# Patient Record
Sex: MALE | Race: WHITE | NOT HISPANIC OR LATINO | Employment: OTHER | URBAN - METROPOLITAN AREA
[De-identification: names, ages, dates, MRNs, and addresses within clinical notes are randomized per-mention and may not be internally consistent; named-entity substitution may affect disease eponyms.]

---

## 2018-03-04 NOTE — PROGRESS NOTES
"[facility] Urology Consult:  Consult from: Dr Lugo/LISA Deluna ([facility])  Consult for: BPH    Phoenix Fragoso is a 57 y.o. male who presents for evaluation of BPH/LUTS at the referral of Dr. Lugo and LISA Deluna, his primary care team    He most recently saw LISA Deluna on 1/15/18, and reported nocturia increasing to 5-6 times nightly.  He had been on Flomax but felt that become less effective.  Terazosin 5 mg was added and he was referred for further evaluation.  He was also noted at that visit to have some bilateral lower extremity edema for a few weeks.  He had been on a calcium channel blocker/ACE inhibitor combination drug (lotrel) for his hypertension.  This was discontinued and he was started on benazepril. Also noted to have hyperlipidemia, hypothyroidism, anxiety (alprazolam/celexa).  He was also given 5mg cialis.    He did see Dr Pitts in 6/2014 for ED after  from his wife of 33 years. He reported ability to have erections but was unable to ejaculate.  History of urological symptoms included decrease force of his urinary stream suggestive slightly more than a trickle not feeling empty postvoid, nocturia x2, daytime frequency every hour.  No hematuria burning on urination or urinary tract infection or stone disease. 30g benign JAY. Given flomax 0.4 daily and prn Cialis 20mg    He presents today reporting nocturia every 1.5 hours at night. 4-5x.  Also endorses significant hesitancy, with intermittency, + pv dribble.  + urgency with just less than half of voids, no UUI   Some mild weakening of stream - partner asserts she heard a good flow, but he notes he "cant pee over the side of the boat anymore"  Still on tamsulosin, not taking terazosin  Brother prostate cancer - diagnosed at age 54.  Thinks its been a year since his lat psa but always remembers them being normal  AUA SS: 27/4, meds help 3/10 - 4: Emptying, frequency, intermittency, urgency, straining, nocturia; 3: Weak stream  He drinks one diet Coke " every morning, no coffee, rare green tea.  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  With his prescription of Cialis 5 mg, he uses one half tablet, 2.5 mg, as when necessary on demand dosing which he does note helps his erections     Past Medical History:   Diagnosis Date    Hypertension        Past Surgical History:   Procedure Laterality Date    APPENDECTOMY      CHOLECYSTECTOMY      right knee arthroscopy         History reviewed. No pertinent family history.    Social History     Social History    Marital status:      Spouse name: N/A    Number of children: N/A    Years of education: N/A     Occupational History    Not on file.     Social History Main Topics    Smoking status: Never Smoker    Smokeless tobacco: Never Used    Alcohol use Yes    Drug use: No    Sexual activity: Yes     Partners: Female     Other Topics Concern    Not on file     Social History Narrative    No narrative on file       Review of patient's allergies indicates:  No Known Allergies    Medications Reviewed: see MAR    ROS:    Constitutional: denies fevers, chills, night sweats, fatigue, malaise  Respiratory: negative for cough, shortness of breath, wheezing, dyspnea.  Cardiovascular: + for high blood pressure, negative for chest pain, varicose veins, ankle swelling, palpitations, syncope.  GI: negative for abdominal pain, heartburn, indigestion, nausea, vomiting, constipation, diarrhea, blood in stool.   Urology: as noted above in HPI  Endocrinology: negative for cold intolerance, excessive thirst, not feeling tired/sluggish, no heat intolerance.   Hematology/Lymph: negative for easy bleeding, easy bruising, swollen glands.  Musculoskeletal: negative for back pain, joint pain, joint swelling, neck pain.  Allergy-Immunology: negative for seasonal allergies, negative for unusual infections.   Skin: negative for boils, breast lumps, hives, itching, rash.  "  Neurology: negative for, dizziness, headache, tingling/numbness, tremors.   Psych: satisfied with life; negative for, anxiety, depression, suicidal thoughts.     PHYSICAL EXAM:    Vitals:    03/05/18 1510   BP: (!) 169/106   Pulse: 68   Resp: 18     Body mass index is 23.75 kg/m². Weight: 81.6 kg (180 lb) Height: 6' 1" (185.4 cm)       General: Alert, cooperative, no distress, appears stated age  Head: Normocephalic, without obvious abnormality, atraumatic  Neck: no masses, no thyromegaly, no lymphadenopathy  Eyes: PERRL, conjunctiva/corneas clear  Lungs: Respirations unlabored, normal effort, no accessory muscle use  CV: Warm and well perfused extremities  Abdomen: Soft, non-tender, no CVA tenderness, no hepatosplenomegaly, no hernia  Penis: phallus normal, circumcised, well cared for, no plaques or lesions.   Scrotum: no cysts, no lesions, no rash, no hydrocele.   Epididymes: normal, nontender, symmetrical, no masses or cysts.   Testes: normal, both descended, no masses.   Urethra: palpably normal with orthotopic meatus of normal size    JAY: normal sphincter tone, no masses, no hemmorrhoids   PROSTATE: 40g, no nodules, smooth, non-tender, symmetrical.   Extremities: Extremities normal, atraumatic, no cyanosis or edema  Skin: Normal color, texture, and turgor, no rashes or lesions  Psych: Appropriate, well oriented, normal affect, normal mood  Neuro: Non-focal    Assessment/Diagnosis:    1. BPH with obstruction/lower urinary tract symptoms  POCT URINE DIPSTICK WITHOUT MICROSCOPE    POCT Bladder Scan    Case Request Operating Room: TRANSRECTAL ULTRASOUND GUIDED PROSTATE BIOPSY, CYSTOSCOPY    Place in Outpatient    Vital Signs     Activity as tolerated   2. Prostate cancer screening  PSA, Screening   3. Elevated PSA         Plans:    We did have a long discussion regarding his severe lower urinary tract symptoms in the setting of enlarged prostate.  He has failed primary therapy with alpha blockers.  We did " discuss that the addition of terazosin to tamsulosin should have no added benefit as they both have similar mechanisms of action.  We did discuss the combination medical therapy is often with 1 alpha-blocker and 15 alpha reductase inhibitor should've prostate be determined to be greater than 40 g with persistent lower urinary tract symptoms.  He would prefer not to be on any medications, though at this point would like the best symptomatic resolution.  We did discuss in the setting of the severity of his LUTS, with enlarged prostate for his age, especially in spite of alpha blockers, further evaluation of his lower urinary tract with cystourethroscopy to determine level of prostatic or lower urinary tract obstruction and evaluate bladder, with concurrent transrectal ultrasound for accurate prostatic volumetric measurement.  Both of these procedures were described in detail and all questions answered, and cystoscopy/truss planned for 3/13/18.  Prior to this, I did advise the patient that I will have his previous PSA history sent over from Dr. Lugo's office for review and as the patient believes it has been one year since his last PSA, I will have him get a PSA today and chart check the results on the way out.  We did have a brief discussion about PSA and prostate cancer diagnostics especially age adjusted elevations in PSA.  Given his family history of first-degree relative, brother, being diagnosed with prostate cancer in his early 50s, should he have any elevation of his PSA or concern on PSA trend upon review of previous results, could add on prostate biopsy to his TRUS/cystoscopy and we will chart check results and discussed with patient.    *Before closing this encounter, I was able to review his PSA of 4.9 from date of visit and did speak with the patient to change procedure to prostate biopsy and cystoscopy.  All risks and benefits of prostate biopsy were discussed with patient via the telephone as well as  preparatory instructions, which were then mailed to the patient as well. -->  Cystoscopy and prostate biopsy at [facility] 3/13/18.

## 2018-03-05 ENCOUNTER — OFFICE VISIT (OUTPATIENT)
Dept: UROLOGY | Facility: CLINIC | Age: 58
End: 2018-03-05
Payer: COMMERCIAL

## 2018-03-05 ENCOUNTER — LAB VISIT (OUTPATIENT)
Dept: LAB | Facility: HOSPITAL | Age: 58
End: 2018-03-05
Attending: UROLOGY
Payer: COMMERCIAL

## 2018-03-05 VITALS
HEIGHT: 73 IN | DIASTOLIC BLOOD PRESSURE: 106 MMHG | WEIGHT: 180 LBS | SYSTOLIC BLOOD PRESSURE: 169 MMHG | RESPIRATION RATE: 18 BRPM | HEART RATE: 68 BPM | BODY MASS INDEX: 23.86 KG/M2

## 2018-03-05 DIAGNOSIS — N13.8 BPH WITH OBSTRUCTION/LOWER URINARY TRACT SYMPTOMS: Primary | ICD-10-CM

## 2018-03-05 DIAGNOSIS — Z12.5 PROSTATE CANCER SCREENING: ICD-10-CM

## 2018-03-05 DIAGNOSIS — R97.20 ELEVATED PSA: ICD-10-CM

## 2018-03-05 DIAGNOSIS — N40.1 BPH WITH OBSTRUCTION/LOWER URINARY TRACT SYMPTOMS: Primary | ICD-10-CM

## 2018-03-05 LAB — COMPLEXED PSA SERPL-MCNC: 4.9 NG/ML

## 2018-03-05 PROCEDURE — 99244 OFF/OP CNSLTJ NEW/EST MOD 40: CPT | Mod: S$GLB,,, | Performed by: UROLOGY

## 2018-03-05 PROCEDURE — 99999 PR PBB SHADOW E&M-NEW PATIENT-LVL IV: CPT | Mod: PBBFAC,,, | Performed by: UROLOGY

## 2018-03-05 PROCEDURE — 84153 ASSAY OF PSA TOTAL: CPT

## 2018-03-05 PROCEDURE — 36415 COLL VENOUS BLD VENIPUNCTURE: CPT

## 2018-03-05 RX ORDER — GENTAMICIN SULFATE 40 MG/ML
80 INJECTION, SOLUTION INTRAMUSCULAR; INTRAVENOUS ONCE
Status: CANCELLED | OUTPATIENT
Start: 2018-03-13

## 2018-03-05 RX ORDER — TERAZOSIN 5 MG/1
5 CAPSULE ORAL DAILY
Refills: 2 | Status: ON HOLD | COMMUNITY
Start: 2018-01-16 | End: 2018-03-13 | Stop reason: HOSPADM

## 2018-03-05 RX ORDER — TADALAFIL 5 MG/1
TABLET, FILM COATED ORAL
Refills: 5 | COMMUNITY
Start: 2018-02-24 | End: 2020-02-03 | Stop reason: SDUPTHER

## 2018-03-05 RX ORDER — LORAZEPAM 2 MG/1
1 TABLET ORAL DAILY
Refills: 2 | COMMUNITY
Start: 2018-02-20 | End: 2020-03-12 | Stop reason: SDUPTHER

## 2018-03-05 RX ORDER — CITALOPRAM 10 MG/1
10 TABLET ORAL DAILY
Refills: 0 | COMMUNITY
Start: 2018-01-15 | End: 2019-04-01 | Stop reason: ALTCHOICE

## 2018-03-05 RX ORDER — GEMFIBROZIL 600 MG/1
300 TABLET, FILM COATED ORAL DAILY
Refills: 0 | COMMUNITY
Start: 2018-01-18 | End: 2020-07-07

## 2018-03-05 RX ORDER — LIDOCAINE HYDROCHLORIDE 10 MG/ML
10 INJECTION, SOLUTION EPIDURAL; INFILTRATION; INTRACAUDAL; PERINEURAL ONCE
Status: CANCELLED | OUTPATIENT
Start: 2018-03-05 | End: 2018-03-05

## 2018-03-05 RX ORDER — LEVOTHYROXINE SODIUM 175 UG/1
175 TABLET ORAL DAILY
Refills: 1 | COMMUNITY
Start: 2018-01-15 | End: 2018-09-24

## 2018-03-05 RX ORDER — BENAZEPRIL HYDROCHLORIDE 20 MG/1
20 TABLET ORAL DAILY
Refills: 2 | COMMUNITY
Start: 2018-02-12 | End: 2019-04-01 | Stop reason: ALTCHOICE

## 2018-03-05 NOTE — LETTER
March 11, 2018      Walker Lugo MD  [address]  [address]  [facility]  [place] [address]           [place] [facility]  [address] [place] [address] [address]  [place] [address]  Phone: [phone]  Fax: [phone]          Patient: Phoenix Fragoso   MR Number: [id number]   YOB: 1960   Date of Visit: 3/5/2018       Dear Dr. Walker Lugo:    Thank you for referring Phoenix Fragoso to me for evaluation. Attached you will find relevant portions of my assessment and plan of care.    If you have questions, please do not hesitate to call me. I look forward to following Phoenix Fragoso along with you.    Sincerely,    Walker Brooks MD    Enclosure  CC:  Christian Deluna PA-C    If you would like to receive this communication electronically, please contact [email or web address] or [phone] to request more information on [employer] Link access.    For providers and/or their staff who would like to refer a patient to [facility], please contact us through our one-stop-shop provider referral line, [facility], at [phone].    If you feel you have received this communication in error or would no longer like to receive these types of communications, please e-mail [email or web address]

## 2018-03-07 ENCOUNTER — TELEPHONE (OUTPATIENT)
Dept: UROLOGY | Facility: CLINIC | Age: 58
End: 2018-03-07

## 2018-03-07 NOTE — TELEPHONE ENCOUNTER
Spoke with Pt. Pt stated he called Dr Avery office to check to see if PSA labs were sent to Dr Brooks. Pt notified we received his 2 lab results. Pt stated Dr Brooks will do Prostate biopsy on 3/13/18. Pt scheduled for 2 wk f/u for results on 3/28/18. Instructed pt on biospy prep. Dr Brooks to send abx to pt's pharmacy for Monday. Pt has stopped blood thinners as ordered. Copy of instruction sheet was mailed to pt already. Pt verbalized understanding.

## 2018-03-11 ENCOUNTER — TELEPHONE (OUTPATIENT)
Dept: UROLOGY | Facility: CLINIC | Age: 58
End: 2018-03-11

## 2018-03-12 ENCOUNTER — TELEPHONE (OUTPATIENT)
Dept: UROLOGY | Facility: CLINIC | Age: 58
End: 2018-03-12

## 2018-03-12 NOTE — TELEPHONE ENCOUNTER
----- Message from Deanna Duron sent at 3/12/2018 10:38 AM CDT -----    Calling  To  Speak  To the  Nurse  About    The  Procedure  Tomorrow  And   Needs an  antibiotic  ,  Before   Procedure // please call   [phone]  [facility] [place], - [place] - [address]  [address] [place] [address]  Phone: [phone] Fax: [phone]

## 2018-03-12 NOTE — TELEPHONE ENCOUNTER
Spoke with pt. Pt notified that his Cipro abx will be called into his pharmacy. Pt wanted it to be called into [facility] pharmacy. Pt advised to start right away. Pt needs to take 2 doses today. Pt verbalized understanding.    Spoke with Lorena at [facility]'s pharmacy. Cipro 500mg po bid x 3 days/disp 6 tablets. Pt will be called as soon as his script is filled.

## 2018-03-13 ENCOUNTER — HOSPITAL ENCOUNTER (OUTPATIENT)
Facility: AMBULARY SURGERY CENTER | Age: 58
Discharge: HOME | End: 2018-03-13
Attending: UROLOGY | Admitting: UROLOGY
Payer: COMMERCIAL

## 2018-03-13 ENCOUNTER — SURGERY (OUTPATIENT)
Age: 58
End: 2018-03-13

## 2018-03-13 DIAGNOSIS — R97.20 ELEVATED PSA: ICD-10-CM

## 2018-03-13 DIAGNOSIS — N13.8 BPH WITH OBSTRUCTION/LOWER URINARY TRACT SYMPTOMS: ICD-10-CM

## 2018-03-13 DIAGNOSIS — N40.1 BPH WITH OBSTRUCTION/LOWER URINARY TRACT SYMPTOMS: ICD-10-CM

## 2018-03-13 PROCEDURE — 55700 PR BIOPSY OF PROSTATE,NEEDLE/PUNCH: CPT | Mod: ,,, | Performed by: UROLOGY

## 2018-03-13 PROCEDURE — 76872 US TRANSRECTAL: CPT | Performed by: UROLOGY

## 2018-03-13 PROCEDURE — 88305 TISSUE EXAM BY PATHOLOGIST: CPT | Performed by: PATHOLOGY

## 2018-03-13 PROCEDURE — 88342 IMHCHEM/IMCYTCHM 1ST ANTB: CPT | Mod: 26,,, | Performed by: PATHOLOGY

## 2018-03-13 PROCEDURE — 76942 ECHO GUIDE FOR BIOPSY: CPT | Mod: 26,59,, | Performed by: UROLOGY

## 2018-03-13 PROCEDURE — 88341 IMHCHEM/IMCYTCHM EA ADD ANTB: CPT | Mod: 26,,, | Performed by: PATHOLOGY

## 2018-03-13 PROCEDURE — 55700 HC PROSTATE NEEDLE BIOPSY: CPT | Performed by: UROLOGY

## 2018-03-13 PROCEDURE — 76872 US TRANSRECTAL: CPT | Mod: 26,,, | Performed by: UROLOGY

## 2018-03-13 PROCEDURE — 52000 CYSTOURETHROSCOPY: CPT | Mod: 59,,, | Performed by: UROLOGY

## 2018-03-13 PROCEDURE — 52000 CYSTOURETHROSCOPY: CPT | Performed by: UROLOGY

## 2018-03-13 RX ORDER — GENTAMICIN SULFATE 40 MG/ML
80 INJECTION, SOLUTION INTRAMUSCULAR; INTRAVENOUS ONCE
Status: COMPLETED | OUTPATIENT
Start: 2018-03-13 | End: 2018-03-13

## 2018-03-13 RX ORDER — WATER 1 ML/ML
IRRIGANT IRRIGATION
Status: DISCONTINUED | OUTPATIENT
Start: 2018-03-13 | End: 2018-03-13 | Stop reason: HOSPADM

## 2018-03-13 RX ORDER — LIDOCAINE HYDROCHLORIDE 10 MG/ML
10 INJECTION, SOLUTION EPIDURAL; INFILTRATION; INTRACAUDAL; PERINEURAL ONCE
Status: COMPLETED | OUTPATIENT
Start: 2018-03-13 | End: 2018-03-13

## 2018-03-13 RX ORDER — LIDOCAINE HYDROCHLORIDE 20 MG/ML
JELLY TOPICAL
Status: DISCONTINUED | OUTPATIENT
Start: 2018-03-13 | End: 2018-03-13 | Stop reason: HOSPADM

## 2018-03-13 RX ADMIN — LIDOCAINE HYDROCHLORIDE 140 MG: 10 INJECTION, SOLUTION EPIDURAL; INFILTRATION; INTRACAUDAL; PERINEURAL at 01:03

## 2018-03-13 RX ADMIN — LIDOCAINE HYDROCHLORIDE 5 ML: 20 JELLY TOPICAL at 01:03

## 2018-03-13 RX ADMIN — WATER 500 ML: 1 IRRIGANT IRRIGATION at 01:03

## 2018-03-13 RX ADMIN — GENTAMICIN SULFATE 80 MG: 40 INJECTION, SOLUTION INTRAMUSCULAR; INTRAVENOUS at 01:03

## 2018-03-13 NOTE — INTERVAL H&P NOTE
The patient has been examined and the H&P has been reviewed:    Proceed with cysto/trus biopsy as planned  Took abx, no bloodthinners, did enema  Pt is acceptable candidate for procedure at asc    Anesthesia/Surgery risks, benefits and alternative options discussed and understood by patient/family.          Active Hospital Problems    Diagnosis  POA    Elevated PSA [R97.20]  Yes      Resolved Hospital Problems    Diagnosis Date Resolved POA   No resolved problems to display.

## 2018-03-13 NOTE — H&P (VIEW-ONLY)
"[facility] Urology Consult:  Consult from: Dr Luog/LISA Deluna ([facility])  Consult for: BPH    Phoenix Fragoso is a 57 y.o. male who presents for evaluation of BPH/LUTS at the referral of Dr. Lugo and LISA Deluna, his primary care team    He most recently saw LISA Deluna on 1/15/18, and reported nocturia increasing to 5-6 times nightly.  He had been on Flomax but felt that become less effective.  Terazosin 5 mg was added and he was referred for further evaluation.  He was also noted at that visit to have some bilateral lower extremity edema for a few weeks.  He had been on a calcium channel blocker/ACE inhibitor combination drug (lotrel) for his hypertension.  This was discontinued and he was started on benazepril. Also noted to have hyperlipidemia, hypothyroidism, anxiety (alprazolam/celexa).  He was also given 5mg cialis.    He did see Dr Pitts in 6/2014 for ED after  from his wife of 33 years. He reported ability to have erections but was unable to ejaculate.  History of urological symptoms included decrease force of his urinary stream suggestive slightly more than a trickle not feeling empty postvoid, nocturia x2, daytime frequency every hour.  No hematuria burning on urination or urinary tract infection or stone disease. 30g benign JAY. Given flomax 0.4 daily and prn Cialis 20mg    He presents today reporting nocturia every 1.5 hours at night. 4-5x.  Also endorses significant hesitancy, with intermittency, + pv dribble.  + urgency with just less than half of voids, no UUI   Some mild weakening of stream - partner asserts she heard a good flow, but he notes he "cant pee over the side of the boat anymore"  Still on tamsulosin, not taking terazosin  Brother prostate cancer - diagnosed at age 54.  Thinks its been a year since his lat psa but always remembers them being normal  AUA SS: 27/4, meds help 3/10 - 4: Emptying, frequency, intermittency, urgency, straining, nocturia; 3: Weak stream  He drinks one diet Coke " every morning, no coffee, rare green tea.  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  With his prescription of Cialis 5 mg, he uses one half tablet, 2.5 mg, as when necessary on demand dosing which he does note helps his erections     Past Medical History:   Diagnosis Date    Hypertension        Past Surgical History:   Procedure Laterality Date    APPENDECTOMY      CHOLECYSTECTOMY      right knee arthroscopy         History reviewed. No pertinent family history.    Social History     Social History    Marital status:      Spouse name: N/A    Number of children: N/A    Years of education: N/A     Occupational History    Not on file.     Social History Main Topics    Smoking status: Never Smoker    Smokeless tobacco: Never Used    Alcohol use Yes    Drug use: No    Sexual activity: Yes     Partners: Female     Other Topics Concern    Not on file     Social History Narrative    No narrative on file       Review of patient's allergies indicates:  No Known Allergies    Medications Reviewed: see MAR    ROS:    Constitutional: denies fevers, chills, night sweats, fatigue, malaise  Respiratory: negative for cough, shortness of breath, wheezing, dyspnea.  Cardiovascular: + for high blood pressure, negative for chest pain, varicose veins, ankle swelling, palpitations, syncope.  GI: negative for abdominal pain, heartburn, indigestion, nausea, vomiting, constipation, diarrhea, blood in stool.   Urology: as noted above in HPI  Endocrinology: negative for cold intolerance, excessive thirst, not feeling tired/sluggish, no heat intolerance.   Hematology/Lymph: negative for easy bleeding, easy bruising, swollen glands.  Musculoskeletal: negative for back pain, joint pain, joint swelling, neck pain.  Allergy-Immunology: negative for seasonal allergies, negative for unusual infections.   Skin: negative for boils, breast lumps, hives, itching, rash.  "  Neurology: negative for, dizziness, headache, tingling/numbness, tremors.   Psych: satisfied with life; negative for, anxiety, depression, suicidal thoughts.     PHYSICAL EXAM:    Vitals:    03/05/18 1510   BP: (!) 169/106   Pulse: 68   Resp: 18     Body mass index is 23.75 kg/m². Weight: 81.6 kg (180 lb) Height: 6' 1" (185.4 cm)       General: Alert, cooperative, no distress, appears stated age  Head: Normocephalic, without obvious abnormality, atraumatic  Neck: no masses, no thyromegaly, no lymphadenopathy  Eyes: PERRL, conjunctiva/corneas clear  Lungs: Respirations unlabored, normal effort, no accessory muscle use  CV: Warm and well perfused extremities  Abdomen: Soft, non-tender, no CVA tenderness, no hepatosplenomegaly, no hernia  Penis: phallus normal, circumcised, well cared for, no plaques or lesions.   Scrotum: no cysts, no lesions, no rash, no hydrocele.   Epididymes: normal, nontender, symmetrical, no masses or cysts.   Testes: normal, both descended, no masses.   Urethra: palpably normal with orthotopic meatus of normal size    JAY: normal sphincter tone, no masses, no hemmorrhoids   PROSTATE: 40g, no nodules, smooth, non-tender, symmetrical.   Extremities: Extremities normal, atraumatic, no cyanosis or edema  Skin: Normal color, texture, and turgor, no rashes or lesions  Psych: Appropriate, well oriented, normal affect, normal mood  Neuro: Non-focal    Assessment/Diagnosis:    1. BPH with obstruction/lower urinary tract symptoms  POCT URINE DIPSTICK WITHOUT MICROSCOPE    POCT Bladder Scan    Case Request Operating Room: TRANSRECTAL ULTRASOUND GUIDED PROSTATE BIOPSY, CYSTOSCOPY    Place in Outpatient    Vital Signs     Activity as tolerated   2. Prostate cancer screening  PSA, Screening   3. Elevated PSA         Plans:    We did have a long discussion regarding his severe lower urinary tract symptoms in the setting of enlarged prostate.  He has failed primary therapy with alpha blockers.  We did " discuss that the addition of terazosin to tamsulosin should have no added benefit as they both have similar mechanisms of action.  We did discuss the combination medical therapy is often with 1 alpha-blocker and 15 alpha reductase inhibitor should've prostate be determined to be greater than 40 g with persistent lower urinary tract symptoms.  He would prefer not to be on any medications, though at this point would like the best symptomatic resolution.  We did discuss in the setting of the severity of his LUTS, with enlarged prostate for his age, especially in spite of alpha blockers, further evaluation of his lower urinary tract with cystourethroscopy to determine level of prostatic or lower urinary tract obstruction and evaluate bladder, with concurrent transrectal ultrasound for accurate prostatic volumetric measurement.  Both of these procedures were described in detail and all questions answered, and cystoscopy/truss planned for 3/13/18.  Prior to this, I did advise the patient that I will have his previous PSA history sent over from Dr. Lugo's office for review and as the patient believes it has been one year since his last PSA, I will have him get a PSA today and chart check the results on the way out.  We did have a brief discussion about PSA and prostate cancer diagnostics especially age adjusted elevations in PSA.  Given his family history of first-degree relative, brother, being diagnosed with prostate cancer in his early 50s, should he have any elevation of his PSA or concern on PSA trend upon review of previous results, could add on prostate biopsy to his TRUS/cystoscopy and we will chart check results and discussed with patient.    *Before closing this encounter, I was able to review his PSA of 4.9 from date of visit and did speak with the patient to change procedure to prostate biopsy and cystoscopy.  All risks and benefits of prostate biopsy were discussed with patient via the telephone as well as  preparatory instructions, which were then mailed to the patient as well. -->  Cystoscopy and prostate biopsy at [facility] 3/13/18.

## 2018-03-13 NOTE — DISCHARGE INSTRUCTIONS
After your prostate biopsy    Avoid sexual activity,lifting, strenuous physical activity or exertion for 3 days     No riding mowers, tractors, bicycles, motorcycles for 2-3 weeks    You may experience blood in your urine or stool for up to 2 weeks and in your semen for up to 6 weeks.  This is a normal side effect of the procedure and will resolve.    Drink plenty of water    Take antibiotics as prescribed    If you experience any of the following conditions, please return immediately to the clinic (during office hrs) or the Emergency Room if after hours:       Fever       Inabiltiy to urinate       Severe bleeding    You may resume aspirin, anti inflammatory and blood thinners in 3 days    Results take at minimum 10 business days.  A 2 week follow up will be scheduled to review pathology reports in the clinic    During office hours, please call  and ask to speak with the nurse if you have any questions.  If after hours, call the [facility] On [facility] # to be connectied t o the doctor on call             After the procedure    · Drink plenty of fluids.  · You may have burning or light bleeding when you urinate--this is normal.  · Medications may be prescribed to ease any discomfort or prevent infection. Take these as directed.  · Call your doctor if you have heavy bleeding or blood clots, burning that lasts more than a day, a fever over 100°F  (38° C), or trouble urinating.

## 2018-03-13 NOTE — PLAN OF CARE
Stable, states ready to go home, chris po fluids, pain tolerable, talked to Dr Brooks with wife present, ambuated to car with wife

## 2018-03-14 NOTE — OP NOTE
[facility] Urology Operative/Brief Discharge Note     Date: 3/13/18     Staff Surgeon: Walker Brooks MD     Pre-Op Diagnosis:   1. Elevated psa   2. BPH/LUTS refractory to medical management     Post-Op Diagnosis: same     Procedure(s) Performed:   1. Transrectal ultrasound guided prostate needle biopsy  2. Cystoscopy (flexible)     INDICATION FOR PROCEDURE:   56 yo M with severe LUTS despite daily flomax (AUA SS 27/4) as well as some urgency. Also noted to have screening psa 4.9 (with prior psas 3.5 in 2016 and 3.2 in 2015).    ANESTHESIA: Local periprostatic block; 10 cc 1% lidocaine, and urojet 2% xylocaine per urethra     PSA:  4.9     TRUS VOLUME: 46.7cc  (W 48.1, H 31.1, L 59.7)     EBL: Minimal     SPECIMEN:   14 Prostate Biopsy Cores: right and left base, apex, and mid - medial and lateral of each - as well as right and left transition zone.      ULTRASOUND FINDINGS: mild hypoechoic areas, normal SVs, + median lobe.     CYSTO FINDINGS: kissing lateral lobe obstruction with significant lateral lobe ingrowth and high grade prostatic urethral obstruction as well as moderate median lobe with anterior ballvalving projection     CONFIRMED PATIENT TOOK ANTIBIOTICS: Yes     CONFIRMED PATIENT NOT TAKING ASPIRIN OR ANTICOAGULANTS: Yes     CONFIRMED PATIENT USED ENEMA: Yes     PROCEDURE IN DETAIL:  After informed consent, the patient was prepped and drapped in standard  cystoscopic fashion and 2% xylocaine jelly was instilled into the urethra. 80mg gentamicin injected IM.     First, a flexible cystoscope was passed into the bladder via the urethra.   Anterior urethra appeared normal without any lesions or narrowings.   The prostatic urethra demonstrated findings as above.    The bladder was then systematically inspected. The ureteral orifices were in the orthotopic position on the trigone with clear efflux bilaterally, seen only on retroflexion, with median lobe projection extending towards trigone. The bladder mucosa,  including the lateral walls, posterior wall, and dome were normal in appearance and free of any lesions or tumors. Did have faint mild grade 1 trabeculations.  Retroflexion noted median lobe as above. Flexible cystoscope then removed.      Patient voided into urinal, and was then turned to the left lateral position and TRUS probe inserted into rectum. Ultrasound measurements taken as above and ultrasound of prostate performed with findings as above. Approximately 10cc of 1% lidocaine injected bilaterally in eder-prostatic block fashion, as well as at the apex.   14 core biopies taken in a sextant fashion, as described in specimens as above.   standard 12 core biopsy template, with the addition of  transition zones. Patient tolerated well without complication.     CONDITION: Stable     DISHARGE:  Status post uncomplicated outpatient procedure as above.   Disposition: Home.  He will follow up in 2 weeks for biopsy results.   Should his biopsy be benign, it would be reasonable to discuss management of his prostatic obstruction, such as TURP.  Resume regular diet  FU 2 weeks for biopsy results  Return to ER if temp >101, uncontrollable urethral or rectal bleeding, or inability to urinate/urinary retention  No sex/ejaculation x3 days, no riding mowers/tractors/bikes x2-4 weeks  Drink plenty of water may see blood in urine

## 2018-03-15 VITALS
OXYGEN SATURATION: 96 % | DIASTOLIC BLOOD PRESSURE: 100 MMHG | HEIGHT: 73 IN | HEART RATE: 72 BPM | RESPIRATION RATE: 20 BRPM | BODY MASS INDEX: 23.84 KG/M2 | TEMPERATURE: 99 F | WEIGHT: 179.88 LBS | SYSTOLIC BLOOD PRESSURE: 152 MMHG

## 2018-03-25 NOTE — PROGRESS NOTES
"[facility] Urology Progress Note    Phoenix Fragoso is a 57 y.o. male who presents for follow up of BPH and elevated PSA, s/p cysto and prostate biopsy on 3/13/18.    He had severe LUTS despite daily flomax (AUA SS 27/4) as well as some urgency. Also noted to have screening psa 4.9 (with prior psas 3.5 in 2016 and 3.2 in 2015).      TRUS VOLUME: 46.7cc  (W 48.1, H 31.1, L 59.7)   ULTRASOUND FINDINGS: mild hypoechoic areas, normal SVs, + median lobe.   CYSTO FINDINGS: kissing lateral lobe obstruction with significant lateral lobe ingrowth and high grade prostatic urethral obstruction as well as moderate median lobe with anterior ballvalving projection  PATHOLOGY:  1. Prostate, right base lateral, biopsy:  Benign prostatic tissue.  2. Prostate, right base medial, biopsy:  Adenocarcinoma, [place] grade 3+3 = 6, measuring 2.5 mm and occupying 25% of one of one core(s).  Negative for perineural invasion.  Negative for extraprostatic extension.  3-14. Prostate biopsies:  Benign prostatic tissue    He did well after the biopsy without fever or chills, no significant pain, and resolution of expected hematuria        ROS: A comprehensive 10 system review was performed and is negative except as noted above in HPI    PHYSICAL EXAM:    Vitals:    03/28/18 1355   BP: (!) 139/90   Pulse: 81   Resp: 18   Temp: 98 °F (36.7 °C)     Body mass index is 23.85 kg/m². Weight: 82 kg (180 lb 12.4 oz) Height: 6' 1" (185.4 cm)       General: Alert, cooperative, no distress, appears stated age   Head: Normocephalic, without obvious abnormality, atraumatic   Eyes: PERRL, conjunctiva/corneas clear   Lungs: Respirations unlabored   Heart: Warm and well perfused   Abdomen: soft NT ND   Extremities: Extremities normal, atraumatic, no cyanosis or edema   Skin: Skin color, texture, turgor normal, no rashes or lesions   Psych: Appropriate   Neurologic: Non-focal         ASSESSMENT   1. Malignant neoplasm of prostate  Prostate Specific Antigen, Diagnostic "   2. BPH with obstruction/lower urinary tract symptoms         Plan    I sat with the patient and explained in detail his diagnosis of prostate cancer, including explanation of dayana grading system, and went over all the treatment options for management of his prostate cancer. The treatment options include detailed discussions of surveillance, radiation treatment including external beam as well as brachytherapy, and surgical extirpative therapy namely robotic prostatectomy. Given his low volume of 1 core of dayana 6, we did discuss that with is very low risk prostate cancer he is a candidate for active surveillance. We also briefly discussed risks and benefits of treatment options. Risks of surgery were discussed including but not limited to up-front urinary incontinence and erectile dysfunction, versus side effects of radiation which are often minimal and irritative upfront with more troubling complications such as stricture and radiation cystitis occurring late.     We discussed radical prostatectomy. The procedure in general including hospital stay and postoperative process were discussed in detail. Risks of surgery were discussed including but not limited to up-front urinary incontinence and erectile dysfunction which we will work to overcome with kegel excercises and any number of ED therapies, versus side effects of radiation which are often minimal and irritative upfront with more troubling complications such as stricture and radiation cystitis occurring late. We also briefly discussed psa monitoring post-treatment and had brief discussion about psa recurrence, noting radiation could be used as salvage therapy after surgery but not often vice versa. We discussed concurrent pelvic lymphadenectomy with surgery, and that sometimes lymph nodes are included in radiation fields dependent on risk. Also discussed concurrent use of ADT with radiation and its side effects such as fatigue, hot flashes, ED.     In  discussion of active surveillance I explained that after Q6 month psa and rebiopsy at 1 year, we could use combination of MRI and biopsy, often alternating annually, to follow him, as well as monitoring psa, re-biopsying at minimum every 2 years or as indicated by psa or MRI lesion, in which case cognitive fusion would be used for his biopsy. If biopsy at 1 year consistent with current pathology, would use MRI the following year.   In this patient, in his 50s who also has bothersome lower urinary tract symptoms, we did also discuss that prostatectomy would be reasonable given the multiple years of active surveillance procedures should he not be found to have any upgrading, as well as obstructive LUTS due to trilobar enlarged prostate with median lobe - an obstruction which could be relieved with prostatectomy thus yielding resolution of his bothersome LUTS upon regaining continence.    He and his wife had several good questions which were all answered in detail. At this time, after extensive discussion, he has elected to proceed with active surveillance, and will RTC in 6 monthd. I also provided a copy of the [facility] patient guide to Localized Prostate Cancer and encouraged he to read through the materials provided and make notes with any questions. In the interim, he does still have persistent LUTS. We discussed adding finasteride vs doubling his flomax and the risks and benefits of both options, including the potential for finding higher grade prostate cancer with 5ari use though it is unclear if this is true upon already having diagnosis of low grade CaP. At this time he would like to try doubling flomax so renewed at 0.8mg daily. May consider finasteride in future, though again with large median lobe, discussed that gland shrinkage usually not achieved of this central obstructing component. We did briefly discuss the safety of TURP in the setting of followed known low grade prostate cancer,  as although prostatectomy would resolve both cancer and obstructive symptoms, oncologic control may not be compromised with bph interventions if closely monitored, nor would it preclude (just increase risk) of future CaP treatment.    RTC 6 mos with psa prior.

## 2018-03-28 ENCOUNTER — OFFICE VISIT (OUTPATIENT)
Dept: UROLOGY | Facility: CLINIC | Age: 58
End: 2018-03-28
Payer: COMMERCIAL

## 2018-03-28 VITALS
WEIGHT: 180.75 LBS | SYSTOLIC BLOOD PRESSURE: 139 MMHG | HEIGHT: 73 IN | RESPIRATION RATE: 18 BRPM | HEART RATE: 81 BPM | DIASTOLIC BLOOD PRESSURE: 90 MMHG | BODY MASS INDEX: 23.96 KG/M2 | TEMPERATURE: 98 F

## 2018-03-28 DIAGNOSIS — C61 MALIGNANT NEOPLASM OF PROSTATE: Primary | ICD-10-CM

## 2018-03-28 DIAGNOSIS — N40.1 BPH WITH OBSTRUCTION/LOWER URINARY TRACT SYMPTOMS: ICD-10-CM

## 2018-03-28 DIAGNOSIS — N13.8 BPH WITH OBSTRUCTION/LOWER URINARY TRACT SYMPTOMS: ICD-10-CM

## 2018-03-28 PROCEDURE — 99214 OFFICE O/P EST MOD 30 MIN: CPT | Mod: S$GLB,,, | Performed by: UROLOGY

## 2018-03-28 PROCEDURE — 99999 PR PBB SHADOW E&M-EST. PATIENT-LVL III: CPT | Mod: PBBFAC,,, | Performed by: UROLOGY

## 2018-03-28 RX ORDER — ALPRAZOLAM 1 MG/1
1 TABLET ORAL DAILY
Refills: 2 | COMMUNITY
Start: 2018-03-12 | End: 2020-07-07

## 2018-03-28 RX ORDER — TAMSULOSIN HYDROCHLORIDE 0.4 MG/1
0.8 CAPSULE ORAL
Qty: 60 CAPSULE | Refills: 11 | Status: SHIPPED | OUTPATIENT
Start: 2018-03-28 | End: 2018-09-24 | Stop reason: SDUPTHER

## 2018-03-28 NOTE — LETTER
April 1, 2018        Walker Lugo MD  [address]  [address]  [facility]  [place] [address]             [place] [facility]  [address] [place] [address] [address]  [place] [address]  Phone: [phone]  Fax: [phone]   Patient: Phoenix Fragoso   MR Number: [id number]   YOB: 1960   Date of Visit: 3/28/2018       Dear Dr. Lugo:    Thank you for referring Phoenix Fragoso to me for evaluation. Attached you will find relevant portions of my assessment and plan of care.    If you have questions, please do not hesitate to call me. I look forward to following Phoenix Fragoso along with you.    Sincerely,      Walker Brooks MD            CC  No Recipients    Enclosure

## 2018-06-07 ENCOUNTER — PATIENT MESSAGE (OUTPATIENT)
Dept: UROLOGY | Facility: CLINIC | Age: 58
End: 2018-06-07

## 2018-09-18 ENCOUNTER — TELEPHONE (OUTPATIENT)
Dept: UROLOGY | Facility: CLINIC | Age: 58
End: 2018-09-18

## 2018-09-18 NOTE — TELEPHONE ENCOUNTER
----- Message from Leona Simmons sent at 9/18/2018  2:20 PM CDT -----  Contact: Wife Sandie  Patient is scheduled to have labs tomorrow and thought his labs should be fasting but was told that it is non fasting.  Please call Sandie back at [phone].  Thank you!

## 2018-09-19 ENCOUNTER — LAB VISIT (OUTPATIENT)
Dept: LAB | Facility: HOSPITAL | Age: 58
End: 2018-09-19
Attending: UROLOGY
Payer: COMMERCIAL

## 2018-09-19 DIAGNOSIS — C61 MALIGNANT NEOPLASM OF PROSTATE: ICD-10-CM

## 2018-09-19 LAB — COMPLEXED PSA SERPL-MCNC: 4.4 NG/ML

## 2018-09-19 PROCEDURE — 84153 ASSAY OF PSA TOTAL: CPT

## 2018-09-19 PROCEDURE — 36415 COLL VENOUS BLD VENIPUNCTURE: CPT

## 2018-09-23 NOTE — PROGRESS NOTES
[facility] Urology Progress Note    Phoenix Fragoso is a 57 y.o. male who presents for follow up of prostate cancer, on active surveillance, as well as LUTS.    Phoenix Fragoso is a 57 y.o. male referred by LISA Deluna/Dr Lugo in March 2018 for eval of elevated psa to 4.9 (previously 3.5 in 2016 and 3.2 in 2015).  He also had severe LUTS despite daily flomax (AUA SS 27/4) as well as some urgency.   4: Emptying, frequency, intermittency, urgency, straining, nocturia; 3: Weak stream -- flomax helped 3/10. + PV dribble, drinking at night.  Family history of prostate cancer in brother, age 54.  Baseline ED since 2014, though satisfactory erections with on demand use of 2.5-5mg cialis    Cytso/Prostate biopsy 3/13/18:  JAY 40g benign  TRUS VOLUME: 46.7cc (W 48.1, H 31.1, L 59.7)  ULTRASOUND FINDINGS: mild hypoechoic areas, normal SVs, + median lobe.  CYSTO FINDINGS: kissing lateral lobe obstruction with significant lateral lobe ingrowth and high grade prostatic urethral obstruction as well as moderate median lobe with anterior ballvalving projection  PATHOLOGY: 1/14 cores dayana 6 CaP: 3+3=6 in 25% RB medial    I last saw him April 2018 to discus diagnosis and after extensive review of options, elected active surveillance and increased flomax to 0.8mg daily.  He returns today noting:  PSA 9/19/18 is 4.4  Udip negative  AUA SS: 32/4 (5: emptying, frequency, intermittency, nocturia; 4: urgency, weak stream, straining) - meds help 3/10  May have to return right after urinating.  Urgency not as bothersome  Nocturia hourly at bedtime  Constipated frequently - baseline once daily but has had periods of not  NO hematuria or dysuria  No new back or bone pain      ROS: A comprehensive 10 system review was performed and is negative except as noted above in HPI    PHYSICAL EXAM:    Vitals:    09/24/18 1207   BP: 138/87   Pulse: (!) 56   Resp: 18   Temp: 97.6 °F (36.4 °C)     Body mass index is 23.85 kg/m². Weight: 82 kg (180 lb 12.4 oz)  "Height: 6' 1" (185.4 cm)       General: Alert, cooperative, no distress, appears stated age   Head: Normocephalic, without obvious abnormality, atraumatic   Eyes: PERRL, conjunctiva/corneas clear   Lungs: Respirations unlabored   Heart: Warm and well perfused   Abdomen: soft NT ND  Extremities: Extremities normal, atraumatic, no cyanosis or edema   Skin: Skin color, texture, turgor normal, no rashes or lesions   Psych: Appropriate   Neurologic: Non-focal       Recent Results (from the past 336 hour(s))   Prostate Specific Antigen, Diagnostic    Collection Time: 09/19/18 12:33 PM   Result Value Ref Range    PSA DIAGNOSTIC 4.4 (H) 0.00 - 4.00 ng/mL   POCT URINE DIPSTICK WITHOUT MICROSCOPE    Collection Time: 09/24/18 12:12 PM   Result Value Ref Range    Color, UA yellow,clear     Spec Grav UA 1.020     pH, UA 5.0     WBC, UA neg     Nitrite, UA neg     Protein neg     Glucose, UA neg     Ketones, UA neg     Urobilinogen, UA neg     Bilirubin neg     Blood, UA neg        ASSESSMENT   1. Malignant neoplasm of prostate  POCT URINE DIPSTICK WITHOUT MICROSCOPE    Prostate Specific Antigen, Diagnostic       Plan    Again had extensive discussion with patient and wife about active surveillance protocol as well as treatment options. Given his severe LUTS would not recommend radiotherapy due to exacerbation, and did again review that prostatectomy would be quite reasonable despite low grade pathology given his age in his 50s and significant LUTS with severely obstructing prostate.   Further detailed discussion about robotic prostatectomy, risks benefits, healing process etc. As well as detailed discussion of continued active surveillance including oncologic implications and implications on lower tract symptoms, including discussion of bladder decompensation from longstanding obstruction worsening urinary symptoms over time and should future prostatectomy be performed may compromise return of continence with more chronic " bladder change. Though prostatectomy has defined side effects/morbidities, would serve to manage both his prostate cancer as well as his lower tract obstruction. Discussed usual return of continence as well as recovery of impotence and details about nerve sparing procedures which are reasonable in low grade disease.    At this time he feels he can live with his urinary symptoms and is not interested in proceeding with any local therapy this time. Extensive discussion and many questions answered. Would like to plan to proceed with AS at this time and return in 6 mos with psa prior. We did discuss planning one year confirmatory biopsy at that time.  Discussed conservative measures to control urgency and frequency including avoiding bladder irritants, timed voiding, not postponing voiding, and bowel regimen (as distended bowel has extrinsic compressive effect on bladder. Discussed bladder irritants include coffe (even decaf), tea, alcohol, soda, spicy foods, acidic juices (orange, tomato), vinegar, and artificial sweeteners.  Discussed OAB meds but would like to hold off at this time. Will continue flomax 0.8mg for now.    RTC 6 mos with psa prior. In interim will consider prostatectomy and will call back to schedule if so desired or with any further questions.

## 2018-09-24 ENCOUNTER — OFFICE VISIT (OUTPATIENT)
Dept: UROLOGY | Facility: CLINIC | Age: 58
End: 2018-09-24
Payer: COMMERCIAL

## 2018-09-24 VITALS
SYSTOLIC BLOOD PRESSURE: 138 MMHG | HEART RATE: 56 BPM | HEIGHT: 73 IN | DIASTOLIC BLOOD PRESSURE: 87 MMHG | BODY MASS INDEX: 23.96 KG/M2 | RESPIRATION RATE: 18 BRPM | WEIGHT: 180.75 LBS | TEMPERATURE: 98 F

## 2018-09-24 DIAGNOSIS — C61 MALIGNANT NEOPLASM OF PROSTATE: Primary | ICD-10-CM

## 2018-09-24 LAB
BILIRUB SERPL-MCNC: NORMAL MG/DL
BLOOD URINE, POC: NORMAL
COLOR, POC UA: NORMAL
GLUCOSE UR QL STRIP: NORMAL
KETONES UR QL STRIP: NORMAL
LEUKOCYTE ESTERASE URINE, POC: NORMAL
NITRITE, POC UA: NORMAL
PH, POC UA: 5
PROTEIN, POC: NORMAL
SPECIFIC GRAVITY, POC UA: 1.02
UROBILINOGEN, POC UA: NORMAL

## 2018-09-24 PROCEDURE — 81002 URINALYSIS NONAUTO W/O SCOPE: CPT | Mod: S$GLB,,, | Performed by: UROLOGY

## 2018-09-24 PROCEDURE — 99999 PR PBB SHADOW E&M-EST. PATIENT-LVL III: CPT | Mod: PBBFAC,,, | Performed by: UROLOGY

## 2018-09-24 PROCEDURE — 99214 OFFICE O/P EST MOD 30 MIN: CPT | Mod: 25,S$GLB,, | Performed by: UROLOGY

## 2018-09-24 PROCEDURE — 3008F BODY MASS INDEX DOCD: CPT | Mod: CPTII,S$GLB,, | Performed by: UROLOGY

## 2018-09-24 RX ORDER — TAMSULOSIN HYDROCHLORIDE 0.4 MG/1
0.8 CAPSULE ORAL
Qty: 60 CAPSULE | Refills: 11 | Status: SHIPPED | OUTPATIENT
Start: 2018-09-24 | End: 2019-10-03 | Stop reason: SDUPTHER

## 2018-09-24 RX ORDER — LEVOTHYROXINE SODIUM 150 UG/1
150 TABLET ORAL DAILY
Refills: 1 | COMMUNITY
Start: 2018-08-30 | End: 2019-12-18 | Stop reason: DRUGHIGH

## 2018-09-24 NOTE — LETTER
September 28, 2018        Walker Lugo MD  [address]  [address]  [facility]  [place] [address]             [place] [facility]  [address] [place] [address] [address]  [place] [address]  Phone: [phone]  Fax: [phone]   Patient: Phoenix Fragoso   MR Number: [id number]   YOB: 1960   Date of Visit: 9/24/2018       Dear Dr. Lugo:    Thank you for referring Phoenix Fragoso to me for evaluation. Attached you will find relevant portions of my assessment and plan of care.    If you have questions, please do not hesitate to call me. I look forward to following Phoenix Fragoso along with you.    Sincerely,      Walker Brooks MD            CC  No Recipients    Enclosure

## 2018-09-24 NOTE — PATIENT INSTRUCTIONS
Discussed conservative measures to control urgency and frequency including   1. avoiding bladder irritants (see below), and increase water intake     2. timed voiding - empty on a schedule (approx ~2-3 hours)    3. dont postponing voiding - dont hold it on purpose     4. bowel regimen as distended bowel has extrinsic compressive effect on bladder.   - any or all of the following in any combination, titrate to soft daily bowel movement without pushing or straining  - colace/stool softener capsule - once to twice daily  - miralax - 1 capful daily to start, can increase to 2x daily (or decrease to 1/2 cap daily)  - increase dietary fibery  - fiber supplements, such as metamucil    Discussed bladder irritants include coffe (even decaf), tea, alcohol, soda, spicy foods, acidic juices (orange, tomato), vinegar, and artificial sweeteners/sugary beverages.

## 2019-03-27 ENCOUNTER — TELEPHONE (OUTPATIENT)
Dept: UROLOGY | Facility: CLINIC | Age: 59
End: 2019-03-27

## 2019-03-27 NOTE — TELEPHONE ENCOUNTER
----- Message from Lara Barroso sent at 3/27/2019 11:28 AM CDT -----  Contact: Self  Pt is calling to speak with Staff regarding orders to have blood work done tomorrow.    He can be reached at [phone].    Thank you.

## 2019-03-28 ENCOUNTER — LAB VISIT (OUTPATIENT)
Dept: LAB | Facility: HOSPITAL | Age: 59
End: 2019-03-28
Attending: UROLOGY
Payer: COMMERCIAL

## 2019-03-28 DIAGNOSIS — C61 MALIGNANT NEOPLASM OF PROSTATE: ICD-10-CM

## 2019-03-28 LAB — COMPLEXED PSA SERPL-MCNC: 2.8 NG/ML (ref 0–4)

## 2019-03-28 PROCEDURE — 84153 ASSAY OF PSA TOTAL: CPT

## 2019-03-28 PROCEDURE — 36415 COLL VENOUS BLD VENIPUNCTURE: CPT

## 2019-04-01 ENCOUNTER — OFFICE VISIT (OUTPATIENT)
Dept: UROLOGY | Facility: CLINIC | Age: 59
End: 2019-04-01
Payer: COMMERCIAL

## 2019-04-01 VITALS
SYSTOLIC BLOOD PRESSURE: 129 MMHG | HEART RATE: 67 BPM | BODY MASS INDEX: 25.42 KG/M2 | HEIGHT: 73 IN | DIASTOLIC BLOOD PRESSURE: 86 MMHG | WEIGHT: 191.81 LBS | RESPIRATION RATE: 18 BRPM

## 2019-04-01 DIAGNOSIS — C61 MALIGNANT NEOPLASM OF PROSTATE: Primary | ICD-10-CM

## 2019-04-01 DIAGNOSIS — R39.14 BENIGN PROSTATIC HYPERPLASIA WITH INCOMPLETE BLADDER EMPTYING: ICD-10-CM

## 2019-04-01 DIAGNOSIS — N40.1 BENIGN PROSTATIC HYPERPLASIA WITH INCOMPLETE BLADDER EMPTYING: ICD-10-CM

## 2019-04-01 LAB
BILIRUB SERPL-MCNC: NORMAL MG/DL
BLOOD URINE, POC: NORMAL
COLOR, POC UA: YELLOW
GLUCOSE UR QL STRIP: NORMAL
KETONES UR QL STRIP: NORMAL
LEUKOCYTE ESTERASE URINE, POC: NORMAL
NITRITE, POC UA: NORMAL
PH, POC UA: 7
POC RESIDUAL URINE VOLUME: 157 ML (ref 0–100)
PROTEIN, POC: NORMAL
SPECIFIC GRAVITY, POC UA: 1.01
UROBILINOGEN, POC UA: 0.2

## 2019-04-01 PROCEDURE — 99215 PR OFFICE/OUTPT VISIT, EST, LEVL V, 40-54 MIN: ICD-10-PCS | Mod: 25,S$GLB,, | Performed by: UROLOGY

## 2019-04-01 PROCEDURE — 99215 OFFICE O/P EST HI 40 MIN: CPT | Mod: 25,S$GLB,, | Performed by: UROLOGY

## 2019-04-01 PROCEDURE — 99999 PR PBB SHADOW E&M-EST. PATIENT-LVL IV: CPT | Mod: PBBFAC,,, | Performed by: UROLOGY

## 2019-04-01 PROCEDURE — 51798 US URINE CAPACITY MEASURE: CPT | Mod: S$GLB,,, | Performed by: UROLOGY

## 2019-04-01 PROCEDURE — 51798 POCT BLADDER SCAN: ICD-10-PCS | Mod: S$GLB,,, | Performed by: UROLOGY

## 2019-04-01 PROCEDURE — 81002 POCT URINE DIPSTICK WITHOUT MICROSCOPE: ICD-10-PCS | Mod: S$GLB,,, | Performed by: UROLOGY

## 2019-04-01 PROCEDURE — 3008F PR BODY MASS INDEX (BMI) DOCUMENTED: ICD-10-PCS | Mod: CPTII,S$GLB,, | Performed by: UROLOGY

## 2019-04-01 PROCEDURE — 81002 URINALYSIS NONAUTO W/O SCOPE: CPT | Mod: S$GLB,,, | Performed by: UROLOGY

## 2019-04-01 PROCEDURE — 99999 PR PBB SHADOW E&M-EST. PATIENT-LVL IV: ICD-10-PCS | Mod: PBBFAC,,, | Performed by: UROLOGY

## 2019-04-01 PROCEDURE — 3008F BODY MASS INDEX DOCD: CPT | Mod: CPTII,S$GLB,, | Performed by: UROLOGY

## 2019-04-01 RX ORDER — HYDRALAZINE HYDROCHLORIDE 100 MG/1
150 TABLET, FILM COATED ORAL 2 TIMES DAILY
COMMUNITY
End: 2021-06-14

## 2019-04-01 RX ORDER — FINASTERIDE 5 MG/1
5 TABLET, FILM COATED ORAL DAILY
Qty: 30 TABLET | Refills: 11 | Status: SHIPPED | OUTPATIENT
Start: 2019-04-01 | End: 2019-08-09

## 2019-04-01 RX ORDER — NEBIVOLOL 20 MG/1
TABLET ORAL DAILY
COMMUNITY
End: 2019-10-23 | Stop reason: SDUPTHER

## 2019-04-01 RX ORDER — LOSARTAN POTASSIUM AND HYDROCHLOROTHIAZIDE 12.5; 1 MG/1; MG/1
1 TABLET ORAL DAILY
COMMUNITY
End: 2019-12-18 | Stop reason: SDUPTHER

## 2019-04-01 NOTE — PATIENT INSTRUCTIONS
Discussed conservative measures to control urgency and frequency including   1. avoiding bladder irritants (see below) and INCREASING water    Discussed bladder irritants include coffe (even decaf), tea, alcohol, soda, spicy foods, acidic juices (orange, tomato), vinegar, and artificial sweeteners/sugary beverages.    2. timed voiding - empty on a schedule (approx ~2-3 hours) in spite of need to urinate     3. dont postponing voiding - dont hold it on purpose     4. Double voiding - try to go again right after finishing.    - try the flomax in evening  - start finasteride    Nurse visit early June - come with full bladder for uroflow test and to check bladder emptying    -----  4. bowel regimen as distended bowel has extrinsic compressive effect on bladder.   - any or all of the following in any combination, titrate to soft daily bowel movement without pushing or straining  - colace/stool softener capsule - once to twice daily  - miralax - 1 capful daily to start, can increase to 2x daily (or decrease to 1/2 cap daily)  - increase dietary fibery  - fiber supplements, such as metamucil

## 2019-04-01 NOTE — PROGRESS NOTES
[facility] Urology Progress Note    Phoenix Fragoso is a 58 y.o. male who presents for follow up of prostate cancer (currently on AS) and severe LUTS    Initially referred by LISA Deluna/Dr Lugo in March 2018 for eval of elevated psa to 4.9 (previously 3.5 in 2016 and 3.2 in 2015).  He also had severe LUTS despite daily flomax (AUA SS 27/4) as well as some urgency. AUA SS: 27/4 (4: Emptying, frequency, intermittency, urgency, straining, nocturia; 3: Weak stream) + PV dribble  Family history of prostate cancer in brother, age 54.  Baseline ED since 2014, though satisfactory erections with on demand use of 2.5-5mg cialis     Cytso/Prostate biopsy 3/13/18:  JAY 40g benign  TRUS VOLUME: 46.7cc (W 48.1, H 31.1, L 59.7)  ULTRASOUND FINDINGS: mild hypoechoic areas, normal SVs, + median lobe.  CYSTO FINDINGS: kissing lateral lobe obstruction with significant lateral lobe ingrowth and high grade prostatic urethral obstruction as well as moderate median lobe with anterior ballvalving projection  PATHOLOGY: 1/14 cores dayana 6 CaP: 3+3=6 in 25% RB medial  After extensive review of options, elected active surveillance and increased flomax to 0.8mg daily.  PSA 9/19/18 is 4.4  Symptoms progressed on flomax, though urgency less. AUA SS: 32/4 (5: emptying, frequency, intermittency, nocturia; 4: urgency, weak stream, straining) - meds help 3/10  Nocturia hourly at bedtime. Constipated frequently - baseline once daily but has had periods of not  Elected to remain on flomax/AS    He returns today noting:  PSA 3/28/19 is 2.8  AUA SS: 33/4, mostly dissatisfied. (5:  Emptying, frequency, intermittency, urgency, straining; for:  Weak stream, sleeping)  Postvoid residual by bladder scan is 157 cc, and he does note that he urinated 2 times prior to his bladder scan since arriving  Urinalysis dipstick is negative.  Nocturia is every 1 and half to 2 hr.  He does not snore.  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  He thought he had an ear infection as he was having aching from the side of his face and then the neurologic changes can 3 weeks later.  It is improving but is still a problem.  Did have initial neurologic workup/scans, but has not followed up.  He reports that he recently had a renal ultrasound which was normal, this was done by LISA Wells to evaluate for renal artery stenosis in the workup of his hypertension.  Blood pressure is now well controlled and is 129/86 today  He did recently establish cardiology care with Dr Gunjan perea of his HTN - had stress test, etc, last seen in December 2018      ROS: A comprehensive 10 system review was performed and is negative except as noted above in HPI    PHYSICAL EXAM:    Vitals:    04/01/19 1146   BP: 129/86   Pulse: 67   Resp: 18     Body mass index is 25.3 kg/m². Weight: 87 kg (191 lb 12.8 oz) Height: 6' 1" (185.4 cm)       General: Alert, cooperative, no distress, appears stated age   Head: Normocephalic, without obvious abnormality, atraumatic   Eyes: PERRL, conjunctiva/corneas clear   Lungs: Respirations unlabored   Heart: Warm and well perfused   Abdomen:  Soft, nontender, nondistended  Extremities: Extremities normal, atraumatic, no cyanosis or edema   Skin: Skin color, texture, turgor normal, no rashes or lesions   Psych: Appropriate   Neurologic: Non-focal       Recent Results (from the past 336 hour(s))   Prostate Specific Antigen, Diagnostic    Collection Time: 03/28/19  8:46 AM   Result Value Ref Range    PSA DIAGNOSTIC 2.8 0.00 - 4.00 ng/mL   POCT URINE DIPSTICK WITHOUT MICROSCOPE    Collection Time: 04/01/19 11:49 AM   Result Value Ref Range    Color, UA yellow     Spec Grav UA 1.010     pH, UA " 7     WBC, UA neg     Nitrite, UA neg     Protein neg     Glucose, UA neg     Ketones, UA neg     Urobilinogen, UA 0.2     Bilirubin neg     Blood, UA neg    POCT Bladder Scan    Collection Time: 04/01/19  2:26 PM   Result Value Ref Range    POC Residual Urine Volume 157 (A) 0 - 100 mL       ASSESSMENT   1. Malignant neoplasm of prostate  POCT URINE DIPSTICK WITHOUT MICROSCOPE    POCT Bladder Scan    Case Request Operating Room: ROBOTIC PROSTATECTOMY   2. Benign prostatic hyperplasia with incomplete bladder emptying         Plan    He has been on active surveillance for his very low risk prostate cancer.  Despite his very low risk prostate cancer suitable for active surveillance he does have significant BPH component in addition to his prostate cancer with severe obstructive lower urinary tract symptoms and incomplete emptying.  Despite increasing dose of alpha blockers, his symptom score remains severe, he had severe visual obstruction, and he has continued demonstration of incomplete emptying despite multiple voids.    Again had extensive discussion with patient and wife about active surveillance protocol as well as treatment options. Given his severe LUTS would not recommend radiotherapy due to exacerbation, and did again review that prostatectomy would be reasonable intervention despite low grade pathology given his age in his 50s and significant LUTS with severely obstructing prostate.   Further detailed discussion about robotic prostatectomy, risks benefits, healing process etc. As well as detailed discussion of continued active surveillance including oncologic implications and implications on lower tract symptoms, including discussion of bladder decompensation from longstanding obstruction worsening urinary symptoms over time and should future prostatectomy be performed may compromise return of continence with more chronic bladder change. Though prostatectomy has defined side effects/morbidities, would serve  to manage both his prostate cancer as well as his lower tract obstruction. Discussed usual return of continence as well as recovery of impotence and details about nerve sparing procedures which are reasonable in low grade disease.    He he and his wife did recently attend seminar I gave about robotic Urology which helped answer many of their questions and they now understand the procedure a bit better, and we did discuss it in detail again today.  At last visit he felt that he could live with his urinary symptoms and was not interested in proceeding with any therapy, though today he does note severe bother and continued incomplete emptying.  We did have a risk benefit discussion about radical prostatectomy for both his prostate cancer and severely obstructing prostate, as well as continued active surveillance and BPH interventions to relieve prostatic obstruction in the interim (such as proceeding with transurethral resection of prostate now and continuing active surveillance for prostate cancer, as his median lobe obstruction is anterior and though Urolift has been indicated for treatment of middle lobe obstruction, not anterior gland middle lobes).     He is now 1 year from initial diagnosis with prostate biopsy and returns today to review PSA trans which fortunately are stable/lower.  We did discuss that this is not necessarily better but rather stable, and is encouraging that his PSA is not rising.  He is due for confirmatory 1 year biopsy, and I did suggest this if we worked to continue active surveillance or if he wanted to consider BPH interventions such as TURP in the interim to make sure there is no upgrading before proceeding with such procedure.  He did indicate at this time that he is ready to proceed with robotic radical prostatectomy for the dual indications above, though he has big projects at work that he would like to see through and proceed with robotic prostatectomy this summer.    Given initial  low volume, low-grade disease, it is reasonable to proceed with local treatment without repeat biopsy.  Could consider 3 T MRI prior to surgical intervention to evaluate for any index lesions or suspicious lesions close to the capsule, as otherwise a bilateral nerve-sparing procedure is quite reasonable in this young patient with low volume low-grade disease.  We did review risks and benefits of robotic prostatectomy and procedure in general as well as postoperative recovery and return to work, catheter management, etc.    Specific to him given an enlarged obstructing prostate from with severe symptoms despite Flomax, and that in procedures to relieve prostatic obstruction he may experience worsening urgency and frequency, and therefore with prostatectomy may be no different given the long-term bladder affects from obstruction, especially given his persistent incomplete emptying.  He does have some baseline urinary urgency though has not had any urge incontinent episodes,  though upon relief of prostatic obstruction, there can be a transient increase in urgency and frequency which may manifest as urge incontinence, and in the postoperative setting from radical prostatectomy this urge incontinence can confound the recovery of stress urinary incontinence.  This can be managed medically to allow him to focus on the recovery of his stress urinary incontinence from the surgery, and will likely be necessary.  We did also briefly discussed challenges of median lobe during prostatectomy and possible effects on bladder neck, including reconstruction, and potential effect on recovery of continence.  However, knowing up front the presence of his median lobe will allow better intraoperative planning and decision making.    At his request, robotic prostatectomy has been scheduled for 8/21/19.    I will have him return on 8/9/19 to clinic to review the procedure in detail, preop with me, signed consents, after which he can go to  pre-admit testing.  In the interim, will make inquiry of Dr. eastman, his cardiologist, for cardiac clearance for robotic prostatectomy.  Will be up to his discretion if it is necessary to see the patient back in followup were clear based on recent full cardiac workup as he did have stress test etc.    As well, this interim will allow him further recovery of his Bell's palsy, and advised that he make follow-up with his neurologist for further evaluation, as well as discussion of potential complications with intubation it anesthesia.  Will also cc Dr. Lugo for interim medical follow-up and medical clearance with documentation of stability of chronic medical problems and any preop optimization recommendations.    Given long interval between this decision making now and his robotic prostatectomy in August 2019, he still has an enlarged obstructing prostate with severe bothersome lower urinary tract symptoms despite increased dose of alpha blocker and persistent incomplete emptying.  We did discuss the dangers of this, the long-term bladder effects, the potential for urinary tract infection, etc.  As he is planning to undergo definitive therapy, I feel it would be safe to add 5 alpha reductase inhibitor, finasteride, at this time.  Potentially may have benefit from dual medical therapy on his symptoms.  We did discuss that in clinical trials in some patients finasteride was found to increase the presence of high-grade disease.  It is unclear in known diagnosed prostate cancer what effect this may have in the short term, but is most likely safe.  We did discuss that 5 alpha reductase inhibitors, and drinking the prostate gland, often do not affect the median lobe/transition zone, though he does have severe lateral lobe obstruction as well and the combination medical therapy in the interim may have some symptomatic benefit until definitive therapy is performed.  Finasteride 5 mg daily has been prescribed and sent to his  pharmacy.  Discussed conservative measures to control urgency and frequency including avoiding bladder irritants, timed voiding, not postponing voiding, and bowel regimen (as distended bowel has extrinsic compressive effect on bladder. Discussed bladder irritants include coffe (even decaf), tea, alcohol, soda, spicy foods, acidic juices (orange, tomato), vinegar, and artificial sweeteners.  Elements of an OTC bowel regimen were written out for the patient to facilitate soft daily bowel movement    To make sure his chronic incomplete emptying is not getting worse and that he is not qualified as chronic urinary retention with increasing postvoid residuals, I will have him return between now and surgery, early June, for a uroflow assessment as an office based assessment of obstruction, as well as a repeat postvoid residual.  He should have an AUA symptom score at that time.  If symptoms and incomplete emptying are progressive at that time, may consider moving up the timeline of his robotic prostatectomy.    45 min spent in encounter, over half in counseling, and all questions at the patient and his wife had were answered in detail.  They are agreeable to treatment plan at this time.  RTC 8/9/19 for preop for robotic prostatectomy 8/21/19, with interval nurse visit for uroflow/PVR/symptom score in June 2019

## 2019-07-17 ENCOUNTER — TELEPHONE (OUTPATIENT)
Dept: UROLOGY | Facility: CLINIC | Age: 59
End: 2019-07-17

## 2019-07-17 NOTE — TELEPHONE ENCOUNTER
----- Message from Daksha Velasquez sent at 7/17/2019 10:34 AM CDT -----  Contact: Patient  Type:  Patient Returning Call    Who Called:  Patient  Who Left Message for Patient:  Lisa  Does the patient know what this is regarding?:  edna  Best Call Back Number:  [phone] (home)    Additional Information:  edna

## 2019-07-17 NOTE — TELEPHONE ENCOUNTER
Spoke to patient  Discussed pham removal ~8-9 days postop  Answered questions  Discussed timing and recovery    Moving to 8/28 - OR was notified - please pursue clearances

## 2019-07-17 NOTE — TELEPHONE ENCOUNTER
Would like to cancel surgery and reschedule to later in the year or after the first.  He asked if Dr. Brooks would be doing surgery between Christmas and New Years.  Advised I would find out, but his surgery day for this type of surgery is on Wednesdays, and both of these holidays fall on a Wednesday.  He is concerned about having a catheter for about 2 weeks interfering with his work.    Please advise or call patient to discuss.   Clearances are pending still, so will need to know if need to martina these or wait.

## 2019-08-01 ENCOUNTER — TELEPHONE (OUTPATIENT)
Dept: UROLOGY | Facility: CLINIC | Age: 59
End: 2019-08-01

## 2019-08-01 NOTE — TELEPHONE ENCOUNTER
----- Message from Nataliia Jovel sent at 8/1/2019 11:40 AM CDT -----  Patient's wife dropped off clearance documents for Dr. Brooks. Put on Lisa's desk. Also asked if they could be contacted regarding if surgery is approved and with pricing info. Good phone #: [phone].

## 2019-08-05 NOTE — TELEPHONE ENCOUNTER
Consulted with Cecilia/Pre-service.  She says the procedure is approved.    Finance should be calling him this week.   Left message for patient.

## 2019-08-09 ENCOUNTER — HOSPITAL ENCOUNTER (OUTPATIENT)
Dept: PREADMISSION TESTING | Facility: HOSPITAL | Age: 59
Discharge: HOME | End: 2019-08-09
Attending: UROLOGY
Payer: COMMERCIAL

## 2019-08-09 ENCOUNTER — OFFICE VISIT (OUTPATIENT)
Dept: UROLOGY | Facility: CLINIC | Age: 59
End: 2019-08-09
Payer: COMMERCIAL

## 2019-08-09 ENCOUNTER — HOSPITAL ENCOUNTER (OUTPATIENT)
Dept: RADIOLOGY | Facility: HOSPITAL | Age: 59
Discharge: HOME | End: 2019-08-09
Attending: UROLOGY
Payer: COMMERCIAL

## 2019-08-09 VITALS
BODY MASS INDEX: 25.9 KG/M2 | SYSTOLIC BLOOD PRESSURE: 129 MMHG | WEIGHT: 195.44 LBS | RESPIRATION RATE: 18 BRPM | DIASTOLIC BLOOD PRESSURE: 83 MMHG | HEART RATE: 62 BPM | HEIGHT: 73 IN

## 2019-08-09 VITALS — BODY MASS INDEX: 25.84 KG/M2 | HEIGHT: 73 IN | WEIGHT: 195 LBS

## 2019-08-09 DIAGNOSIS — C61 MALIGNANT NEOPLASM OF PROSTATE: Primary | ICD-10-CM

## 2019-08-09 LAB
BILIRUB SERPL-MCNC: NORMAL MG/DL
BLOOD URINE, POC: NORMAL
COLOR, POC UA: YELLOW
GLUCOSE UR QL STRIP: NORMAL
KETONES UR QL STRIP: NORMAL
LEUKOCYTE ESTERASE URINE, POC: NORMAL
NITRITE, POC UA: NORMAL
PH, POC UA: 5
PROTEIN, POC: NORMAL
SPECIFIC GRAVITY, POC UA: 1.03
UROBILINOGEN, POC UA: 0.2

## 2019-08-09 PROCEDURE — 71046 X-RAY EXAM CHEST 2 VIEWS: CPT | Mod: TC,FY

## 2019-08-09 PROCEDURE — 99999 PR PBB SHADOW E&M-EST. PATIENT-LVL III: CPT | Mod: PBBFAC,,, | Performed by: UROLOGY

## 2019-08-09 PROCEDURE — 81002 URINALYSIS NONAUTO W/O SCOPE: CPT | Mod: S$GLB,,, | Performed by: UROLOGY

## 2019-08-09 PROCEDURE — 3008F PR BODY MASS INDEX (BMI) DOCUMENTED: ICD-10-PCS | Mod: CPTII,S$GLB,, | Performed by: UROLOGY

## 2019-08-09 PROCEDURE — 99215 PR OFFICE/OUTPT VISIT, EST, LEVL V, 40-54 MIN: ICD-10-PCS | Mod: 25,S$GLB,, | Performed by: UROLOGY

## 2019-08-09 PROCEDURE — 71046 X-RAY EXAM CHEST 2 VIEWS: CPT | Mod: 26,,, | Performed by: RADIOLOGY

## 2019-08-09 PROCEDURE — 99215 OFFICE O/P EST HI 40 MIN: CPT | Mod: 25,S$GLB,, | Performed by: UROLOGY

## 2019-08-09 PROCEDURE — 99900104 DSU ONLY-NO CHARGE-EA ADD'L HR (STAT)

## 2019-08-09 PROCEDURE — 3008F BODY MASS INDEX DOCD: CPT | Mod: CPTII,S$GLB,, | Performed by: UROLOGY

## 2019-08-09 PROCEDURE — 99999 PR PBB SHADOW E&M-EST. PATIENT-LVL III: ICD-10-PCS | Mod: PBBFAC,,, | Performed by: UROLOGY

## 2019-08-09 PROCEDURE — 71046 XR CHEST PA AND LATERAL: ICD-10-PCS | Mod: 26,,, | Performed by: RADIOLOGY

## 2019-08-09 PROCEDURE — 87086 URINE CULTURE/COLONY COUNT: CPT

## 2019-08-09 PROCEDURE — 99900103 DSU ONLY-NO CHARGE-INITIAL HR (STAT)

## 2019-08-09 PROCEDURE — 81002 POCT URINE DIPSTICK WITHOUT MICROSCOPE: ICD-10-PCS | Mod: S$GLB,,, | Performed by: UROLOGY

## 2019-08-09 RX ORDER — AMOXICILLIN 500 MG
2 CAPSULE ORAL DAILY
COMMUNITY

## 2019-08-09 NOTE — DISCHARGE INSTRUCTIONS

## 2019-08-10 LAB — BACTERIA UR CULT: NO GROWTH

## 2019-08-17 NOTE — PROGRESS NOTES
[facility] Urology Progress Note    Phoenix Fragoso is a 58 y.o. male who presents for fu of prostate cancer and severe LUTS    Initially referred by LISA Deluna/Dr Lugo in March 2018 for eval of elevated psa to 4.9 (previously 3.5 in 2016 and 3.2 in 2015).  He also had severe LUTS despite daily flomax (AUA SS 27/4) as well as some urgency. AUA SS: 27/4 (4: Emptying, frequency, intermittency, urgency, straining, nocturia; 3: Weak stream) + PV dribble  Family history of prostate cancer in brother, age 54. Baseline ED since 2014, though satisfactory erections with on demand use of 2.5-5mg cialis     Cytso/Prostate biopsy 3/13/18: JAY 40g benign. TRUS VOLUME: 46.7cc (W 48.1, H 31.1, L 59.7). ULTRASOUND FINDINGS: mild hypoechoic areas, normal SVs, + median lobe.  CYSTO FINDINGS: kissing lateral lobe obstruction with significant lateral lobe ingrowth and high grade prostatic urethral obstruction as well as moderate median lobe with anterior ballvalving projection  PATHOLOGY: 1/14 cores dayana 6 CaP: 3+3=6 in 25% RB medial  After extensive review of options, elected active surveillance and increased flomax to 0.8mg daily.    PSA 9/19/18 is 4.4 Symptoms progressed on flomax, though urgency less. AUA SS: 32/4 (5: emptying, frequency, intermittency, nocturia; 4: urgency, weak stream, straining) - meds help 3/10  Nocturia hourly at bedtime. Constipated frequently - baseline once daily but has had periods of not. Elected to remain on flomax/AS     PSA 3/28/19 is 2.8. AUA SS: 33/4, mostly dissatisfied. (5:  Emptying, frequency, intermittency, urgency, straining; for:  Weak stream, sleeping)  Postvoid residual by bladder scan is 157 cc, and he does note that he urinated 2 times prior to his bladder scan since arriving. Urinalysis dipstick is negative.  Nocturia is every 1 and half to 2 hr.  He does not snore. No incontinence though does have significant postvoid dribble  He has been on losartan/hydrochlorothiazide combination blood  "pressure pill since November or December, and in the morning after taking his medications he does urinate more  Diagnosed with Bell's palsy on 1/5/19.  Recently had a renal ultrasound which was normal, this was done by LISA Wells to evaluate for renal artery stenosis in the workup of his hypertension.  He did recently establish cardiology care with Dr Gunjan perea of his HTN - had stress test, etc, last seen in December 2018  In discussion management of his prostate cancer and his severe BPH with obstruction, elected to proceed with future robotic prostatectomy, though in the interim added finasteride 5 mg for dual medical therapy.    He returns today without any improvement of his obstructive lower urinary tract symptoms, noting it may actually be getting worse.  AUA symptom score 31/4.  Medications helped 3/10.  (5:  Emptying, frequency, intermittency; for:  Urgency, weak stream, straining, sleeping)  No constipation.  Diet improved.  Increase fiber.  Never heard stool.  Bothered by urgency frequency.  No new back pain or bone pain.  No hematuria or dysuria.  Bell's palsy largely resolved    ROS: A comprehensive 10 system review was performed and is negative except as noted above in HPI    PHYSICAL EXAM:    Vitals:    08/09/19 1023   BP: 129/83   Pulse: 62   Resp: 18     Body mass index is 25.78 kg/m². Weight: 88.6 kg (195 lb 7 oz) Height: 6' 1" (185.4 cm)       General: Alert, cooperative, no distress, appears stated age   Head: Normocephalic, without obvious abnormality, atraumatic   Eyes: PERRL, conjunctiva/corneas clear   Lungs: Respirations unlabored   Heart: Warm and well perfused   Abdomen:  Soft, nontender, nondistended  Extremities: Extremities normal, atraumatic, no cyanosis or edema   Skin: Skin color, texture, turgor normal, no rashes or lesions   Psych: Appropriate   Neurologic: Non-focal       Recent Results (from the past 336 hour(s))   POCT URINE DIPSTICK WITHOUT MICROSCOPE    Collection Time: " 08/09/19 10:37 AM   Result Value Ref Range    Color, UA yellow     Spec Grav UA 1.030     pH, UA 5     WBC, UA neg     Nitrite, UA neg     Protein neg     Glucose, UA neg     Ketones, UA neg     Urobilinogen, UA 0.2     Bilirubin neg     Blood, UA neg    Urine culture    Collection Time: 08/09/19 11:09 AM   Result Value Ref Range    Urine Culture, Routine No growth    Basic metabolic panel    Collection Time: 08/09/19 11:41 AM   Result Value Ref Range    Sodium 142 136 - 145 mmol/L    Potassium 3.6 3.5 - 5.1 mmol/L    Chloride 104 95 - 110 mmol/L    CO2 28 23 - 29 mmol/L    Glucose 93 70 - 110 mg/dL    BUN, Bld 23 (H) 6 - 20 mg/dL    Creatinine 1.0 0.5 - 1.4 mg/dL    Calcium 9.4 8.7 - 10.5 mg/dL    Anion Gap 10 8 - 16 mmol/L    eGFR if African American >60 >60 mL/min/1.73 m^2    eGFR if non African American >60 >60 mL/min/1.73 m^2   CBC auto differential    Collection Time: 08/09/19 11:42 AM   Result Value Ref Range    WBC 5.50 3.90 - 12.70 K/uL    RBC 5.01 4.60 - 6.20 M/uL    Hemoglobin 13.8 (L) 14.0 - 18.0 g/dL    Hematocrit 41.2 40.0 - 54.0 %    Mean Corpuscular Volume 82 82 - 98 fL    Mean Corpuscular Hemoglobin 27.5 27.0 - 31.0 pg    Mean Corpuscular Hemoglobin Conc 33.5 32.0 - 36.0 g/dL    RDW 12.4 11.5 - 14.5 %    Platelets 218 150 - 350 K/uL    MPV 8.7 (L) 9.2 - 12.9 fL    Gran # (ANC) 3.3 1.8 - 7.7 K/uL    Immature Grans (Abs) 0.04 0.00 - 0.04 K/uL    Lymph # 1.4 1.0 - 4.8 K/uL    Mono # 0.6 0.3 - 1.0 K/uL    Eos # 0.1 0.0 - 0.5 K/uL    Baso # 0.04 0.00 - 0.20 K/uL    nRBC 0 0 /100 WBC    Gran% 59.5 38.0 - 73.0 %    Lymph% 26.0 18.0 - 48.0 %    Mono% 11.6 4.0 - 15.0 %    Eosinophil% 1.5 0.0 - 8.0 %    Basophil% 0.7 0.0 - 1.9 %    Differential Method Automated    Protime-INR    Collection Time: 08/09/19 11:42 AM   Result Value Ref Range    Prothrombin Time 10.3 9.0 - 12.5 sec    INR 1.0 0.8 - 1.2       ASSESSMENT   1. Malignant neoplasm of prostate  POCT URINE DIPSTICK WITHOUT MICROSCOPE    Diet NPO    Admit  to Inpatient    Insert peripheral IV    Place MICKEY hose    Reason for No Pharmacological VTE Prophylaxis    Place sequential compression device    Basic metabolic panel    CBC auto differential    Protime-INR    Type And Screen Preop    Urine culture    CANCELED: EKG 12-lead    CANCELED: X-Ray Chest PA And Lateral       Plan    He has a significantly enlarged obstructing prostate with severe refractory lower urinary tract symptoms despite dual medical therapy, as well as small volume low-grade prostate cancer.    At this time he is ready to proceed with surgical extirpative therapy, and has elected to proceed with robot-assisted laparoscopic radical prostatectomy.  The procedure in general including hospital stay and postoperative recovery process were discussed in detail. Reviewed hospital stay, LORA drain and pham management, reminding him the pham will remain indwelling at minimum 7-10 days during which time he should refrain from driving. Risks of surgery were discussed including but not limited to up-front urinary incontinence and erectile dysfunction which we will work to overcome with kegel excercises and any number of ED therapies. We discussed possibility of pelvic lymphadenectomy, though not necessary in small volume low-grade disease. We did also discuss nerve sparing procedure.    Risks of surgery were discussed including but not limited to urinary incontinence, erectile dysfunction, injury to intraabdominal structures, urine leak, anastamotic leak, rectal injury, damage to ureters, hernia, postoperative ileus.    We did review that he does have some baseline urinary urgency though has not had any urge incontinent episodes, and upon relief of longstanding prostatic obstruction, there can be a transient increase in urgency and frequency which may manifest as urge incontinence, and in the postoperative setting from radical prostatectomy this urge incontinence can confound the recovery of stress urinary  incontinence.  This can be managed medically to allow him to focus on the recovery of his stress urinary incontinence from the surgery, and will likely be necessary.  We did also briefly discussed challenges of median lobe during prostatectomy and possible effects on bladder neck, including reconstruction, and potential effect on recovery of continence.  However, knowing up front the presence of his median lobe will allow better intraoperative planning and decision making.     The need for monitoring his PSA postoperatively was also discussed with the patient. Any adjuvant treatment, including hormonal and/or radiation treatment may be dependent on his final pathology report, including final dayana score, margin status, extracapsular invasion, or seminal vesicle invasion. Such adjuvant therapies may also be necessary in the case of postoperative psa rise. All questions were answered and appropriate informed consent was obtained.   Robotic prostatectomy planned for 8/28/19.       I did instruct the patient on kegel exercises today and advised to practice them preoperatively to strengthen his pelvic floor muscles prior to surgery to facilitate postoperative return of continence.  He will proceed with medical and cardiac clearance from Dr. Lugo and Dr. Hollins     40 mins spent in encounter, over half in counseling

## 2019-08-17 NOTE — H&P (VIEW-ONLY)
[facility] Urology Progress Note    Phoenix Fragoso is a 58 y.o. male who presents for fu of prostate cancer and severe LUTS    Initially referred by LISA Deluna/Dr Lugo in March 2018 for eval of elevated psa to 4.9 (previously 3.5 in 2016 and 3.2 in 2015).  He also had severe LUTS despite daily flomax (AUA SS 27/4) as well as some urgency. AUA SS: 27/4 (4: Emptying, frequency, intermittency, urgency, straining, nocturia; 3: Weak stream) + PV dribble  Family history of prostate cancer in brother, age 54. Baseline ED since 2014, though satisfactory erections with on demand use of 2.5-5mg cialis     Cytso/Prostate biopsy 3/13/18: JAY 40g benign. TRUS VOLUME: 46.7cc (W 48.1, H 31.1, L 59.7). ULTRASOUND FINDINGS: mild hypoechoic areas, normal SVs, + median lobe.  CYSTO FINDINGS: kissing lateral lobe obstruction with significant lateral lobe ingrowth and high grade prostatic urethral obstruction as well as moderate median lobe with anterior ballvalving projection  PATHOLOGY: 1/14 cores dayana 6 CaP: 3+3=6 in 25% RB medial  After extensive review of options, elected active surveillance and increased flomax to 0.8mg daily.    PSA 9/19/18 is 4.4 Symptoms progressed on flomax, though urgency less. AUA SS: 32/4 (5: emptying, frequency, intermittency, nocturia; 4: urgency, weak stream, straining) - meds help 3/10  Nocturia hourly at bedtime. Constipated frequently - baseline once daily but has had periods of not. Elected to remain on flomax/AS     PSA 3/28/19 is 2.8. AUA SS: 33/4, mostly dissatisfied. (5:  Emptying, frequency, intermittency, urgency, straining; for:  Weak stream, sleeping)  Postvoid residual by bladder scan is 157 cc, and he does note that he urinated 2 times prior to his bladder scan since arriving. Urinalysis dipstick is negative.  Nocturia is every 1 and half to 2 hr.  He does not snore. No incontinence though does have significant postvoid dribble  He has been on losartan/hydrochlorothiazide combination blood  "pressure pill since November or December, and in the morning after taking his medications he does urinate more  Diagnosed with Bell's palsy on 1/5/19.  Recently had a renal ultrasound which was normal, this was done by LISA Wells to evaluate for renal artery stenosis in the workup of his hypertension.  He did recently establish cardiology care with Dr Gunjan perea of his HTN - had stress test, etc, last seen in December 2018  In discussion management of his prostate cancer and his severe BPH with obstruction, elected to proceed with future robotic prostatectomy, though in the interim added finasteride 5 mg for dual medical therapy.    He returns today without any improvement of his obstructive lower urinary tract symptoms, noting it may actually be getting worse.  AUA symptom score 31/4.  Medications helped 3/10.  (5:  Emptying, frequency, intermittency; for:  Urgency, weak stream, straining, sleeping)  No constipation.  Diet improved.  Increase fiber.  Never heard stool.  Bothered by urgency frequency.  No new back pain or bone pain.  No hematuria or dysuria.  Bell's palsy largely resolved    ROS: A comprehensive 10 system review was performed and is negative except as noted above in HPI    PHYSICAL EXAM:    Vitals:    08/09/19 1023   BP: 129/83   Pulse: 62   Resp: 18     Body mass index is 25.78 kg/m². Weight: 88.6 kg (195 lb 7 oz) Height: 6' 1" (185.4 cm)       General: Alert, cooperative, no distress, appears stated age   Head: Normocephalic, without obvious abnormality, atraumatic   Eyes: PERRL, conjunctiva/corneas clear   Lungs: Respirations unlabored   Heart: Warm and well perfused   Abdomen:  Soft, nontender, nondistended  Extremities: Extremities normal, atraumatic, no cyanosis or edema   Skin: Skin color, texture, turgor normal, no rashes or lesions   Psych: Appropriate   Neurologic: Non-focal       Recent Results (from the past 336 hour(s))   POCT URINE DIPSTICK WITHOUT MICROSCOPE    Collection Time: " 08/09/19 10:37 AM   Result Value Ref Range    Color, UA yellow     Spec Grav UA 1.030     pH, UA 5     WBC, UA neg     Nitrite, UA neg     Protein neg     Glucose, UA neg     Ketones, UA neg     Urobilinogen, UA 0.2     Bilirubin neg     Blood, UA neg    Urine culture    Collection Time: 08/09/19 11:09 AM   Result Value Ref Range    Urine Culture, Routine No growth    Basic metabolic panel    Collection Time: 08/09/19 11:41 AM   Result Value Ref Range    Sodium 142 136 - 145 mmol/L    Potassium 3.6 3.5 - 5.1 mmol/L    Chloride 104 95 - 110 mmol/L    CO2 28 23 - 29 mmol/L    Glucose 93 70 - 110 mg/dL    BUN, Bld 23 (H) 6 - 20 mg/dL    Creatinine 1.0 0.5 - 1.4 mg/dL    Calcium 9.4 8.7 - 10.5 mg/dL    Anion Gap 10 8 - 16 mmol/L    eGFR if African American >60 >60 mL/min/1.73 m^2    eGFR if non African American >60 >60 mL/min/1.73 m^2   CBC auto differential    Collection Time: 08/09/19 11:42 AM   Result Value Ref Range    WBC 5.50 3.90 - 12.70 K/uL    RBC 5.01 4.60 - 6.20 M/uL    Hemoglobin 13.8 (L) 14.0 - 18.0 g/dL    Hematocrit 41.2 40.0 - 54.0 %    Mean Corpuscular Volume 82 82 - 98 fL    Mean Corpuscular Hemoglobin 27.5 27.0 - 31.0 pg    Mean Corpuscular Hemoglobin Conc 33.5 32.0 - 36.0 g/dL    RDW 12.4 11.5 - 14.5 %    Platelets 218 150 - 350 K/uL    MPV 8.7 (L) 9.2 - 12.9 fL    Gran # (ANC) 3.3 1.8 - 7.7 K/uL    Immature Grans (Abs) 0.04 0.00 - 0.04 K/uL    Lymph # 1.4 1.0 - 4.8 K/uL    Mono # 0.6 0.3 - 1.0 K/uL    Eos # 0.1 0.0 - 0.5 K/uL    Baso # 0.04 0.00 - 0.20 K/uL    nRBC 0 0 /100 WBC    Gran% 59.5 38.0 - 73.0 %    Lymph% 26.0 18.0 - 48.0 %    Mono% 11.6 4.0 - 15.0 %    Eosinophil% 1.5 0.0 - 8.0 %    Basophil% 0.7 0.0 - 1.9 %    Differential Method Automated    Protime-INR    Collection Time: 08/09/19 11:42 AM   Result Value Ref Range    Prothrombin Time 10.3 9.0 - 12.5 sec    INR 1.0 0.8 - 1.2       ASSESSMENT   1. Malignant neoplasm of prostate  POCT URINE DIPSTICK WITHOUT MICROSCOPE    Diet NPO    Admit  to Inpatient    Insert peripheral IV    Place MICKEY hose    Reason for No Pharmacological VTE Prophylaxis    Place sequential compression device    Basic metabolic panel    CBC auto differential    Protime-INR    Type And Screen Preop    Urine culture    CANCELED: EKG 12-lead    CANCELED: X-Ray Chest PA And Lateral       Plan    He has a significantly enlarged obstructing prostate with severe refractory lower urinary tract symptoms despite dual medical therapy, as well as small volume low-grade prostate cancer.    At this time he is ready to proceed with surgical extirpative therapy, and has elected to proceed with robot-assisted laparoscopic radical prostatectomy.  The procedure in general including hospital stay and postoperative recovery process were discussed in detail. Reviewed hospital stay, LORA drain and pham management, reminding him the pham will remain indwelling at minimum 7-10 days during which time he should refrain from driving. Risks of surgery were discussed including but not limited to up-front urinary incontinence and erectile dysfunction which we will work to overcome with kegel excercises and any number of ED therapies. We discussed possibility of pelvic lymphadenectomy, though not necessary in small volume low-grade disease. We did also discuss nerve sparing procedure.    Risks of surgery were discussed including but not limited to urinary incontinence, erectile dysfunction, injury to intraabdominal structures, urine leak, anastamotic leak, rectal injury, damage to ureters, hernia, postoperative ileus.    We did review that he does have some baseline urinary urgency though has not had any urge incontinent episodes, and upon relief of longstanding prostatic obstruction, there can be a transient increase in urgency and frequency which may manifest as urge incontinence, and in the postoperative setting from radical prostatectomy this urge incontinence can confound the recovery of stress urinary  incontinence.  This can be managed medically to allow him to focus on the recovery of his stress urinary incontinence from the surgery, and will likely be necessary.  We did also briefly discussed challenges of median lobe during prostatectomy and possible effects on bladder neck, including reconstruction, and potential effect on recovery of continence.  However, knowing up front the presence of his median lobe will allow better intraoperative planning and decision making.     The need for monitoring his PSA postoperatively was also discussed with the patient. Any adjuvant treatment, including hormonal and/or radiation treatment may be dependent on his final pathology report, including final dayana score, margin status, extracapsular invasion, or seminal vesicle invasion. Such adjuvant therapies may also be necessary in the case of postoperative psa rise. All questions were answered and appropriate informed consent was obtained.   Robotic prostatectomy planned for 8/28/19.       I did instruct the patient on kegel exercises today and advised to practice them preoperatively to strengthen his pelvic floor muscles prior to surgery to facilitate postoperative return of continence.  He will proceed with medical and cardiac clearance from Dr. Lugo and Dr. Hollins     40 mins spent in encounter, over half in counseling

## 2019-08-27 ENCOUNTER — ANESTHESIA EVENT (OUTPATIENT)
Dept: SURGERY | Facility: HOSPITAL | Age: 59
DRG: 723 | End: 2019-08-27
Payer: COMMERCIAL

## 2019-08-28 ENCOUNTER — HOSPITAL ENCOUNTER (INPATIENT)
Facility: HOSPITAL | Age: 59
LOS: 1 days | Discharge: HOME | DRG: 723 | End: 2019-08-29
Attending: UROLOGY | Admitting: INTERNAL MEDICINE
Payer: COMMERCIAL

## 2019-08-28 ENCOUNTER — ANESTHESIA (OUTPATIENT)
Dept: SURGERY | Facility: HOSPITAL | Age: 59
DRG: 723 | End: 2019-08-28
Payer: COMMERCIAL

## 2019-08-28 ENCOUNTER — TELEPHONE (OUTPATIENT)
Dept: UROLOGY | Facility: CLINIC | Age: 59
End: 2019-08-28

## 2019-08-28 DIAGNOSIS — E07.9 THYROID DISEASE: ICD-10-CM

## 2019-08-28 DIAGNOSIS — C61 MALIGNANT NEOPLASM OF PROSTATE: ICD-10-CM

## 2019-08-28 DIAGNOSIS — I10 ESSENTIAL HYPERTENSION: ICD-10-CM

## 2019-08-28 DIAGNOSIS — T78.2XXA ANAPHYLACTIC REACTION: ICD-10-CM

## 2019-08-28 DIAGNOSIS — T88.6XXA ANAPHYLACTIC SHOCK, DUE TO ADVERSE EFFECT OF CORRECT MEDICINAL SUBSTANCE PROPERLY ADMINISTERED, INITIAL ENCOUNTER: Primary | ICD-10-CM

## 2019-08-28 DIAGNOSIS — T78.2XXA ANAPHYLAXIS, INITIAL ENCOUNTER: ICD-10-CM

## 2019-08-28 LAB
ABO + RH BLD: NORMAL
ALBUMIN SERPL BCP-MCNC: 3.8 G/DL (ref 3.5–5.2)
ALP SERPL-CCNC: 58 U/L (ref 55–135)
ALT SERPL W/O P-5'-P-CCNC: 41 U/L (ref 10–44)
ANION GAP SERPL CALC-SCNC: 7 MMOL/L (ref 8–16)
AST SERPL-CCNC: 24 U/L (ref 10–40)
BASOPHILS # BLD AUTO: 0.02 K/UL (ref 0–0.2)
BASOPHILS NFR BLD: 0.5 % (ref 0–1.9)
BILIRUB SERPL-MCNC: 0.5 MG/DL (ref 0.1–1)
BLD GP AB SCN CELLS X3 SERPL QL: NORMAL
BUN SERPL-MCNC: 26 MG/DL (ref 6–20)
CALCIUM SERPL-MCNC: 8.9 MG/DL (ref 8.7–10.5)
CHLORIDE SERPL-SCNC: 106 MMOL/L (ref 95–110)
CK MB SERPL-MCNC: 3.2 NG/ML (ref 0.1–6.5)
CK MB SERPL-MCNC: 3.4 NG/ML (ref 0.1–6.5)
CK MB SERPL-RTO: 2.2 % (ref 0–5)
CK MB SERPL-RTO: 2.3 % (ref 0–5)
CK SERPL-CCNC: 146 U/L (ref 20–200)
CK SERPL-CCNC: 150 U/L (ref 20–200)
CO2 SERPL-SCNC: 28 MMOL/L (ref 23–29)
CREAT SERPL-MCNC: 1.3 MG/DL (ref 0.5–1.4)
DIFFERENTIAL METHOD: ABNORMAL
EOSINOPHIL # BLD AUTO: 0 K/UL (ref 0–0.5)
EOSINOPHIL NFR BLD: 1 % (ref 0–8)
ERYTHROCYTE [DISTWIDTH] IN BLOOD BY AUTOMATED COUNT: 11.9 % (ref 11.5–14.5)
EST. GFR  (AFRICAN AMERICAN): >60 ML/MIN/1.73 M^2
EST. GFR  (NON AFRICAN AMERICAN): 60 ML/MIN/1.73 M^2
GLUCOSE SERPL-MCNC: 113 MG/DL (ref 70–110)
HCT VFR BLD AUTO: 39.3 % (ref 40–54)
HGB BLD-MCNC: 13.5 G/DL (ref 14–18)
IMM GRANULOCYTES # BLD AUTO: 0.02 K/UL (ref 0–0.04)
LACTATE SERPL-SCNC: 1.9 MMOL/L (ref 0.5–2.2)
LYMPHOCYTES # BLD AUTO: 1.1 K/UL (ref 1–4.8)
LYMPHOCYTES NFR BLD: 25.9 % (ref 18–48)
MCH RBC QN AUTO: 28.7 PG (ref 27–31)
MCHC RBC AUTO-ENTMCNC: 34.4 G/DL (ref 32–36)
MCV RBC AUTO: 83 FL (ref 82–98)
MONOCYTES # BLD AUTO: 0.3 K/UL (ref 0.3–1)
MONOCYTES NFR BLD: 7.1 % (ref 4–15)
NEUTROPHILS # BLD AUTO: 2.7 K/UL (ref 1.8–7.7)
NEUTROPHILS NFR BLD: 65 % (ref 38–73)
NRBC BLD-RTO: 0 /100 WBC
PLATELET # BLD AUTO: 174 K/UL (ref 150–350)
PMV BLD AUTO: 8.7 FL (ref 9.2–12.9)
POTASSIUM SERPL-SCNC: 3.7 MMOL/L (ref 3.5–5.1)
PROT SERPL-MCNC: 6.2 G/DL (ref 6–8.4)
RBC # BLD AUTO: 4.71 M/UL (ref 4.6–6.2)
SODIUM SERPL-SCNC: 141 MMOL/L (ref 136–145)
T4 FREE SERPL-MCNC: 1.56 NG/DL (ref 0.71–1.51)
TROPONIN I SERPL DL<=0.01 NG/ML-MCNC: 0.01 NG/ML (ref 0–0.03)
TROPONIN I SERPL DL<=0.01 NG/ML-MCNC: 0.01 NG/ML (ref 0–0.03)
TSH SERPL DL<=0.005 MIU/L-ACNC: 0.27 UIU/ML (ref 0.4–4)
WBC # BLD AUTO: 4.21 K/UL (ref 3.9–12.7)

## 2019-08-28 PROCEDURE — 63600175 PHARM REV CODE 636 W HCPCS: Performed by: INTERNAL MEDICINE

## 2019-08-28 PROCEDURE — S0028 INJECTION, FAMOTIDINE, 20 MG: HCPCS | Performed by: INTERNAL MEDICINE

## 2019-08-28 PROCEDURE — 27000221 HC OXYGEN, UP TO 24 HOURS

## 2019-08-28 PROCEDURE — 37000009 HC ANESTHESIA EA ADD 15 MINS: Performed by: UROLOGY

## 2019-08-28 PROCEDURE — D9220A PRA ANESTHESIA: ICD-10-PCS | Mod: ANES,,, | Performed by: ANESTHESIOLOGY

## 2019-08-28 PROCEDURE — 99222 1ST HOSP IP/OBS MODERATE 55: CPT | Mod: ,,, | Performed by: UROLOGY

## 2019-08-28 PROCEDURE — 94761 N-INVAS EAR/PLS OXIMETRY MLT: CPT

## 2019-08-28 PROCEDURE — 25000003 PHARM REV CODE 250: Performed by: ANESTHESIOLOGY

## 2019-08-28 PROCEDURE — 71000033 HC RECOVERY, INTIAL HOUR: Performed by: UROLOGY

## 2019-08-28 PROCEDURE — S0028 INJECTION, FAMOTIDINE, 20 MG: HCPCS | Performed by: ANESTHESIOLOGY

## 2019-08-28 PROCEDURE — 86901 BLOOD TYPING SEROLOGIC RH(D): CPT

## 2019-08-28 PROCEDURE — 71000039 HC RECOVERY, EACH ADD'L HOUR: Performed by: UROLOGY

## 2019-08-28 PROCEDURE — 84443 ASSAY THYROID STIM HORMONE: CPT

## 2019-08-28 PROCEDURE — D9220A PRA ANESTHESIA: ICD-10-PCS | Mod: CRNA,,, | Performed by: NURSE ANESTHETIST, CERTIFIED REGISTERED

## 2019-08-28 PROCEDURE — 99900104 DSU ONLY-NO CHARGE-EA ADD'L HR (STAT): Performed by: UROLOGY

## 2019-08-28 PROCEDURE — 63600175 PHARM REV CODE 636 W HCPCS: Performed by: NURSE ANESTHETIST, CERTIFIED REGISTERED

## 2019-08-28 PROCEDURE — 25000003 PHARM REV CODE 250: Performed by: INTERNAL MEDICINE

## 2019-08-28 PROCEDURE — 80053 COMPREHEN METABOLIC PANEL: CPT

## 2019-08-28 PROCEDURE — 84439 ASSAY OF FREE THYROXINE: CPT

## 2019-08-28 PROCEDURE — 25000003 PHARM REV CODE 250: Performed by: NURSE ANESTHETIST, CERTIFIED REGISTERED

## 2019-08-28 PROCEDURE — 20000000 HC ICU ROOM

## 2019-08-28 PROCEDURE — 63600175 PHARM REV CODE 636 W HCPCS: Performed by: ANESTHESIOLOGY

## 2019-08-28 PROCEDURE — 37000008 HC ANESTHESIA 1ST 15 MINUTES: Performed by: UROLOGY

## 2019-08-28 PROCEDURE — 36000706: Performed by: UROLOGY

## 2019-08-28 PROCEDURE — 82550 ASSAY OF CK (CPK): CPT

## 2019-08-28 PROCEDURE — 99900103 DSU ONLY-NO CHARGE-INITIAL HR (STAT): Performed by: UROLOGY

## 2019-08-28 PROCEDURE — 85025 COMPLETE CBC W/AUTO DIFF WBC: CPT

## 2019-08-28 PROCEDURE — 83605 ASSAY OF LACTIC ACID: CPT

## 2019-08-28 PROCEDURE — 36000707: Performed by: UROLOGY

## 2019-08-28 PROCEDURE — 82553 CREATINE MB FRACTION: CPT

## 2019-08-28 PROCEDURE — D9220A PRA ANESTHESIA: Mod: CRNA,,, | Performed by: NURSE ANESTHETIST, CERTIFIED REGISTERED

## 2019-08-28 PROCEDURE — 84484 ASSAY OF TROPONIN QUANT: CPT | Mod: 91

## 2019-08-28 PROCEDURE — 63600175 PHARM REV CODE 636 W HCPCS: Performed by: UROLOGY

## 2019-08-28 PROCEDURE — D9220A PRA ANESTHESIA: Mod: ANES,,, | Performed by: ANESTHESIOLOGY

## 2019-08-28 PROCEDURE — 99222 PR INITIAL HOSPITAL CARE,LEVL II: ICD-10-PCS | Mod: ,,, | Performed by: UROLOGY

## 2019-08-28 PROCEDURE — 36415 COLL VENOUS BLD VENIPUNCTURE: CPT

## 2019-08-28 RX ORDER — EPINEPHRINE 0.1 MG/ML
0.1 INJECTION INTRAVENOUS ONCE
Status: COMPLETED | OUTPATIENT
Start: 2019-08-28 | End: 2019-08-28

## 2019-08-28 RX ORDER — FAMOTIDINE 10 MG/ML
20 INJECTION INTRAVENOUS 2 TIMES DAILY
Status: DISCONTINUED | OUTPATIENT
Start: 2019-08-28 | End: 2019-08-30 | Stop reason: HOSPADM

## 2019-08-28 RX ORDER — PHENYLEPHRINE HYDROCHLORIDE 10 MG/ML
INJECTION INTRAVENOUS
Status: DISCONTINUED | OUTPATIENT
Start: 2019-08-28 | End: 2019-08-28

## 2019-08-28 RX ORDER — LIDOCAINE HYDROCHLORIDE 10 MG/ML
1 INJECTION, SOLUTION EPIDURAL; INFILTRATION; INTRACAUDAL; PERINEURAL ONCE
Status: DISCONTINUED | OUTPATIENT
Start: 2019-08-28 | End: 2019-08-28 | Stop reason: HOSPADM

## 2019-08-28 RX ORDER — FAMOTIDINE 10 MG/ML
20 INJECTION INTRAVENOUS ONCE
Status: COMPLETED | OUTPATIENT
Start: 2019-08-28 | End: 2019-08-28

## 2019-08-28 RX ORDER — EPHEDRINE SULFATE 50 MG/ML
INJECTION, SOLUTION INTRAVENOUS
Status: DISCONTINUED | OUTPATIENT
Start: 2019-08-28 | End: 2019-08-28

## 2019-08-28 RX ORDER — CEFAZOLIN SODIUM 2 G/50ML
2 SOLUTION INTRAVENOUS
Status: COMPLETED | OUTPATIENT
Start: 2019-08-28 | End: 2019-08-28

## 2019-08-28 RX ORDER — FENTANYL CITRATE 50 UG/ML
INJECTION, SOLUTION INTRAMUSCULAR; INTRAVENOUS
Status: DISCONTINUED | OUTPATIENT
Start: 2019-08-28 | End: 2019-08-28

## 2019-08-28 RX ORDER — ALPRAZOLAM 1 MG/1
1 TABLET ORAL ONCE
Status: COMPLETED | OUTPATIENT
Start: 2019-08-28 | End: 2019-08-28

## 2019-08-28 RX ORDER — METHYLPREDNISOLONE SOD SUCC 125 MG
125 VIAL (EA) INJECTION EVERY 6 HOURS
Status: DISCONTINUED | OUTPATIENT
Start: 2019-08-28 | End: 2019-08-30 | Stop reason: HOSPADM

## 2019-08-28 RX ORDER — ROCURONIUM BROMIDE 10 MG/ML
INJECTION, SOLUTION INTRAVENOUS
Status: DISCONTINUED | OUTPATIENT
Start: 2019-08-28 | End: 2019-08-28

## 2019-08-28 RX ORDER — SODIUM CHLORIDE 9 MG/ML
INJECTION, SOLUTION INTRAVENOUS CONTINUOUS
Status: DISCONTINUED | OUTPATIENT
Start: 2019-08-28 | End: 2019-08-30 | Stop reason: HOSPADM

## 2019-08-28 RX ORDER — DEXAMETHASONE SODIUM PHOSPHATE 4 MG/ML
INJECTION, SOLUTION INTRA-ARTICULAR; INTRALESIONAL; INTRAMUSCULAR; INTRAVENOUS; SOFT TISSUE
Status: DISCONTINUED | OUTPATIENT
Start: 2019-08-28 | End: 2019-08-28

## 2019-08-28 RX ORDER — EPINEPHRINE 0.1 MG/ML
INJECTION INTRAVENOUS
Status: DISCONTINUED | OUTPATIENT
Start: 2019-08-28 | End: 2019-08-28

## 2019-08-28 RX ORDER — ACETAMINOPHEN 10 MG/ML
INJECTION, SOLUTION INTRAVENOUS
Status: DISCONTINUED | OUTPATIENT
Start: 2019-08-28 | End: 2019-08-28

## 2019-08-28 RX ORDER — LIDOCAINE HCL/PF 100 MG/5ML
SYRINGE (ML) INTRAVENOUS
Status: DISCONTINUED | OUTPATIENT
Start: 2019-08-28 | End: 2019-08-28

## 2019-08-28 RX ORDER — ONDANSETRON 2 MG/ML
INJECTION INTRAMUSCULAR; INTRAVENOUS
Status: DISCONTINUED | OUTPATIENT
Start: 2019-08-28 | End: 2019-08-28

## 2019-08-28 RX ORDER — SODIUM CHLORIDE, SODIUM LACTATE, POTASSIUM CHLORIDE, CALCIUM CHLORIDE 600; 310; 30; 20 MG/100ML; MG/100ML; MG/100ML; MG/100ML
INJECTION, SOLUTION INTRAVENOUS CONTINUOUS
Status: DISCONTINUED | OUTPATIENT
Start: 2019-08-28 | End: 2019-08-28

## 2019-08-28 RX ORDER — MIDAZOLAM HYDROCHLORIDE 1 MG/ML
INJECTION, SOLUTION INTRAMUSCULAR; INTRAVENOUS
Status: DISCONTINUED | OUTPATIENT
Start: 2019-08-28 | End: 2019-08-28

## 2019-08-28 RX ORDER — DIPHENHYDRAMINE HYDROCHLORIDE 50 MG/ML
25 INJECTION INTRAMUSCULAR; INTRAVENOUS EVERY 6 HOURS
Status: DISCONTINUED | OUTPATIENT
Start: 2019-08-28 | End: 2019-08-30 | Stop reason: HOSPADM

## 2019-08-28 RX ORDER — PROPOFOL 10 MG/ML
VIAL (ML) INTRAVENOUS
Status: DISCONTINUED | OUTPATIENT
Start: 2019-08-28 | End: 2019-08-28

## 2019-08-28 RX ORDER — METHYLPREDNISOLONE ACETATE 80 MG/ML
INJECTION, SUSPENSION INTRA-ARTICULAR; INTRALESIONAL; INTRAMUSCULAR; SOFT TISSUE
Status: DISCONTINUED | OUTPATIENT
Start: 2019-08-28 | End: 2019-08-28

## 2019-08-28 RX ORDER — SUCCINYLCHOLINE CHLORIDE 20 MG/ML
INJECTION INTRAMUSCULAR; INTRAVENOUS
Status: DISCONTINUED | OUTPATIENT
Start: 2019-08-28 | End: 2019-08-28

## 2019-08-28 RX ORDER — HYDRALAZINE HYDROCHLORIDE 25 MG/1
100 TABLET, FILM COATED ORAL ONCE
Status: COMPLETED | OUTPATIENT
Start: 2019-08-28 | End: 2019-08-28

## 2019-08-28 RX ADMIN — MIDAZOLAM 2 MG: 1 INJECTION INTRAMUSCULAR; INTRAVENOUS at 07:08

## 2019-08-28 RX ADMIN — EPHEDRINE SULFATE 25 MG: 50 INJECTION, SOLUTION INTRAMUSCULAR; INTRAVENOUS; SUBCUTANEOUS at 07:08

## 2019-08-28 RX ADMIN — DIPHENHYDRAMINE HYDROCHLORIDE 25 MG: 50 INJECTION INTRAMUSCULAR; INTRAVENOUS at 05:08

## 2019-08-28 RX ADMIN — EPINEPHRINE 4 MCG: 0.1 INJECTION, SOLUTION ENDOTRACHEAL; INTRACARDIAC; INTRAVENOUS at 07:08

## 2019-08-28 RX ADMIN — EPINEPHRINE 12 MCG: 0.1 INJECTION, SOLUTION ENDOTRACHEAL; INTRACARDIAC; INTRAVENOUS at 08:08

## 2019-08-28 RX ADMIN — LIDOCAINE HYDROCHLORIDE 100 MG: 20 INJECTION, SOLUTION INTRAVENOUS at 07:08

## 2019-08-28 RX ADMIN — DIPHENHYDRAMINE HYDROCHLORIDE 25 MG: 50 INJECTION INTRAMUSCULAR; INTRAVENOUS at 12:08

## 2019-08-28 RX ADMIN — PHENYLEPHRINE HYDROCHLORIDE 0.5 MCG/KG/MIN: 10 INJECTION INTRAVENOUS at 07:08

## 2019-08-28 RX ADMIN — SODIUM CHLORIDE, SODIUM LACTATE, POTASSIUM CHLORIDE, AND CALCIUM CHLORIDE: .6; .31; .03; .02 INJECTION, SOLUTION INTRAVENOUS at 06:08

## 2019-08-28 RX ADMIN — ACETAMINOPHEN 1000 MG: 10 INJECTION, SOLUTION INTRAVENOUS at 07:08

## 2019-08-28 RX ADMIN — METHYLPREDNISOLONE SODIUM SUCCINATE 125 MG: 125 INJECTION, POWDER, FOR SOLUTION INTRAMUSCULAR; INTRAVENOUS at 02:08

## 2019-08-28 RX ADMIN — ROCURONIUM BROMIDE 45 MG: 10 INJECTION, SOLUTION INTRAVENOUS at 07:08

## 2019-08-28 RX ADMIN — EPINEPHRINE 8 MCG: 0.1 INJECTION, SOLUTION ENDOTRACHEAL; INTRACARDIAC; INTRAVENOUS at 08:08

## 2019-08-28 RX ADMIN — METHYLPREDNISOLONE SODIUM SUCCINATE 125 MG: 125 INJECTION, POWDER, FOR SOLUTION INTRAMUSCULAR; INTRAVENOUS at 08:08

## 2019-08-28 RX ADMIN — PHENYLEPHRINE HYDROCHLORIDE 300 MCG: 10 INJECTION INTRAVENOUS at 07:08

## 2019-08-28 RX ADMIN — FENTANYL CITRATE 50 MCG: 50 INJECTION, SOLUTION INTRAMUSCULAR; INTRAVENOUS at 08:08

## 2019-08-28 RX ADMIN — FAMOTIDINE 20 MG: 10 INJECTION INTRAVENOUS at 08:08

## 2019-08-28 RX ADMIN — FENTANYL CITRATE 100 MCG: 50 INJECTION, SOLUTION INTRAMUSCULAR; INTRAVENOUS at 07:08

## 2019-08-28 RX ADMIN — PROPOFOL 200 MG: 10 INJECTION, EMULSION INTRAVENOUS at 07:08

## 2019-08-28 RX ADMIN — EPINEPHRINE 0.1 MG: 0.1 INJECTION INTRACARDIAC; INTRAVENOUS at 08:08

## 2019-08-28 RX ADMIN — ROCURONIUM BROMIDE 5 MG: 10 INJECTION, SOLUTION INTRAVENOUS at 07:08

## 2019-08-28 RX ADMIN — SUCCINYLCHOLINE CHLORIDE 160 MG: 20 INJECTION, SOLUTION INTRAMUSCULAR; INTRAVENOUS at 07:08

## 2019-08-28 RX ADMIN — SODIUM CHLORIDE, SODIUM LACTATE, POTASSIUM CHLORIDE, AND CALCIUM CHLORIDE: .6; .31; .03; .02 INJECTION, SOLUTION INTRAVENOUS at 08:08

## 2019-08-28 RX ADMIN — ALPRAZOLAM 1 MG: 1 TABLET ORAL at 11:08

## 2019-08-28 RX ADMIN — METHYLPREDNISOLONE ACETATE 100 MG: 80 INJECTION, SUSPENSION INTRA-ARTICULAR; INTRALESIONAL; INTRAMUSCULAR; SOFT TISSUE at 08:08

## 2019-08-28 RX ADMIN — CEFAZOLIN SODIUM 2 G: 2 SOLUTION INTRAVENOUS at 07:08

## 2019-08-28 RX ADMIN — ONDANSETRON 4 MG: 2 INJECTION, SOLUTION INTRAMUSCULAR; INTRAVENOUS at 07:08

## 2019-08-28 RX ADMIN — DIPHENHYDRAMINE HYDROCHLORIDE 25 MG: 50 INJECTION INTRAMUSCULAR; INTRAVENOUS at 11:08

## 2019-08-28 RX ADMIN — SODIUM CHLORIDE, SODIUM LACTATE, POTASSIUM CHLORIDE, AND CALCIUM CHLORIDE: .6; .31; .03; .02 INJECTION, SOLUTION INTRAVENOUS at 09:08

## 2019-08-28 RX ADMIN — SUGAMMADEX 500 MG: 100 INJECTION, SOLUTION INTRAVENOUS at 08:08

## 2019-08-28 RX ADMIN — HYDRALAZINE HYDROCHLORIDE 100 MG: 25 TABLET, FILM COATED ORAL at 10:08

## 2019-08-28 RX ADMIN — SODIUM CHLORIDE: 0.9 INJECTION, SOLUTION INTRAVENOUS at 09:08

## 2019-08-28 RX ADMIN — DEXAMETHASONE SODIUM PHOSPHATE 4 MG: 4 INJECTION, SOLUTION INTRAMUSCULAR; INTRAVENOUS at 07:08

## 2019-08-28 NOTE — ANESTHESIA PREPROCEDURE EVALUATION
08/28/2019  Phoenix Fragoso is a 58 y.o., male.    Anesthesia Evaluation    I have reviewed the Patient Summary Reports.    I have reviewed the Nursing Notes.   I have reviewed the Medications.     Review of Systems  Anesthesia Hx:  No problems with previous Anesthesia    Social:  Non-Smoker, Social Alcohol Use    Hematology/Oncology:        Current/Recent Cancer.   EENT/Dental:EENT/Dental Normal   Cardiovascular:   Hypertension    Pulmonary:  Pulmonary Normal    Renal/:   Prostate ca    Hepatic/GI:  Hepatic/GI Normal    Musculoskeletal:  Musculoskeletal Normal    Neurological:   Neuromuscular Disease,    Endocrine:  Endocrine Normal    Dermatological:  Skin Normal        Physical Exam  General:  Well nourished    Airway/Jaw/Neck:  Airway Findings: Mouth Opening: Normal Tongue: Normal  General Airway Assessment: Adult  Mallampati: II        Eyes/Ears/Nose:  EYES/EARS/NOSE FINDINGS: Normal   Dental:  DENTAL FINDINGS: Normal   Chest/Lungs:  Chest/Lungs Findings: Clear to auscultation, Normal Respiratory Rate     Heart/Vascular:  Heart Findings: Rate: Normal  Rhythm: Regular Rhythm        Mental Status:  Mental Status Findings:  Cooperative, Alert and Oriented         Anesthesia Plan  Type of Anesthesia, risks & benefits discussed:  Anesthesia Type:  general  Patient's Preference:   Intra-op Monitoring Plan: standard ASA monitors  Intra-op Monitoring Plan Comments:   Post Op Pain Control Plan: IV/PO Opioids PRN  Post Op Pain Control Plan Comments:   Induction:   IV  Beta Blocker:  Patient is on a Beta-Blocker and has received one dose within the past 24 hours (No further documentation required).       Informed Consent: Patient understands risks and agrees with Anesthesia plan.  Questions answered. Anesthesia consent signed with patient.  ASA Score: 3     Day of Surgery Review of History & Physical: I have  interviewed and examined the patient. I have reviewed the patient's H&P dated:  There are no significant changes.  H&P update referred to the surgeon.         Ready For Surgery From Anesthesia Perspective.

## 2019-08-28 NOTE — PLAN OF CARE
Problem: Adult Inpatient Plan of Care  Goal: Plan of Care Review  Pt has improved since arrival to the floor.  Rash nearly gone.  Still with some facial swelling.  Complaints of sore throat.  Tolerating water.  Spoke with Dr Dior.  Ok for clear liquids but NO WARM/HOT liquids.  Informed pt.  No c/o pain no distress.  Adequate output.  Teds/scds.  Benadryl every 6 hours.  Solumedrol every 8.  poc discussed.  Verbalized understanding.

## 2019-08-28 NOTE — TRANSFER OF CARE
"Anesthesia Transfer of Care Note    Patient: Phoenix Fragoso    Procedure(s) Performed: Procedure(s) (LRB):  ROBOTIC PROSTATECTOMY (N/A)    Patient location: PACU    Anesthesia Type: general    Transport from OR: Transported from OR on 2-3 L/min O2 by NC with adequate spontaneous ventilation    Post pain: adequate analgesia    Post assessment: no apparent anesthetic complications and tolerated procedure well    Post vital signs: stable    Level of consciousness: awake, oriented and alert    Nausea/Vomiting: no nausea/vomiting    Complications: none    Transfer of care protocol was followed      Last vitals:   Visit Vitals  BP (!) 140/83 (BP Location: Left arm, Patient Position: Lying)   Pulse 64   Temp 36.7 °C (98.1 °F) (Skin)   Resp 16   Ht 6' 1" (1.854 m)   Wt 88.5 kg (195 lb)   SpO2 96%   BMI 25.73 kg/m²     "

## 2019-08-28 NOTE — PROGRESS NOTES
Procedure canceled as below:  After induction, patient developed significant rash.  He received 2 g of Ancef, and was induced with rocuronium, propofol, and fentanyl.  Almost immediately after all drug administration and induction, he developed maculopapular rash down peripheral trunk.  Progressed into axilla and down legs.  He was given 25 mg of Benadryl IV and observed and rash continued to spread.  He was given 25 more mg of IV Benadryl as well as Decadron.  Was only at this time did his pressure start to drop a little bit and he was given bumps of epinephrine by anesthesia and rash did then start to become hives/welts, and began to coalesce, spread, and move centrally.  Involvement was from chin down trunk, shoulders, arms, extending to legs, genitals, buttocks.  One hundred of Solu-Medrol was administered by Anesthesia.  Reaction and rash continued to progress and he was extubated taken to PACU.  Only once in PACU, did he then started to progress to have some angioedema and Hospital Medicine was called to evaluate for continued epinephrine and planned admit to ICU for observation and continued treatment.  Family notified.  Series of photos documenting progression of rash and reaction captured by epic clara into patient's chart

## 2019-08-28 NOTE — OR NURSING
Pt into OR at 0725. Overall skin warm, dry, and intact upon initial assessment. Anesthesia proceeded with induction. After induction, pt's abdomen clipped for procedure and pt prepared for positioning. At 0745, it was noted to patient's right axilla that a generalized rash was starting, and both Dr. Kwan and Dr. Brooks were called into OR at this time. Upon their arrival, rash continued to spread across patient's generalized body. Appropriate measures taken for staff. Decision made between surgeon and anesthesia to cancel case. Pt extubated and proceeded to PACU. Report given to PACU RN.

## 2019-08-28 NOTE — PLAN OF CARE
Dr renteria released from pacu to icu 505 skin w+d welps on trunk area noted to be less red and still splochy able to swallow well vs stable dr olmos was at pt bedside and rounded and placed orders before transfer wife at bs 02 on rn 94 o2 placed on pt at 2 l now 98 sleepy at intervals no itching noted at this time labs pending no nausea no emesis no co pain voided 300 in urinal  Report to Mercedez worley

## 2019-08-28 NOTE — H&P
PCP: Walker Lugo MD    History & Physical    Chief Complaint: Anaphylactic reactioion    History of Present Illness:  Patient is a 58 y.o. male admitted to Hospitalist Service from Recovery room after patient received anesthesia induction for planned elective Prostatectomy by Dr. Brooks this morning. After receiving dose of Propofol, Ancef and Vecuronium, patient started having rapidly progressing generalized maculopapular rash with hives all over with facial, lip and tongue swelling. BP dropped to 60/40 mmHg. Patient was given IVF bolus, IV Decadron 4 mg, Benadryl IV 50 mg, IV Pepcid 20 mg, dose of Epinephrine . Patient reportedly has past medical history significant for no allergic drug reaction, HTN, hyperlipidemia, hypothyroidism and history of Bell's Palsy. Patient's surgery was cancelled. Presently, patient is without subjective complaints. VSS. Patient denied chest pain, shortness of breath, abdominal pain, nausea, vomiting, headache, vision changes, focal neuro-deficits, cough or fever.    Past Medical History:   Diagnosis Date    Bell's palsy     High cholesterol     Hypertension     Insomnia     Thyroid disease      Past Surgical History:   Procedure Laterality Date    APPENDECTOMY      CHOLECYSTECTOMY      CYSTOSCOPY N/A 3/13/2018    Performed by Walker Brooks MD at [facility] OR    PROSTATE BIOPSY      right knee arthroscopy      TRANSRECTAL ULTRASOUND GUIDED PROSTATE BIOPSY N/A 3/13/2018    Performed by Walker Brooks MD at [facility] OR     History reviewed. No pertinent family history.  Social History     Tobacco Use    Smoking status: Never Smoker    Smokeless tobacco: Never Used   Substance Use Topics    Alcohol use: Yes     Comment: OCCASIONALLY    Drug use: No      Review of patient's allergies indicates:  No Known Allergies  PTA Medications   Medication Sig    ALPRAZolam (XANAX) 1 MG tablet Take 1 mg by mouth once daily.    ascorbic acid, vitamin C, (VITAMIN C) 100 MG tablet  Take 100 mg by mouth once daily.    aspirin (ECOTRIN) 81 MG EC tablet Take 81 mg by mouth once daily.    CIALIS 5 mg tablet TAKE ONE TABLET BY MOUTH EVERY 24 HOURS    fish oil-omega-3 fatty acids 300-1,000 mg capsule Take 1 capsule by mouth once daily.    gemfibrozil (LOPID) 600 MG tablet Take 300 mg by mouth once daily.    hydrALAZINE (APRESOLINE) 100 MG tablet Take 100 mg by mouth 2 (two) times daily.    levothyroxine (SYNTHROID) 150 MCG tablet Take 150 mcg by mouth once daily.    LORazepam (ATIVAN) 2 MG Tab Take 2 mg by mouth once daily.    losartan-hydrochlorothiazide 100-12.5 mg (HYZAAR) 100-12.5 mg Tab Take 1 tablet by mouth once daily.    multivitamin capsule Take 1 capsule by mouth once daily.    nebivolol (BYSTOLIC) 20 mg Tab Take by mouth once daily.     tamsulosin (FLOMAX) 0.4 mg Cap Take 2 capsules (0.8 mg total) by mouth after dinner.     Review of Systems:  Constitutional: no fever or chills  Eyes: no visual changes  Ears, nose, mouth, throat, and face: + Facial swelling/lip and tongue swelling.  Respiratory: no cough or shorness of breath  Cardiovascular: no chest pain or palpitations. + low blood pressure  Gastrointestinal: no nausea or vomiting, no abdominal pain or change in bowel habits  Genitourinary: no hematuria or dysuria  Integument/breast: see HPI  Hematologic/lymphatic: no easy bruising or lymphadenopathy  Musculoskeletal: no arthralgias or myalgias  Neurological: no seizures or tremors.  Behavioral/Psych: no auditory or visual hallucinations  Endocrine: no heat or cold intolerance     OBJECTIVE:     Vital Signs (Most Recent)  Temp: 97.9 °F (36.6 °C) (08/28/19 0825)  Pulse: 64 (08/28/19 0835)  Resp: 15 (08/28/19 0835)  BP: 135/73 (08/28/19 0835)  SpO2: 97 % (08/28/19 0835)    Physical Exam:  General appearance: well developed, appears stated age  Head: normocephalic, atraumatic  Eyes:  conjunctivae/corneas clear. PERRL.  Nose: Nares normal. Septum midline.  Throat: Facial  swelling with lip and tongue swelling noted. No stridor.  Neck: supple, symmetrical, trachea midline, no JVD and thyroid not enlarged, symmetric, no tenderness/mass/nodules  Lungs:  clear to auscultation bilaterally and normal respiratory effort  Chest wall: no tenderness  Heart: regular rate and rhythm, S1, S2 normal, no murmur, click, rub or gallop  Abdomen: soft, non-tender non-distended; bowel sounds normal; no masses,  no organomegaly  Extremities: no cyanosis, clubbing or edema.   Pulses: 2+ and symmetric  Skin: Skin color, texture, turgor normal. Patient have generalized erythema with maculopapular rash with hives mostly on the trunk with lip and tongue swelling. Patient is able to phonate and swallow. No wheezing.  Lymph nodes: Cervical, supraclavicular, and axillary nodes normal.  Neurologic: Normal strength and tone. No focal numbness or weakness. CNII-XII intact.      Laboratory:   Labs pending.  No results found for: HGBA1C  Microbiology Results (last 7 days)     ** No results found for the last 168 hours. **        Diagnostic Results:  Chest X-Ray (08-09-18): No acute abnormality.    Assessment/Plan:     Active Hospital Problems    Diagnosis  POA    *Anaphylactic shock, due to adverse effect of correct medicinal substance properly administered [T88.6XXA]  Admit to ICU for close monitoring. Patient received IV Propofol, Ancef andf Vecuronium is quick succession, not sure which medication contributed to anaphylactic reaction. Will add those to allergy list until further information is ascertained.   Start IVF hydration with NS at 150 cc/hr.  Obtain stat labs and serial cardiac enzymes.  Check TSH.  Hold anti-HTN agents.  Keep NPO until angioedema resolves.  Use Epinephrine SQ as directed.   IV Benadryl 50 mg q 6 hrs.  IV Pepcid 20 mg q 12 hrs.  IV Solumedrol 125 mg q 6 hrs.    Yes    Malignant neoplasm of prostate [C61]  Prostate surgery at a later date as per Dr. Brooks.    Yes    Hypertension  [I10]  Hold anti-HTN agents. Monitor BP closely.    Yes    Thyroid disease [E07.9]  Will resume Synthroid from tomorrow. Check TSH.   Yes      DVT prophylaxis: Use SCD and TEDs. Early ambulation encouraged.    Chato Dior MD  Department of Hospital Medicine   [facility]

## 2019-08-28 NOTE — ANESTHESIA POSTPROCEDURE EVALUATION
Anesthesia Post Evaluation    Patient: Phoenix Fragoso    Procedure(s) Performed: Procedure(s) (LRB):  ROBOTIC PROSTATECTOMY (N/A)    Final Anesthesia Type: general  Patient location during evaluation: PACU  Patient participation: Yes- Able to Participate  Level of consciousness: awake and alert  Post-procedure vital signs: reviewed and stable  Pain management: adequate  Airway patency: patent  PONV status at discharge: No PONV  Anesthetic complications: yes  Perioperative Events: unplanned ICU admission  Lili-operative Events Comments: Pt developed hypotension, full body maculopapular rash shortly after induction and administration of rocuronium and Ancef.  Pressors, iv fluids, benadryl, pepcid, steroid given. VSS.  Case cancelled and hospital medicine consulted.  Pt extubated and admitted to the ICU.    Cardiovascular status: hemodynamically stable  Respiratory status: unassisted and nasal cannula  Hydration status: euvolemic  Follow-up not needed.          Vitals Value Taken Time   /90 8/28/2019 12:01 PM   Temp 36.6 °C (97.9 °F) 8/28/2019  9:45 AM   Pulse 67 8/28/2019 12:03 PM   Resp 30 8/28/2019 12:03 PM   SpO2 97 % 8/28/2019 12:03 PM   Vitals shown include unvalidated device data.      Event Time     Out of Recovery 10:25:00          Pain/Pamella Score: Pamella Score: 10 (8/28/2019  9:45 AM)

## 2019-08-28 NOTE — INTERVAL H&P NOTE
The patient has been examined and the H&P has been reviewed:    No changes to above. Proceed with robotic prostatectomy as planned.    Anesthesia/Surgery risks, benefits and alternative options discussed and understood by patient/family.  All questions patient and wife had were answered in preop holding        Active Hospital Problems    Diagnosis  POA    Malignant neoplasm of prostate [C61]  Yes      Resolved Hospital Problems   No resolved problems to display.

## 2019-08-28 NOTE — TELEPHONE ENCOUNTER
Patient says he made all these arrangements with his employer to have this surgery.  He is asking if Dr. Brooks will be able to do the surgery tomorrow.    Advised patient that may not be safe due to his recent reaction to medications.    Advised that I will send message to doctor.

## 2019-08-28 NOTE — TELEPHONE ENCOUNTER
----- Message from Poornima Isabel sent at 8/28/2019 12:38 PM CDT -----  Type: Needs Medical Advice    Who Called:  self  Symptoms (please be specific):  Asking to speak with Lisa   How long has patient had these symptoms:  In ICU at [facility] n/s   Pharmacy name and phone #:     Best Call Back Number: [phone]   Additional Information: allergic reaction to meds or anesthesia

## 2019-08-29 VITALS
HEIGHT: 73 IN | RESPIRATION RATE: 28 BRPM | WEIGHT: 194.88 LBS | DIASTOLIC BLOOD PRESSURE: 75 MMHG | TEMPERATURE: 98 F | HEART RATE: 169 BPM | OXYGEN SATURATION: 86 % | SYSTOLIC BLOOD PRESSURE: 143 MMHG | BODY MASS INDEX: 25.83 KG/M2

## 2019-08-29 LAB
ALBUMIN SERPL BCP-MCNC: 3.5 G/DL (ref 3.5–5.2)
ALP SERPL-CCNC: 56 U/L (ref 55–135)
ALT SERPL W/O P-5'-P-CCNC: 37 U/L (ref 10–44)
ANION GAP SERPL CALC-SCNC: 10 MMOL/L (ref 8–16)
AST SERPL-CCNC: 19 U/L (ref 10–40)
BASOPHILS NFR BLD: 0 % (ref 0–1.9)
BILIRUB SERPL-MCNC: 0.6 MG/DL (ref 0.1–1)
BUN SERPL-MCNC: 21 MG/DL (ref 6–20)
CALCIUM SERPL-MCNC: 8.2 MG/DL (ref 8.7–10.5)
CHLORIDE SERPL-SCNC: 110 MMOL/L (ref 95–110)
CHOLEST SERPL-MCNC: 155 MG/DL (ref 120–199)
CHOLEST/HDLC SERPL: 4.3 {RATIO} (ref 2–5)
CK MB SERPL-MCNC: 2.8 NG/ML (ref 0.1–6.5)
CK MB SERPL-RTO: 2 % (ref 0–5)
CK SERPL-CCNC: 139 U/L (ref 20–200)
CO2 SERPL-SCNC: 25 MMOL/L (ref 23–29)
CREAT SERPL-MCNC: 0.9 MG/DL (ref 0.5–1.4)
DIFFERENTIAL METHOD: ABNORMAL
EOSINOPHIL NFR BLD: 0 % (ref 0–8)
ERYTHROCYTE [DISTWIDTH] IN BLOOD BY AUTOMATED COUNT: 12.1 % (ref 11.5–14.5)
EST. GFR  (AFRICAN AMERICAN): >60 ML/MIN/1.73 M^2
EST. GFR  (NON AFRICAN AMERICAN): >60 ML/MIN/1.73 M^2
GLUCOSE SERPL-MCNC: 143 MG/DL (ref 70–110)
HCT VFR BLD AUTO: 38.6 % (ref 40–54)
HDLC SERPL-MCNC: 36 MG/DL (ref 40–75)
HDLC SERPL: 23.2 % (ref 20–50)
HGB BLD-MCNC: 13.4 G/DL (ref 14–18)
IMM GRANULOCYTES # BLD AUTO: ABNORMAL K/UL
LDLC SERPL CALC-MCNC: 100.4 MG/DL (ref 63–159)
LYMPHOCYTES NFR BLD: 9 % (ref 18–48)
MCH RBC QN AUTO: 28.2 PG (ref 27–31)
MCHC RBC AUTO-ENTMCNC: 34.7 G/DL (ref 32–36)
MCV RBC AUTO: 81 FL (ref 82–98)
MONOCYTES NFR BLD: 1 % (ref 4–15)
NEUTROPHILS NFR BLD: 90 % (ref 38–73)
NONHDLC SERPL-MCNC: 119 MG/DL
NRBC BLD-RTO: 0 /100 WBC
PLATELET # BLD AUTO: 216 K/UL (ref 150–350)
PLATELET BLD QL SMEAR: ABNORMAL
PMV BLD AUTO: 8.6 FL (ref 9.2–12.9)
POTASSIUM SERPL-SCNC: 3.6 MMOL/L (ref 3.5–5.1)
PROT SERPL-MCNC: 5.9 G/DL (ref 6–8.4)
RBC # BLD AUTO: 4.76 M/UL (ref 4.6–6.2)
SODIUM SERPL-SCNC: 145 MMOL/L (ref 136–145)
TRIGL SERPL-MCNC: 93 MG/DL (ref 30–150)
WBC # BLD AUTO: 9.59 K/UL (ref 3.9–12.7)

## 2019-08-29 PROCEDURE — 80053 COMPREHEN METABOLIC PANEL: CPT

## 2019-08-29 PROCEDURE — 99232 SBSQ HOSP IP/OBS MODERATE 35: CPT | Mod: ,,, | Performed by: UROLOGY

## 2019-08-29 PROCEDURE — 63600175 PHARM REV CODE 636 W HCPCS: Performed by: INTERNAL MEDICINE

## 2019-08-29 PROCEDURE — 94761 N-INVAS EAR/PLS OXIMETRY MLT: CPT

## 2019-08-29 PROCEDURE — 20000000 HC ICU ROOM

## 2019-08-29 PROCEDURE — 25000003 PHARM REV CODE 250: Performed by: INTERNAL MEDICINE

## 2019-08-29 PROCEDURE — 27000221 HC OXYGEN, UP TO 24 HOURS

## 2019-08-29 PROCEDURE — 80061 LIPID PANEL: CPT

## 2019-08-29 PROCEDURE — 99232 PR SUBSEQUENT HOSPITAL CARE,LEVL II: ICD-10-PCS | Mod: ,,, | Performed by: UROLOGY

## 2019-08-29 PROCEDURE — 85007 BL SMEAR W/DIFF WBC COUNT: CPT

## 2019-08-29 PROCEDURE — 36415 COLL VENOUS BLD VENIPUNCTURE: CPT

## 2019-08-29 PROCEDURE — S0028 INJECTION, FAMOTIDINE, 20 MG: HCPCS | Performed by: INTERNAL MEDICINE

## 2019-08-29 PROCEDURE — 85027 COMPLETE CBC AUTOMATED: CPT

## 2019-08-29 RX ORDER — METHYLPREDNISOLONE 4 MG/1
TABLET ORAL
Qty: 21 TABLET | Refills: 0 | Status: ON HOLD | OUTPATIENT
Start: 2019-08-29 | End: 2019-10-17 | Stop reason: HOSPADM

## 2019-08-29 RX ORDER — HYDRALAZINE HYDROCHLORIDE 25 MG/1
100 TABLET, FILM COATED ORAL EVERY 12 HOURS
Status: DISCONTINUED | OUTPATIENT
Start: 2019-08-29 | End: 2019-08-30 | Stop reason: HOSPADM

## 2019-08-29 RX ORDER — FAMOTIDINE 20 MG/1
20 TABLET, FILM COATED ORAL 2 TIMES DAILY
Qty: 20 TABLET | Refills: 0 | Status: SHIPPED | OUTPATIENT
Start: 2019-08-29 | End: 2019-12-18

## 2019-08-29 RX ORDER — DIPHENHYDRAMINE HCL 50 MG
50 CAPSULE ORAL EVERY 6 HOURS PRN
Qty: 20 CAPSULE | Refills: 0 | Status: ON HOLD | OUTPATIENT
Start: 2019-08-29 | End: 2019-10-17 | Stop reason: HOSPADM

## 2019-08-29 RX ORDER — HYDROCHLOROTHIAZIDE 25 MG/1
25 TABLET ORAL DAILY
Status: DISCONTINUED | OUTPATIENT
Start: 2019-08-29 | End: 2019-08-30 | Stop reason: HOSPADM

## 2019-08-29 RX ORDER — LOSARTAN POTASSIUM AND HYDROCHLOROTHIAZIDE 12.5; 5 MG/1; MG/1
2 TABLET ORAL DAILY
Status: DISCONTINUED | OUTPATIENT
Start: 2019-08-29 | End: 2019-08-29 | Stop reason: SDUPTHER

## 2019-08-29 RX ORDER — LOSARTAN POTASSIUM 25 MG/1
100 TABLET ORAL DAILY
Status: DISCONTINUED | OUTPATIENT
Start: 2019-08-29 | End: 2019-08-30 | Stop reason: HOSPADM

## 2019-08-29 RX ORDER — NEBIVOLOL 5 MG/1
20 TABLET ORAL DAILY
Status: DISCONTINUED | OUTPATIENT
Start: 2019-08-29 | End: 2019-08-30 | Stop reason: HOSPADM

## 2019-08-29 RX ADMIN — DIPHENHYDRAMINE HYDROCHLORIDE 25 MG: 50 INJECTION INTRAMUSCULAR; INTRAVENOUS at 12:08

## 2019-08-29 RX ADMIN — HYDROCHLOROTHIAZIDE 25 MG: 25 TABLET ORAL at 11:08

## 2019-08-29 RX ADMIN — HYDRALAZINE HYDROCHLORIDE 100 MG: 25 TABLET, FILM COATED ORAL at 11:08

## 2019-08-29 RX ADMIN — METHYLPREDNISOLONE SODIUM SUCCINATE 125 MG: 125 INJECTION, POWDER, FOR SOLUTION INTRAMUSCULAR; INTRAVENOUS at 01:08

## 2019-08-29 RX ADMIN — LOSARTAN POTASSIUM 100 MG: 25 TABLET ORAL at 11:08

## 2019-08-29 RX ADMIN — NEBIVOLOL HYDROCHLORIDE 20 MG: 5 TABLET ORAL at 11:08

## 2019-08-29 RX ADMIN — METHYLPREDNISOLONE SODIUM SUCCINATE 125 MG: 125 INJECTION, POWDER, FOR SOLUTION INTRAMUSCULAR; INTRAVENOUS at 08:08

## 2019-08-29 RX ADMIN — FAMOTIDINE 20 MG: 10 INJECTION INTRAVENOUS at 08:08

## 2019-08-29 RX ADMIN — DIPHENHYDRAMINE HYDROCHLORIDE 25 MG: 50 INJECTION INTRAMUSCULAR; INTRAVENOUS at 05:08

## 2019-08-29 NOTE — PROGRESS NOTES
Patient doing well.  Vital signs stable.  Observed in ICU overnight.  Received multiple doses of epinephrine yesterday.  Patient with minimal complaints.  Throat is somewhat sore.  Rash resolving/resolved    His angioedema has significantly improved.  Still mildly puffy chin/throat.  Hives/welts resolved, and maculopapular rash near complete regression.  Some faint light brown spots remain specially lower abdomen near hips, but essentially has all resolved.    Discussed with patient again, the need to wait before planning surgery.  Given severe systemic reaction, and the stress a man cysto major surgery, would wait at least 4-6 weeks from time of this event.    He has had laparoscopic cholecystectomy, and appendectomy in the past and therefore has received general anesthesia with paralytics.  He has also had colonoscopy and likely received propofol.  He has also taken amoxicillin in the past multiple times without any adverse reaction.    It is unclear to which agent his severe hypersensitivity and anaphylaxis resulted from.  We did discuss using different anesthetic agents and antibiotics at the next planned surgical encounter, though in the interim I would like him to see allergy/immunology for evaluation and review of this event and possible sensitivity testing for future operative safety.    Scheduled with Dr Shrestha on 9/24/19 but have requested sooner appt  Prostatectomy late sept/early october

## 2019-08-29 NOTE — PLAN OF CARE
08/28/19 2249   Patient Assessment/Suction   Level of Consciousness (AVPU) alert   Respiratory Effort Unlabored   PRE-TX-O2   O2 Device (Oxygen Therapy) nasal cannula   $ Is the patient on Low Flow Oxygen? Yes   Flow (L/min) 2   Oxygen Concentration (%) 28   SpO2 95 %   Pulse Oximetry Type Continuous   $ Pulse Oximetry - Multiple Charge Pulse Oximetry - Multiple   Pulse 82   Resp 16   Ready to Wean/Extubation Screen   FIO2<=50 (chart decimal) 0.28

## 2019-08-29 NOTE — PLAN OF CARE
08/29/19 0750   PRE-TX-O2   O2 Device (Oxygen Therapy) nasal cannula   $ Is the patient on Low Flow Oxygen? Yes   Flow (L/min) 2   SpO2 96 %   Pulse Oximetry Type Continuous   $ Pulse Oximetry - Multiple Charge Pulse Oximetry - Multiple   Pulse 85   Resp 20

## 2019-08-29 NOTE — DISCHARGE SUMMARY
Discharge Summary  Hospital Medicine    Admit Date: 8/28/2019    Date and Time: 8/29/201912:58 PM    Discharge Attending Physician: Chato Dior MD    Primary Care Physician: Walker Lugo MD    Diagnoses:  Active Hospital Problems    Diagnosis  POA    *Anaphylactic shock, due to adverse effect of correct medicinal substance properly administered [T88.6XXA]  Yes    Malignant neoplasm of prostate [C61]  Yes    Hypertension [I10]  Yes    Thyroid disease [E07.9]  Yes     Discharged Condition: Good    Hospital Course:   Patient is a 58 y.o. male admitted to Hospitalist Service from Recovery room after patient received anesthesia induction for planned elective Prostatectomy by Dr. Brooks. After receiving dose of Propofol, Ancef and Vecuronium, patient started having rapidly progressing generalized maculopapular rash with hives all over with facial, lip and tongue swelling. BP dropped to 60/40 mmHg. Patient was given IVF bolus, IV Decadron 4 mg, Benadryl IV 50 mg, IV Pepcid 20 mg, dose of Epinephrine. Patient reportedly has past medical history significant for no allergic drug reaction, HTN, hyperlipidemia, hypothyroidism and history of Bell's Palsy. Patient's surgery was cancelled. Patient denied chest pain, shortness of breath, abdominal pain, nausea, vomiting, headache, vision changes, focal neuro-deficits, cough or fever. Patient was admitted to Hospitalist medicine service. Patient was managed in intensive care unit.  With use of antihistamine, epinephrine, steroid use patient's angioedema has resolved.  Patient is feeling at baseline.  Patient denies in knee difficulty of breathing, wheezing, lip for tongue swelling. Patient is able to swallow well.  No speech troubles reported.  Outpatient follow-up with allergist has been established.  Patient to follow up with his doctors and his prostate surgery will be rescheduled in near future as per Dr. Brooks.  Patient was instructed to avoid use of penicillin, we  cure only room and propofol in future.  These medications have been listed as severe allergies for now in patient's medical chart.  Side effect profile of steroid use also discussed with the patient and also sedating potential of antihistamine and driving precautions, operating heavy machinery or going to the height a discussed with the patient.  Patient was discharged home in stable condition with following discharge plan of care.     Consults: Dr. Brooks    Significant Diagnostic Studies:   Chest X-Ray (08-09-18): No acute abnormality.    Microbiology Results (last 7 days)     ** No results found for the last 168 hours. **        Special Treatments/Procedures: None  Disposition: Home or Self Care    Medications:  Reconciled Home Medications:   Current Discharge Medication List      START taking these medications    Details   diphenhydrAMINE (BENADRYL) 50 MG capsule Take 1 capsule (50 mg total) by mouth every 6 (six) hours as needed for Itching (Facial swelling).  Qty: 20 capsule, Refills: 0      famotidine (PEPCID) 20 MG tablet Take 1 tablet (20 mg total) by mouth 2 (two) times daily.  Qty: 20 tablet, Refills: 0      methylPREDNISolone (MEDROL DOSEPACK) 4 mg tablet follow package directions  Qty: 21 tablet, Refills: 0         CONTINUE these medications which have NOT CHANGED    Details   ALPRAZolam (XANAX) 1 MG tablet Take 1 mg by mouth once daily.  Refills: 2      ascorbic acid, vitamin C, (VITAMIN C) 100 MG tablet Take 100 mg by mouth once daily.      aspirin (ECOTRIN) 81 MG EC tablet Take 81 mg by mouth once daily.      CIALIS 5 mg tablet TAKE ONE TABLET BY MOUTH EVERY 24 HOURS  Refills: 5      fish oil-omega-3 fatty acids 300-1,000 mg capsule Take 1 capsule by mouth once daily.      gemfibrozil (LOPID) 600 MG tablet Take 300 mg by mouth once daily.  Refills: 0      hydrALAZINE (APRESOLINE) 100 MG tablet Take 100 mg by mouth 2 (two) times daily.      levothyroxine (SYNTHROID) 150 MCG tablet Take 150 mcg by  mouth once daily.  Refills: 1      LORazepam (ATIVAN) 2 MG Tab Take 2 mg by mouth once daily.  Refills: 2      losartan-hydrochlorothiazide 100-12.5 mg (HYZAAR) 100-12.5 mg Tab Take 1 tablet by mouth once daily.      multivitamin capsule Take 1 capsule by mouth once daily.      nebivolol (BYSTOLIC) 20 mg Tab Take by mouth once daily.       tamsulosin (FLOMAX) 0.4 mg Cap Take 2 capsules (0.8 mg total) by mouth after dinner.  Qty: 60 capsule, Refills: 11           Discharge Procedure Orders   Diet Cardiac     Other restrictions (specify):   Order Comments: PLEASE OBSERVE FALL PRECAUTIONS     Call MD for:   Order Comments: For worsening symptoms, chest pain, shortness of breath, increased abdominal pain, high grade fever, stroke or stroke like symptoms, immediately go to the nearest Emergency Room or call 911 as soon as possible.     Follow-up Information     Walker Lugo MD In 1 week.    Specialty:  Family Medicine  Contact information:  [address]  [address]  [facility]  [place] [address]  [phone]             Walker Brooks MD.    Specialty:  Urology  Why:  as planned.  Contact information:  [address] [place] [address] DR  [address]  [place] [address]  [phone]             Please follow up.    Contact information:  Follow up with allergist as planned               Time spent on the discharge of patient: 32 minutes  Patient was seen and examined on the date of discharge and determined to be suitable for discharge.

## 2019-08-29 NOTE — PLAN OF CARE
Problem: Adult Inpatient Plan of Care  Goal: Plan of Care Review  Outcome: Ongoing (interventions implemented as appropriate)  Ensured patient safety with frequent checks, assessing pain and monitoring for rash to trunk, no papules noted and minimal part of the rash still seen. No itching or swallowing issues. Patient notes soreness in throat but remains on cold cld and tolerating the same. B/P elevated and NP called who reordered some of his home meds. Remains free of falls and skin intact. Will continue to monitor.

## 2019-08-29 NOTE — PLAN OF CARE
Discharged home, no needs.       08/29/19 1542   Discharge Assessment   Assessment Type Discharge Planning Assessment

## 2019-08-29 NOTE — NURSING
IV removed from right hand with cath intact.  Disconnected from monitoring equipment.  Awaiting wife's return, in order to review prescriptions, and AVS.

## 2019-08-29 NOTE — PLAN OF CARE
08/29/19 1542   Final Note   Assessment Type Final Discharge Note   Anticipated Discharge Disposition Home

## 2019-08-29 NOTE — PLAN OF CARE
Problem: Adult Inpatient Plan of Care  Goal: Plan of Care Review  Outcome: Ongoing (interventions implemented as appropriate)  Disconnected from monitoring to ambulate to restroom across olsen from room.  Tolerated activity well without weakness or SOB or lightheadedness.  Has been visited by Dr. Brooks.

## 2019-08-29 NOTE — PLAN OF CARE
Problem: Adult Inpatient Plan of Care  Goal: Plan of Care Review  Outcome: Outcome(s) achieved Date Met: 08/29/19  AVS reviewed with patient and wife.  Any questions answered.  Given prescriptions for Benadryl, Pepcid, and Medrol dosepack.  Discharged via wheelchair with spouse.

## 2019-08-30 ENCOUNTER — PATIENT OUTREACH (OUTPATIENT)
Dept: ADMINISTRATIVE | Facility: CLINIC | Age: 59
End: 2019-08-30

## 2019-08-30 ENCOUNTER — TELEPHONE (OUTPATIENT)
Dept: UROLOGY | Facility: CLINIC | Age: 59
End: 2019-08-30

## 2019-08-30 NOTE — TELEPHONE ENCOUNTER
----- Message from Allie Schneider sent at 8/30/2019 10:13 AM CDT -----  Contact: wife Loli Fragoso   Patient wife requesting to speak with Marietta regarding a appointment patient has per wife     And few more questions wife has to ask also,did not states question       Please call to advice [phone] patient contact

## 2019-08-30 NOTE — PATIENT INSTRUCTIONS
Discharge Instructions for Anaphylactic Shock  You have been diagnosed with a serious kind of allergic reaction known as anaphylactic shock. This occurs within minutes of exposure to an allergy-causing substance, such as penicillin, nuts, intravenous contrast given during a CT scan, or a bee sting. Contact with these substances, either through the skin or by eating, causes your blood pressure to drop suddenly. Less oxygen reaches your brain and other organs, and your body goes into shock. You may also have skin swelling, an itchy red rash called hives, and breathing difficulty. If not treated quickly, anaphylactic shock can be fatal.  Home Care  Ask your doctor about carrying an EpiPen. This is a single-dose injection kit of epinephrine (adrenaline). With the kit you can give yourself a shot of medication that will counteract the allergic reaction until medical help arrives. Learn how to give yourself a shot so that you are prepared.  Check the expiration date of your EpiPen regularly.  Keep more than one EpiPen on hand. Carry one kit with you and keep others where they are easy to find, such as in your work desk.  Avoid the things that cause your allergic reaction whenever possible.  Always ask about ingredients when eating food prepared by others. At a restaurant, tell the  about your food allergies so that they know to take special care when preparing your meal.  Wear a medical identification bracelet. This warns others about your allergy and tells them what to do in an emergency. Ask your healthcare provider how to get one.  Tell your family, friends, and co-workers what they should do if you have a severe allergic reaction.  Show them how to use the EpiPen.  Tell them to call 911 if you are having a reaction and to give you a shot if you are unable to.  Ask them to start CPR if you stop breathing.  Tell them to make sure you are lying down with your legs raised during the reaction.  Tell your doctor,  dentist, and pharmacist about any allergies you have to medications. Keep a list of alternative medications handy.  Ask your doctor whether immunotherapy (allergy shots) will help you.  Follow-Up  Make a follow-up appointment as directed by our staff.    Call 911 Right Away if You Have:  Fainting or loss of consciousness  Racing pulse  Trouble breathing or wheezing  Nausea and vomiting  Swelling of your lips, tongue, or throat  Itchy, blotchy skin or hives  Pale, cool, damp skin  Drowsiness  Confusion   © 8810-3958 [facility], [address] [place] [address], [place] [address]. All rights reserved. This information is not intended as a substitute for professional medical care. Always follow your healthcare professional's instructions.

## 2019-08-30 NOTE — TELEPHONE ENCOUNTER
Patient d/c yesterday.  Swelling in hands, feet, face.    Advised that he was loaded with fluids,and may take another day or two to lose all the fluid.  Advised to drink plenty of water and may be helpful to go for a walk today.      Asking about this allergist he will need to see.  He is scheduled to see Dr. Garcia 10/2.      Patient is anxious to have surgery done.

## 2019-08-30 NOTE — PROGRESS NOTES
C3 nurse attempted to contact patient. No answer. The following message was left for the patient to return the call:  Good morning I am a nurse calling on behalf of [facility] from the [facility].  This is a Transitional Care Call for Phoenix Fragoso. When you have a moment please contact us at [phone] or [phone] Monday through Friday, between the hours of 8 am to 4 pm. We look forward to speaking with you. On behalf of [facility] have a nice day.    The patient does not have a scheduled HOSFU appointment within 7-14 days post hospital discharge date 8/29/19.Non [facility] Provider,unable to route at this time.

## 2019-09-02 NOTE — TELEPHONE ENCOUNTER
Per communication with Dr Shrestha, was supposed to be scheduled for allergen testing visit at [facility] 9/17 and be seen prior by allergy fellow. Goal surgery would be early October. Please help reconcile schedule with allergy staff and reassure patient this workup is to ensure his safety before attempting surgery again      ------------------------    We usually do the perioperative anaphylaxis testing at [facility] given how tough it is to get ahold of the anesthetic medications that we need to test.     Mr. Fragoso needs to be seen once in clinic to go over what we plan to do, and then he will be scheduled for allergy testing at one of our high-risk procedure clinics.     Our next procedure clinic is 9/17 (and unfortunately they are only once a month). I have asked Nurse Pili Rivera at [facility] to get him an appt with one of our allergy fellows prior to 9/17, and then we will subsequently test him on 9/17.

## 2019-09-03 ENCOUNTER — TELEPHONE (OUTPATIENT)
Dept: ALLERGY | Facility: CLINIC | Age: 59
End: 2019-09-03

## 2019-09-03 NOTE — TELEPHONE ENCOUNTER
He is scheduled to see one of the fellows on 9/11 at 8am to discuss what kind of testing they will be doing and then scheduled for challenge clinic on 9/17 at 2pm.

## 2019-09-03 NOTE — TELEPHONE ENCOUNTER
Received message from Dr. Overton's nurse asking if patient could be scheduled for challenge clinic on 9/17/19.  I was able to schedule patient with Dr. Anne on 9/11/19 at 8am and challenge clinic on 9/17/19 at 2pm.  Notified patient of appointments.

## 2019-09-04 ENCOUNTER — PATIENT MESSAGE (OUTPATIENT)
Dept: UROLOGY | Facility: CLINIC | Age: 59
End: 2019-09-04

## 2019-09-11 ENCOUNTER — OFFICE VISIT (OUTPATIENT)
Dept: ALLERGY | Facility: CLINIC | Age: 59
End: 2019-09-11
Payer: COMMERCIAL

## 2019-09-11 VITALS
BODY MASS INDEX: 25.5 KG/M2 | WEIGHT: 192.44 LBS | SYSTOLIC BLOOD PRESSURE: 114 MMHG | DIASTOLIC BLOOD PRESSURE: 82 MMHG | HEIGHT: 73 IN

## 2019-09-11 DIAGNOSIS — T78.2XXA ANAPHYLAXIS, INITIAL ENCOUNTER: Primary | ICD-10-CM

## 2019-09-11 PROCEDURE — 99999 PR PBB SHADOW E&M-EST. PATIENT-LVL IV: CPT | Mod: PBBFAC,,, | Performed by: PEDIATRICS

## 2019-09-11 PROCEDURE — 3008F PR BODY MASS INDEX (BMI) DOCUMENTED: ICD-10-PCS | Mod: CPTII,S$GLB,, | Performed by: PEDIATRICS

## 2019-09-11 PROCEDURE — 3074F PR MOST RECENT SYSTOLIC BLOOD PRESSURE < 130 MM HG: ICD-10-PCS | Mod: CPTII,S$GLB,, | Performed by: PEDIATRICS

## 2019-09-11 PROCEDURE — 99999 PR PBB SHADOW E&M-EST. PATIENT-LVL IV: ICD-10-PCS | Mod: PBBFAC,,, | Performed by: PEDIATRICS

## 2019-09-11 PROCEDURE — 3079F PR MOST RECENT DIASTOLIC BLOOD PRESSURE 80-89 MM HG: ICD-10-PCS | Mod: CPTII,S$GLB,, | Performed by: PEDIATRICS

## 2019-09-11 PROCEDURE — 99204 PR OFFICE/OUTPT VISIT, NEW, LEVL IV, 45-59 MIN: ICD-10-PCS | Mod: S$GLB,,, | Performed by: PEDIATRICS

## 2019-09-11 PROCEDURE — 3079F DIAST BP 80-89 MM HG: CPT | Mod: CPTII,S$GLB,, | Performed by: PEDIATRICS

## 2019-09-11 PROCEDURE — 3008F BODY MASS INDEX DOCD: CPT | Mod: CPTII,S$GLB,, | Performed by: PEDIATRICS

## 2019-09-11 PROCEDURE — 3074F SYST BP LT 130 MM HG: CPT | Mod: CPTII,S$GLB,, | Performed by: PEDIATRICS

## 2019-09-11 PROCEDURE — 99204 OFFICE O/P NEW MOD 45 MIN: CPT | Mod: S$GLB,,, | Performed by: PEDIATRICS

## 2019-09-11 NOTE — PROGRESS NOTES
"ALLERGY & IMMUNOLOGY CLINIC - INITIAL CONSULTATION     HISTORY OF PRESENT ILLNESS     Patient ID: Phoenix Fragoso is a 58 y.o. male    CC: Perioperative anaphylaxis    HPI: 58 year old male with a past medical history of prostate cancer and severe LUTS who presents for evaluation or perioperative anaphylaxis. Patient was scheduled for a robotic prostatectomy on August 28th. Per note the reaction was as followes "He received 2 g of Ancef, and was induced with rocuronium, propofol, and fentanyl.  Almost immediately after all drug administration and induction, he developed maculopapular rash down peripheral trunk. Progressed into axilla and down legs. He was given 25 mg of Benadryl IV and observed and rash continued to spread.  He was given 25 more mg of IV Benadryl as well as Decadron.  Was only at this time did his pressure start to drop a little bit and he was given bumps of epinephrine by anesthesia and rash did then start to become hives/welts, and began to coalesce, spread, and move centrally.  Involvement was from chin down trunk, shoulders, arms, extending to legs, genitals, buttocks.  One hundred of Solu-Medrol was administered by Anesthesia.  Reaction and rash continued to progress and he was extubated taken to PACU. Only once in PACU, did he then started to progress to have some angioedema and Hospital Medicine was called to evaluate for continued epinephrine and planned admit to ICU for observation and continued treatment."    From Chart review in epic the following medications were charted as being given: midazolam, fentanyl, propofol, lidocaine, rocuronium, succinylcholine, cefazolin. Plan is for Prostatectomy late sept/early October and they want workup    Other surgical history include a laparoscopic cholecystectomy, and appendectomy for which he received general anesthesia with paralytics.  He has also had colonoscopy and likely received propofol.  He has also taken amoxicillin in the past multiple " times without any adverse reaction. Other medical history is significant for bell's palsy diagnosed in January likely post viral. Has hypertension and is on a beta blocker which he takes in the morning. Has hypothyroidism and is on synthroid.   REVIEW OF SYSTEMS   Review of Systems   Constitutional: Negative.    HENT: Negative.    Eyes: Negative.    Respiratory: Negative.    Cardiovascular: Negative.    Gastrointestinal: Negative.    Genitourinary: Negative.    Musculoskeletal: Negative.    Skin: Negative.    Neurological: Negative.    Endo/Heme/Allergies: Negative.    Psychiatric/Behavioral: Negative.         MEDICAL HISTORY     Past Medical History:   Diagnosis Date    Bell's palsy     High cholesterol     Hypertension     Insomnia     Thyroid disease         PHYSICAL EXAM   Physical Exam   Constitutional: He is oriented to person, place, and time and well-developed, well-nourished, and in no distress. No distress.   HENT:   Head: Normocephalic and atraumatic.   Right Ear: External ear normal.   Left Ear: External ear normal.   Nose: Nose normal.   Mouth/Throat: Oropharynx is clear and moist.   Eyes: Conjunctivae and EOM are normal. Right eye exhibits no discharge. Left eye exhibits no discharge.   Cardiovascular: Normal rate, regular rhythm, normal heart sounds and intact distal pulses. Exam reveals no gallop and no friction rub.   No murmur heard.  Pulmonary/Chest: Effort normal and breath sounds normal. No respiratory distress. He has no wheezes. He has no rales. He exhibits no tenderness.   Musculoskeletal: Normal range of motion. He exhibits no edema, tenderness or deformity.   Neurological: He is alert and oriented to person, place, and time. GCS score is 15.   Skin: Skin is warm and dry. No rash noted. He is not diaphoretic. No erythema. No pallor.      CHART REVIEW   Reviewed hospital chart from previous admission. Images uploaded to [facility]   ASSESSMENT & PLAN     Phoenix Fragoso is a 58 y.o. male with      Anaphylaxis, initial encounter: History of event, change in vitals, and images in the chart are all consistent with a diagnosis of perioperative anaphylaxis. At this point the specific trigger is unclear. Patient to return next week for skin testing and possible oral challenge. We reviewed what to expect at the appointment next week. Instructed to hold all antihistamines for seven days prior( start holding today) and should also not take morning beta blocker prior to coming in for the appointment.  - Testing will be done to the following medications:  Midazolam  Fentanyl  Propofol  Lidocaine  Rocuronium  Succinylcholine  Cefazolin  Betadyne    Follow up: 1 week for testing     Nicholas Anne DO  Allergy Immunology Fellow

## 2019-09-17 ENCOUNTER — OFFICE VISIT (OUTPATIENT)
Dept: ALLERGY | Facility: CLINIC | Age: 59
End: 2019-09-17
Payer: COMMERCIAL

## 2019-09-17 VITALS — BODY MASS INDEX: 25.31 KG/M2 | WEIGHT: 190.94 LBS | HEIGHT: 73 IN

## 2019-09-17 DIAGNOSIS — T78.2XXD DRUG-INDUCED ANAPHYLAXIS, SUBSEQUENT ENCOUNTER: Primary | ICD-10-CM

## 2019-09-17 DIAGNOSIS — T50.905D DRUG-INDUCED ANAPHYLAXIS, SUBSEQUENT ENCOUNTER: Primary | ICD-10-CM

## 2019-09-17 DIAGNOSIS — Z88.1 ALLERGY TO CEPHALOSPORIN: ICD-10-CM

## 2019-09-17 PROCEDURE — 99999 PR PBB SHADOW E&M-EST. PATIENT-LVL IV: ICD-10-PCS | Mod: PBBFAC,,, | Performed by: PEDIATRICS

## 2019-09-17 PROCEDURE — 99499 UNLISTED E&M SERVICE: CPT | Mod: S$GLB,,, | Performed by: PEDIATRICS

## 2019-09-17 PROCEDURE — 95018 PR PERQ&IC ALLG TEST DRUGS/BIOL: ICD-10-PCS | Mod: S$GLB,,, | Performed by: PEDIATRICS

## 2019-09-17 PROCEDURE — 99499 NO LOS: ICD-10-PCS | Mod: S$GLB,,, | Performed by: PEDIATRICS

## 2019-09-17 PROCEDURE — 95018 ALL TSTG PERQ&IQ DRUGS/BIOL: CPT | Mod: S$GLB,,, | Performed by: PEDIATRICS

## 2019-09-17 PROCEDURE — 99999 PR PBB SHADOW E&M-EST. PATIENT-LVL IV: CPT | Mod: PBBFAC,,, | Performed by: PEDIATRICS

## 2019-09-17 NOTE — PROGRESS NOTES
"ALLERGY & IMMUNOLOGY CLINIC - PROCEDURE CLINIC NOTE     HISTORY OF PRESENT ILLNESS     Patient ID: Phoenix Fragoso is a 58 y.o. male    CC: perioperative anaphylaxis, here for skin testing     HPI: Mr. Fragoso is a 59 yo male with a history of prostate cancer who had anaphylactic shock during anesthesia for a robotic prostatectomy on August 28th. He was unable to undergo the surgery secondary to the reaction. He was evaluated by Dr. Anne on 9/11/19 and was noted to receive several anesthetic agents as well as cefazolin prior to the onset of the reaction. He is here today to undergo skin prick and intradermal testing to determine the cause of his reaction. After reviewing the anesthesia record, as well as the physician documentation, we are going to test to the following: cefazolin, midazolam, fentanyl, propofol, lidocaine, rocuronium, vecuronium (unclear whether he received fabricio or vec), succinylocholine, betadine, and chlorhexidine.    He has held all antihistamines for the last 7 days, including H1 and H2 blockers. He also held his beta blocker this morning in anticipation of today's procedure. He feels well, denies recent illness, trouble breathing, GI upset or rash.      REVIEW OF SYSTEMS     CONST: no fever, chills, recent illness   NEURO: no headache, dizziness  EYES: no discharge, no redness, watering   NOSE: mild congestion and PND, no sneezing, no rhinorrhea  PULM: no SOB, no wheezing, no cough  CV: no CP, no palpitations, no leg swelling  GI:no pain, no N/V/D  DERM: no rashes, no skin breaks     MEDICAL HISTORY     MedHx: active problems reviewed, no interval changes to medical histories   Past Medical History:   Diagnosis Date    Bell's palsy      High cholesterol      Hypertension      Insomnia      Thyroid disease          Prostate cancer        PHYSICAL EXAM     VS: Ht 6' 1" (1.854 m)   Wt 86.6 kg (190 lb 14.7 oz)   BMI 25.19 kg/m²   GENERAL:awake and alert, NAD, well-appearing, " cooperative  EYES: no conjunctival injection, no discharge, no infraorbital shiners  NOSE: normal turbinates, stringing mucous, no polyps  ORAL: MMM, no ulcers, no thrush, no cobblestoning  LUNGS: CTAB, no w/r/c, no increased WOB  HEART: RRR, normal S1/S2, no m/g/r  EXTREMITIES: no c/c/e  DERM: no rashes, no skin breaks  NEURO: normal gait, no facial asymmetry     ALLERGEN TESTING     After review of the procedure in detail, including the need for SPT and intradermal testing to agents tested, verbal informed consent was obtained. The following medications and concentrations were used for skin prick and intradermal testing:  Medication Concentration SPT concentration Intradermal concentration   Cefazolin 330 mg/mL 2mg/mL 2 mg/mL   Midazolam 1 mg/mL 1 mg/mL 0.5 mg/mL   Fentanyl 50 mcg/mL 0.05mg/mL 0.005mg/mL   Propofol 10 mg/mL 10 mg/mL 1 mg/mL   Lidocaine 10 mg/mL 10 mg/mL 1 mg/mL   Rocuronium 10 mg/mL 10 mg/mL 0.05 mg/mL   Vecuronium 1 mg/mL 1 mg/mL 0.4 mg/mL   Succinylocholine 20 mg/mL Not performed 0.02 mg/mL   Betadine  10% 10% Not performed   Chlorhexidine gluconate 4% w/v 4% w/v Not performed       Results:  Medication SPT (wheal/flare) Intradermal (wheal/flare)   Cefazolin Negative  8mm/13mm, 7mm wheal (performed in duplicate)   Midazolam Negative  Negative    Fentanyl Negative Negative    Propofol Negative  Negative    Lidocaine Negative  Negative    Rocuronium Negative  Negative    Vecuronium Negative  Negative    Succinylocholine Not performed Negative    Betadine  Negative  Not performed   Chlorhexidine gluconate Negative  Not performed    Positive histamine control 10mm/23mm Not performed   Negative saline control Negative  Negative    Total skin tests placed: 21    Interpretation: Positive to cefazolin on intradermal testing, negative to all other medications tested by skin prick and intradermal testing with adequate positive (histamine) and negative (saline) controls.     *Please note the positive  predictive value and negative predicative value for skin prick and intradermal testing to these medications is not well validated, and it is possible to have both false positives and false negative results. This testing assesses risk for IgE mediated symptoms such as urticaria, angioedema, bronchospasm, acute onset vomiting/diarrhea/abdominal cramping, and hypotension. This test does not assess risk for other immunologic and non-immunologic drug hypersensitivities such as serum sickness, interstitial nephritis, immunologic cytopenias, Lyons-Moise syndrome, erythema multiforme, toxic epidermal necrolysis, and morbiliform eruptions.     CHART REVIEW     Reviewed allergy clinic notes, anesthesia record, hospital notes      ASSESSMENT & PLAN     Phoenix Fragoso is a 58 y.o. male with     Perioperative anaphylaxis-history consistent with anaphylaxis perioperatively. Results of skin testing today indicate that cefazolin was the most likely culprit since this was the only agent tested that was positive. This is in keeping with the most common cause of perioperative anaphylaxis in the [place], which is antibiotics, followed by neuromuscular blocking agents. We discussed the results of today's skin testing in detail, and its limitations. Particularly we reviewed that skin testing for anesthetic medications is not well validated, and it is possible to have false positive and false negative results. However, given the results of today's testing and the pre-test probability that cefazolin was the trigger, we believe this was the likely trigger for his reaction perioperatively and have removed vecuronium and propofol from his allergy list. He should not be given cefazolin with future surgeries.     As noted above, the test today does not assess his risk for other immunologic and non-immunologic drug hypersensitivities such as serum sickness, interstitial nephritis, immunologic cytopenias, Lyons-Moise syndrome,  erythema multiforme, toxic epidermal necrolysis, and morbiliform eruptions.    Allergy to cefazolin-we reviewed that cefazolin has a unique side chain and there is little cross reactivity between cefazolin and other beta-lactam antibiotics according to the literature. Despite this, guidelines suggest performing penicillin skin testing or graded amoxicillin challenge in patients with cephalosporin allergy to confirm the ability to tolerate penicillins. We reviewed that penicillin allergy status has been associated with longer hospital stays and more side effects from use of other classes of antibiotics. In order to confirm that he can tolerate penicillin based antibiotics, we will follow up in 1 week for either penicillin skin testing or graded amoxicillin challenge.     Follow up: on 9/24/19 for penicillin testing (likely with graded amoxicillin challenge)     Discussed with Dr. Henrietta Ríos MD  Allergy/Immunology Fellow

## 2019-09-17 NOTE — PATIENT INSTRUCTIONS
We skin tested you to the following medications today: cefazolin, midazolam, fentanyl, propofol, lidocaine, rocuronium, vecuronium, succinylcholine, betadine, and chlorhexidine. The only positive skin test was to cefazolin (ancef), indicating this was likely the culprit of your anaphylaxis during your surgery. We will keep ancef on your allergy list but will remove vecuronium and propofol.     We recommend that you continue to avoid cefazolin. Cefazolin has a unique side chain from other antibiotics, therefore there is little chance that you will be allergic to other antibiotic classes such as penicillins. However, to confirm that you are not allergic to penicillins, we can perform skin testing to penicillin in our [facility] at your earliest convenience. Alternatively, we can give you a dose of amoxicillin in a graded challenge in our clinic to confirm that you are not allergic to penicillins.

## 2019-09-20 NOTE — PROGRESS NOTES
I have reviewed the notes, assessments, and/or procedures performed by Dr. Ríos, I concur with her/his documentation of Phoenix Fragoso.

## 2019-09-24 ENCOUNTER — PATIENT MESSAGE (OUTPATIENT)
Dept: UROLOGY | Facility: CLINIC | Age: 59
End: 2019-09-24

## 2019-09-24 ENCOUNTER — OFFICE VISIT (OUTPATIENT)
Dept: ALLERGY | Facility: CLINIC | Age: 59
End: 2019-09-24
Payer: COMMERCIAL

## 2019-09-24 VITALS — OXYGEN SATURATION: 98 % | WEIGHT: 192.25 LBS | BODY MASS INDEX: 25.48 KG/M2 | HEIGHT: 73 IN | HEART RATE: 66 BPM

## 2019-09-24 DIAGNOSIS — Z88.0 ALLERGY STATUS TO PENICILLIN: ICD-10-CM

## 2019-09-24 DIAGNOSIS — Z88.1 ALLERGY TO CEPHALOSPORIN: ICD-10-CM

## 2019-09-24 DIAGNOSIS — T50.905D DRUG-INDUCED ANAPHYLAXIS, SUBSEQUENT ENCOUNTER: Primary | ICD-10-CM

## 2019-09-24 DIAGNOSIS — T78.2XXD DRUG-INDUCED ANAPHYLAXIS, SUBSEQUENT ENCOUNTER: Primary | ICD-10-CM

## 2019-09-24 PROCEDURE — 99213 PR OFFICE/OUTPT VISIT, EST, LEVL III, 20-29 MIN: ICD-10-PCS | Mod: 25,S$GLB,, | Performed by: ALLERGY & IMMUNOLOGY

## 2019-09-24 PROCEDURE — 99999 PR PBB SHADOW E&M-EST. PATIENT-LVL III: ICD-10-PCS | Mod: PBBFAC,,, | Performed by: ALLERGY & IMMUNOLOGY

## 2019-09-24 PROCEDURE — 95076 PR INGESTION CHALLENGE TEST; INITIAL 120 MIN: ICD-10-PCS | Mod: S$GLB,,, | Performed by: ALLERGY & IMMUNOLOGY

## 2019-09-24 PROCEDURE — 95076 INGEST CHALLENGE INI 120 MIN: CPT | Mod: S$GLB,,, | Performed by: ALLERGY & IMMUNOLOGY

## 2019-09-24 PROCEDURE — 99213 OFFICE O/P EST LOW 20 MIN: CPT | Mod: 25,S$GLB,, | Performed by: ALLERGY & IMMUNOLOGY

## 2019-09-24 PROCEDURE — 99999 PR PBB SHADOW E&M-EST. PATIENT-LVL III: CPT | Mod: PBBFAC,,, | Performed by: ALLERGY & IMMUNOLOGY

## 2019-09-24 NOTE — PROGRESS NOTES
"ALLERGY & IMMUNOLOGY CLINIC - FOLLOW UP     HISTORY OF PRESENT ILLNESS     Patient ID: Phoenix Fragoso is a 58 y.o. male    CC: amox challenge    HPI: Mr. Fragoso is a 57 yo male with a history of prostate cancer who had anaphylactic shock during anesthesia for a robotic prostatectomy on August 28th. He was unable to undergo the surgery secondary to the reaction. He was evaluated by Dr. Anne on 9/11/19 and was noted to receive several anesthetic agents as well as cefazolin prior to the onset of the reaction. He was skin tested by Dr. Ríos and myself on 9/17/19 to various medications, and was found to have positive intradermal tests to cefazolin. We presume his reaction is due to a cefazolin allergy, which is statistically the most common cause of perioperative anaphylaxis in the .     He is here today for amoxicillin challenge to confirm the ability to tolerate penicillins despite this cefazolin allergy.     He feels well today, except for a very mild cough and post-nasal drip. He is not on any antihistamines at this time.        REVIEW OF SYSTEMS     CONST: no F/C/NS, no unintentional weight changes  NEURO: rare H/A, no weakness, no paresthesias  EYES: no discharge, no pruritus, no erythema  EARS: no hearing loss, no sensation of fullness  NOSE: no congestion, mild rhinorrhea, no itching, no sneezing  PULM: no SOB, no wheezing, + cough  CV: no CP, no palpitations, no leg swelling  GI: no dysphagia, no heartburn, no pain, no N/V/D  DERM: no rashes, no skin breaks     MEDICAL HISTORY     MedHx: active problems reviewed     PHYSICAL EXAM     VS: Pulse 66   Ht 6' 1" (1.854 m)   Wt 87.2 kg (192 lb 3.9 oz)   SpO2 98%   BMI 25.36 kg/m²   GENERAL: alert, NAD, well-appearing, cooperative  NECK: supple, trachea midline, no thyromegaly, no LAD  LUNGS: CTAB, no w/r/c, no increased WOB  HEART: RRR, normal S1/S2, no m/g/r  EXTREMITIES: +2 distal pulses, no c/c/e  LYMPHATICS: no cervical/submandibular LAD  DERM: no " "rashes, no skin breaks, no dystrophic fingernails  NEURO: normal gait, no facial asymmetry     PROCEDURE     After reviewing risks, benefits, and alternatives, we decided to proceed with a graded amoxicillin challenge using Amox suspension 250mg/5mL.  8:28am: Mr. Fragoso ingested 1mL of amoxicillin.  8:50am: Mr. Fragoso ingested 9mL of amoxicillin.   9:50am: Mr. Fragoso was discharged home from clinic in excellent condition, not having developed any signs or symptoms during this time. We reviewed when to seek medical attention, and if he should develop any questionable or concerning symptoms over the next 24 hours, to let us know. Otherwise we consider his amoxicillin challenge "Passed"!     ASSESSMENT & PLAN     Phoenix Fragoso is a 58 y.o. male with     Allergy to cefazolin-we reviewed that cefazolin has a unique side chain and there is little cross reactivity between cefazolin and other cephalosporins. He can have other cephalosporins without concern for cross-reactivity.     No evidence of allergy to penicillin after today's amoxicillin challenge. OK to have penicillins moving forward.     I have reviewed his allergy list to ensure that it only mentions cefazolin. Surgeons/anesthesiologists will need to chose an alternative pre-operative medication moving forward.     Follow up: mayco Shrestha MD  Allergy/Immunology    "

## 2019-09-25 ENCOUNTER — PATIENT MESSAGE (OUTPATIENT)
Dept: UROLOGY | Facility: CLINIC | Age: 59
End: 2019-09-25

## 2019-09-25 DIAGNOSIS — C61 PROSTATE CANCER: Primary | ICD-10-CM

## 2019-09-25 RX ORDER — CIPROFLOXACIN 2 MG/ML
400 INJECTION, SOLUTION INTRAVENOUS
Status: CANCELLED | OUTPATIENT
Start: 2019-09-25

## 2019-10-03 RX ORDER — TAMSULOSIN HYDROCHLORIDE 0.4 MG/1
CAPSULE ORAL
Qty: 60 CAPSULE | Refills: 0 | Status: ON HOLD | OUTPATIENT
Start: 2019-10-03 | End: 2019-10-17 | Stop reason: HOSPADM

## 2019-10-07 ENCOUNTER — PATIENT MESSAGE (OUTPATIENT)
Dept: SURGERY | Facility: HOSPITAL | Age: 59
End: 2019-10-07

## 2019-10-11 ENCOUNTER — CLINICAL SUPPORT (OUTPATIENT)
Dept: UROLOGY | Facility: CLINIC | Age: 59
End: 2019-10-11
Payer: COMMERCIAL

## 2019-10-11 DIAGNOSIS — R82.998 CELLS AND CASTS IN URINE: Primary | ICD-10-CM

## 2019-10-11 PROCEDURE — 99499 UNLISTED E&M SERVICE: CPT | Mod: S$GLB,,, | Performed by: UROLOGY

## 2019-10-11 PROCEDURE — 87086 URINE CULTURE/COLONY COUNT: CPT

## 2019-10-11 PROCEDURE — 99499 NO LOS: ICD-10-PCS | Mod: S$GLB,,, | Performed by: UROLOGY

## 2019-10-11 NOTE — PROGRESS NOTES
Per  patient in clinic today to give a urine sample. Ambulated to restroom provided a clean catch urine sample. Sent for urine culture.

## 2019-10-13 LAB — BACTERIA UR CULT: NO GROWTH

## 2019-10-14 NOTE — PRE-PROCEDURE INSTRUCTIONS
Pre-op phone call made to pt.'s voicemail.    All medications reviewed and  pre-procedure  instructions given. Requested pt to call back to verify receiving instructions.

## 2019-10-15 ENCOUNTER — ANESTHESIA EVENT (OUTPATIENT)
Dept: SURGERY | Facility: HOSPITAL | Age: 59
DRG: 708 | End: 2019-10-15
Payer: COMMERCIAL

## 2019-10-16 ENCOUNTER — ANESTHESIA (OUTPATIENT)
Dept: SURGERY | Facility: HOSPITAL | Age: 59
DRG: 708 | End: 2019-10-16
Payer: COMMERCIAL

## 2019-10-16 ENCOUNTER — HOSPITAL ENCOUNTER (INPATIENT)
Facility: HOSPITAL | Age: 59
LOS: 1 days | Discharge: HOME | DRG: 708 | End: 2019-10-17
Attending: UROLOGY | Admitting: UROLOGY
Payer: COMMERCIAL

## 2019-10-16 DIAGNOSIS — C61 PROSTATE CANCER: ICD-10-CM

## 2019-10-16 DIAGNOSIS — C61 MALIGNANT NEOPLASM OF PROSTATE: ICD-10-CM

## 2019-10-16 DIAGNOSIS — R97.20 ELEVATED PSA: Primary | ICD-10-CM

## 2019-10-16 DIAGNOSIS — Z01.818 PRE-OP EXAM: ICD-10-CM

## 2019-10-16 LAB
ABO + RH BLD: NORMAL
ANION GAP SERPL CALC-SCNC: 12 MMOL/L (ref 8–16)
BASOPHILS # BLD AUTO: ABNORMAL K/UL (ref 0–0.2)
BASOPHILS NFR BLD: 0 % (ref 0–1.9)
BLD GP AB SCN CELLS X3 SERPL QL: NORMAL
BUN SERPL-MCNC: 24 MG/DL (ref 6–20)
CALCIUM SERPL-MCNC: 8.5 MG/DL (ref 8.7–10.5)
CHLORIDE SERPL-SCNC: 105 MMOL/L (ref 95–110)
CO2 SERPL-SCNC: 23 MMOL/L (ref 23–29)
CREAT SERPL-MCNC: 1.1 MG/DL (ref 0.5–1.4)
DIFFERENTIAL METHOD: ABNORMAL
EOSINOPHIL # BLD AUTO: ABNORMAL K/UL (ref 0–0.5)
EOSINOPHIL NFR BLD: 0 % (ref 0–8)
ERYTHROCYTE [DISTWIDTH] IN BLOOD BY AUTOMATED COUNT: 12.4 % (ref 11.5–14.5)
EST. GFR  (AFRICAN AMERICAN): >60 ML/MIN/1.73 M^2
EST. GFR  (NON AFRICAN AMERICAN): >60 ML/MIN/1.73 M^2
GLUCOSE SERPL-MCNC: 152 MG/DL (ref 70–110)
HCT VFR BLD AUTO: 34.2 % (ref 40–54)
HGB BLD-MCNC: 11.9 G/DL (ref 14–18)
IMM GRANULOCYTES # BLD AUTO: ABNORMAL K/UL (ref 0–0.04)
LYMPHOCYTES # BLD AUTO: ABNORMAL K/UL (ref 1–4.8)
LYMPHOCYTES NFR BLD: 7 % (ref 18–48)
MCH RBC QN AUTO: 29 PG (ref 27–31)
MCHC RBC AUTO-ENTMCNC: 34.8 G/DL (ref 32–36)
MCV RBC AUTO: 83 FL (ref 82–98)
MONOCYTES # BLD AUTO: ABNORMAL K/UL (ref 0.3–1)
MONOCYTES NFR BLD: 2 % (ref 4–15)
NEUTROPHILS NFR BLD: 84 % (ref 38–73)
NEUTS BAND NFR BLD MANUAL: 7 %
NRBC BLD-RTO: 0 /100 WBC
OVALOCYTES BLD QL SMEAR: ABNORMAL
PLATELET # BLD AUTO: 170 K/UL (ref 150–350)
PLATELET BLD QL SMEAR: ABNORMAL
PMV BLD AUTO: 8.5 FL (ref 9.2–12.9)
POIKILOCYTOSIS BLD QL SMEAR: SLIGHT
POTASSIUM SERPL-SCNC: 3.8 MMOL/L (ref 3.5–5.1)
RBC # BLD AUTO: 4.1 M/UL (ref 4.6–6.2)
SODIUM SERPL-SCNC: 140 MMOL/L (ref 136–145)
WBC # BLD AUTO: 8.87 K/UL (ref 3.9–12.7)

## 2019-10-16 PROCEDURE — 12000002 HC ACUTE/MED SURGE SEMI-PRIVATE ROOM

## 2019-10-16 PROCEDURE — D9220A PRA ANESTHESIA: ICD-10-PCS | Mod: CRNA,,, | Performed by: NURSE ANESTHETIST, CERTIFIED REGISTERED

## 2019-10-16 PROCEDURE — S5010 5% DEXTROSE AND 0.45% SALINE: HCPCS | Performed by: UROLOGY

## 2019-10-16 PROCEDURE — 71000039 HC RECOVERY, EACH ADD'L HOUR: Performed by: UROLOGY

## 2019-10-16 PROCEDURE — 99900103 DSU ONLY-NO CHARGE-INITIAL HR (STAT): Performed by: UROLOGY

## 2019-10-16 PROCEDURE — 25000003 PHARM REV CODE 250: Performed by: NURSE ANESTHETIST, CERTIFIED REGISTERED

## 2019-10-16 PROCEDURE — 37000008 HC ANESTHESIA 1ST 15 MINUTES: Performed by: UROLOGY

## 2019-10-16 PROCEDURE — 94761 N-INVAS EAR/PLS OXIMETRY MLT: CPT

## 2019-10-16 PROCEDURE — 55866 LAPS SURG PRST8ECT RPBIC RAD: CPT | Mod: 22,,, | Performed by: UROLOGY

## 2019-10-16 PROCEDURE — C1729 CATH, DRAINAGE: HCPCS | Performed by: UROLOGY

## 2019-10-16 PROCEDURE — 25000003 PHARM REV CODE 250: Performed by: UROLOGY

## 2019-10-16 PROCEDURE — 99900035 HC TECH TIME PER 15 MIN (STAT)

## 2019-10-16 PROCEDURE — 88305 TISSUE EXAM BY PATHOLOGIST: CPT | Performed by: PATHOLOGY

## 2019-10-16 PROCEDURE — D9220A PRA ANESTHESIA: Mod: CRNA,,, | Performed by: NURSE ANESTHETIST, CERTIFIED REGISTERED

## 2019-10-16 PROCEDURE — 63600175 PHARM REV CODE 636 W HCPCS: Performed by: NURSE ANESTHETIST, CERTIFIED REGISTERED

## 2019-10-16 PROCEDURE — 36000712 HC OR TIME LEV V 1ST 15 MIN: Performed by: UROLOGY

## 2019-10-16 PROCEDURE — 88309 TISSUE SPECIMEN TO PATHOLOGY - SURGERY: ICD-10-PCS | Mod: 26,,, | Performed by: PATHOLOGY

## 2019-10-16 PROCEDURE — D9220A PRA ANESTHESIA: Mod: ANES,,, | Performed by: ANESTHESIOLOGY

## 2019-10-16 PROCEDURE — 93005 ELECTROCARDIOGRAM TRACING: CPT

## 2019-10-16 PROCEDURE — 36415 COLL VENOUS BLD VENIPUNCTURE: CPT

## 2019-10-16 PROCEDURE — 63600175 PHARM REV CODE 636 W HCPCS: Performed by: UROLOGY

## 2019-10-16 PROCEDURE — 55866 PR LAP,PROSTATECTOMY,RADICAL,W/NERVE SPARE,INCL ROBOTIC: ICD-10-PCS | Mod: 22,,, | Performed by: UROLOGY

## 2019-10-16 PROCEDURE — 93010 EKG 12-LEAD: ICD-10-PCS | Mod: ,,, | Performed by: INTERNAL MEDICINE

## 2019-10-16 PROCEDURE — 36000713 HC OR TIME LEV V EA ADD 15 MIN: Performed by: UROLOGY

## 2019-10-16 PROCEDURE — 93010 ELECTROCARDIOGRAM REPORT: CPT | Mod: ,,, | Performed by: INTERNAL MEDICINE

## 2019-10-16 PROCEDURE — 27201423 OPTIME MED/SURG SUP & DEVICES STERILE SUPPLY: Performed by: UROLOGY

## 2019-10-16 PROCEDURE — D9220A PRA ANESTHESIA: ICD-10-PCS | Mod: ANES,,, | Performed by: ANESTHESIOLOGY

## 2019-10-16 PROCEDURE — 85007 BL SMEAR W/DIFF WBC COUNT: CPT

## 2019-10-16 PROCEDURE — 63600175 PHARM REV CODE 636 W HCPCS: Performed by: ANESTHESIOLOGY

## 2019-10-16 PROCEDURE — 99900104 DSU ONLY-NO CHARGE-EA ADD'L HR (STAT): Performed by: UROLOGY

## 2019-10-16 PROCEDURE — 94799 UNLISTED PULMONARY SVC/PX: CPT

## 2019-10-16 PROCEDURE — 37000009 HC ANESTHESIA EA ADD 15 MINS: Performed by: UROLOGY

## 2019-10-16 PROCEDURE — 71000033 HC RECOVERY, INTIAL HOUR: Performed by: UROLOGY

## 2019-10-16 PROCEDURE — 88309 TISSUE EXAM BY PATHOLOGIST: CPT | Mod: 26,,, | Performed by: PATHOLOGY

## 2019-10-16 PROCEDURE — 88305 TISSUE SPECIMEN TO PATHOLOGY - SURGERY: ICD-10-PCS | Mod: 26,,, | Performed by: PATHOLOGY

## 2019-10-16 PROCEDURE — 88305 TISSUE EXAM BY PATHOLOGIST: CPT | Mod: 26,,, | Performed by: PATHOLOGY

## 2019-10-16 PROCEDURE — 86901 BLOOD TYPING SEROLOGIC RH(D): CPT

## 2019-10-16 PROCEDURE — 80048 BASIC METABOLIC PNL TOTAL CA: CPT

## 2019-10-16 PROCEDURE — P9045 ALBUMIN (HUMAN), 5%, 250 ML: HCPCS | Performed by: NURSE ANESTHETIST, CERTIFIED REGISTERED

## 2019-10-16 PROCEDURE — 85027 COMPLETE CBC AUTOMATED: CPT

## 2019-10-16 RX ORDER — CIPROFLOXACIN 2 MG/ML
400 INJECTION, SOLUTION INTRAVENOUS
Status: COMPLETED | OUTPATIENT
Start: 2019-10-16 | End: 2019-10-16

## 2019-10-16 RX ORDER — POLYETHYLENE GLYCOL 3350 17 G/17G
17 POWDER, FOR SOLUTION ORAL DAILY
Status: DISCONTINUED | OUTPATIENT
Start: 2019-10-17 | End: 2019-10-17 | Stop reason: HOSPADM

## 2019-10-16 RX ORDER — ONDANSETRON 2 MG/ML
4 INJECTION INTRAMUSCULAR; INTRAVENOUS EVERY 4 HOURS PRN
Status: DISCONTINUED | OUTPATIENT
Start: 2019-10-16 | End: 2019-10-17 | Stop reason: HOSPADM

## 2019-10-16 RX ORDER — TRAMADOL HYDROCHLORIDE 50 MG/1
50 TABLET ORAL EVERY 6 HOURS PRN
Status: DISCONTINUED | OUTPATIENT
Start: 2019-10-16 | End: 2019-10-17 | Stop reason: HOSPADM

## 2019-10-16 RX ORDER — LOSARTAN POTASSIUM 25 MG/1
100 TABLET ORAL DAILY
Status: DISCONTINUED | OUTPATIENT
Start: 2019-10-17 | End: 2019-10-17 | Stop reason: HOSPADM

## 2019-10-16 RX ORDER — ONDANSETRON 2 MG/ML
4 INJECTION INTRAMUSCULAR; INTRAVENOUS ONCE AS NEEDED
Status: DISCONTINUED | OUTPATIENT
Start: 2019-10-16 | End: 2019-10-16

## 2019-10-16 RX ORDER — ALBUMIN HUMAN 50 G/1000ML
SOLUTION INTRAVENOUS CONTINUOUS PRN
Status: DISCONTINUED | OUTPATIENT
Start: 2019-10-16 | End: 2019-10-16

## 2019-10-16 RX ORDER — LIDOCAINE HCL/PF 100 MG/5ML
SYRINGE (ML) INTRAVENOUS
Status: DISCONTINUED | OUTPATIENT
Start: 2019-10-16 | End: 2019-10-16

## 2019-10-16 RX ORDER — DIPHENHYDRAMINE HYDROCHLORIDE 50 MG/ML
INJECTION INTRAMUSCULAR; INTRAVENOUS
Status: DISCONTINUED | OUTPATIENT
Start: 2019-10-16 | End: 2019-10-16

## 2019-10-16 RX ORDER — MIDAZOLAM HYDROCHLORIDE 1 MG/ML
INJECTION, SOLUTION INTRAMUSCULAR; INTRAVENOUS
Status: DISCONTINUED | OUTPATIENT
Start: 2019-10-16 | End: 2019-10-16

## 2019-10-16 RX ORDER — PHENYLEPHRINE HYDROCHLORIDE 10 MG/ML
INJECTION INTRAVENOUS
Status: DISCONTINUED | OUTPATIENT
Start: 2019-10-16 | End: 2019-10-16

## 2019-10-16 RX ORDER — FAMOTIDINE 20 MG/1
20 TABLET, FILM COATED ORAL 2 TIMES DAILY
Status: DISCONTINUED | OUTPATIENT
Start: 2019-10-16 | End: 2019-10-17 | Stop reason: HOSPADM

## 2019-10-16 RX ORDER — OXYCODONE HYDROCHLORIDE 5 MG/1
5 TABLET ORAL ONCE AS NEEDED
Status: DISCONTINUED | OUTPATIENT
Start: 2019-10-16 | End: 2019-10-16 | Stop reason: HOSPADM

## 2019-10-16 RX ORDER — VECURONIUM BROMIDE FOR INJECTION 1 MG/ML
INJECTION, POWDER, LYOPHILIZED, FOR SOLUTION INTRAVENOUS
Status: DISCONTINUED | OUTPATIENT
Start: 2019-10-16 | End: 2019-10-16

## 2019-10-16 RX ORDER — FENTANYL CITRATE 50 UG/ML
25 INJECTION, SOLUTION INTRAMUSCULAR; INTRAVENOUS EVERY 5 MIN PRN
Status: DISCONTINUED | OUTPATIENT
Start: 2019-10-16 | End: 2019-10-16

## 2019-10-16 RX ORDER — FENTANYL CITRATE 50 UG/ML
INJECTION, SOLUTION INTRAMUSCULAR; INTRAVENOUS
Status: DISCONTINUED | OUTPATIENT
Start: 2019-10-16 | End: 2019-10-16

## 2019-10-16 RX ORDER — LIDOCAINE HYDROCHLORIDE 10 MG/ML
0.5 INJECTION, SOLUTION EPIDURAL; INFILTRATION; INTRACAUDAL; PERINEURAL ONCE
Status: DISCONTINUED | OUTPATIENT
Start: 2019-10-16 | End: 2019-10-16 | Stop reason: HOSPADM

## 2019-10-16 RX ORDER — ONDANSETRON 2 MG/ML
4 INJECTION INTRAMUSCULAR; INTRAVENOUS ONCE AS NEEDED
Status: DISCONTINUED | OUTPATIENT
Start: 2019-10-16 | End: 2019-10-16 | Stop reason: HOSPADM

## 2019-10-16 RX ORDER — HYDROCHLOROTHIAZIDE 12.5 MG/1
12.5 TABLET ORAL DAILY
Status: DISCONTINUED | OUTPATIENT
Start: 2019-10-17 | End: 2019-10-17 | Stop reason: HOSPADM

## 2019-10-16 RX ORDER — ONDANSETRON 2 MG/ML
INJECTION INTRAMUSCULAR; INTRAVENOUS
Status: DISCONTINUED | OUTPATIENT
Start: 2019-10-16 | End: 2019-10-16

## 2019-10-16 RX ORDER — SIMETHICONE 80 MG
2 TABLET,CHEWABLE ORAL 3 TIMES DAILY
Status: DISCONTINUED | OUTPATIENT
Start: 2019-10-16 | End: 2019-10-17 | Stop reason: HOSPADM

## 2019-10-16 RX ORDER — GLYCOPYRROLATE 0.2 MG/ML
INJECTION INTRAMUSCULAR; INTRAVENOUS
Status: DISCONTINUED | OUTPATIENT
Start: 2019-10-16 | End: 2019-10-16

## 2019-10-16 RX ORDER — HYDRALAZINE HYDROCHLORIDE 25 MG/1
100 TABLET, FILM COATED ORAL 2 TIMES DAILY
Status: DISCONTINUED | OUTPATIENT
Start: 2019-10-16 | End: 2019-10-17 | Stop reason: HOSPADM

## 2019-10-16 RX ORDER — MEPERIDINE HYDROCHLORIDE 50 MG/ML
12.5 INJECTION INTRAMUSCULAR; INTRAVENOUS; SUBCUTANEOUS ONCE
Status: COMPLETED | OUTPATIENT
Start: 2019-10-16 | End: 2019-10-16

## 2019-10-16 RX ORDER — SODIUM CHLORIDE, SODIUM LACTATE, POTASSIUM CHLORIDE, CALCIUM CHLORIDE 600; 310; 30; 20 MG/100ML; MG/100ML; MG/100ML; MG/100ML
INJECTION, SOLUTION INTRAVENOUS CONTINUOUS
Status: DISCONTINUED | OUTPATIENT
Start: 2019-10-16 | End: 2019-10-16

## 2019-10-16 RX ORDER — ROCURONIUM BROMIDE 10 MG/ML
INJECTION, SOLUTION INTRAVENOUS
Status: DISCONTINUED | OUTPATIENT
Start: 2019-10-16 | End: 2019-10-16

## 2019-10-16 RX ORDER — DEXTROSE MONOHYDRATE AND SODIUM CHLORIDE 5; .45 G/100ML; G/100ML
INJECTION, SOLUTION INTRAVENOUS CONTINUOUS
Status: DISCONTINUED | OUTPATIENT
Start: 2019-10-16 | End: 2019-10-17 | Stop reason: HOSPADM

## 2019-10-16 RX ORDER — HEPARIN SODIUM 10000 [USP'U]/ML
INJECTION, SOLUTION INTRAVENOUS; SUBCUTANEOUS
Status: DISCONTINUED | OUTPATIENT
Start: 2019-10-16 | End: 2019-10-16 | Stop reason: HOSPADM

## 2019-10-16 RX ORDER — DEXAMETHASONE SODIUM PHOSPHATE 4 MG/ML
INJECTION, SOLUTION INTRA-ARTICULAR; INTRALESIONAL; INTRAMUSCULAR; INTRAVENOUS; SOFT TISSUE
Status: DISCONTINUED | OUTPATIENT
Start: 2019-10-16 | End: 2019-10-16

## 2019-10-16 RX ORDER — KETOROLAC TROMETHAMINE 30 MG/ML
15 INJECTION, SOLUTION INTRAMUSCULAR; INTRAVENOUS EVERY 8 HOURS
Status: DISCONTINUED | OUTPATIENT
Start: 2019-10-16 | End: 2019-10-17

## 2019-10-16 RX ORDER — OXYCODONE HYDROCHLORIDE 5 MG/1
5 TABLET ORAL ONCE AS NEEDED
Status: DISCONTINUED | OUTPATIENT
Start: 2019-10-16 | End: 2019-10-16

## 2019-10-16 RX ORDER — CIPROFLOXACIN 2 MG/ML
400 INJECTION, SOLUTION INTRAVENOUS
Status: COMPLETED | OUTPATIENT
Start: 2019-10-16 | End: 2019-10-17

## 2019-10-16 RX ORDER — MORPHINE SULFATE 2 MG/ML
2 INJECTION, SOLUTION INTRAMUSCULAR; INTRAVENOUS EVERY 4 HOURS PRN
Status: DISCONTINUED | OUTPATIENT
Start: 2019-10-16 | End: 2019-10-17

## 2019-10-16 RX ORDER — DOCUSATE SODIUM 100 MG/1
100 CAPSULE, LIQUID FILLED ORAL 2 TIMES DAILY
Status: DISCONTINUED | OUTPATIENT
Start: 2019-10-16 | End: 2019-10-17 | Stop reason: HOSPADM

## 2019-10-16 RX ORDER — ACETAMINOPHEN 325 MG/1
650 TABLET ORAL EVERY 4 HOURS PRN
Status: DISCONTINUED | OUTPATIENT
Start: 2019-10-16 | End: 2019-10-17 | Stop reason: HOSPADM

## 2019-10-16 RX ORDER — FENTANYL CITRATE 50 UG/ML
25 INJECTION, SOLUTION INTRAMUSCULAR; INTRAVENOUS EVERY 5 MIN PRN
Status: DISCONTINUED | OUTPATIENT
Start: 2019-10-16 | End: 2019-10-16 | Stop reason: HOSPADM

## 2019-10-16 RX ORDER — ACETAMINOPHEN 10 MG/ML
1000 INJECTION, SOLUTION INTRAVENOUS EVERY 8 HOURS
Status: COMPLETED | OUTPATIENT
Start: 2019-10-16 | End: 2019-10-16

## 2019-10-16 RX ORDER — ACETAMINOPHEN 10 MG/ML
INJECTION, SOLUTION INTRAVENOUS
Status: DISCONTINUED | OUTPATIENT
Start: 2019-10-16 | End: 2019-10-16

## 2019-10-16 RX ORDER — NEBIVOLOL 5 MG/1
20 TABLET ORAL DAILY
Status: DISCONTINUED | OUTPATIENT
Start: 2019-10-17 | End: 2019-10-17 | Stop reason: HOSPADM

## 2019-10-16 RX ORDER — PROPOFOL 10 MG/ML
VIAL (ML) INTRAVENOUS
Status: DISCONTINUED | OUTPATIENT
Start: 2019-10-16 | End: 2019-10-16

## 2019-10-16 RX ORDER — KETOROLAC TROMETHAMINE 30 MG/ML
INJECTION, SOLUTION INTRAMUSCULAR; INTRAVENOUS
Status: DISCONTINUED | OUTPATIENT
Start: 2019-10-16 | End: 2019-10-16

## 2019-10-16 RX ORDER — EPHEDRINE SULFATE 50 MG/ML
INJECTION, SOLUTION INTRAVENOUS
Status: DISCONTINUED | OUTPATIENT
Start: 2019-10-16 | End: 2019-10-16

## 2019-10-16 RX ORDER — ALPRAZOLAM 1 MG/1
1 TABLET ORAL DAILY
Status: DISCONTINUED | OUTPATIENT
Start: 2019-10-17 | End: 2019-10-17 | Stop reason: HOSPADM

## 2019-10-16 RX ADMIN — FENTANYL CITRATE 25 MCG: 50 INJECTION, SOLUTION INTRAMUSCULAR; INTRAVENOUS at 01:10

## 2019-10-16 RX ADMIN — VECURONIUM BROMIDE FOR INJECTION 2 MG: 1 INJECTION, POWDER, LYOPHILIZED, FOR SOLUTION INTRAVENOUS at 12:10

## 2019-10-16 RX ADMIN — VECURONIUM BROMIDE FOR INJECTION 2 MG: 1 INJECTION, POWDER, LYOPHILIZED, FOR SOLUTION INTRAVENOUS at 10:10

## 2019-10-16 RX ADMIN — CIPROFLOXACIN 400 MG: 2 INJECTION, SOLUTION INTRAVENOUS at 06:10

## 2019-10-16 RX ADMIN — DEXTROSE AND SODIUM CHLORIDE: 5; .45 INJECTION, SOLUTION INTRAVENOUS at 03:10

## 2019-10-16 RX ADMIN — MIDAZOLAM 2 MG: 1 INJECTION INTRAMUSCULAR; INTRAVENOUS at 07:10

## 2019-10-16 RX ADMIN — CIPROFLOXACIN 400 MG: 2 INJECTION INTRAVENOUS at 07:10

## 2019-10-16 RX ADMIN — PHENYLEPHRINE HYDROCHLORIDE 100 MCG: 10 INJECTION INTRAVENOUS at 09:10

## 2019-10-16 RX ADMIN — SIMETHICONE CHEW TAB 80 MG 160 MG: 80 TABLET ORAL at 08:10

## 2019-10-16 RX ADMIN — KETOROLAC TROMETHAMINE 15 MG: 30 INJECTION, SOLUTION INTRAMUSCULAR at 07:10

## 2019-10-16 RX ADMIN — PHENYLEPHRINE HYDROCHLORIDE 100 MCG: 10 INJECTION INTRAVENOUS at 10:10

## 2019-10-16 RX ADMIN — ACETAMINOPHEN 650 MG: 325 TABLET ORAL at 04:10

## 2019-10-16 RX ADMIN — EPHEDRINE SULFATE 15 MG: 50 INJECTION, SOLUTION INTRAMUSCULAR; INTRAVENOUS; SUBCUTANEOUS at 09:10

## 2019-10-16 RX ADMIN — SIMETHICONE CHEW TAB 80 MG 160 MG: 80 TABLET ORAL at 02:10

## 2019-10-16 RX ADMIN — LIDOCAINE HYDROCHLORIDE 100 MG: 20 INJECTION, SOLUTION INTRAVENOUS at 07:10

## 2019-10-16 RX ADMIN — PROPOFOL 200 MG: 10 INJECTION, EMULSION INTRAVENOUS at 07:10

## 2019-10-16 RX ADMIN — TRAMADOL HYDROCHLORIDE 50 MG: 50 TABLET, FILM COATED ORAL at 11:10

## 2019-10-16 RX ADMIN — ACETAMINOPHEN 1000 MG: 10 INJECTION, SOLUTION INTRAVENOUS at 02:10

## 2019-10-16 RX ADMIN — FENTANYL CITRATE 100 MCG: 50 INJECTION, SOLUTION INTRAMUSCULAR; INTRAVENOUS at 07:10

## 2019-10-16 RX ADMIN — VECURONIUM BROMIDE FOR INJECTION 4 MG: 1 INJECTION, POWDER, LYOPHILIZED, FOR SOLUTION INTRAVENOUS at 08:10

## 2019-10-16 RX ADMIN — ACETAMINOPHEN 1000 MG: 10 INJECTION, SOLUTION INTRAVENOUS at 08:10

## 2019-10-16 RX ADMIN — SODIUM CHLORIDE, SODIUM LACTATE, POTASSIUM CHLORIDE, AND CALCIUM CHLORIDE: .6; .31; .03; .02 INJECTION, SOLUTION INTRAVENOUS at 06:10

## 2019-10-16 RX ADMIN — ROCURONIUM BROMIDE 50 MG: 10 INJECTION, SOLUTION INTRAVENOUS at 07:10

## 2019-10-16 RX ADMIN — EPHEDRINE SULFATE 5 MG: 50 INJECTION, SOLUTION INTRAMUSCULAR; INTRAVENOUS; SUBCUTANEOUS at 08:10

## 2019-10-16 RX ADMIN — MEPERIDINE HYDROCHLORIDE 12.5 MG: 50 INJECTION, SOLUTION INTRAMUSCULAR; INTRAVENOUS; SUBCUTANEOUS at 05:10

## 2019-10-16 RX ADMIN — PHENYLEPHRINE HYDROCHLORIDE 100 MCG: 10 INJECTION INTRAVENOUS at 01:10

## 2019-10-16 RX ADMIN — ALBUMIN (HUMAN): 12.5 SOLUTION INTRAVENOUS at 09:10

## 2019-10-16 RX ADMIN — TRAMADOL HYDROCHLORIDE 50 MG: 50 TABLET, FILM COATED ORAL at 02:10

## 2019-10-16 RX ADMIN — GLYCOPYRROLATE 0.2 MG: 0.2 INJECTION, SOLUTION INTRAMUSCULAR; INTRAVENOUS at 07:10

## 2019-10-16 RX ADMIN — SUGAMMADEX 200 MG: 100 INJECTION, SOLUTION INTRAVENOUS at 01:10

## 2019-10-16 RX ADMIN — SODIUM CHLORIDE, SODIUM LACTATE, POTASSIUM CHLORIDE, AND CALCIUM CHLORIDE: .6; .31; .03; .02 INJECTION, SOLUTION INTRAVENOUS at 01:10

## 2019-10-16 RX ADMIN — HYDRALAZINE HYDROCHLORIDE 100 MG: 25 TABLET, FILM COATED ORAL at 08:10

## 2019-10-16 RX ADMIN — FAMOTIDINE 20 MG: 20 TABLET ORAL at 08:10

## 2019-10-16 RX ADMIN — VECURONIUM BROMIDE FOR INJECTION 4 MG: 1 INJECTION, POWDER, LYOPHILIZED, FOR SOLUTION INTRAVENOUS at 09:10

## 2019-10-16 RX ADMIN — DIPHENHYDRAMINE HYDROCHLORIDE 25 MG: 50 INJECTION INTRAMUSCULAR; INTRAVENOUS at 07:10

## 2019-10-16 RX ADMIN — SODIUM CHLORIDE, SODIUM LACTATE, POTASSIUM CHLORIDE, AND CALCIUM CHLORIDE: .6; .31; .03; .02 INJECTION, SOLUTION INTRAVENOUS at 11:10

## 2019-10-16 RX ADMIN — DOCUSATE SODIUM 100 MG: 100 CAPSULE, LIQUID FILLED ORAL at 08:10

## 2019-10-16 RX ADMIN — ACETAMINOPHEN 1000 MG: 10 INJECTION, SOLUTION INTRAVENOUS at 09:10

## 2019-10-16 RX ADMIN — EPHEDRINE SULFATE 10 MG: 50 INJECTION, SOLUTION INTRAMUSCULAR; INTRAVENOUS; SUBCUTANEOUS at 09:10

## 2019-10-16 RX ADMIN — DEXTROSE AND SODIUM CHLORIDE: 5; .45 INJECTION, SOLUTION INTRAVENOUS at 10:10

## 2019-10-16 RX ADMIN — KETOROLAC TROMETHAMINE 30 MG: 30 INJECTION, SOLUTION INTRAMUSCULAR; INTRAVENOUS at 01:10

## 2019-10-16 RX ADMIN — VECURONIUM BROMIDE FOR INJECTION 2 MG: 1 INJECTION, POWDER, LYOPHILIZED, FOR SOLUTION INTRAVENOUS at 08:10

## 2019-10-16 RX ADMIN — PHENYLEPHRINE HYDROCHLORIDE 100 MCG: 10 INJECTION INTRAVENOUS at 11:10

## 2019-10-16 RX ADMIN — DEXAMETHASONE SODIUM PHOSPHATE 8 MG: 4 INJECTION, SOLUTION INTRAMUSCULAR; INTRAVENOUS at 07:10

## 2019-10-16 RX ADMIN — ONDANSETRON 4 MG: 2 INJECTION, SOLUTION INTRAMUSCULAR; INTRAVENOUS at 01:10

## 2019-10-16 RX ADMIN — ALBUMIN (HUMAN): 12.5 SOLUTION INTRAVENOUS at 10:10

## 2019-10-16 RX ADMIN — PHENYLEPHRINE HYDROCHLORIDE 100 MCG: 10 INJECTION INTRAVENOUS at 12:10

## 2019-10-16 RX ADMIN — FENTANYL CITRATE 50 MCG: 50 INJECTION, SOLUTION INTRAMUSCULAR; INTRAVENOUS at 01:10

## 2019-10-16 RX ADMIN — ONDANSETRON 4 MG: 2 INJECTION, SOLUTION INTRAMUSCULAR; INTRAVENOUS at 07:10

## 2019-10-16 NOTE — ANESTHESIA PREPROCEDURE EVALUATION
10/16/2019  Phoenix Fragoso is a 58 y.o., male.    Pre-op Assessment    I have reviewed the Patient Summary Reports.     I have reviewed the Nursing Notes.   I have reviewed the Medications.     Review of Systems  Anesthesia Hx:  No problems with previous Anesthesia    Social:  Non-Smoker, Social Alcohol Use    Hematology/Oncology:        Current/Recent Cancer.   EENT/Dental:EENT/Dental Normal   Cardiovascular:   Hypertension    Pulmonary:  Pulmonary Normal    Renal/:   Prostate ca    Hepatic/GI:  Hepatic/GI Normal    Musculoskeletal:  Musculoskeletal Normal    Neurological:   Neuromuscular Disease,    Endocrine:  Endocrine Normal    Dermatological:  Skin Normal        Physical Exam  General:  Well nourished    Airway/Jaw/Neck:  Airway Findings: Mouth Opening: Normal Tongue: Normal  General Airway Assessment: Adult  Mallampati: II        Eyes/Ears/Nose:  EYES/EARS/NOSE FINDINGS: Normal   Dental:  DENTAL FINDINGS: Normal   Chest/Lungs:  Chest/Lungs Findings: Clear to auscultation, Normal Respiratory Rate     Heart/Vascular:  Heart Findings: Rate: Normal  Rhythm: Regular Rhythm        Mental Status:  Mental Status Findings:  Cooperative, Alert and Oriented         Anesthesia Plan  Type of Anesthesia, risks & benefits discussed:  Anesthesia Type:  general  Patient's Preference:   Intra-op Monitoring Plan: standard ASA monitors  Intra-op Monitoring Plan Comments:   Post Op Pain Control Plan: IV/PO Opioids PRN  Post Op Pain Control Plan Comments:   Induction:   IV  Beta Blocker:  Patient is on a Beta-Blocker and has received one dose within the past 24 hours (No further documentation required).       Informed Consent: Patient understands risks and agrees with Anesthesia plan.  Questions answered. Anesthesia consent signed with patient.  ASA Score: 3     Day of Surgery Review of History & Physical: I have  interviewed and examined the patient. I have reviewed the patient's H&P dated:  There are no significant changes.  H&P update referred to the surgeon.     Anesthesia Plan Notes: Pt had anaphylactic reaction to ancef during his last procedure(attempted prostatectomy).          Ready For Surgery From Anesthesia Perspective.

## 2019-10-16 NOTE — ANESTHESIA POSTPROCEDURE EVALUATION
Anesthesia Post Evaluation    Patient: Phoenix Fragoso    Procedure(s) Performed: Procedure(s) (LRB):  ROBOTIC PROSTATECTOMY (N/A)    Final Anesthesia Type: general  Patient location during evaluation: PACU  Patient participation: Yes- Able to Participate  Level of consciousness: awake and alert  Post-procedure vital signs: reviewed and stable  Pain management: adequate  Airway patency: patent  PONV status at discharge: No PONV  Anesthetic complications: no      Cardiovascular status: blood pressure returned to baseline and hemodynamically stable  Respiratory status: unassisted  Hydration status: euvolemic  Follow-up not needed.          Vitals Value Taken Time   /68 10/16/2019  3:35 PM   Temp 36.7 °C (98 °F) 10/16/2019  3:26 PM   Pulse 60 10/16/2019  3:37 PM   Resp 45 10/16/2019  3:37 PM   SpO2 100 % 10/16/2019  3:37 PM   Vitals shown include unvalidated device data.      Event Time     Out of Recovery 15:35:26          Pain/Pamella Score: Pain Rating Prior to Med Admin: 0 (10/16/2019  3:00 PM)  Pamella Score: 8 (10/16/2019  3:00 PM)         Body Location Override (Optional - Billing Will Still Be Based On Selected Body Map Location If Applicable): left inner thigh

## 2019-10-16 NOTE — BRIEF OP NOTE
[facility]  Brief Operative Note    Date: 10/16/2019    Staff Surgeon: Walker Brooks MD    Bedside Assistant: Ye TRINH    Pre-Op Diagnosis: prostate cancer    Post-Op Diagnosis: same    Procedure(s) Performed:   Robot assisted laparoscopic radical prostatectomy, bilateral nerve sparing (mod 22)    Specimen(s):   Prostate and seminal vesicles  Periprostatic fat and lymph nodes    Anesthesia: General endotracheal anesthesia    Findings: mild adhesions from prior surgery taken down laparoscopically to place ports, significant median lobe necessitating extended bladder neck dissection and subsequent bladder neck reconstruction    Estimated Blood Loss: 550cc    Drains: 20fr pham, 7mm LORA    Complications: none    Disposition: PACU

## 2019-10-16 NOTE — NURSING
Pt arrived to room 402 via bed. oriented to room. family @ bedside. IV fluid infusing. Lap sites clean and dry. LORA to suction dsg with small amt of drainage. 3 small rashes noted on abdomen, marked and photos in chart, MD aware. Jeremías/scd in place. . pham to gravity. pain level 0/10 at this time. updated on plan of care. bed locked and low with call light in reach. Wife at bedside. monitoring

## 2019-10-16 NOTE — TRANSFER OF CARE
"Anesthesia Transfer of Care Note    Patient: Phoenix Fragoso    Procedure(s) Performed: Procedure(s) (LRB):  ROBOTIC PROSTATECTOMY (N/A)    Patient location: PACU    Anesthesia Type: general    Transport from OR: Transported from OR on 2-3 L/min O2 by NC with adequate spontaneous ventilation    Post pain: adequate analgesia    Post assessment: no apparent anesthetic complications    Post vital signs: stable    Level of consciousness: sedated    Nausea/Vomiting: no nausea/vomiting    Complications: none    Transfer of care protocol was followed      Last vitals:   Visit Vitals  BP (!) 167/93 (BP Location: Left arm, Patient Position: Lying)   Pulse 60   Temp 36.8 °C (98.2 °F)   Resp 16   Ht 6' 1" (1.854 m)   Wt 85.3 kg (188 lb)   SpO2 98%   BMI 24.80 kg/m²     "

## 2019-10-16 NOTE — OR NURSING
Upon admit to PACU patient was noted to have 3 red areas on his abdomen. OR circulator notified as well as Dr Brooks. Pictures placed in chart

## 2019-10-16 NOTE — NURSING
Assumed pt care from richard lomax to abdomen is dry/intact, additional red/rash appear areas noted to abdomen as well, pt denies itching at this time, pham catheter draining clear yellow urine to urimeter bag, LORA drain emptying bloody fluids, pain is control with current regimen, family at bedside, will continue to monitor, observe and note any changes, safety maintain

## 2019-10-16 NOTE — DISCHARGE INSTRUCTIONS
"Discharge Instructions: After Your Surgery/Procedure  Youve just had surgery. During surgery you were given medicine called anesthesia to keep you relaxed and free of pain. After surgery you may have some pain or nausea. This is common. Here are some tips for feeling better and getting well after surgery.     Stay on schedule with your medication.   Going home  Your doctor or nurse will show you how to take care of yourself when you go home. He or she will also answer your questions. Have an adult family member or friend drive you home.      For your safety we recommend these precaution for the first 24 hours after your procedure:  · Do not drive or use heavy equipment.  · Do not make important decisions or sign legal papers.  · Do not drink alcohol.  · Have someone stay with you, if needed. He or she can watch for problems and help keep you safe.  · Your concentration, balance, coordination, and judgement may be impaired for many hours after anesthesia.  Use caution when ambulating or standing up.     · You may feel weak and "washed out" after anesthesia and surgery.      Subtle residual effects of general anesthesia or sedation with regional / local anesthesia can last more than 24 hours.  Rest for the remainder of the day or longer if your Doctor/Surgeon has advised you to do so.  Although you may feel normal within the first 24 hours, your reflexes and mental ability may be impaired without you realizing it.  You may feel dizzy, lightheaded or sleepy for 24 hours or longer.      Be sure to go to all follow-up visits with your doctor. And rest after your surgery for as long as your doctor tells you to.  Coping with pain  If you have pain after surgery, pain medicine will help you feel better. Take it as told, before pain becomes severe. Also, ask your doctor or pharmacist about other ways to control pain. This might be with heat, ice, or relaxation. And follow any other instructions your surgeon or nurse gives " you.  Tips for taking pain medicine  To get the best relief possible, remember these points:  · Pain medicines can upset your stomach. Taking them with a little food may help.  · Most pain relievers taken by mouth need at least 20 to 30 minutes to start to work.  · Taking medicine on a schedule can help you remember to take it. Try to time your medicine so that you can take it before starting an activity. This might be before you get dressed, go for a walk, or sit down for dinner.  · Constipation is a common side effect of pain medicines. Call your doctor before taking any medicines such as laxatives or stool softeners to help ease constipation. Also ask if you should skip any foods. Drinking lots of fluids and eating foods such as fruits and vegetables that are high in fiber can also help. Remember, do not take laxatives unless your surgeon has prescribed them.  · Drinking alcohol and taking pain medicine can cause dizziness and slow your breathing. It can even be deadly. Do not drink alcohol while taking pain medicine.  · Pain medicine can make you react more slowly to things. Do not drive or run machinery while taking pain medicine.  Your health care provider may tell you to take acetaminophen to help ease your pain. Ask him or her how much you are supposed to take each day. Acetaminophen or other pain relievers may interact with your prescription medicines or other over-the-counter (OTC) drugs. Some prescription medicines have acetaminophen and other ingredients. Using both prescription and OTC acetaminophen for pain can cause you to overdose. Read the labels on your OTC medicines with care. This will help you to clearly know the list of ingredients, how much to take, and any warnings. It may also help you not take too much acetaminophen. If you have questions or do not understand the information, ask your pharmacist or health care provider to explain it to you before you take the OTC medicine.  Managing  nausea  Some people have an upset stomach after surgery. This is often because of anesthesia, pain, or pain medicine, or the stress of surgery. These tips will help you handle nausea and eat healthy foods as you get better. If you were on a special food plan before surgery, ask your doctor if you should follow it while you get better. These tips may help:  · Do not push yourself to eat. Your body will tell you when to eat and how much.  · Start off with clear liquids and soup. They are easier to digest.  · Next try semi-solid foods, such as mashed potatoes, applesauce, and gelatin, as you feel ready.  · Slowly move to solid foods. Dont eat fatty, rich, or spicy foods at first.  · Do not force yourself to have 3 large meals a day. Instead eat smaller amounts more often.  · Take pain medicines with a small amount of solid food, such as crackers or toast, to avoid nausea.     Call your surgeon if  · You still have pain an hour after taking medicine. The medicine may not be strong enough.  · You feel too sleepy, dizzy, or groggy. The medicine may be too strong.  · You have side effects like nausea, vomiting, or skin changes, such as rash, itching, or hives.       If you have obstructive sleep apnea  You were given anesthesia medicine during surgery to keep you comfortable and free of pain. After surgery, you may have more apnea spells because of this medicine and other medicines you were given. The spells may last longer than usual.   At home:  · Keep using the continuous positive airway pressure (CPAP) device when you sleep. Unless your health care provider tells you not to, use it when you sleep, day or night. CPAP is a common device used to treat obstructive sleep apnea.  · Talk with your provider before taking any pain medicine, muscle relaxants, or sedatives. Your provider will tell you about the possible dangers of taking these medicines.  © 5811-0799 [employer]. [address] [place] [address]  [place] [address]. All rights reserved. This information is not intended as a substitute for professional medical care. Always follow your healthcare professional's instructions.        General Information:    1.  Do not drink alcoholic beverages including beer for 24 hours or as long as you are on pain medication..  2.  Do not drive a motor vehicle, operate machinery or power tools, or signs legal papers for 24 hours or as long as you are on pain medication.   3.  You may experience light-headedness, dizziness, and sleepiness following surgery. Please do not stay alone. A responsible adult should be with you for this 24 hour period.  4.  Go home and rest.  5. Progress slowly to a normal diet unless instructed.  Otherwise, begin with liquids such as soft drinks, then soup and crackers working up to solid foods. Drink plenty of nonalcoholic fluids.  6.  Certain anesthetics and pain medications produce nausea and vomiting in certain       individuals. If nausea becomes a problem at home, call you doctor.  7. A nurse will be calling you sometime after surgery. Do not be alarmed. This is our way of finding out how you are doing.  8. Several times every hour while you are awake, take 2-3 deep breaths and cough. If you had stomach surgery hold a pillow or rolled towel firmly against your stomach before you cough. This will help with any pain the cough might cause.  9. Several times every hour while you are awake, pump and flex your feet 5-6 times and do foot circles. This will help prevent blood clots.  10.Call your doctor for severe pain, bleeding, fever, or signs or symptoms of infection (pain, swelling, redness, foul odor, drainage).      Post op instructions for prevention of DVT  What is deep vein thrombosis?  Deep vein thrombosis (DVT) is the medical term for blood clots in the deep veins of the leg.  These blood clots can be dangerous.  A DVT can block a blood vessel and keep blood from getting where it needs to go.   Another problem is that the clot can travel to other parts of the body such as the lungs.  A clot that travels to the lungs is called a pulmonary embolus (PE) and can cause serious problems with breathing which can lead to death.  Am I at risk for DVT/PE?  If you are not very active, you are at risk of DVT.  Anyone confined to bed, sitting for long periods of time, recovering from surgery, etc. increases the risk of DVT.  Other risk factors are cancer diagnosis, certain medications, estrogen replacement in any form,older age, obesity, pregnancy, smoking, history of clotting disorders, and dehydration.  How will I know if I have a DVT?   Swelling in the lower leg   Pain   Warmth, redness, hardness or bulging of the vein  If you have any of these symptoms, call your doctors office right away.  Some people will not have any symptoms until the clot moves to the lungs.  What are the symptoms of a PE?   Panting, shortness of breath, or trouble breathing   Sharp, knife-like chest pain when you breathe   Coughing or coughing up blood   Rapid heartbeat  If you have any of these symptoms or get worse quickly, call 911 for emergency treatment.  How can I prevent a DVT?   Avoid long periods of inactivity and dont cross your legs--get up and walk around every hour or so.   Stay active--walking after surgery is highly encouraged.  This means you should get out of the house and walk in the neighborhood.  Going up and down stairs will not impair healing (unless advised against such activity by your doctor).     Drink plenty of noncaffeinated, nonalcoholic fluids each day to prevent dehydration.   Wear special support stockings, if they have been advised by your doctor.   If you travel, stop at least once an hour and walk around.   Avoid smoking (assistance with stopping is available through your healthcare provider)  Always notify your doctor if you are not able to follow the post operative instructions that are  given to you at the time of discharge.  It may be necessary to prescribe one of the medications available to prevent DVT.    We hope your stay was comfortable as you heal now, mend and rest.    For we have enjoyed taking care of you by giving your our best.    And as you get better, by regaining your health and strength;   We count it as a privilege to have served you and hope your time at [facility] was well spent.      Thank  You!!!

## 2019-10-16 NOTE — H&P
[facility] Urology Progress Note     Phoenix Fragoso is a 58 y.o. male who presents for fu of prostate cancer and severe LUTS     Initially referred by LISA Deluna/Dr Lugo in March 2018 for eval of elevated psa to 4.9 (previously 3.5 in 2016 and 3.2 in 2015).  He also had severe LUTS despite daily flomax (AUA SS 27/4) as well as some urgency. AUA SS: 27/4 (4: Emptying, frequency, intermittency, urgency, straining, nocturia; 3: Weak stream) + PV dribble  Family history of prostate cancer in brother, age 54. Baseline ED since 2014, though satisfactory erections with on demand use of 2.5-5mg cialis     Cytso/Prostate biopsy 3/13/18: JAY 40g benign. TRUS VOLUME: 46.7cc (W 48.1, H 31.1, L 59.7). ULTRASOUND FINDINGS: mild hypoechoic areas, normal SVs, + median lobe.  CYSTO FINDINGS: kissing lateral lobe obstruction with significant lateral lobe ingrowth and high grade prostatic urethral obstruction as well as moderate median lobe with anterior ballvalving projection  PATHOLOGY: 1/14 cores dayana 6 CaP: 3+3=6 in 25% RB medial  After extensive review of options, elected active surveillance and increased flomax to 0.8mg daily.     PSA 9/19/18 is 4.4 Symptoms progressed on flomax, though urgency less. AUA SS: 32/4 (5: emptying, frequency, intermittency, nocturia; 4: urgency, weak stream, straining) - meds help 3/10  Nocturia hourly at bedtime. Constipated frequently - baseline once daily but has had periods of not. Elected to remain on flomax/AS     PSA 3/28/19 is 2.8. AUA SS: 33/4, mostly dissatisfied. (5:  Emptying, frequency, intermittency, urgency, straining; for:  Weak stream, sleeping)  Postvoid residual by bladder scan is 157 cc, and he does note that he urinated 2 times prior to his bladder scan since arriving. Urinalysis dipstick is negative.  Nocturia is every 1 and half to 2 hr.  He does not snore. No incontinence though does have significant postvoid dribble  He has been on losartan/hydrochlorothiazide combination  "blood pressure pill since November or December, and in the morning after taking his medications he does urinate more  Diagnosed with Bell's palsy on 1/5/19.  Recently had a renal ultrasound which was normal, this was done by LISA Wells to evaluate for renal artery stenosis in the workup of his hypertension.  He did recently establish cardiology care with Dr Gunjan perea of his HTN - had stress test, etc, last seen in December 2018  In discussion management of his prostate cancer and his severe BPH with obstruction, elected to proceed with future robotic prostatectomy, though in the interim added finasteride 5 mg for dual medical therapy.     He returns today without any improvement of his obstructive lower urinary tract symptoms, noting it may actually be getting worse.  AUA symptom score 31/4.  Medications helped 3/10.  (5:  Emptying, frequency, intermittency; for:  Urgency, weak stream, straining, sleeping)  No constipation.  Diet improved.  Increase fiber.  Never heard stool.  Bothered by urgency frequency.  No new back pain or bone pain.  No hematuria or dysuria.  Bell's palsy largely resolved     ROS: A comprehensive 10 system review was performed and is negative except as noted above in HPI     PHYSICAL EXAM:         Vitals:     08/09/19 1023   BP: 129/83   Pulse: 62   Resp: 18      Body mass index is 25.78 kg/m². Weight: 88.6 kg (195 lb 7 oz) Height: 6' 1" (185.4 cm)         General: Alert, cooperative, no distress, appears stated age   Head: Normocephalic, without obvious abnormality, atraumatic   Eyes: PERRL, conjunctiva/corneas clear   Lungs: Respirations unlabored   Heart: Warm and well perfused   Abdomen:  Soft, nontender, nondistended  Extremities: Extremities normal, atraumatic, no cyanosis or edema   Skin: Skin color, texture, turgor normal, no rashes or lesions   Psych: Appropriate   Neurologic: Non-focal         Recent Results         Recent Results (from the past 336 hour(s))   POCT URINE DIPSTICK " WITHOUT MICROSCOPE     Collection Time: 08/09/19 10:37 AM   Result Value Ref Range     Color, UA yellow       Spec Grav UA 1.030       pH, UA 5       WBC, UA neg       Nitrite, UA neg       Protein neg       Glucose, UA neg       Ketones, UA neg       Urobilinogen, UA 0.2       Bilirubin neg       Blood, UA neg     Urine culture     Collection Time: 08/09/19 11:09 AM   Result Value Ref Range     Urine Culture, Routine No growth     Basic metabolic panel     Collection Time: 08/09/19 11:41 AM   Result Value Ref Range     Sodium 142 136 - 145 mmol/L     Potassium 3.6 3.5 - 5.1 mmol/L     Chloride 104 95 - 110 mmol/L     CO2 28 23 - 29 mmol/L     Glucose 93 70 - 110 mg/dL     BUN, Bld 23 (H) 6 - 20 mg/dL     Creatinine 1.0 0.5 - 1.4 mg/dL     Calcium 9.4 8.7 - 10.5 mg/dL     Anion Gap 10 8 - 16 mmol/L     eGFR if African American >60 >60 mL/min/1.73 m^2     eGFR if non African American >60 >60 mL/min/1.73 m^2   CBC auto differential     Collection Time: 08/09/19 11:42 AM   Result Value Ref Range     WBC 5.50 3.90 - 12.70 K/uL     RBC 5.01 4.60 - 6.20 M/uL     Hemoglobin 13.8 (L) 14.0 - 18.0 g/dL     Hematocrit 41.2 40.0 - 54.0 %     Mean Corpuscular Volume 82 82 - 98 fL     Mean Corpuscular Hemoglobin 27.5 27.0 - 31.0 pg     Mean Corpuscular Hemoglobin Conc 33.5 32.0 - 36.0 g/dL     RDW 12.4 11.5 - 14.5 %     Platelets 218 150 - 350 K/uL     MPV 8.7 (L) 9.2 - 12.9 fL     Gran # (ANC) 3.3 1.8 - 7.7 K/uL     Immature Grans (Abs) 0.04 0.00 - 0.04 K/uL     Lymph # 1.4 1.0 - 4.8 K/uL     Mono # 0.6 0.3 - 1.0 K/uL     Eos # 0.1 0.0 - 0.5 K/uL     Baso # 0.04 0.00 - 0.20 K/uL     nRBC 0 0 /100 WBC     Gran% 59.5 38.0 - 73.0 %     Lymph% 26.0 18.0 - 48.0 %     Mono% 11.6 4.0 - 15.0 %     Eosinophil% 1.5 0.0 - 8.0 %     Basophil% 0.7 0.0 - 1.9 %     Differential Method Automated     Protime-INR     Collection Time: 08/09/19 11:42 AM   Result Value Ref Range     Prothrombin Time 10.3 9.0 - 12.5 sec     INR 1.0 0.8 - 1.2             ASSESSMENT   1. Malignant neoplasm of prostate  POCT URINE DIPSTICK WITHOUT MICROSCOPE     Diet NPO     Admit to Inpatient     Insert peripheral IV     Place MICKEY hose     Reason for No Pharmacological VTE Prophylaxis     Place sequential compression device     Basic metabolic panel     CBC auto differential     Protime-INR     Type And Screen Preop     Urine culture     CANCELED: EKG 12-lead     CANCELED: X-Ray Chest PA And Lateral         Plan    He has a significantly enlarged obstructing prostate with severe refractory lower urinary tract symptoms despite dual medical therapy, as well as small volume low-grade prostate cancer.    At this time he is ready to proceed with surgical extirpative therapy, and has elected to proceed with robot-assisted laparoscopic radical prostatectomy.  The procedure in general including hospital stay and postoperative recovery process were discussed in detail. Reviewed hospital stay, LORA drain and pham management, reminding him the pham will remain indwelling at minimum 7-10 days during which time he should refrain from driving. Risks of surgery were discussed including but not limited to up-front urinary incontinence and erectile dysfunction which we will work to overcome with kegel excercises and any number of ED therapies. We discussed possibility of pelvic lymphadenectomy, though not necessary in small volume low-grade disease. We did also discuss nerve sparing procedure.    Risks of surgery were discussed including but not limited to urinary incontinence, erectile dysfunction, injury to intraabdominal structures, urine leak, anastamotic leak, rectal injury, damage to ureters, hernia, postoperative ileus.     We did review that he does have some baseline urinary urgency though has not had any urge incontinent episodes, and upon relief of longstanding prostatic obstruction, there can be a transient increase in urgency and frequency which may manifest as urge incontinence,  and in the postoperative setting from radical prostatectomy this urge incontinence can confound the recovery of stress urinary incontinence.  This can be managed medically to allow him to focus on the recovery of his stress urinary incontinence from the surgery, and will likely be necessary.  We did also briefly discussed challenges of median lobe during prostatectomy and possible effects on bladder neck, including reconstruction, and potential effect on recovery of continence.  However, knowing up front the presence of his median lobe will allow better intraoperative planning and decision making.      The need for monitoring his PSA postoperatively was also discussed with the patient. Any adjuvant treatment, including hormonal and/or radiation treatment may be dependent on his final pathology report, including final dayana score, margin status, extracapsular invasion, or seminal vesicle invasion. Such adjuvant therapies may also be necessary in the case of postoperative psa rise. All questions were answered and appropriate informed consent was obtained.   Robotic prostatectomy planned for 8/28/19.       I did instruct the patient on kegel exercises today and advised to practice them preoperatively to strengthen his pelvic floor muscles prior to surgery to facilitate postoperative return of continence.  He will proceed with medical and cardiac clearance from Dr. Lugo and Dr. Hollins      -------------------------------------------------------------------------------------    Had anaphylaxis at last planned prostatectomy after induction now determined to be from ancef  Has had allergy testing and pcn challenges and his allergy to ancef was confirmed though tolerates other PCN.   Returns for prostatectomy, all questions answered in preop holding.

## 2019-10-17 VITALS
DIASTOLIC BLOOD PRESSURE: 79 MMHG | WEIGHT: 188 LBS | HEIGHT: 73 IN | TEMPERATURE: 98 F | BODY MASS INDEX: 24.92 KG/M2 | RESPIRATION RATE: 18 BRPM | OXYGEN SATURATION: 93 % | SYSTOLIC BLOOD PRESSURE: 162 MMHG | HEART RATE: 73 BPM

## 2019-10-17 LAB
ANION GAP SERPL CALC-SCNC: 10 MMOL/L (ref 8–16)
BASOPHILS # BLD AUTO: 0.01 K/UL (ref 0–0.2)
BASOPHILS NFR BLD: 0.1 % (ref 0–1.9)
BODY FLUID SOURCE, CREATININE: NORMAL
BUN SERPL-MCNC: 17 MG/DL (ref 6–20)
CALCIUM SERPL-MCNC: 8.6 MG/DL (ref 8.7–10.5)
CHLORIDE SERPL-SCNC: 106 MMOL/L (ref 95–110)
CO2 SERPL-SCNC: 26 MMOL/L (ref 23–29)
CREAT FLD-MCNC: 1.1 MG/DL
CREAT SERPL-MCNC: 1 MG/DL (ref 0.5–1.4)
DIFFERENTIAL METHOD: ABNORMAL
EOSINOPHIL # BLD AUTO: 0 K/UL (ref 0–0.5)
EOSINOPHIL NFR BLD: 0 % (ref 0–8)
ERYTHROCYTE [DISTWIDTH] IN BLOOD BY AUTOMATED COUNT: 12.4 % (ref 11.5–14.5)
EST. GFR  (AFRICAN AMERICAN): >60 ML/MIN/1.73 M^2
EST. GFR  (NON AFRICAN AMERICAN): >60 ML/MIN/1.73 M^2
GLUCOSE SERPL-MCNC: 118 MG/DL (ref 70–110)
HCT VFR BLD AUTO: 33.4 % (ref 40–54)
HGB BLD-MCNC: 11.4 G/DL (ref 14–18)
IMM GRANULOCYTES # BLD AUTO: 0.07 K/UL (ref 0–0.04)
LYMPHOCYTES # BLD AUTO: 0.9 K/UL (ref 1–4.8)
LYMPHOCYTES NFR BLD: 10.4 % (ref 18–48)
MAGNESIUM SERPL-MCNC: 2 MG/DL (ref 1.6–2.6)
MCH RBC QN AUTO: 28.8 PG (ref 27–31)
MCHC RBC AUTO-ENTMCNC: 34.1 G/DL (ref 32–36)
MCV RBC AUTO: 84 FL (ref 82–98)
MONOCYTES # BLD AUTO: 0.7 K/UL (ref 0.3–1)
MONOCYTES NFR BLD: 7.7 % (ref 4–15)
NEUTROPHILS # BLD AUTO: 7.3 K/UL (ref 1.8–7.7)
NEUTROPHILS NFR BLD: 81 % (ref 38–73)
NRBC BLD-RTO: 0 /100 WBC
PHOSPHATE SERPL-MCNC: 2.8 MG/DL (ref 2.7–4.5)
PLATELET # BLD AUTO: 199 K/UL (ref 150–350)
PMV BLD AUTO: 8.8 FL (ref 9.2–12.9)
POTASSIUM SERPL-SCNC: 3.4 MMOL/L (ref 3.5–5.1)
RBC # BLD AUTO: 3.96 M/UL (ref 4.6–6.2)
SODIUM SERPL-SCNC: 142 MMOL/L (ref 136–145)
WBC # BLD AUTO: 9.01 K/UL (ref 3.9–12.7)

## 2019-10-17 PROCEDURE — 63600175 PHARM REV CODE 636 W HCPCS: Performed by: UROLOGY

## 2019-10-17 PROCEDURE — 94799 UNLISTED PULMONARY SVC/PX: CPT

## 2019-10-17 PROCEDURE — S5010 5% DEXTROSE AND 0.45% SALINE: HCPCS | Performed by: UROLOGY

## 2019-10-17 PROCEDURE — 36415 COLL VENOUS BLD VENIPUNCTURE: CPT

## 2019-10-17 PROCEDURE — 84100 ASSAY OF PHOSPHORUS: CPT

## 2019-10-17 PROCEDURE — 80048 BASIC METABOLIC PNL TOTAL CA: CPT

## 2019-10-17 PROCEDURE — 94761 N-INVAS EAR/PLS OXIMETRY MLT: CPT

## 2019-10-17 PROCEDURE — 85025 COMPLETE CBC W/AUTO DIFF WBC: CPT

## 2019-10-17 PROCEDURE — 25000003 PHARM REV CODE 250: Performed by: UROLOGY

## 2019-10-17 PROCEDURE — 83735 ASSAY OF MAGNESIUM: CPT

## 2019-10-17 PROCEDURE — 82570 ASSAY OF URINE CREATININE: CPT

## 2019-10-17 RX ORDER — ACETAMINOPHEN 325 MG/1
650 TABLET ORAL EVERY 4 HOURS PRN
Refills: 0 | COMMUNITY
Start: 2019-10-17 | End: 2019-12-18

## 2019-10-17 RX ORDER — DOCUSATE SODIUM 100 MG/1
100 CAPSULE, LIQUID FILLED ORAL 2 TIMES DAILY
Refills: 0 | COMMUNITY
Start: 2019-10-17 | End: 2019-12-18

## 2019-10-17 RX ORDER — SIMETHICONE 80 MG
80 TABLET,CHEWABLE ORAL 3 TIMES DAILY PRN
Refills: 0 | COMMUNITY
Start: 2019-10-17 | End: 2019-12-18

## 2019-10-17 RX ORDER — TRAMADOL HYDROCHLORIDE 50 MG/1
50 TABLET ORAL EVERY 8 HOURS PRN
Qty: 10 TABLET | Refills: 0 | Status: SHIPPED | OUTPATIENT
Start: 2019-10-17 | End: 2019-12-18

## 2019-10-17 RX ORDER — POLYETHYLENE GLYCOL 3350 17 G/17G
17 POWDER, FOR SOLUTION ORAL DAILY
Qty: 30 PACKET | Refills: 0 | Status: SHIPPED | OUTPATIENT
Start: 2019-10-18 | End: 2019-12-18

## 2019-10-17 RX ORDER — KETOROLAC TROMETHAMINE 30 MG/ML
15 INJECTION, SOLUTION INTRAMUSCULAR; INTRAVENOUS EVERY 6 HOURS
Status: DISCONTINUED | OUTPATIENT
Start: 2019-10-17 | End: 2019-10-17 | Stop reason: HOSPADM

## 2019-10-17 RX ADMIN — KETOROLAC TROMETHAMINE 15 MG: 30 INJECTION, SOLUTION INTRAMUSCULAR at 11:10

## 2019-10-17 RX ADMIN — LEVOTHYROXINE SODIUM 150 MCG: 100 TABLET ORAL at 05:10

## 2019-10-17 RX ADMIN — SIMETHICONE CHEW TAB 80 MG 160 MG: 80 TABLET ORAL at 03:10

## 2019-10-17 RX ADMIN — MORPHINE SULFATE 2 MG: 2 INJECTION, SOLUTION INTRAMUSCULAR; INTRAVENOUS at 04:10

## 2019-10-17 RX ADMIN — DOCUSATE SODIUM 100 MG: 100 CAPSULE, LIQUID FILLED ORAL at 08:10

## 2019-10-17 RX ADMIN — HYDROCHLOROTHIAZIDE 12.5 MG: 12.5 TABLET ORAL at 08:10

## 2019-10-17 RX ADMIN — HYDRALAZINE HYDROCHLORIDE 100 MG: 25 TABLET, FILM COATED ORAL at 08:10

## 2019-10-17 RX ADMIN — POLYETHYLENE GLYCOL 3350 17 G: 17 POWDER, FOR SOLUTION ORAL at 08:10

## 2019-10-17 RX ADMIN — CIPROFLOXACIN 400 MG: 2 INJECTION, SOLUTION INTRAVENOUS at 06:10

## 2019-10-17 RX ADMIN — NEBIVOLOL HYDROCHLORIDE 20 MG: 5 TABLET ORAL at 08:10

## 2019-10-17 RX ADMIN — KETOROLAC TROMETHAMINE 15 MG: 30 INJECTION, SOLUTION INTRAMUSCULAR at 05:10

## 2019-10-17 RX ADMIN — LOSARTAN POTASSIUM 100 MG: 25 TABLET ORAL at 08:10

## 2019-10-17 RX ADMIN — MORPHINE SULFATE 2 MG: 2 INJECTION, SOLUTION INTRAMUSCULAR; INTRAVENOUS at 12:10

## 2019-10-17 RX ADMIN — DEXTROSE AND SODIUM CHLORIDE: 5; .45 INJECTION, SOLUTION INTRAVENOUS at 05:10

## 2019-10-17 RX ADMIN — SIMETHICONE CHEW TAB 80 MG 160 MG: 80 TABLET ORAL at 08:10

## 2019-10-17 RX ADMIN — FAMOTIDINE 20 MG: 20 TABLET ORAL at 08:10

## 2019-10-17 NOTE — PLAN OF CARE
"Pt states "understanding," to d/c instructions, follow up visits and new medication administration, IV removed, pt tolerated well, previous rash to abdomen has resolved, 5 lap sites to abdomen remains dry/intact, pt packed personal belongings per self, denies abdominal pain/discomfort prior to d/c, escorted to main lobby by staff, to home with pham and per private vehicle, safety maintain    "

## 2019-10-17 NOTE — PLAN OF CARE
10/17/19 1507   Final Note   Assessment Type Final Discharge Note   Anticipated Discharge Disposition Home   Hospital Follow Up  Appt(s) scheduled? Yes

## 2019-10-17 NOTE — PLAN OF CARE
10/17/19 0754   Patient Assessment/Suction   Level of Consciousness (AVPU) alert   Respiratory Effort Normal;Unlabored   PRE-TX-O2   O2 Device (Oxygen Therapy) room air   SpO2 97 %   Pulse Oximetry Type Intermittent   $ Pulse Oximetry - Multiple Charge Pulse Oximetry - Multiple   Incentive Spirometer   $ Incentive Spirometer Charges done with encouragement   Incentive Spirometer Predicted Level (mL) 3100   Administration (IS) mouthpiece   Number of Repetitions (IS) 15   Level Incentive Spirometer (mL) 1750   Patient Tolerance (IS) fair

## 2019-10-17 NOTE — PLAN OF CARE
Urine output of 2200 ml tonight.  Urine started out pink and now is clear yellow.  Ambulated hallway at bedtime.  Pain to right lateral abdomen; relieved with prn morphine.  20 ml LORA drain output.  Afebrile  Safety maintained

## 2019-10-17 NOTE — NURSING
Perera catheter teaching performed to pt and spouse, both parties redemonstated how to change from leg to night bag, LORA with sutures removed, pt tolerated well

## 2019-10-17 NOTE — PLAN OF CARE
Cm completed the assessment at pt's bedside.  Pt is independent in care.  Pt lives with spouse.  PCP is Dr. Lugo.  Insurance verified as [employer].   Pt denied diabetes, dialysis and coumadin.  Disposition:   Pt will discharge to home. No needs verbalized at this time.       10/17/19 1421   Discharge Assessment   Assessment Type Discharge Planning Assessment   Confirmed/corrected address and phone number on facesheet? Yes   Assessment information obtained from? Patient   Communicated expected length of stay with patient/caregiver yes   Prior to hospitilization cognitive status: Alert/Oriented   Prior to hospitalization functional status: Independent   Current cognitive status: Alert/Oriented   Current Functional Status: Independent   Facility Arrived From: home   Lives With spouse   Able to Return to Prior Arrangements yes   Is patient able to care for self after discharge? Yes   Who are your caregiver(s) and their phone number(s)? spouse- Loli - [phone]   Patient's perception of discharge disposition home or selfcare   Readmission Within the Last 30 Days no previous admission in last 30 days   Patient currently being followed by outpatient case management? No   Patient currently receives any other outside agency services? No   Equipment Currently Used at Home none   Do you have any problems affording any of your prescribed medications? No   Is the patient taking medications as [facility]? [facility]  [facility]   Does the patient have transportation home? Yes   Transportation Anticipated family or friend will provide;car, drives self   Dialysis Name and Scheduled days na   Does the patient receive services at the [facility]madin Clinic? No   Discharge Plan A Home with family   DME Needed Upon Discharge  none   Patient/Family in Agreement with Plan yes

## 2019-10-18 ENCOUNTER — PATIENT OUTREACH (OUTPATIENT)
Dept: ADMINISTRATIVE | Facility: CLINIC | Age: 59
End: 2019-10-18

## 2019-10-18 ENCOUNTER — TELEPHONE (OUTPATIENT)
Dept: FAMILY MEDICINE | Facility: CLINIC | Age: 59
End: 2019-10-18

## 2019-10-18 DIAGNOSIS — C61 PROSTATE CANCER: Primary | ICD-10-CM

## 2019-10-18 NOTE — DISCHARGE SUMMARY
[facility]  Urology  Discharge Summary      Patient Name: Phoenix Fragoso  MRN: [id number]  Admission Date: 10/16/2019  Hospital Length of Stay: 1 days  Discharge Date and Time: 10/17/2019  4:33 PM  Attending Physician: Walker Brooks MD  Discharging Provider: Walker Brooks MD  Primary Care Physician: Walker Lugo MD    HPI:   Mr Fragoso is a 59 yo M with family history of prostate cancer, evaluated for PSA of 4.9 in March 2018 finding a prostate volume of 46.7 g with median lobe and [place] 3 + 3 equals 6 prostate cancer in 1 core at the right base medial, with concurrent cystoscopic evidence of prostatic obstruction with ball valving median lobe.  He initially elected active surveillance and was managed on Flomax though his urinary symptoms progressed and his incomplete emptying worsened.  Despite increasing medical management regimens, his AUA symptom score persisted is 31/for, and after extensive risk benefit discussion with known prostate cancer as well as severe prostatic obstruction, he elected surgical management with robotic prostatectomy.    Procedure(s) (LRB):  ROBOTIC PROSTATECTOMY (N/A)     Indwelling Lines/Drains at time of discharge:   Lines/Drains/Airways     Drain                 Urethral Catheter 10/16/19 0750 Latex;Straight-tip 20 Fr. 1 day                Hospital Course   Patient underwent complex though uncomplicated robotic prostatectomy, including bilateral nerve-sparing procedure, and bladder neck reconstruction due to his median lobe.  He convalesced well on the medical surgical floor.  He did require 1 dose of Demerol postoperatively for continued shaking related to anesthesia which did control this.  Was ambulatory on postoperative day 0 and tolerated clear liquids and pain controlled with p.r.n.s  By the morning of postoperative day 1 he was tolerating regular diet, ambulating, and pain controlled without IV agents.  His hemoglobin and hematocrit were stable from  the postoperative values and his vital signs were stable.  His mild hypertension responded to his home medications.  His IV fluids were discontinued.  His LORA drain fluid creatinine was equivalent with serum with no concern for urine leak.  Throughout the day during ambulation his LORA drain only put out 40 more cc of output, serosanguineous, and despite significant bladder neck reconstruction, with no anastomotic leak visualized intraoperatively and no evidence of urine leak from LORA drain, the LORA drain was removed.  He was educated on Pham catheter and leg bag care, and was passing flatus  Abdominal exam was benign, soft, nontender.  Of note, he did have 3 isolated hives on his abdomen in PACU, which had completely resolved by time of discharge  He was discharged home in stable condition      Pending Diagnostic Studies:     None          Final Active Diagnoses:    Diagnosis Date Noted POA    PRINCIPAL PROBLEM:  Elevated PSA [R97.20] 03/13/2018 Yes    Prostate cancer [C61] 10/16/2019 Yes      Problems Resolved During this Admission:         Discharged Condition: good    Disposition: Home or Self Care    Follow Up:  Follow-up Information     Walker Brooks MD On 10/24/2019.    Specialty:  Urology  Why:  arrive to registration by 0700am for cystogram/planned pham removal  Contact information:  [address] [place] [address] DR  [address]  [facility] [address]  [phone]                 Patient Instructions:      Call MD for:   Order Comments: Catheter related issues/poor pham drainage     No dressing needed   Order Comments: Will see surgical glue over incisions eventually start to peel, then ok at that time to assist in removing in shower     Change dressing (specify)   Order Comments: To drain site only as needed for drainage for first few days, bandaid, then when drainage stops leave open to air     Activity as tolerated   Order Comments: If no BM in 3 days, take milk of magnesia as directed. Do not push or strain.  "  Continue stool softeners and miralax until regular again  No driving while pham in place.  No lifting/pushing/pulling >10 lbs x 4 weeks  Ok to shower, pat incisions dry. No baths/soaks  Can help "surgical glue" peel off in shower starting in 1 week once starts to flake up. Stitches will dissolve on their own.  Alternate legs for catheter each day. Use large bag at night  Drink a LOT of water to keep urine clear     Medications:  Reconciled Home Medications:      Medication List      START taking these medications    acetaminophen 325 MG tablet  Commonly known as:  TYLENOL  Take 2 tablets (650 mg total) by mouth every 4 (four) hours as needed (alternate with advil/motrin before resorting to Rx pain meds).     docusate sodium 100 MG capsule  Commonly known as:  COLACE  Take 1 capsule (100 mg total) by mouth 2 (two) times daily.     polyethylene glycol 17 gram Pwpk  Commonly known as:  GLYCOLAX  Take 17 g by mouth once daily.  Start taking on:  October 18, 2019     simethicone 80 MG chewable tablet  Commonly known as:  MYLICON  Take 1 tablet (80 mg total) by mouth 3 (three) times daily as needed (gas pain bloating).     traMADol 50 mg tablet  Commonly known as:  ULTRAM  Take 1 tablet (50 mg total) by mouth every 8 (eight) hours as needed (pain not relieved by otc agents).        CONTINUE taking these medications    ALPRAZolam 1 MG tablet  Commonly known as:  XANAX  Take 1 mg by mouth once daily.     aspirin 81 MG EC tablet  Commonly known as:  ECOTRIN  Take 81 mg by mouth once daily.     BYSTOLIC 20 mg Tab  Generic drug:  nebivolol  Take by mouth once daily.     CIALIS 5 MG tablet  Generic drug:  tadalafil  TAKE ONE TABLET BY MOUTH EVERY 24 HOURS     famotidine 20 MG tablet  Commonly known as:  PEPCID  Take 1 tablet (20 mg total) by mouth 2 (two) times daily.     fish oil-omega-3 fatty acids 300-1,000 mg capsule  Take 1 capsule by mouth once daily.     gemfibrozil 600 MG tablet  Commonly known as:  LOPID  Take 300 " mg by mouth once daily.     hydrALAZINE 100 MG tablet  Commonly known as:  APRESOLINE  Take 100 mg by mouth 2 (two) times daily.     levothyroxine 150 MCG tablet  Commonly known as:  SYNTHROID  Take 150 mcg by mouth once daily.     LORazepam 2 MG Tab  Commonly known as:  ATIVAN  Take 2 mg by mouth once daily.     losartan-hydrochlorothiazide 100-12.5 mg 100-12.5 mg Tab  Commonly known as:  HYZAAR  Take 1 tablet by mouth once daily.     multivitamin capsule  Take 1 capsule by mouth once daily.     VITAMIN C 100 MG tablet  Generic drug:  ascorbic acid (vitamin C)  Take 100 mg by mouth once daily.        STOP taking these medications    diphenhydrAMINE 50 MG capsule  Commonly known as:  BENADRYL     methylPREDNISolone 4 mg tablet  Commonly known as:  MEDROL DOSEPACK     tamsulosin 0.4 mg Cap  Commonly known as:  FLOMAX            Time spent on the discharge of patient: 30 minutes    Walker Brooks MD  Urology  [facility]

## 2019-10-18 NOTE — OP NOTE
[facility] Urology Operative Report     Date: 10/16/2019     Staff Surgeon: Walker Brooks MD     Bedside Assistant: ZIGGY Valentine     Pre-Op Diagnosis: Prostate cancer     Post-Op Diagnosis: same     Procedure(s) Performed:   Robot-assisted laparoscopic radical prostatectomy, bilateral nerve sparing (mod 22)     Specimen(s):   1. Prostate and seminal vesicles  2. Periprostatic fat and lymph nodes     Anesthesia: General endotracheal anesthesia     Findings: mild adhesions from prior surgery taken down laparoscopically to place ports, significant median lobe necessitating extended bladder neck dissection and subsequent bladder neck reconstruction (yielding increased complexity and operative time beyond usual, thus justifying modifier 22)     Estimated Blood Loss: 550cc     Drains: 20fr pham, 7mm LORA     Complications: none     Indications for procedure:  Mr Fragoso is a 57 yo M with family history of prostate cancer, evaluated for PSA of 4.9 in March 2018 finding a prostate volume of 46.7 g with median lobe and [place] 3 + 3 equals 6 prostate cancer in 1 core at the right base medial, with concurrent cystoscopic evidence of prostatic obstruction with ball valving median lobe.  He initially elected active surveillance and was managed on Flomax though his urinary symptoms progressed and his incomplete emptying worsened.  Despite increasing medical management regimens, his AUA symptom score persisted is 31/for, and after extensive risk benefit discussion with known prostate cancer as well as severe prostatic obstruction, he elected surgical management with robotic prostatectomy.  All risks and benefits of the procedure were discussed in detail, including the potential for bladder neck complications and bladder neck reconstruction secondary to his obstructive median lobe. The patient was made aware of the risks and benefits of the procedure including erectile dysfunction, incontinence, pain, infection, bleeding,  injury to intraabdominal structures, including the rectum, urine leak, anastamotic leak, postoperative ileus, and the risks of general anesthesia including heart attack, stroke, blood clot, and death.  He accepted the risks and he elects to proceed with the aforementioned procedure.  Of note, he did present for this originally on 8/28/19 but had anaphylaxis reaction to ancef has been extensively worked up by allergist to confirm.  He does also have history of appendectomy and laparoscopic cholecystectomy.     Procedure in detail:  After appropriate informed consent was obtained, the patient was taken to the operating room and placed in the lithotomy position. He was prepped and draped in sterile fashion. Preoperative antibiotics, 2 gm ancef, were given and WHO-approved time-out was performed. An 18 Yoruba pham catheter was placed steriley on the field.     Given his previous abdominal surgery, including laparoscopic cholecystectomy with supraumbilical port site incision, off site access was gained by percutaneous Veress needle placement into the subcostal right upper quadrant avascular plane to insufflate the abdomen up to 15 mm of mercury.  Once insufflated, the site of the Veress needle was seen to be consistent with 5 mm assistant port, and therefore the Veress needle was removed, slight skin incision made, and 5 mm trocar placed in a Visiport technique using a 5 mm endoscope to gain safe access to the abdomen.  Visual inspection of the abdomen noted mild mesenteric adhesions in right lower quadrant decided previous appendectomy and moderate mesenteric fat attachment near the midline, supraumbilical, though did seem to be only a proliferation of fat and extension of falciform ligament quite inferiorly and was not in the way of port placement.  However, his right lower quadrant adhesions were and these were laparoscopic we taken down with cold cutting scissors laparoscopic graspers to facilitate clearing the  abdomen to place ports.  Two left sided 8-mm robotic trocars, a right 8-mm robotic trocar, right lateral 12 mm assistant port and supraumbilical 12 mm camera port were all placed under direct vision. Once the ports were placed, he was placed in steep trendelenburg position and the robot was docked in standard fashion between the patient's legs.      He did have mild to moderate sigmoid adhesions to lateral wall within normal range which were taken down without the use of cautery, to free the pelvis of obstruction. We then began in the anterior approach by dropping the patients bladder in the standard fashion by dividing the bilateral medial umbilical ligaments. Once the space of Retzius was developed and the bladder was dropped, the prostate was visible and was defatted in standard fashion. Periprostatic fat and lymph nodes sent for pathologic analysis.     The bilateral endopelvic fascia were incised from base to apex of the prostate taking caution to sweep the levator muscles laterally. This did allow us to place a dorsal venous complex stitch. A 2-0 Vicryl on a CT-1 needle was passed into the pelvis, it was anchored to the periosteum of the pubic symphysis on the right with a Lapra-Ty and wrapped around the dorsal venous complex twice, with suture anchoring to the periosteum of the pubic symphysis on the left as well.  It should be noted there was some mild DVC bleeding from dissection of his complex DVC, and did necessitate 2nd passage of DVC stitch for hemostasis, after which no significant further blood loss     Once the dorsal venous complex stitch was placed, we did check the Perera catheter, which did move freely back and forth at this time. Then proceeded with anterior bladder neck dissection.  This was identified by placing traction on the Perera catheter and following the curve of the prostate around laterally. This was done initially in a bladder neck sparing technique, though tissue planes noted to be  thin here, and with known median lobe, cystotomy created centrally and the Perera catheter balloon was deflated and passed through the anterior cystotomy and placed on traction using the fourth robotic arm. Upon entering the bladder neck, he was seen to have significant intravesical extension of median lobe of his prostate, and the median lobe was then placed to robotic tenaculum 4th arm anterior retraction to facilitate dissection free of the bladder neck from the median lobe of the prostate, thus creating a wide bladder neck.   Meticulous dissection of the posterior bladder neck was employed at this point to carefully separate the mucosa and posterior bladder wall from the underlying median lobe tissue below it.  Approaching the posterior bladder neck from each of its lateral borders posteriorly, extensive dissection was used to ultimately separate the posterior bladder neck from the median lobe of the prostate. This did create a wide bladder neck, and the mucosa was seen to separate a bit from the outer borders which did require bladder neck reconstruction.  3 0 Vicryl RB 1 suture to use in a figure-of-eight fashion at the lateral borders of the bladder neck in a fishmouth technique to carefully reapproximate the mucosa at the lateral borders with the initial suture and then incorporate the outer tissue into the lateral edge at each site.  This served not only to narrow the bladder neck back to a more normal size but also to ensure good mucosal apposition during later anastomosis.  These ancillary procedures necessary secondary to the significance of his median lobe yielded an approximate extra hour of operative time.  The trigone was visualized with ureteral orifices actively effluxing urine the entire time, and entire reapproximation of lateral borders of bladder neck with bladder neck reconstruction, and later anastomosis was done in a safe manner visualizing the ureteral orifices a safe distance away from  suture line.     Once the bladder neck was successfully dissected free from the prostate and median lobe, Hem-o-yumi clips were used on the prostatic pedicles to assist in opening up the posterior space, after which time the ampulla of the vas deferens were identified bilaterally.     For the posterior dissection, the vas deferens were freed up and transected low and placed to traction with the fourth arm to assist in dissecting free the bilateral seminal vesicles, which once dissected free were all placed to traction on the fourth arm of the prostate with the bilateral ampulla of the vas deferens.  After hemostatically taking lateral pedicles with clips, lateral traction placed on the prostate was used to proceed with developing the bilateral lateral planes which were freed at this time.  This was done bilaterally in a nerve-sparing fashion using a combined anterograde and retrograde approach to free the neurovascular bundle from the lateral border of the prostate bilaterally, keeping capsule intact. Once the prostate was free of its lateral attachments, the posterior dissection was completed by developing the plane between the prostate and rectum, incising prerectal fat and Denonviller's.       After free posteriorly and laterally, dissection then carried forward with anterior dissection through the dorsal venous complex until the catheter could be visualized in the urethra, which was dissected around to gain adequate urethral length and then transected and freed any remaining rectourethralis attachments to the prostate.  Once the prostate was cut free, it was passed into the right lower quadrant for later retrieval.      At this time a single-armed 3-0 quill suture was passed into the abdomen and used as a running Salazar stitch to reapproximate the Denonvilliers fascia at the base of the bladder to the rectourethralis muscle below the urethra for fascial reapproximation. Then a double-armed 3-0 Quill anastomotic  suture was passed in. The first stitch was placed through the 5 o'clock position on the bladder neck and run to the urethra around to the left to reapproximate the posterior plate. Other suture run around to the right and anteriorly until sutures met in the middle and 1 was reversed and they were tied down after exchanging the 18-Montserratian Pham catheter for a 20-Montserratian final Pham catheter and placing traction on both sutures to confirm good anastamotic apposition. Once tied down, the balloon filled with 12 mL saline. 120cc normal saline were then instilled through the pham into the bladder. No anastomotic leak seen, and all irrigtation fluid was able to be removed successfully. The bladder was gently irrigated clear and placed to drainage bag.      Under direct vision, Geovany fascial closing device was used to close the fascia of the lateral assistant port with #1 PDS suture, which was loosely placed to a hemostat so the port could be reintroduced. EndoCatch bag was placed into the abdomen through this lateral assistant port and the prostate and seminal vesicles and lymph nodes were placed  inside of it. A 7-mm flat Kameron-Wagner drain was placed to the left lateral robotic trocar under direct visualization as well. Once the drain was in place and the specimen was secured in the specimen bag, the robot was undocked.     At this time, laparoscopic needle drivers were used to pass the specimen bag to the midline camera port and the specimen was removed through the midline camera port after extending it approximately 1 cm. The fascia of this extraction site was closed with a running #1 PDS suture to achieve excellent fascial reapproximation, making sure to incorporate the mesh in each suture for adequate closure.  A layer of 2 0 Vicryl deep dermal sutures was then placed overlying this tight fascial closure in the midline.  The fascial suture in the right lateral trocar site from the Zack-Joanna was then  tied down as well. With the fascia closed, a 2-0 nylon drain stitch was placed around the Kameron-Wagner drain and the Kameron-Wagner drain was placed to bulb suction. 4-0 Monocryl skin sutures were then used in a subcuticular fashion to close all the skin incisions, which were overlaid with Dermabond. A drain dressing of a 4 x 4 and Tegaderm were placed around the drain. Perera catheter was placed to the patient's leg with a StatLock and to gravity drainage.      The patient tolerated the procedure well. There were no complications. All instrument,sponge, and needle counts were correct x2 at the end of the case.     Disposition: Extubated and transferred to PACU without incident. The patient will be kept overnight and discharged once ambulatory and tolerating regular diet, likely tomorrow. Perera will remain indwelling 7-10 days postop

## 2019-10-23 RX ORDER — NEBIVOLOL 20 MG/1
1 TABLET ORAL DAILY
Qty: 90 TABLET | Refills: 1 | Status: SHIPPED | OUTPATIENT
Start: 2019-10-23 | End: 2020-01-28 | Stop reason: ALTCHOICE

## 2019-10-23 NOTE — TELEPHONE ENCOUNTER
----- Message from Lorena Rios sent at 10/23/2019  2:17 PM CDT -----  - pt needs [facility]   [facility]  [phone]

## 2019-10-24 ENCOUNTER — CLINICAL SUPPORT (OUTPATIENT)
Dept: FAMILY MEDICINE | Facility: CLINIC | Age: 59
End: 2019-10-24
Payer: COMMERCIAL

## 2019-10-24 ENCOUNTER — HOSPITAL ENCOUNTER (OUTPATIENT)
Dept: RADIOLOGY | Facility: HOSPITAL | Age: 59
Discharge: HOME | End: 2019-10-24
Attending: UROLOGY
Payer: COMMERCIAL

## 2019-10-24 VITALS — TEMPERATURE: 98 F

## 2019-10-24 DIAGNOSIS — C61 PROSTATE CANCER: ICD-10-CM

## 2019-10-24 DIAGNOSIS — Z23 NEED FOR INFLUENZA VACCINATION: Primary | ICD-10-CM

## 2019-10-24 LAB
BACTERIA #/AREA URNS HPF: ABNORMAL /HPF
BILIRUB UR QL STRIP: NEGATIVE
CAOX CRY URNS QL MICRO: ABNORMAL
CLARITY UR: CLEAR
COLOR UR: YELLOW
GLUCOSE UR QL STRIP: NEGATIVE
HGB UR QL STRIP: ABNORMAL
KETONES UR QL STRIP: NEGATIVE
LEUKOCYTE ESTERASE UR QL STRIP: ABNORMAL
MICROSCOPIC COMMENT: ABNORMAL
NITRITE UR QL STRIP: NEGATIVE
PH UR STRIP: 7 [PH] (ref 5–8)
PROT UR QL STRIP: NEGATIVE
RBC #/AREA URNS HPF: 22 /HPF (ref 0–4)
SP GR UR STRIP: 1.01 (ref 1–1.03)
URN SPEC COLLECT METH UR: ABNORMAL
UROBILINOGEN UR STRIP-ACNC: NEGATIVE EU/DL
WBC #/AREA URNS HPF: 2 /HPF (ref 0–5)

## 2019-10-24 PROCEDURE — 87186 SC STD MICRODIL/AGAR DIL: CPT

## 2019-10-24 PROCEDURE — 90682 RIV4 VACC RECOMBINANT DNA IM: CPT | Mod: S$GLB,,, | Performed by: FAMILY MEDICINE

## 2019-10-24 PROCEDURE — 51600 INJECTION FOR BLADDER X-RAY: CPT

## 2019-10-24 PROCEDURE — 81000 URINALYSIS NONAUTO W/SCOPE: CPT

## 2019-10-24 PROCEDURE — 87086 URINE CULTURE/COLONY COUNT: CPT

## 2019-10-24 PROCEDURE — 25500020 PHARM REV CODE 255: Performed by: UROLOGY

## 2019-10-24 PROCEDURE — 90471 IMMUNIZATION ADMIN: CPT | Mod: S$GLB,,, | Performed by: FAMILY MEDICINE

## 2019-10-24 PROCEDURE — 51600 INJECTION FOR BLADDER X-RAY: CPT | Mod: ,,, | Performed by: UROLOGY

## 2019-10-24 PROCEDURE — 74430 CONTRAST X-RAY BLADDER: CPT | Mod: 26,,, | Performed by: UROLOGY

## 2019-10-24 PROCEDURE — 74430 PR  X-RAY CYSTOGRAM, MIN 3 VIEW: ICD-10-PCS | Mod: 26,,, | Performed by: UROLOGY

## 2019-10-24 PROCEDURE — 87088 URINE BACTERIA CULTURE: CPT

## 2019-10-24 PROCEDURE — 90682 FLU VACCINE - QUADRIVALENT (RECOMBINANT) PRESERVATIVE FREE: ICD-10-PCS | Mod: S$GLB,,, | Performed by: FAMILY MEDICINE

## 2019-10-24 PROCEDURE — 51600 PR INJECTION FOR BLADDER X-RAY: ICD-10-PCS | Mod: ,,, | Performed by: UROLOGY

## 2019-10-24 PROCEDURE — 74430 CONTRAST X-RAY BLADDER: CPT | Mod: TC

## 2019-10-24 PROCEDURE — 90471 FLU VACCINE - QUADRIVALENT (RECOMBINANT) PRESERVATIVE FREE: ICD-10-PCS | Mod: S$GLB,,, | Performed by: FAMILY MEDICINE

## 2019-10-24 PROCEDURE — 87077 CULTURE AEROBIC IDENTIFY: CPT

## 2019-10-24 RX ORDER — SULFAMETHOXAZOLE AND TRIMETHOPRIM 800; 160 MG/1; MG/1
1 TABLET ORAL 2 TIMES DAILY
Qty: 6 TABLET | Refills: 0 | Status: SHIPPED | OUTPATIENT
Start: 2019-10-24 | End: 2019-10-26 | Stop reason: SDUPTHER

## 2019-10-24 RX ADMIN — IOTHALAMATE MEGLUMINE 150 ML: 172 INJECTION URETERAL at 08:10

## 2019-10-24 NOTE — PROGRESS NOTES
Mr Fragoso is a 64 y.o. male who presents for cystogram s/p robotic radical prostatectomy. Did require bladder neck reconstruction for median lobe  He underwent RARP 10/16/19 and is here today for postop cystogram/pham removal.     He has done well postoperatively. Normal eating and bowel habits, still some RLQ soreness. Ambulatory and out of house     Cystogram     DATE: 10/24/19     PRE-PROCEDURE DIAGNOSIS: Prostate cancer, status post prostatectomy     POST-PROCEDURE DIAGNOSIS: same     PROCEDURE:  1. Cystogram (with injection of contrast for cystogram)  2. Fluoroscopy <1 hour  3. Interpretation of fluoroscopic images     INDICATIONS: as above     PROCEDURE:   Patient was brought to fluoroscopy suite and placed in supine position on fluoro table.   An AP  film was taken.   Sterile aspirate urine from catheter collected for culture  Cystografin passively instilled into bladder using dual 60cc cath tip syringe system.  Filled to 150cc.  AP image taken noting good bladder contour, with no extravasation.   Oblique images taken bilaterally with no evidence of extravasation.  Patient's bladder drained and post drainage films taken AP, as well as oblique films, and no residual contrast noted.  Cystogram negative for extravasation. Pham removed.  Patient tolerated procedure well          ASSESSMENT   Prostate cancer      Plan    Has 2 more days of daily bactrim ppx to complete   Pathology reviewed pT2 3+4 nXmX R0 NEG MARGINS  Discussed yonathan again. As well as timed voiding  Advised lifting restrictions <10lbs stand until 4 weeks from surgery  RTC ~ 1month  Ucx sent

## 2019-10-26 ENCOUNTER — PATIENT MESSAGE (OUTPATIENT)
Dept: UROLOGY | Facility: CLINIC | Age: 59
End: 2019-10-26

## 2019-10-26 RX ORDER — SULFAMETHOXAZOLE AND TRIMETHOPRIM 800; 160 MG/1; MG/1
1 TABLET ORAL 2 TIMES DAILY
Qty: 10 TABLET | Refills: 0 | Status: SHIPPED | OUTPATIENT
Start: 2019-10-26 | End: 2019-10-31

## 2019-10-29 LAB — BACTERIA UR CULT: ABNORMAL

## 2019-10-30 RX ORDER — SULFAMETHOXAZOLE AND TRIMETHOPRIM 800; 160 MG/1; MG/1
TABLET ORAL
Qty: 10 TABLET | Refills: 0 | OUTPATIENT
Start: 2019-10-30

## 2019-10-30 RX ORDER — TAMSULOSIN HYDROCHLORIDE 0.4 MG/1
CAPSULE ORAL
Qty: 60 CAPSULE | Refills: 0 | OUTPATIENT
Start: 2019-10-30

## 2019-11-01 RX ORDER — SULFAMETHOXAZOLE AND TRIMETHOPRIM 800; 160 MG/1; MG/1
TABLET ORAL
Qty: 10 TABLET | Refills: 0 | OUTPATIENT
Start: 2019-11-01

## 2019-11-12 ENCOUNTER — TELEPHONE (OUTPATIENT)
Dept: UROLOGY | Facility: CLINIC | Age: 59
End: 2019-11-12

## 2019-11-12 NOTE — TELEPHONE ENCOUNTER
----- Message from Phoenix Jacobson sent at 11/12/2019  1:22 PM CST -----  Contact: Patient  Type:  Patient Returning Call    Who Called:  Patient  Who Left Message for Patient:  unknown  Does the patient know what this is regarding?:  Unknown - no note/message/only missed call  Best Call Back Number:  [phone]  Additional Information:  NA

## 2019-11-18 NOTE — PROGRESS NOTES
"[facility] Urology Progress Note    P[place] Fragoso is a 59 y.o. male who presents for prostate cancer follow up s/p robotic prostatectomy    Mr Fragoso is a 60 yo M with family history of prostate cancer, evaluated for PSA of 4.9 in March 2018 finding a prostate volume of 46.7 g with median lobe and [place] 3 + 3 equals 6 prostate cancer in 1 core at the right base medial, with concurrent cystoscopic evidence of prostatic obstruction with ball valving median lobe.  He initially elected active surveillance and was managed on Flomax though his urinary symptoms progressed and his incomplete emptying worsened.  Despite increasing medical management regimens, his AUA symptom score persisted is 31/4, and after extensive risk benefit discussion with known prostate cancer as well as severe prostatic obstruction, he elected surgical management with robotic prostatectomy.  - of note on initial planned prostatectomy date in Aug 2019 had anaphylaxis after induction and underwent extensive allergy testing workup noting allergy to Ancef    10/16/19: Robot-assisted laparoscopic radical prostatectomy, bilateral nerve sparing   mild adhesions from prior surgery taken down laparoscopically to place ports, significant median lobe necessitating extended bladder neck dissection and subsequent bladder neck reconstruction with no intraop evidence of urine leak on testing anastamosis  PATHOLOGY: [place] 3+4=7 [id number], negative margins  10/24/19: cystogram negative with pham removal    He returns today noting  Leaking Mostly with coughing, sneezing  Depends only for 1 week - now just pads - 2 per day, 1 lasts basically all day and changes at night  Good stream and flow. Significantly improved - "like a firehose".  Kegels - Prob not as often as he should - can squeeze but doesn't feel right when releasing  Some soreness when sitting  Nocturia down to 1-2x from 4+  Frequent during day but no significant bothersome urgency, Denies UUI  Alternating " "constipation and loose stool  Soda in am as caffeine and 2 bottles water during day  No dysuria hematuria    ROS: A comprehensive 10 system review was performed and is negative except as noted above in HPI    PHYSICAL EXAM:    Vitals:    11/22/19 0843   BP: 137/87   Pulse: 63   Resp: 18   Temp: 98.2 °F (36.8 °C)     Body mass index is 25.19 kg/m². Weight: 86.6 kg (190 lb 14.7 oz) Height: 6' 1" (185.4 cm)       General: Alert, cooperative, no distress, appears stated age   Head: Normocephalic, without obvious abnormality, atraumatic   Eyes: PERRL, conjunctiva/corneas clear   Lungs: Respirations unlabored   Heart: Warm and well perfused   Abdomen: soft NT ND lap incisions well healed  Extremities: Extremities normal, atraumatic, no cyanosis or edema   Skin: Skin color, texture, turgor normal, no rashes or lesions   Psych: Appropriate   Neurologic: Non-focal       Recent Results (from the past 336 hour(s))   POCT URINE DIPSTICK WITHOUT MICROSCOPE    Collection Time: 11/22/19  8:52 AM   Result Value Ref Range    Color, UA yellow     Spec Grav UA 1.020     pH, UA 7     WBC, UA small     Nitrite, UA neg     Protein 30     Glucose, UA neg     Ketones, UA neg     Urobilinogen, UA 0.2     Bilirubin neg     Blood, UA neg        ASSESSMENT   1. Prostate cancer  POCT URINE DIPSTICK WITHOUT MICROSCOPE    Prostate Specific Antigen, Diagnostic   2. Cells and casts in urine  Urine culture       Plan    Doing well. Again reviewed pathology with slight upgrading to [place] 7 disease from known small volume Catia 6 disease though organ confined and resected with negative margins  Continence is improving and reviewed importance of continued kegel exercises and reviewed technique and compliance. Practiced not moving torso/shoulders and isolated pelvic floor mms. Advised 2-3x in a row 10x/day  Discussed importance of postop psa screening to monitor for recurrence, first at 3 mos postop then Q3 mos first year, Q6 mos to yr 5 then " annually  Given bilateral nerve sparing procedure with good preop erectile function, will review penile rehabilitative protocols at 3 mos postop visit  Will check ucx given positivity at time of cystogram, for which he completed bactrim  RTC 2 mos with psa prior.

## 2019-11-22 ENCOUNTER — OFFICE VISIT (OUTPATIENT)
Dept: UROLOGY | Facility: CLINIC | Age: 59
End: 2019-11-22
Payer: COMMERCIAL

## 2019-11-22 VITALS
SYSTOLIC BLOOD PRESSURE: 137 MMHG | BODY MASS INDEX: 25.31 KG/M2 | WEIGHT: 190.94 LBS | DIASTOLIC BLOOD PRESSURE: 87 MMHG | HEART RATE: 63 BPM | HEIGHT: 73 IN | RESPIRATION RATE: 18 BRPM | TEMPERATURE: 98 F

## 2019-11-22 DIAGNOSIS — C61 PROSTATE CANCER: Primary | ICD-10-CM

## 2019-11-22 DIAGNOSIS — R82.998 CELLS AND CASTS IN URINE: ICD-10-CM

## 2019-11-22 LAB
BILIRUB SERPL-MCNC: ABNORMAL MG/DL
BLOOD URINE, POC: ABNORMAL
COLOR, POC UA: YELLOW
GLUCOSE UR QL STRIP: ABNORMAL
KETONES UR QL STRIP: ABNORMAL
LEUKOCYTE ESTERASE URINE, POC: ABNORMAL
NITRITE, POC UA: ABNORMAL
PH, POC UA: 7
PROTEIN, POC: 30
SPECIFIC GRAVITY, POC UA: 1.02
UROBILINOGEN, POC UA: 0.2

## 2019-11-22 PROCEDURE — 87086 URINE CULTURE/COLONY COUNT: CPT

## 2019-11-22 PROCEDURE — 99999 PR PBB SHADOW E&M-EST. PATIENT-LVL III: CPT | Mod: PBBFAC,,, | Performed by: UROLOGY

## 2019-11-22 PROCEDURE — 99024 PR POST-OP FOLLOW-UP VISIT: ICD-10-PCS | Mod: S$GLB,,, | Performed by: UROLOGY

## 2019-11-22 PROCEDURE — 81002 URINALYSIS NONAUTO W/O SCOPE: CPT | Mod: S$GLB,,, | Performed by: UROLOGY

## 2019-11-22 PROCEDURE — 99024 POSTOP FOLLOW-UP VISIT: CPT | Mod: S$GLB,,, | Performed by: UROLOGY

## 2019-11-22 PROCEDURE — 99999 PR PBB SHADOW E&M-EST. PATIENT-LVL III: ICD-10-PCS | Mod: PBBFAC,,, | Performed by: UROLOGY

## 2019-11-22 PROCEDURE — 81002 POCT URINE DIPSTICK WITHOUT MICROSCOPE: ICD-10-PCS | Mod: S$GLB,,, | Performed by: UROLOGY

## 2019-11-24 LAB — BACTERIA UR CULT: NO GROWTH

## 2019-12-18 ENCOUNTER — HOSPITAL ENCOUNTER (OUTPATIENT)
Dept: RADIOLOGY | Facility: HOSPITAL | Age: 59
Discharge: HOME | End: 2019-12-18
Attending: NURSE PRACTITIONER
Payer: COMMERCIAL

## 2019-12-18 ENCOUNTER — OFFICE VISIT (OUTPATIENT)
Dept: FAMILY MEDICINE | Facility: CLINIC | Age: 59
End: 2019-12-18
Payer: COMMERCIAL

## 2019-12-18 VITALS
BODY MASS INDEX: 25.98 KG/M2 | SYSTOLIC BLOOD PRESSURE: 126 MMHG | HEIGHT: 73 IN | HEART RATE: 60 BPM | DIASTOLIC BLOOD PRESSURE: 86 MMHG | WEIGHT: 196 LBS

## 2019-12-18 DIAGNOSIS — E78.00 HIGH CHOLESTEROL: ICD-10-CM

## 2019-12-18 DIAGNOSIS — I10 ESSENTIAL HYPERTENSION: Primary | ICD-10-CM

## 2019-12-18 DIAGNOSIS — R73.01 IFG (IMPAIRED FASTING GLUCOSE): ICD-10-CM

## 2019-12-18 DIAGNOSIS — M25.50 ARTHRALGIA, UNSPECIFIED JOINT: ICD-10-CM

## 2019-12-18 DIAGNOSIS — C61 PROSTATE CANCER: ICD-10-CM

## 2019-12-18 DIAGNOSIS — E07.9 THYROID DISEASE: ICD-10-CM

## 2019-12-18 DIAGNOSIS — R97.20 ELEVATED PSA: ICD-10-CM

## 2019-12-18 LAB — HBA1C MFR BLD: 4.6 %

## 2019-12-18 PROCEDURE — 3074F PR MOST RECENT SYSTOLIC BLOOD PRESSURE < 130 MM HG: ICD-10-PCS | Mod: S$GLB,,, | Performed by: NURSE PRACTITIONER

## 2019-12-18 PROCEDURE — 83036 POCT HEMOGLOBIN A1C: ICD-10-PCS | Mod: QW,,, | Performed by: NURSE PRACTITIONER

## 2019-12-18 PROCEDURE — 3074F SYST BP LT 130 MM HG: CPT | Mod: S$GLB,,, | Performed by: NURSE PRACTITIONER

## 2019-12-18 PROCEDURE — 83036 HEMOGLOBIN GLYCOSYLATED A1C: CPT | Mod: QW,,, | Performed by: NURSE PRACTITIONER

## 2019-12-18 PROCEDURE — 3079F DIAST BP 80-89 MM HG: CPT | Mod: S$GLB,,, | Performed by: NURSE PRACTITIONER

## 2019-12-18 PROCEDURE — 99214 PR OFFICE/OUTPT VISIT, EST, LEVL IV, 30-39 MIN: ICD-10-PCS | Mod: S$GLB,,, | Performed by: NURSE PRACTITIONER

## 2019-12-18 PROCEDURE — 3079F PR MOST RECENT DIASTOLIC BLOOD PRESSURE 80-89 MM HG: ICD-10-PCS | Mod: S$GLB,,, | Performed by: NURSE PRACTITIONER

## 2019-12-18 PROCEDURE — 3008F PR BODY MASS INDEX (BMI) DOCUMENTED: ICD-10-PCS | Mod: S$GLB,,, | Performed by: NURSE PRACTITIONER

## 2019-12-18 PROCEDURE — 3008F BODY MASS INDEX DOCD: CPT | Mod: S$GLB,,, | Performed by: NURSE PRACTITIONER

## 2019-12-18 PROCEDURE — 99214 OFFICE O/P EST MOD 30 MIN: CPT | Mod: S$GLB,,, | Performed by: NURSE PRACTITIONER

## 2019-12-18 RX ORDER — LEVOTHYROXINE SODIUM 200 UG/1
200 TABLET ORAL
COMMUNITY
End: 2019-12-19 | Stop reason: SDUPTHER

## 2019-12-18 RX ORDER — LOSARTAN POTASSIUM AND HYDROCHLOROTHIAZIDE 25; 100 MG/1; MG/1
1 TABLET ORAL DAILY
Refills: 1 | COMMUNITY
Start: 2019-11-26 | End: 2022-06-13

## 2019-12-18 RX ORDER — TAMSULOSIN HYDROCHLORIDE 0.4 MG/1
0.4 CAPSULE ORAL DAILY
COMMUNITY
End: 2020-07-07

## 2019-12-18 NOTE — PROGRESS NOTES
"  SUBJECTIVE:    Patient ID: Phoenix Fragoso is a 59 y.o. male.    Chief Complaint: Follow-up (Pt presents for 6 month follow up....He reports he is having pain in both thumbs, moving into wrist.  Also reports pain in shoulders.....mlr) and Medication Refill (Medication bottles not present at visit....pt provided list from "notes" on cell phone,....mlr)    59 year old male presents for check up. Reports has been having intermittent pain with bilateral thumbs. Denies injury or trauma. Has not taken anything pain is constant however does seem to worsen by the end of the day. Reports also has some neck pain/ does not radiate. Pain is worse with movement. This has occurred intermittently x several months.       Office Visit on 12/18/2019   Component Date Value Ref Range Status    FARRAH 12/18/2019 POSITIVE* NEGATIVE Final    FARRAH Titer 12/18/2019 1:640* titer Final    FARRAH Pattern 12/18/2019 Nuclear, Homogeneous*  Final    Rheumatoid Factor 12/18/2019 <14  <14 IU/mL Final    Uric Acid 12/18/2019 4.2  4.0 - 8.0 mg/dL Final    WBC 12/18/2019 5.2  3.8 - 10.8 Thousand/uL Final    RBC 12/18/2019 4.94  4.20 - 5.80 Million/uL Final    Hemoglobin 12/18/2019 14.0  13.2 - 17.1 g/dL Final    Hematocrit 12/18/2019 40.7  38.5 - 50.0 % Final    Mean Corpuscular Volume 12/18/2019 82.4  80.0 - 100.0 fL Final    Mean Corpuscular Hemoglobin 12/18/2019 28.3  27.0 - 33.0 pg Final    Mean Corpuscular Hemoglobin Conc 12/18/2019 34.4  32.0 - 36.0 g/dL Final    RDW 12/18/2019 12.1  11.0 - 15.0 % Final    Platelets 12/18/2019 230  140 - 400 Thousand/uL Final    MPV 12/18/2019 9.0  7.5 - 12.5 fL Final    Neutrophils Absolute 12/18/2019 3,536  1,500 - 7,800 cells/uL Final    Lymph # 12/18/2019 1,222  850 - 3,900 cells/uL Final    Mono # 12/18/2019 333  200 - 950 cells/uL Final    Eos # 12/18/2019 88  15 - 500 cells/uL Final    Baso # 12/18/2019 21  0 - 200 cells/uL Final    Neutrophils Relative 12/18/2019 68  % Final    Lymph% " 12/18/2019 23.5  % Final    Mono% 12/18/2019 6.4  % Final    Eosinophil% 12/18/2019 1.7  % Final    Basophil% 12/18/2019 0.4  % Final    Hemoglobin A1C 12/18/2019 4.6  % Final    Sed Rate 12/18/2019 9  < OR = 20 mm/h Final   Office Visit on 11/22/2019   Component Date Value Ref Range Status    Color, UA 11/22/2019 yellow   Final    Spec Grav UA 11/22/2019 1.020   Final    pH, UA 11/22/2019 7   Final    WBC, UA 11/22/2019 small   Final    Nitrite, UA 11/22/2019 neg   Final    Protein 11/22/2019 30   Final    Glucose, UA 11/22/2019 neg   Final    Ketones, UA 11/22/2019 neg   Final    Urobilinogen, UA 11/22/2019 0.2   Final    Bilirubin 11/22/2019 neg   Final    Blood, UA 11/22/2019 neg   Final    Urine Culture, Routine 11/22/2019 No growth   Final   Hospital Outpatient Visit on 10/24/2019   Component Date Value Ref Range Status    Specimen UA 10/24/2019 Urine, Catheterized   Final    Color, UA 10/24/2019 Yellow  Yellow, Straw, Ju Final    Appearance, UA 10/24/2019 Clear  Clear Final    pH, UA 10/24/2019 7.0  5.0 - 8.0 Final    Specific [place], UA 10/24/2019 1.010  1.005 - 1.030 Final    Protein, UA 10/24/2019 Negative  Negative Final    Glucose, UA 10/24/2019 Negative  Negative Final    Ketones, UA 10/24/2019 Negative  Negative Final    Bilirubin (UA) 10/24/2019 Negative  Negative Final    Occult Blood UA 10/24/2019 1+* Negative Final    Nitrite, UA 10/24/2019 Negative  Negative Final    Urobilinogen, UA 10/24/2019 Negative  <2.0 EU/dL Final    Leukocytes, UA 10/24/2019 Trace* Negative Final    Urine Culture, Routine 10/24/2019 *  Final                    Value:ACINETOBACTER LWOFFII GROUP  50,000 - 99,999 cfu/ml      RBC, UA 10/24/2019 22* 0 - 4 /hpf Final    WBC, UA 10/24/2019 2  0 - 5 /hpf Final    Bacteria 10/24/2019 Few* None-Occ /hpf Final    Ca Oxalate Shira, UA 10/24/2019 Rare  None-Moderate Final    Microscopic Comment 10/24/2019 SEE COMMENT   Final   Admission on  10/16/2019, Discharged on 10/17/2019   Component Date Value Ref Range Status    Group & Rh 10/16/2019 B POS   Final    Indirect Fernandez 10/16/2019 NEG   Final    Sodium 10/16/2019 140  136 - 145 mmol/L Final    Potassium 10/16/2019 3.8  3.5 - 5.1 mmol/L Final    Chloride 10/16/2019 105  95 - 110 mmol/L Final    CO2 10/16/2019 23  23 - 29 mmol/L Final    Glucose 10/16/2019 152* 70 - 110 mg/dL Final    BUN, Bld 10/16/2019 24* 6 - 20 mg/dL Final    Creatinine 10/16/2019 1.1  0.5 - 1.4 mg/dL Final    Calcium 10/16/2019 8.5* 8.7 - 10.5 mg/dL Final    Anion Gap 10/16/2019 12  8 - 16 mmol/L Final    eGFR if African American 10/16/2019 >60  >60 mL/min/1.73 m^2 Final    eGFR if non African American 10/16/2019 >60  >60 mL/min/1.73 m^2 Final    WBC 10/16/2019 8.87  3.90 - 12.70 K/uL Final    RBC 10/16/2019 4.10* 4.60 - 6.20 M/uL Final    Hemoglobin 10/16/2019 11.9* 14.0 - 18.0 g/dL Final    Hematocrit 10/16/2019 34.2* 40.0 - 54.0 % Final    Mean Corpuscular Volume 10/16/2019 83  82 - 98 fL Final    Mean Corpuscular Hemoglobin 10/16/2019 29.0  27.0 - 31.0 pg Final    Mean Corpuscular Hemoglobin Conc 10/16/2019 34.8  32.0 - 36.0 g/dL Final    RDW 10/16/2019 12.4  11.5 - 14.5 % Final    Platelets 10/16/2019 170  150 - 350 K/uL Final    MPV 10/16/2019 8.5* 9.2 - 12.9 fL Final    Immature Grans (Abs) 10/16/2019 CANCELED  0.00 - 0.04 K/uL Final    Lymph # 10/16/2019 CANCELED  1.0 - 4.8 K/uL Final    Mono # 10/16/2019 CANCELED  0.3 - 1.0 K/uL Final    Eos # 10/16/2019 CANCELED  0.0 - 0.5 K/uL Final    Baso # 10/16/2019 CANCELED  0.00 - 0.20 K/uL Final    nRBC 10/16/2019 0  0 /100 WBC Final    Gran% 10/16/2019 84.0* 38.0 - 73.0 % Final    Lymph% 10/16/2019 7.0* 18.0 - 48.0 % Final    Mono% 10/16/2019 2.0* 4.0 - 15.0 % Final    Eosinophil% 10/16/2019 0.0  0.0 - 8.0 % Final    Basophil% 10/16/2019 0.0  0.0 - 1.9 % Final    Bands 10/16/2019 7.0  % Final    Platelet Estimate 10/16/2019 Appears normal    Final    Poik 10/16/2019 Slight   Final    Ovalocytes 10/16/2019 Occasional   Final    Differential Method 10/16/2019 Manual   Final    Sodium 10/17/2019 142  136 - 145 mmol/L Final    Potassium 10/17/2019 3.4* 3.5 - 5.1 mmol/L Final    Chloride 10/17/2019 106  95 - 110 mmol/L Final    CO2 10/17/2019 26  23 - 29 mmol/L Final    Glucose 10/17/2019 118* 70 - 110 mg/dL Final    BUN, Bld 10/17/2019 17  6 - 20 mg/dL Final    Creatinine 10/17/2019 1.0  0.5 - 1.4 mg/dL Final    Calcium 10/17/2019 8.6* 8.7 - 10.5 mg/dL Final    Anion Gap 10/17/2019 10  8 - 16 mmol/L Final    eGFR if African American 10/17/2019 >60  >60 mL/min/1.73 m^2 Final    eGFR if non African American 10/17/2019 >60  >60 mL/min/1.73 m^2 Final    Magnesium 10/17/2019 2.0  1.6 - 2.6 mg/dL Final    Phosphorus 10/17/2019 2.8  2.7 - 4.5 mg/dL Final    WBC 10/17/2019 9.01  3.90 - 12.70 K/uL Final    RBC 10/17/2019 3.96* 4.60 - 6.20 M/uL Final    Hemoglobin 10/17/2019 11.4* 14.0 - 18.0 g/dL Final    Hematocrit 10/17/2019 33.4* 40.0 - 54.0 % Final    Mean Corpuscular Volume 10/17/2019 84  82 - 98 fL Final    Mean Corpuscular Hemoglobin 10/17/2019 28.8  27.0 - 31.0 pg Final    Mean Corpuscular Hemoglobin Conc 10/17/2019 34.1  32.0 - 36.0 g/dL Final    RDW 10/17/2019 12.4  11.5 - 14.5 % Final    Platelets 10/17/2019 199  150 - 350 K/uL Final    MPV 10/17/2019 8.8* 9.2 - 12.9 fL Final    Gran # (ANC) 10/17/2019 7.3  1.8 - 7.7 K/uL Final    Immature Grans (Abs) 10/17/2019 0.07* 0.00 - 0.04 K/uL Final    Lymph # 10/17/2019 0.9* 1.0 - 4.8 K/uL Final    Mono # 10/17/2019 0.7  0.3 - 1.0 K/uL Final    Eos # 10/17/2019 0.0  0.0 - 0.5 K/uL Final    Baso # 10/17/2019 0.01  0.00 - 0.20 K/uL Final    nRBC 10/17/2019 0  0 /100 WBC Final    Gran% 10/17/2019 81.0* 38.0 - 73.0 % Final    Lymph% 10/17/2019 10.4* 18.0 - 48.0 % Final    Mono% 10/17/2019 7.7  4.0 - 15.0 % Final    Eosinophil% 10/17/2019 0.0  0.0 - 8.0 % Final    Basophil%  10/17/2019 0.1  0.0 - 1.9 % Final    Differential Method 10/17/2019 Automated   Final    Body Fluid Source, Creatinine 10/17/2019 LORA Drainage   Final    Creatinine, Body Fluid 10/17/2019 1.1  Not established mg/dL Final   Clinical Support on 10/11/2019   Component Date Value Ref Range Status    Urine Culture, Routine 10/11/2019 No growth   Final   Admission on 08/28/2019, Discharged on 08/29/2019   Component Date Value Ref Range Status    Group & Rh 08/28/2019 B POS   Final    Indirect Fernandez 08/28/2019 NEG   Final    WBC 08/28/2019 4.21  3.90 - 12.70 K/uL Final    RBC 08/28/2019 4.71  4.60 - 6.20 M/uL Final    Hemoglobin 08/28/2019 13.5* 14.0 - 18.0 g/dL Final    Hematocrit 08/28/2019 39.3* 40.0 - 54.0 % Final    Mean Corpuscular Volume 08/28/2019 83  82 - 98 fL Final    Mean Corpuscular Hemoglobin 08/28/2019 28.7  27.0 - 31.0 pg Final    Mean Corpuscular Hemoglobin Conc 08/28/2019 34.4  32.0 - 36.0 g/dL Final    RDW 08/28/2019 11.9  11.5 - 14.5 % Final    Platelets 08/28/2019 174  150 - 350 K/uL Final    MPV 08/28/2019 8.7* 9.2 - 12.9 fL Final    Gran # (ANC) 08/28/2019 2.7  1.8 - 7.7 K/uL Final    Immature Grans (Abs) 08/28/2019 0.02  0.00 - 0.04 K/uL Final    Lymph # 08/28/2019 1.1  1.0 - 4.8 K/uL Final    Mono # 08/28/2019 0.3  0.3 - 1.0 K/uL Final    Eos # 08/28/2019 0.0  0.0 - 0.5 K/uL Final    Baso # 08/28/2019 0.02  0.00 - 0.20 K/uL Final    nRBC 08/28/2019 0  0 /100 WBC Final    Gran% 08/28/2019 65.0  38.0 - 73.0 % Final    Lymph% 08/28/2019 25.9  18.0 - 48.0 % Final    Mono% 08/28/2019 7.1  4.0 - 15.0 % Final    Eosinophil% 08/28/2019 1.0  0.0 - 8.0 % Final    Basophil% 08/28/2019 0.5  0.0 - 1.9 % Final    Differential Method 08/28/2019 Automated   Final    Sodium 08/28/2019 141  136 - 145 mmol/L Final    Potassium 08/28/2019 3.7  3.5 - 5.1 mmol/L Final    Chloride 08/28/2019 106  95 - 110 mmol/L Final    CO2 08/28/2019 28  23 - 29 mmol/L Final    Glucose 08/28/2019 113*  70 - 110 mg/dL Final    BUN, Bld 08/28/2019 26* 6 - 20 mg/dL Final    Creatinine 08/28/2019 1.3  0.5 - 1.4 mg/dL Final    Calcium 08/28/2019 8.9  8.7 - 10.5 mg/dL Final    Total Protein 08/28/2019 6.2  6.0 - 8.4 g/dL Final    Albumin 08/28/2019 3.8  3.5 - 5.2 g/dL Final    Total Bilirubin 08/28/2019 0.5  0.1 - 1.0 mg/dL Final    Alkaline Phosphatase 08/28/2019 58  55 - 135 U/L Final    AST 08/28/2019 24  10 - 40 U/L Final    ALT 08/28/2019 41  10 - 44 U/L Final    Anion Gap 08/28/2019 7* 8 - 16 mmol/L Final    eGFR if African American 08/28/2019 >60  >60 mL/min/1.73 m^2 Final    eGFR if non African American 08/28/2019 60  >60 mL/min/1.73 m^2 Final    Troponin I 08/28/2019 0.006  0.000 - 0.026 ng/mL Final    TSH 08/28/2019 0.269* 0.400 - 4.000 uIU/mL Final    Lactate (Lactic Acid) 08/28/2019 1.9  0.5 - 2.2 mmol/L Final    CPK 08/28/2019 150  20 - 200 U/L Final    CPK MB 08/28/2019 3.4  0.1 - 6.5 ng/mL Final    MB% 08/28/2019 2.3  0.0 - 5.0 % Final    Troponin I 08/28/2019 0.008  0.000 - 0.026 ng/mL Final    Free T4 08/28/2019 1.56* 0.71 - 1.51 ng/dL Final    CPK 08/28/2019 146  20 - 200 U/L Final    CPK MB 08/28/2019 3.2  0.1 - 6.5 ng/mL Final    MB% 08/28/2019 2.2  0.0 - 5.0 % Final    CPK 08/28/2019 139  20 - 200 U/L Final    CPK MB 08/28/2019 2.8  0.1 - 6.5 ng/mL Final    MB% 08/28/2019 2.0  0.0 - 5.0 % Final    WBC 08/29/2019 9.59  3.90 - 12.70 K/uL Final    RBC 08/29/2019 4.76  4.60 - 6.20 M/uL Final    Hemoglobin 08/29/2019 13.4* 14.0 - 18.0 g/dL Final    Hematocrit 08/29/2019 38.6* 40.0 - 54.0 % Final    Mean Corpuscular Volume 08/29/2019 81* 82 - 98 fL Final    Mean Corpuscular Hemoglobin 08/29/2019 28.2  27.0 - 31.0 pg Final    Mean Corpuscular Hemoglobin Conc 08/29/2019 34.7  32.0 - 36.0 g/dL Final    RDW 08/29/2019 12.1  11.5 - 14.5 % Final    Platelets 08/29/2019 216  150 - 350 K/uL Final    MPV 08/29/2019 8.6* 9.2 - 12.9 fL Final    Immature Grans (Abs) 08/29/2019  CANCELED  K/uL Final-Edited    nRBC 08/29/2019 0  0 /100 WBC Final    Gran% 08/29/2019 90.0* 38.0 - 73.0 % Final    Lymph% 08/29/2019 9.0* 18.0 - 48.0 % Final    Mono% 08/29/2019 1.0* 4.0 - 15.0 % Final    Eosinophil% 08/29/2019 0.0  0.0 - 8.0 % Final    Basophil% 08/29/2019 0.0  0.0 - 1.9 % Final    Platelet Estimate 08/29/2019 Appears normal   Final    Differential Method 08/29/2019 Manual   Corrected    Sodium 08/29/2019 145  136 - 145 mmol/L Final    Potassium 08/29/2019 3.6  3.5 - 5.1 mmol/L Final    Chloride 08/29/2019 110  95 - 110 mmol/L Final    CO2 08/29/2019 25  23 - 29 mmol/L Final    Glucose 08/29/2019 143* 70 - 110 mg/dL Final    BUN, Bld 08/29/2019 21* 6 - 20 mg/dL Final    Creatinine 08/29/2019 0.9  0.5 - 1.4 mg/dL Final    Calcium 08/29/2019 8.2* 8.7 - 10.5 mg/dL Final    Total Protein 08/29/2019 5.9* 6.0 - 8.4 g/dL Final    Albumin 08/29/2019 3.5  3.5 - 5.2 g/dL Final    Total Bilirubin 08/29/2019 0.6  0.1 - 1.0 mg/dL Final    Alkaline Phosphatase 08/29/2019 56  55 - 135 U/L Final    AST 08/29/2019 19  10 - 40 U/L Final    ALT 08/29/2019 37  10 - 44 U/L Final    Anion Gap 08/29/2019 10  8 - 16 mmol/L Final    eGFR if African American 08/29/2019 >60  >60 mL/min/1.73 m^2 Final    eGFR if non African American 08/29/2019 >60  >60 mL/min/1.73 m^2 Final    Cholesterol 08/29/2019 155  120 - 199 mg/dL Final    Triglycerides 08/29/2019 93  30 - 150 mg/dL Final    HDL 08/29/2019 36* 40 - 75 mg/dL Final    LDL Cholesterol 08/29/2019 100.4  63.0 - 159.0 mg/dL Final    Hdl/Cholesterol Ratio 08/29/2019 23.2  20.0 - 50.0 % Final    Total Cholesterol/HDL Ratio 08/29/2019 4.3  2.0 - 5.0 Final    Non-HDL Cholesterol 08/29/2019 119  mg/dL Final   Lab Visit on 08/09/2019   Component Date Value Ref Range Status    Sodium 08/09/2019 142  136 - 145 mmol/L Final    Potassium 08/09/2019 3.6  3.5 - 5.1 mmol/L Final    Chloride 08/09/2019 104  95 - 110 mmol/L Final    CO2 08/09/2019 28  23  - 29 mmol/L Final    Glucose 08/09/2019 93  70 - 110 mg/dL Final    BUN, Bld 08/09/2019 23* 6 - 20 mg/dL Final    Creatinine 08/09/2019 1.0  0.5 - 1.4 mg/dL Final    Calcium 08/09/2019 9.4  8.7 - 10.5 mg/dL Final    Anion Gap 08/09/2019 10  8 - 16 mmol/L Final    eGFR if African American 08/09/2019 >60  >60 mL/min/1.73 m^2 Final    eGFR if non African American 08/09/2019 >60  >60 mL/min/1.73 m^2 Final    WBC 08/09/2019 5.50  3.90 - 12.70 K/uL Final    RBC 08/09/2019 5.01  4.60 - 6.20 M/uL Final    Hemoglobin 08/09/2019 13.8* 14.0 - 18.0 g/dL Final    Hematocrit 08/09/2019 41.2  40.0 - 54.0 % Final    Mean Corpuscular Volume 08/09/2019 82  82 - 98 fL Final    Mean Corpuscular Hemoglobin 08/09/2019 27.5  27.0 - 31.0 pg Final    Mean Corpuscular Hemoglobin Conc 08/09/2019 33.5  32.0 - 36.0 g/dL Final    RDW 08/09/2019 12.4  11.5 - 14.5 % Final    Platelets 08/09/2019 218  150 - 350 K/uL Final    MPV 08/09/2019 8.7* 9.2 - 12.9 fL Final    Gran # (ANC) 08/09/2019 3.3  1.8 - 7.7 K/uL Final    Immature Grans (Abs) 08/09/2019 0.04  0.00 - 0.04 K/uL Final    Lymph # 08/09/2019 1.4  1.0 - 4.8 K/uL Final    Mono # 08/09/2019 0.6  0.3 - 1.0 K/uL Final    Eos # 08/09/2019 0.1  0.0 - 0.5 K/uL Final    Baso # 08/09/2019 0.04  0.00 - 0.20 K/uL Final    nRBC 08/09/2019 0  0 /100 WBC Final    Gran% 08/09/2019 59.5  38.0 - 73.0 % Final    Lymph% 08/09/2019 26.0  18.0 - 48.0 % Final    Mono% 08/09/2019 11.6  4.0 - 15.0 % Final    Eosinophil% 08/09/2019 1.5  0.0 - 8.0 % Final    Basophil% 08/09/2019 0.7  0.0 - 1.9 % Final    Differential Method 08/09/2019 Automated   Final    Prothrombin Time 08/09/2019 10.3  9.0 - 12.5 sec Final    INR 08/09/2019 1.0  0.8 - 1.2 Final   Office Visit on 08/09/2019   Component Date Value Ref Range Status    Color, UA 08/09/2019 yellow   Final    Spec Grav UA 08/09/2019 1.030   Final    pH, UA 08/09/2019 5   Final    WBC, UA 08/09/2019 neg   Final    Nitrite, UA  08/09/2019 neg   Final    Protein 08/09/2019 neg   Final    Glucose, UA 08/09/2019 neg   Final    Ketones, UA 08/09/2019 neg   Final    Urobilinogen, UA 08/09/2019 0.2   Final    Bilirubin 08/09/2019 neg   Final    Blood, UA 08/09/2019 neg   Final    Urine Culture, Routine 08/09/2019 No growth   Final       Past Medical History:   Diagnosis Date    Bell's palsy     High cholesterol     Hypertension     Insomnia     Thyroid disease      Past Surgical History:   Procedure Laterality Date    APPENDECTOMY      CHOLECYSTECTOMY      PROSTATE BIOPSY      PROSTATE SURGERY      right knee arthroscopy      ROBOT-ASSISTED LAPAROSCOPIC PROSTATECTOMY N/A 10/16/2019    Procedure: ROBOTIC PROSTATECTOMY;  Surgeon: Walker Brooks MD;  Location: [facility];  Service: Urology;  Laterality: N/A;     History reviewed. No pertinent family history.    Marital Status:   Alcohol History:  reports that he drinks alcohol.  Tobacco History:  reports that he has never smoked. He has never used smokeless tobacco.  Drug History:  reports that he does not use drugs.    Review of patient's allergies indicates:   Allergen Reactions    Ancef [cefazolin] Anaphylaxis     Anaphylactic shock        Current Outpatient Medications:     ALPRAZolam (XANAX) 1 MG tablet, Take 1 mg by mouth once daily., Disp: , Rfl: 2    ascorbic acid, vitamin C, (VITAMIN C) 100 MG tablet, Take 100 mg by mouth once daily., Disp: , Rfl:     aspirin (ECOTRIN) 81 MG EC tablet, Take 81 mg by mouth once daily., Disp: , Rfl:     fish oil-omega-3 fatty acids 300-1,000 mg capsule, Take 2 capsules by mouth once daily. , Disp: , Rfl:     hydrALAZINE (APRESOLINE) 100 MG tablet, Take 100 mg by mouth every 8 (eight) hours. , Disp: , Rfl:     LORazepam (ATIVAN) 2 MG Tab, Take 1 mg by mouth once daily. , Disp: , Rfl: 2    multivitamin capsule, Take 1 capsule by mouth once daily., Disp: , Rfl:     nebivolol (BYSTOLIC) 20 mg Tab, Take 1 tablet by mouth once  "daily., Disp: 90 tablet, Rfl: 1    tamsulosin (FLOMAX) 0.4 mg Cap, Take 0.4 mg by mouth once daily., Disp: , Rfl:     CIALIS 5 mg tablet, TAKE ONE TABLET BY MOUTH EVERY 24 HOURS, Disp: , Rfl: 5    gemfibrozil (LOPID) 600 MG tablet, Take 300 mg by mouth once daily., Disp: , Rfl: 0    levothyroxine (SYNTHROID) 200 MCG tablet, Take 1 tablet (200 mcg total) by mouth before breakfast., Disp: 90 tablet, Rfl: 1    losartan-hydrochlorothiazide 100-25 mg (HYZAAR) 100-25 mg per tablet, Take 1 tablet by mouth once daily., Disp: , Rfl: 1    methylPREDNISolone (MEDROL DOSEPACK) 4 mg tablet, use as directed, Disp: 1 Package, Rfl: 0    Review of Systems   Constitutional: Negative for fatigue, fever and unexpected weight change.   HENT: Negative for ear pain, sinus pressure and sore throat.    Eyes: Negative for pain.   Respiratory: Negative for cough and shortness of breath.    Cardiovascular: Negative for chest pain and leg swelling.   Gastrointestinal: Negative for abdominal pain, constipation, nausea and vomiting.   Genitourinary: Negative for dysuria, frequency and urgency.   Musculoskeletal: Positive for arthralgias and neck pain.   Skin: Negative for rash.   Neurological: Negative for dizziness, weakness and headaches.   Psychiatric/Behavioral: Negative for sleep disturbance.          Objective:      Vitals:    12/18/19 1234   BP: 126/86   Pulse: 60   Weight: 88.9 kg (196 lb)   Height: 6' 1" (1.854 m)     Body mass index is 25.86 kg/m².  Physical Exam   Constitutional: He is oriented to person, place, and time. He appears well-developed and well-nourished.   HENT:   Right Ear: External ear normal.   Left Ear: External ear normal.   Mouth/Throat: Oropharynx is clear and moist.   Neck: Neck supple. No tracheal deviation present.   Cardiovascular: Normal rate and regular rhythm. Exam reveals no gallop and no friction rub.   No murmur heard.  Pulmonary/Chest: Breath sounds normal. No stridor. He has no wheezes. He has no " rales.   Abdominal: Soft. He exhibits no mass. There is no tenderness.   Musculoskeletal: He exhibits no edema or deformity.        Cervical back: He exhibits tenderness and spasm.        Hands:  Bilateral shoulder tenderness. No deformity   Lymphadenopathy:     He has no cervical adenopathy.   Neurological: He is alert and oriented to person, place, and time. Coordination normal.   Skin: Skin is warm and dry.   Psychiatric: He has a normal mood and affect. Thought content normal.         Assessment:       1. Essential hypertension    2. Elevated PSA    3. High cholesterol    4. Prostate cancer    5. Thyroid disease    6. Arthralgia, unspecified joint    7. IFG (impaired fasting glucose)         Plan:       Essential hypertension    Elevated PSA    High cholesterol    Prostate cancer    Thyroid disease    Arthralgia, unspecified joint  -     FARRAH Screen w/Reflex; Future; Expected date: 12/18/2019  -     Rheumatoid factor; Future; Expected date: 12/18/2019  -     Sedimentation rate; Future; Expected date: 12/18/2019  -     Uric acid; Future; Expected date: 12/18/2019  -     CBC auto differential; Future; Expected date: 12/18/2019  -     X-Ray Cervical Spine AP And Lateral; Future  -     X-Ray Hand 2 View Left; Future  -     X-Ray Hand 2 View Right; Future  -     X-Ray Shoulder 2 or More Views Left; Future    IFG (impaired fasting glucose)  -     Hemoglobin A1C, POCT    Other orders  -     Sedimentation rate, automated      Follow up in about 3 months (around 3/18/2020) for Follow up.

## 2019-12-19 ENCOUNTER — TELEPHONE (OUTPATIENT)
Dept: FAMILY MEDICINE | Facility: CLINIC | Age: 59
End: 2019-12-19

## 2019-12-19 ENCOUNTER — HOSPITAL ENCOUNTER (OUTPATIENT)
Dept: RADIOLOGY | Facility: HOSPITAL | Age: 59
Discharge: HOME | End: 2019-12-19
Attending: NURSE PRACTITIONER
Payer: COMMERCIAL

## 2019-12-19 PROCEDURE — 73030 X-RAY EXAM OF SHOULDER: CPT | Mod: TC,PO,LT

## 2019-12-19 PROCEDURE — 72040 X-RAY EXAM NECK SPINE 2-3 VW: CPT | Mod: TC,PO

## 2019-12-19 PROCEDURE — 73120 X-RAY EXAM OF HAND: CPT | Mod: TC,PO,LT

## 2019-12-19 PROCEDURE — 73120 X-RAY EXAM OF HAND: CPT | Mod: TC,PO,RT

## 2019-12-19 RX ORDER — METHYLPREDNISOLONE 4 MG/1
TABLET ORAL
Qty: 1 PACKAGE | Refills: 0 | Status: SHIPPED | OUTPATIENT
Start: 2019-12-19 | End: 2020-01-08

## 2019-12-19 RX ORDER — LEVOTHYROXINE SODIUM 200 UG/1
200 TABLET ORAL
Qty: 90 TABLET | Refills: 1 | Status: SHIPPED | OUTPATIENT
Start: 2019-12-19 | End: 2020-08-06 | Stop reason: SDUPTHER

## 2019-12-19 NOTE — TELEPHONE ENCOUNTER
Result Notes for X-Ray Shoulder 2 or More Views Left     Notes recorded by Brigette Robledo NP on 12/19/2019 at 1:42 PM CST  Osteophytes on shoulder. No other acute findings.     Result Notes for X-Ray Hand 2 View Right     Notes recorded by Brigette Robledo NP on 12/19/2019 at 1:48 PM CST  Chronic deformity from previous fracture to 4th finger. Osteophytes to 1st finger     Result Notes for X-Ray Cervical Spine AP And Lateral     Notes recorded by Brigette Robledo NP on 12/19/2019 at 1:46 PM CST  Facet joint arthritis. Narrowing of disc space.

## 2019-12-19 NOTE — TELEPHONE ENCOUNTER
----- Message from Poornima Cisse sent at 12/19/2019  8:40 AM CST -----  Refill for Levothyroxine 200 mg, and a RX for a steroid? [facility]. Pt #[phone]

## 2019-12-19 NOTE — PROGRESS NOTES
Spoke to patient with results verbatim per Brigette. Verbalized understanding that rx was called in. Would like x-ray results.

## 2019-12-19 NOTE — TELEPHONE ENCOUNTER
----- Message from Brigette Robledo NP sent at 12/19/2019 10:15 AM CST -----  Labs are normal. Start medrol. Sent to pharmacy.

## 2019-12-21 LAB
ANA PAT SER IF-IMP: ABNORMAL
ANA SER QL IF: POSITIVE
ANA TITR SER IF: ABNORMAL TITER
BASOPHILS # BLD AUTO: 21 CELLS/UL (ref 0–200)
BASOPHILS NFR BLD AUTO: 0.4 %
EOSINOPHIL # BLD AUTO: 88 CELLS/UL (ref 15–500)
EOSINOPHIL NFR BLD AUTO: 1.7 %
ERYTHROCYTE [DISTWIDTH] IN BLOOD BY AUTOMATED COUNT: 12.1 % (ref 11–15)
ERYTHROCYTE [SEDIMENTATION RATE] IN BLOOD BY WESTERGREN METHOD: 9 MM/H
HCT VFR BLD AUTO: 40.7 % (ref 38.5–50)
HGB BLD-MCNC: 14 G/DL (ref 13.2–17.1)
LYMPHOCYTES # BLD AUTO: 1222 CELLS/UL (ref 850–3900)
LYMPHOCYTES NFR BLD AUTO: 23.5 %
MCH RBC QN AUTO: 28.3 PG (ref 27–33)
MCHC RBC AUTO-ENTMCNC: 34.4 G/DL (ref 32–36)
MCV RBC AUTO: 82.4 FL (ref 80–100)
MONOCYTES # BLD AUTO: 333 CELLS/UL (ref 200–950)
MONOCYTES NFR BLD AUTO: 6.4 %
NEUTROPHILS # BLD AUTO: 3536 CELLS/UL (ref 1500–7800)
NEUTROPHILS NFR BLD AUTO: 68 %
PLATELET # BLD AUTO: 230 THOUSAND/UL (ref 140–400)
PMV BLD REES-ECKER: 9 FL (ref 7.5–12.5)
RBC # BLD AUTO: 4.94 MILLION/UL (ref 4.2–5.8)
RHEUMATOID FACT SERPL-ACNC: <14 IU/ML
URATE SERPL-MCNC: 4.2 MG/DL (ref 4–8)
WBC # BLD AUTO: 5.2 THOUSAND/UL (ref 3.8–10.8)

## 2019-12-23 ENCOUNTER — TELEPHONE (OUTPATIENT)
Dept: FAMILY MEDICINE | Facility: CLINIC | Age: 59
End: 2019-12-23

## 2019-12-23 DIAGNOSIS — R76.8 POSITIVE ANA (ANTINUCLEAR ANTIBODY): Primary | ICD-10-CM

## 2019-12-23 NOTE — TELEPHONE ENCOUNTER
Can you call and let him know avani is positive. This lab was still pending when he was given the rest of results. We need to refer to rheumatology for further eval. Does he have preference.

## 2019-12-23 NOTE — TELEPHONE ENCOUNTER
Spoke to patient. He understood the message. He wishes to use Dr Bakari Lehman.   Please inform him when the referral is ready/ba

## 2019-12-30 ENCOUNTER — TELEPHONE (OUTPATIENT)
Dept: FAMILY MEDICINE | Facility: CLINIC | Age: 59
End: 2019-12-30

## 2019-12-30 DIAGNOSIS — R76.8 POSITIVE ANA (ANTINUCLEAR ANTIBODY): Primary | ICD-10-CM

## 2019-12-30 RX ORDER — NAPROXEN 500 MG/1
500 TABLET ORAL 2 TIMES DAILY WITH MEALS
Qty: 60 TABLET | Refills: 0 | Status: SHIPPED | OUTPATIENT
Start: 2019-12-30 | End: 2020-07-07

## 2019-12-30 NOTE — TELEPHONE ENCOUNTER
----- Message from Ernesto Hernández sent at 12/30/2019 11:00 AM CST -----  Contact: Phoenix Fragoso  Pt says that he needs the nurse to give him a call back, he didn't want to specify to my why.   Pt# [phone]

## 2019-12-30 NOTE — TELEPHONE ENCOUNTER
----- Message from Lorena Rios sent at 12/30/2019  9:27 AM CST -----  vm- pt has been diagnosed with rheumatoid arthritis and is in a lot of pain. Dr. main can not see him till may 6th. Would like to talk to sandrine  [phone]

## 2019-12-30 NOTE — TELEPHONE ENCOUNTER
----- Message from Lorena Rios sent at 12/30/2019  9:27 AM CST -----  vm- pt has been diagnosed with rheumatoid arthritis and is in a lot of pain. Dr. main can not see him till may 6th. Would like to talk to sandrine  [phone]

## 2019-12-30 NOTE — TELEPHONE ENCOUNTER
Spoke to patient. Naprosyn sent to pharm. I put in new referral for Dr. Demarco. Can you please call and make appointment for patient?? ? Then let him know time and date.

## 2019-12-31 NOTE — TELEPHONE ENCOUNTER
Spoke with [facility]  line, Dr Solis schedule is not available to be booked on for them in 2020. She is going to send a message to the staff to see if they are able to get him scheduled.

## 2020-01-01 RX ORDER — TRAMADOL HYDROCHLORIDE 50 MG/1
TABLET ORAL
Qty: 10 TABLET | OUTPATIENT
Start: 2020-01-01

## 2020-01-03 ENCOUNTER — TELEPHONE (OUTPATIENT)
Dept: RHEUMATOLOGY | Facility: CLINIC | Age: 60
End: 2020-01-03

## 2020-01-03 NOTE — TELEPHONE ENCOUNTER
Spoke to pt and notified him that she is no longer accepting any new pts. Recommended other locations and he declined. Stated he would try something else.

## 2020-01-03 NOTE — TELEPHONE ENCOUNTER
----- Message from Stephenie Barrera sent at 1/3/2020 10:54 AM CST -----  Contact: pt  Pt would like to be called back regarding getting a new patient appt- referral in [facility]    Pt can be reached at [phone]

## 2020-01-06 ENCOUNTER — TELEPHONE (OUTPATIENT)
Dept: FAMILY MEDICINE | Facility: CLINIC | Age: 60
End: 2020-01-06

## 2020-01-06 DIAGNOSIS — M25.50 ARTHRALGIA, UNSPECIFIED JOINT: ICD-10-CM

## 2020-01-06 DIAGNOSIS — R76.8 POSITIVE ANA (ANTINUCLEAR ANTIBODY): Primary | ICD-10-CM

## 2020-01-06 NOTE — TELEPHONE ENCOUNTER
----- Message from Danielle Cueto sent at 1/6/2020  1:52 PM CST -----  Contact: Patient  VM @ 1:48 wants a call back in regards to  Rheumatology Dr. Demarco is not taking new patients. [phone]

## 2020-01-06 NOTE — TELEPHONE ENCOUNTER
Spoke with pt and let him know Dr. Jacobson name and number. Pt wants to know if there is anything that can be done for him in the mean time. States the steroids helped but two days after he stopped it started again. Tramadol and Naprosyn not helping. States he is in A LOT of pain and nothing is helping - is having a hard time putting his shirt on.

## 2020-01-07 RX ORDER — HYDROCODONE BITARTRATE AND ACETAMINOPHEN 5; 325 MG/1; MG/1
1 TABLET ORAL EVERY 8 HOURS PRN
Qty: 21 TABLET | Refills: 0 | Status: SHIPPED | OUTPATIENT
Start: 2020-01-07 | End: 2020-01-28

## 2020-01-07 RX ORDER — CELECOXIB 200 MG/1
200 CAPSULE ORAL 2 TIMES DAILY
Qty: 60 CAPSULE | Refills: 0 | Status: SHIPPED | OUTPATIENT
Start: 2020-01-07 | End: 2021-02-24

## 2020-01-07 NOTE — TELEPHONE ENCOUNTER
Patient aware you are out of clinic until 01/08. Would like to know if he can take steroids again. Advised of medication sent to pharmacy.

## 2020-01-08 RX ORDER — METHYLPREDNISOLONE 4 MG/1
TABLET ORAL
Qty: 1 PACKAGE | Refills: 0 | Status: SHIPPED | OUTPATIENT
Start: 2020-01-08 | End: 2020-01-29

## 2020-01-08 NOTE — TELEPHONE ENCOUNTER
Ok to start medrol. Hold celebrex. . Start upon completion of steroids. Does he want to see ortho?

## 2020-01-08 NOTE — TELEPHONE ENCOUNTER
Spoke with pt, agrees to take medrol and hold celebrex until after. Will call back after completion and let us know if he wants to see ortho. States he is trying to hold out until he can see Dr. Lehman

## 2020-01-24 ENCOUNTER — TELEPHONE (OUTPATIENT)
Dept: FAMILY MEDICINE | Facility: CLINIC | Age: 60
End: 2020-01-24

## 2020-01-24 DIAGNOSIS — R76.8 POSITIVE ANA (ANTINUCLEAR ANTIBODY): Primary | ICD-10-CM

## 2020-01-24 DIAGNOSIS — M25.50 ARTHRALGIA, UNSPECIFIED JOINT: ICD-10-CM

## 2020-01-24 NOTE — TELEPHONE ENCOUNTER
----- Message from Lorena Rios sent at 1/24/2020 11:42 AM CST -----  vm- pt would like to talk to sandrine,has has been in a lot of pain with no relief  [phone]

## 2020-01-27 ENCOUNTER — TELEPHONE (OUTPATIENT)
Dept: FAMILY MEDICINE | Facility: CLINIC | Age: 60
End: 2020-01-27

## 2020-01-27 NOTE — TELEPHONE ENCOUNTER
Spoke with [facility] in [place]. They would not allow me to schedule without first sending a referral. I explained we are trying to get him in asap, states she cannot guarantee when the next appt is but would likely be sooner than his current appointment in may. Referral in.

## 2020-01-27 NOTE — TELEPHONE ENCOUNTER
----- Message from Evelyn Demarco sent at 1/27/2020  8:57 AM CST -----  Pt is needing to come in for arthritis pain   Pt [phone]

## 2020-01-27 NOTE — TELEPHONE ENCOUNTER
Spoke with pt and let him know we can see him tomorrow at 12 pm. Also let him know we are working on getting him scheduled with rheumatology group in [place].

## 2020-01-28 ENCOUNTER — OFFICE VISIT (OUTPATIENT)
Dept: FAMILY MEDICINE | Facility: CLINIC | Age: 60
End: 2020-01-28
Payer: COMMERCIAL

## 2020-01-28 VITALS
DIASTOLIC BLOOD PRESSURE: 90 MMHG | WEIGHT: 189 LBS | HEIGHT: 73 IN | BODY MASS INDEX: 25.05 KG/M2 | SYSTOLIC BLOOD PRESSURE: 122 MMHG | HEART RATE: 76 BPM

## 2020-01-28 DIAGNOSIS — E78.00 HIGH CHOLESTEROL: ICD-10-CM

## 2020-01-28 DIAGNOSIS — I10 ESSENTIAL HYPERTENSION: ICD-10-CM

## 2020-01-28 DIAGNOSIS — M25.50 ARTHRALGIA, UNSPECIFIED JOINT: ICD-10-CM

## 2020-01-28 DIAGNOSIS — M35.3 POLYMYALGIA RHEUMATICA: ICD-10-CM

## 2020-01-28 DIAGNOSIS — C61 MALIGNANT NEOPLASM OF PROSTATE: ICD-10-CM

## 2020-01-28 DIAGNOSIS — R76.8 POSITIVE ANA (ANTINUCLEAR ANTIBODY): Primary | ICD-10-CM

## 2020-01-28 PROCEDURE — 99214 OFFICE O/P EST MOD 30 MIN: CPT | Mod: S$GLB,,, | Performed by: NURSE PRACTITIONER

## 2020-01-28 PROCEDURE — 3080F PR MOST RECENT DIASTOLIC BLOOD PRESSURE >= 90 MM HG: ICD-10-PCS | Mod: S$GLB,,, | Performed by: NURSE PRACTITIONER

## 2020-01-28 PROCEDURE — 99214 PR OFFICE/OUTPT VISIT, EST, LEVL IV, 30-39 MIN: ICD-10-PCS | Mod: S$GLB,,, | Performed by: NURSE PRACTITIONER

## 2020-01-28 PROCEDURE — 3074F PR MOST RECENT SYSTOLIC BLOOD PRESSURE < 130 MM HG: ICD-10-PCS | Mod: S$GLB,,, | Performed by: NURSE PRACTITIONER

## 2020-01-28 PROCEDURE — 3008F PR BODY MASS INDEX (BMI) DOCUMENTED: ICD-10-PCS | Mod: S$GLB,,, | Performed by: NURSE PRACTITIONER

## 2020-01-28 PROCEDURE — 3074F SYST BP LT 130 MM HG: CPT | Mod: S$GLB,,, | Performed by: NURSE PRACTITIONER

## 2020-01-28 PROCEDURE — 3080F DIAST BP >= 90 MM HG: CPT | Mod: S$GLB,,, | Performed by: NURSE PRACTITIONER

## 2020-01-28 PROCEDURE — 3008F BODY MASS INDEX DOCD: CPT | Mod: S$GLB,,, | Performed by: NURSE PRACTITIONER

## 2020-01-28 RX ORDER — TRAMADOL HYDROCHLORIDE 50 MG/1
50 TABLET ORAL DAILY
COMMUNITY
Start: 2019-12-31 | End: 2020-07-07

## 2020-01-28 RX ORDER — PREDNISONE 5 MG/1
5 TABLET ORAL 2 TIMES DAILY
Qty: 60 TABLET | Refills: 0 | Status: SHIPPED | OUTPATIENT
Start: 2020-01-28 | End: 2020-02-11 | Stop reason: SDUPTHER

## 2020-01-28 RX ORDER — CARVEDILOL 6.25 MG/1
6.25 TABLET ORAL 2 TIMES DAILY
COMMUNITY
Start: 2020-01-20 | End: 2021-02-24 | Stop reason: SDUPTHER

## 2020-01-28 RX ORDER — HYDROCODONE BITARTRATE AND ACETAMINOPHEN 10; 325 MG/1; MG/1
1 TABLET ORAL EVERY 12 HOURS PRN
Qty: 40 TABLET | Refills: 0 | Status: SHIPPED | OUTPATIENT
Start: 2020-01-28 | End: 2020-07-07

## 2020-01-29 LAB
ALBUMIN SERPL-MCNC: 3.9 G/DL (ref 3.6–5.1)
ALBUMIN/GLOB SERPL: 1.3 (CALC) (ref 1–2.5)
ALP SERPL-CCNC: 79 U/L (ref 40–115)
ALT SERPL-CCNC: 25 U/L (ref 9–46)
AST SERPL-CCNC: 21 U/L (ref 10–35)
BILIRUB SERPL-MCNC: 1.1 MG/DL (ref 0.2–1.2)
BUN SERPL-MCNC: 21 MG/DL (ref 7–25)
BUN/CREAT SERPL: NORMAL (CALC) (ref 6–22)
CALCIUM SERPL-MCNC: 9.7 MG/DL (ref 8.6–10.3)
CHLORIDE SERPL-SCNC: 101 MMOL/L (ref 98–110)
CO2 SERPL-SCNC: 28 MMOL/L (ref 20–32)
CREAT SERPL-MCNC: 0.96 MG/DL (ref 0.7–1.25)
CRP SERPL-MCNC: 29.2 MG/L
ERYTHROCYTE [SEDIMENTATION RATE] IN BLOOD BY WESTERGREN METHOD: 36 MM/H
GFRSERPLBLD MDRD-ARVRAT: 86 ML/MIN/1.73M2
GLOBULIN SER CALC-MCNC: 3 G/DL (CALC) (ref 1.9–3.7)
GLUCOSE SERPL-MCNC: 97 MG/DL (ref 65–139)
POTASSIUM SERPL-SCNC: 3.7 MMOL/L (ref 3.5–5.3)
PROT SERPL-MCNC: 6.9 G/DL (ref 6.1–8.1)
SODIUM SERPL-SCNC: 140 MMOL/L (ref 135–146)

## 2020-01-31 ENCOUNTER — LAB VISIT (OUTPATIENT)
Dept: LAB | Facility: HOSPITAL | Age: 60
End: 2020-01-31
Attending: UROLOGY
Payer: COMMERCIAL

## 2020-01-31 ENCOUNTER — TELEPHONE (OUTPATIENT)
Dept: FAMILY MEDICINE | Facility: CLINIC | Age: 60
End: 2020-01-31

## 2020-01-31 DIAGNOSIS — C61 PROSTATE CANCER: ICD-10-CM

## 2020-01-31 LAB — COMPLEXED PSA SERPL-MCNC: <0.01 NG/ML (ref 0–4)

## 2020-01-31 PROCEDURE — 84153 ASSAY OF PSA TOTAL: CPT

## 2020-01-31 PROCEDURE — 36415 COLL VENOUS BLD VENIPUNCTURE: CPT

## 2020-01-31 NOTE — TELEPHONE ENCOUNTER
----- Message from Eevlyn Demarco sent at 1/31/2020  9:11 AM CST -----  PT is needing a call back about her . Pt is wanting to know if we got in touch dr iglesias   Pt wife calline [phone]

## 2020-01-31 NOTE — TELEPHONE ENCOUNTER
Spoke with pt and let her know Dr. Nolan office was suppose to contact them, they would not allow me to schedule - they wanted a referral first. Wife will call their office and try to get scheduled. I recommended asking about their cancellation list as well.

## 2020-02-01 NOTE — PROGRESS NOTES
SUBJECTIVE:    Patient ID: Phoenix Fragoso is a 59 y.o. male.    Chief Complaint: Hand Pain (Pt reports he began with hand pain which moved into the shoulders, worse on left, this is a continuation from visit on 12/18/2018.....NO med bottles.....pharmacy verified.....mlr)    59 year old male presents for follow up. Still not feeling great. Feels sore all over. Had labs done which showed positive avani. Awaiting appointment with rheumatology. Not sleeping well due to pain      Office Visit on 01/28/2020   Component Date Value Ref Range Status    CRP 01/28/2020 29.2* <8.0 mg/L Final    Glucose 01/28/2020 97  65 - 139 mg/dL Final    BUN, Bld 01/28/2020 21  7 - 25 mg/dL Final    Creatinine 01/28/2020 0.96  0.70 - 1.25 mg/dL Final    eGFR if non African American 01/28/2020 86  > OR = 60 mL/min/1.73m2 Final    eGFR if African American 01/28/2020 99  > OR = 60 mL/min/1.73m2 Final    BUN/Creatinine Ratio 01/28/2020 NOT APPLICABLE  6 - 22 (calc) Final    Sodium 01/28/2020 140  135 - 146 mmol/L Final    Potassium 01/28/2020 3.7  3.5 - 5.3 mmol/L Final    Chloride 01/28/2020 101  98 - 110 mmol/L Final    CO2 01/28/2020 28  20 - 32 mmol/L Final    Calcium 01/28/2020 9.7  8.6 - 10.3 mg/dL Final    Total Protein 01/28/2020 6.9  6.1 - 8.1 g/dL Final    Albumin 01/28/2020 3.9  3.6 - 5.1 g/dL Final    Globulin, Total 01/28/2020 3.0  1.9 - 3.7 g/dL (calc) Final    Albumin/Globulin Ratio 01/28/2020 1.3  1.0 - 2.5 (calc) Final    Total Bilirubin 01/28/2020 1.1  0.2 - 1.2 mg/dL Final    Alkaline Phosphatase 01/28/2020 79  40 - 115 U/L Final    AST 01/28/2020 21  10 - 35 U/L Final    ALT 01/28/2020 25  9 - 46 U/L Final    Sed Rate 01/28/2020 36* < OR = 20 mm/h Final   Office Visit on 12/18/2019   Component Date Value Ref Range Status    AVANI 12/18/2019 POSITIVE* NEGATIVE Final    AVANI Titer 12/18/2019 1:640* titer Final    AVANI Pattern 12/18/2019 Nuclear, Homogeneous*  Final    Rheumatoid Factor 12/18/2019 <14  <14  IU/mL Final    Uric Acid 12/18/2019 4.2  4.0 - 8.0 mg/dL Final    WBC 12/18/2019 5.2  3.8 - 10.8 Thousand/uL Final    RBC 12/18/2019 4.94  4.20 - 5.80 Million/uL Final    Hemoglobin 12/18/2019 14.0  13.2 - 17.1 g/dL Final    Hematocrit 12/18/2019 40.7  38.5 - 50.0 % Final    Mean Corpuscular Volume 12/18/2019 82.4  80.0 - 100.0 fL Final    Mean Corpuscular Hemoglobin 12/18/2019 28.3  27.0 - 33.0 pg Final    Mean Corpuscular Hemoglobin Conc 12/18/2019 34.4  32.0 - 36.0 g/dL Final    RDW 12/18/2019 12.1  11.0 - 15.0 % Final    Platelets 12/18/2019 230  140 - 400 Thousand/uL Final    MPV 12/18/2019 9.0  7.5 - 12.5 fL Final    Neutrophils Absolute 12/18/2019 3,536  1,500 - 7,800 cells/uL Final    Lymph # 12/18/2019 1,222  850 - 3,900 cells/uL Final    Mono # 12/18/2019 333  200 - 950 cells/uL Final    Eos # 12/18/2019 88  15 - 500 cells/uL Final    Baso # 12/18/2019 21  0 - 200 cells/uL Final    Neutrophils Relative 12/18/2019 68  % Final    Lymph% 12/18/2019 23.5  % Final    Mono% 12/18/2019 6.4  % Final    Eosinophil% 12/18/2019 1.7  % Final    Basophil% 12/18/2019 0.4  % Final    Hemoglobin A1C 12/18/2019 4.6  % Final    Sed Rate 12/18/2019 9  < OR = 20 mm/h Final   Office Visit on 11/22/2019   Component Date Value Ref Range Status    Color, UA 11/22/2019 yellow   Final    Spec Grav UA 11/22/2019 1.020   Final    pH, UA 11/22/2019 7   Final    WBC, UA 11/22/2019 small   Final    Nitrite, UA 11/22/2019 neg   Final    Protein 11/22/2019 30   Final    Glucose, UA 11/22/2019 neg   Final    Ketones, UA 11/22/2019 neg   Final    Urobilinogen, UA 11/22/2019 0.2   Final    Bilirubin 11/22/2019 neg   Final    Blood, UA 11/22/2019 neg   Final    Urine Culture, Routine 11/22/2019 No growth   Final   Hospital Outpatient Visit on 10/24/2019   Component Date Value Ref Range Status    Specimen UA 10/24/2019 Urine, Catheterized   Final    Color, UA 10/24/2019 Yellow  Yellow, Straw, Ju Final     Appearance, UA 10/24/2019 Clear  Clear Final    pH, UA 10/24/2019 7.0  5.0 - 8.0 Final    Specific [place], UA 10/24/2019 1.010  1.005 - 1.030 Final    Protein, UA 10/24/2019 Negative  Negative Final    Glucose, UA 10/24/2019 Negative  Negative Final    Ketones, UA 10/24/2019 Negative  Negative Final    Bilirubin (UA) 10/24/2019 Negative  Negative Final    Occult Blood UA 10/24/2019 1+* Negative Final    Nitrite, UA 10/24/2019 Negative  Negative Final    Urobilinogen, UA 10/24/2019 Negative  <2.0 EU/dL Final    Leukocytes, UA 10/24/2019 Trace* Negative Final    Urine Culture, Routine 10/24/2019 *  Final                    Value:ACINETOBACTER LWOFFII GROUP  50,000 - 99,999 cfu/ml      RBC, UA 10/24/2019 22* 0 - 4 /hpf Final    WBC, UA 10/24/2019 2  0 - 5 /hpf Final    Bacteria 10/24/2019 Few* None-Occ /hpf Final    Ca Oxalate Shira, UA 10/24/2019 Rare  None-Moderate Final    Microscopic Comment 10/24/2019 SEE COMMENT   Final   Admission on 10/16/2019, Discharged on 10/17/2019   Component Date Value Ref Range Status    Group & Rh 10/16/2019 B POS   Final    Indirect Fernandez 10/16/2019 NEG   Final    Sodium 10/16/2019 140  136 - 145 mmol/L Final    Potassium 10/16/2019 3.8  3.5 - 5.1 mmol/L Final    Chloride 10/16/2019 105  95 - 110 mmol/L Final    CO2 10/16/2019 23  23 - 29 mmol/L Final    Glucose 10/16/2019 152* 70 - 110 mg/dL Final    BUN, Bld 10/16/2019 24* 6 - 20 mg/dL Final    Creatinine 10/16/2019 1.1  0.5 - 1.4 mg/dL Final    Calcium 10/16/2019 8.5* 8.7 - 10.5 mg/dL Final    Anion Gap 10/16/2019 12  8 - 16 mmol/L Final    eGFR if African American 10/16/2019 >60  >60 mL/min/1.73 m^2 Final    eGFR if non African American 10/16/2019 >60  >60 mL/min/1.73 m^2 Final    WBC 10/16/2019 8.87  3.90 - 12.70 K/uL Final    RBC 10/16/2019 4.10* 4.60 - 6.20 M/uL Final    Hemoglobin 10/16/2019 11.9* 14.0 - 18.0 g/dL Final    Hematocrit 10/16/2019 34.2* 40.0 - 54.0 % Final    Mean Corpuscular  Volume 10/16/2019 83  82 - 98 fL Final    Mean Corpuscular Hemoglobin 10/16/2019 29.0  27.0 - 31.0 pg Final    Mean Corpuscular Hemoglobin Conc 10/16/2019 34.8  32.0 - 36.0 g/dL Final    RDW 10/16/2019 12.4  11.5 - 14.5 % Final    Platelets 10/16/2019 170  150 - 350 K/uL Final    MPV 10/16/2019 8.5* 9.2 - 12.9 fL Final    Immature Grans (Abs) 10/16/2019 CANCELED  0.00 - 0.04 K/uL Final    Lymph # 10/16/2019 CANCELED  1.0 - 4.8 K/uL Final    Mono # 10/16/2019 CANCELED  0.3 - 1.0 K/uL Final    Eos # 10/16/2019 CANCELED  0.0 - 0.5 K/uL Final    Baso # 10/16/2019 CANCELED  0.00 - 0.20 K/uL Final    nRBC 10/16/2019 0  0 /100 WBC Final    Gran% 10/16/2019 84.0* 38.0 - 73.0 % Final    Lymph% 10/16/2019 7.0* 18.0 - 48.0 % Final    Mono% 10/16/2019 2.0* 4.0 - 15.0 % Final    Eosinophil% 10/16/2019 0.0  0.0 - 8.0 % Final    Basophil% 10/16/2019 0.0  0.0 - 1.9 % Final    Bands 10/16/2019 7.0  % Final    Platelet Estimate 10/16/2019 Appears normal   Final    Poik 10/16/2019 Slight   Final    Ovalocytes 10/16/2019 Occasional   Final    Differential Method 10/16/2019 Manual   Final    Sodium 10/17/2019 142  136 - 145 mmol/L Final    Potassium 10/17/2019 3.4* 3.5 - 5.1 mmol/L Final    Chloride 10/17/2019 106  95 - 110 mmol/L Final    CO2 10/17/2019 26  23 - 29 mmol/L Final    Glucose 10/17/2019 118* 70 - 110 mg/dL Final    BUN, Bld 10/17/2019 17  6 - 20 mg/dL Final    Creatinine 10/17/2019 1.0  0.5 - 1.4 mg/dL Final    Calcium 10/17/2019 8.6* 8.7 - 10.5 mg/dL Final    Anion Gap 10/17/2019 10  8 - 16 mmol/L Final    eGFR if African American 10/17/2019 >60  >60 mL/min/1.73 m^2 Final    eGFR if non African American 10/17/2019 >60  >60 mL/min/1.73 m^2 Final    Magnesium 10/17/2019 2.0  1.6 - 2.6 mg/dL Final    Phosphorus 10/17/2019 2.8  2.7 - 4.5 mg/dL Final    WBC 10/17/2019 9.01  3.90 - 12.70 K/uL Final    RBC 10/17/2019 3.96* 4.60 - 6.20 M/uL Final    Hemoglobin 10/17/2019 11.4* 14.0 - 18.0 g/dL  Final    Hematocrit 10/17/2019 33.4* 40.0 - 54.0 % Final    Mean Corpuscular Volume 10/17/2019 84  82 - 98 fL Final    Mean Corpuscular Hemoglobin 10/17/2019 28.8  27.0 - 31.0 pg Final    Mean Corpuscular Hemoglobin Conc 10/17/2019 34.1  32.0 - 36.0 g/dL Final    RDW 10/17/2019 12.4  11.5 - 14.5 % Final    Platelets 10/17/2019 199  150 - 350 K/uL Final    MPV 10/17/2019 8.8* 9.2 - 12.9 fL Final    Gran # (ANC) 10/17/2019 7.3  1.8 - 7.7 K/uL Final    Immature Grans (Abs) 10/17/2019 0.07* 0.00 - 0.04 K/uL Final    Lymph # 10/17/2019 0.9* 1.0 - 4.8 K/uL Final    Mono # 10/17/2019 0.7  0.3 - 1.0 K/uL Final    Eos # 10/17/2019 0.0  0.0 - 0.5 K/uL Final    Baso # 10/17/2019 0.01  0.00 - 0.20 K/uL Final    nRBC 10/17/2019 0  0 /100 WBC Final    Gran% 10/17/2019 81.0* 38.0 - 73.0 % Final    Lymph% 10/17/2019 10.4* 18.0 - 48.0 % Final    Mono% 10/17/2019 7.7  4.0 - 15.0 % Final    Eosinophil% 10/17/2019 0.0  0.0 - 8.0 % Final    Basophil% 10/17/2019 0.1  0.0 - 1.9 % Final    Differential Method 10/17/2019 Automated   Final    Body Fluid Source, Creatinine 10/17/2019 LORA Drainage   Final    Creatinine, Body Fluid 10/17/2019 1.1  Not established mg/dL Final   Clinical Support on 10/11/2019   Component Date Value Ref Range Status    Urine Culture, Routine 10/11/2019 No growth   Final   Admission on 08/28/2019, Discharged on 08/29/2019   Component Date Value Ref Range Status    Group & Rh 08/28/2019 B POS   Final    Indirect Fernandez 08/28/2019 NEG   Final    WBC 08/28/2019 4.21  3.90 - 12.70 K/uL Final    RBC 08/28/2019 4.71  4.60 - 6.20 M/uL Final    Hemoglobin 08/28/2019 13.5* 14.0 - 18.0 g/dL Final    Hematocrit 08/28/2019 39.3* 40.0 - 54.0 % Final    Mean Corpuscular Volume 08/28/2019 83  82 - 98 fL Final    Mean Corpuscular Hemoglobin 08/28/2019 28.7  27.0 - 31.0 pg Final    Mean Corpuscular Hemoglobin Conc 08/28/2019 34.4  32.0 - 36.0 g/dL Final    RDW 08/28/2019 11.9  11.5 - 14.5 % Final     Platelets 08/28/2019 174  150 - 350 K/uL Final    MPV 08/28/2019 8.7* 9.2 - 12.9 fL Final    Gran # (ANC) 08/28/2019 2.7  1.8 - 7.7 K/uL Final    Immature Grans (Abs) 08/28/2019 0.02  0.00 - 0.04 K/uL Final    Lymph # 08/28/2019 1.1  1.0 - 4.8 K/uL Final    Mono # 08/28/2019 0.3  0.3 - 1.0 K/uL Final    Eos # 08/28/2019 0.0  0.0 - 0.5 K/uL Final    Baso # 08/28/2019 0.02  0.00 - 0.20 K/uL Final    nRBC 08/28/2019 0  0 /100 WBC Final    Gran% 08/28/2019 65.0  38.0 - 73.0 % Final    Lymph% 08/28/2019 25.9  18.0 - 48.0 % Final    Mono% 08/28/2019 7.1  4.0 - 15.0 % Final    Eosinophil% 08/28/2019 1.0  0.0 - 8.0 % Final    Basophil% 08/28/2019 0.5  0.0 - 1.9 % Final    Differential Method 08/28/2019 Automated   Final    Sodium 08/28/2019 141  136 - 145 mmol/L Final    Potassium 08/28/2019 3.7  3.5 - 5.1 mmol/L Final    Chloride 08/28/2019 106  95 - 110 mmol/L Final    CO2 08/28/2019 28  23 - 29 mmol/L Final    Glucose 08/28/2019 113* 70 - 110 mg/dL Final    BUN, Bld 08/28/2019 26* 6 - 20 mg/dL Final    Creatinine 08/28/2019 1.3  0.5 - 1.4 mg/dL Final    Calcium 08/28/2019 8.9  8.7 - 10.5 mg/dL Final    Total Protein 08/28/2019 6.2  6.0 - 8.4 g/dL Final    Albumin 08/28/2019 3.8  3.5 - 5.2 g/dL Final    Total Bilirubin 08/28/2019 0.5  0.1 - 1.0 mg/dL Final    Alkaline Phosphatase 08/28/2019 58  55 - 135 U/L Final    AST 08/28/2019 24  10 - 40 U/L Final    ALT 08/28/2019 41  10 - 44 U/L Final    Anion Gap 08/28/2019 7* 8 - 16 mmol/L Final    eGFR if African American 08/28/2019 >60  >60 mL/min/1.73 m^2 Final    eGFR if non African American 08/28/2019 60  >60 mL/min/1.73 m^2 Final    Troponin I 08/28/2019 0.006  0.000 - 0.026 ng/mL Final    TSH 08/28/2019 0.269* 0.400 - 4.000 uIU/mL Final    Lactate (Lactic Acid) 08/28/2019 1.9  0.5 - 2.2 mmol/L Final    CPK 08/28/2019 150  20 - 200 U/L Final    CPK MB 08/28/2019 3.4  0.1 - 6.5 ng/mL Final    MB% 08/28/2019 2.3  0.0 - 5.0 % Final     Troponin I 08/28/2019 0.008  0.000 - 0.026 ng/mL Final    Free T4 08/28/2019 1.56* 0.71 - 1.51 ng/dL Final    CPK 08/28/2019 146  20 - 200 U/L Final    CPK MB 08/28/2019 3.2  0.1 - 6.5 ng/mL Final    MB% 08/28/2019 2.2  0.0 - 5.0 % Final    CPK 08/28/2019 139  20 - 200 U/L Final    CPK MB 08/28/2019 2.8  0.1 - 6.5 ng/mL Final    MB% 08/28/2019 2.0  0.0 - 5.0 % Final    WBC 08/29/2019 9.59  3.90 - 12.70 K/uL Final    RBC 08/29/2019 4.76  4.60 - 6.20 M/uL Final    Hemoglobin 08/29/2019 13.4* 14.0 - 18.0 g/dL Final    Hematocrit 08/29/2019 38.6* 40.0 - 54.0 % Final    Mean Corpuscular Volume 08/29/2019 81* 82 - 98 fL Final    Mean Corpuscular Hemoglobin 08/29/2019 28.2  27.0 - 31.0 pg Final    Mean Corpuscular Hemoglobin Conc 08/29/2019 34.7  32.0 - 36.0 g/dL Final    RDW 08/29/2019 12.1  11.5 - 14.5 % Final    Platelets 08/29/2019 216  150 - 350 K/uL Final    MPV 08/29/2019 8.6* 9.2 - 12.9 fL Final    Immature Grans (Abs) 08/29/2019 CANCELED  K/uL Final-Edited    nRBC 08/29/2019 0  0 /100 WBC Final    Gran% 08/29/2019 90.0* 38.0 - 73.0 % Final    Lymph% 08/29/2019 9.0* 18.0 - 48.0 % Final    Mono% 08/29/2019 1.0* 4.0 - 15.0 % Final    Eosinophil% 08/29/2019 0.0  0.0 - 8.0 % Final    Basophil% 08/29/2019 0.0  0.0 - 1.9 % Final    Platelet Estimate 08/29/2019 Appears normal   Final    Differential Method 08/29/2019 Manual   Corrected    Sodium 08/29/2019 145  136 - 145 mmol/L Final    Potassium 08/29/2019 3.6  3.5 - 5.1 mmol/L Final    Chloride 08/29/2019 110  95 - 110 mmol/L Final    CO2 08/29/2019 25  23 - 29 mmol/L Final    Glucose 08/29/2019 143* 70 - 110 mg/dL Final    BUN, Bld 08/29/2019 21* 6 - 20 mg/dL Final    Creatinine 08/29/2019 0.9  0.5 - 1.4 mg/dL Final    Calcium 08/29/2019 8.2* 8.7 - 10.5 mg/dL Final    Total Protein 08/29/2019 5.9* 6.0 - 8.4 g/dL Final    Albumin 08/29/2019 3.5  3.5 - 5.2 g/dL Final    Total Bilirubin 08/29/2019 0.6  0.1 - 1.0 mg/dL Final     Alkaline Phosphatase 08/29/2019 56  55 - 135 U/L Final    AST 08/29/2019 19  10 - 40 U/L Final    ALT 08/29/2019 37  10 - 44 U/L Final    Anion Gap 08/29/2019 10  8 - 16 mmol/L Final    eGFR if African American 08/29/2019 >60  >60 mL/min/1.73 m^2 Final    eGFR if non African American 08/29/2019 >60  >60 mL/min/1.73 m^2 Final    Cholesterol 08/29/2019 155  120 - 199 mg/dL Final    Triglycerides 08/29/2019 93  30 - 150 mg/dL Final    HDL 08/29/2019 36* 40 - 75 mg/dL Final    LDL Cholesterol 08/29/2019 100.4  63.0 - 159.0 mg/dL Final    Hdl/Cholesterol Ratio 08/29/2019 23.2  20.0 - 50.0 % Final    Total Cholesterol/HDL Ratio 08/29/2019 4.3  2.0 - 5.0 Final    Non-HDL Cholesterol 08/29/2019 119  mg/dL Final   Lab Visit on 08/09/2019   Component Date Value Ref Range Status    Sodium 08/09/2019 142  136 - 145 mmol/L Final    Potassium 08/09/2019 3.6  3.5 - 5.1 mmol/L Final    Chloride 08/09/2019 104  95 - 110 mmol/L Final    CO2 08/09/2019 28  23 - 29 mmol/L Final    Glucose 08/09/2019 93  70 - 110 mg/dL Final    BUN, Bld 08/09/2019 23* 6 - 20 mg/dL Final    Creatinine 08/09/2019 1.0  0.5 - 1.4 mg/dL Final    Calcium 08/09/2019 9.4  8.7 - 10.5 mg/dL Final    Anion Gap 08/09/2019 10  8 - 16 mmol/L Final    eGFR if African American 08/09/2019 >60  >60 mL/min/1.73 m^2 Final    eGFR if non African American 08/09/2019 >60  >60 mL/min/1.73 m^2 Final    WBC 08/09/2019 5.50  3.90 - 12.70 K/uL Final    RBC 08/09/2019 5.01  4.60 - 6.20 M/uL Final    Hemoglobin 08/09/2019 13.8* 14.0 - 18.0 g/dL Final    Hematocrit 08/09/2019 41.2  40.0 - 54.0 % Final    Mean Corpuscular Volume 08/09/2019 82  82 - 98 fL Final    Mean Corpuscular Hemoglobin 08/09/2019 27.5  27.0 - 31.0 pg Final    Mean Corpuscular Hemoglobin Conc 08/09/2019 33.5  32.0 - 36.0 g/dL Final    RDW 08/09/2019 12.4  11.5 - 14.5 % Final    Platelets 08/09/2019 218  150 - 350 K/uL Final    MPV 08/09/2019 8.7* 9.2 - 12.9 fL Final    Gran #  (ANC) 08/09/2019 3.3  1.8 - 7.7 K/uL Final    Immature Grans (Abs) 08/09/2019 0.04  0.00 - 0.04 K/uL Final    Lymph # 08/09/2019 1.4  1.0 - 4.8 K/uL Final    Mono # 08/09/2019 0.6  0.3 - 1.0 K/uL Final    Eos # 08/09/2019 0.1  0.0 - 0.5 K/uL Final    Baso # 08/09/2019 0.04  0.00 - 0.20 K/uL Final    nRBC 08/09/2019 0  0 /100 WBC Final    Gran% 08/09/2019 59.5  38.0 - 73.0 % Final    Lymph% 08/09/2019 26.0  18.0 - 48.0 % Final    Mono% 08/09/2019 11.6  4.0 - 15.0 % Final    Eosinophil% 08/09/2019 1.5  0.0 - 8.0 % Final    Basophil% 08/09/2019 0.7  0.0 - 1.9 % Final    Differential Method 08/09/2019 Automated   Final    Prothrombin Time 08/09/2019 10.3  9.0 - 12.5 sec Final    INR 08/09/2019 1.0  0.8 - 1.2 Final   Office Visit on 08/09/2019   Component Date Value Ref Range Status    Color, UA 08/09/2019 yellow   Final    Spec Grav UA 08/09/2019 1.030   Final    pH, UA 08/09/2019 5   Final    WBC, UA 08/09/2019 neg   Final    Nitrite, UA 08/09/2019 neg   Final    Protein 08/09/2019 neg   Final    Glucose, UA 08/09/2019 neg   Final    Ketones, UA 08/09/2019 neg   Final    Urobilinogen, UA 08/09/2019 0.2   Final    Bilirubin 08/09/2019 neg   Final    Blood, UA 08/09/2019 neg   Final    Urine Culture, Routine 08/09/2019 No growth   Final       Past Medical History:   Diagnosis Date    Bell's palsy     High cholesterol     Hypertension     Insomnia     Thyroid disease      Past Surgical History:   Procedure Laterality Date    APPENDECTOMY      CHOLECYSTECTOMY      PROSTATE BIOPSY      PROSTATE SURGERY      right knee arthroscopy      ROBOT-ASSISTED LAPAROSCOPIC PROSTATECTOMY N/A 10/16/2019    Procedure: ROBOTIC PROSTATECTOMY;  Surgeon: Walker Brooks MD;  Location: [facility];  Service: Urology;  Laterality: N/A;     History reviewed. No pertinent family history.    Marital Status:   Alcohol History:  reports that he drinks alcohol.  Tobacco History:  reports that he has never  smoked. He has never used smokeless tobacco.  Drug History:  reports that he does not use drugs.    Review of patient's allergies indicates:   Allergen Reactions    Ancef [cefazolin] Anaphylaxis     Anaphylactic shock        Current Outpatient Medications:     ALPRAZolam (XANAX) 1 MG tablet, Take 1 mg by mouth once daily., Disp: , Rfl: 2    ascorbic acid, vitamin C, (VITAMIN C) 100 MG tablet, Take 100 mg by mouth once daily., Disp: , Rfl:     aspirin (ECOTRIN) 81 MG EC tablet, Take 81 mg by mouth once daily., Disp: , Rfl:     carvedilol (COREG) 6.25 MG tablet, Take 6.25 mg by mouth 2 (two) times daily., Disp: , Rfl:     celecoxib (CELEBREX) 200 MG capsule, Take 1 capsule (200 mg total) by mouth 2 (two) times daily., Disp: 60 capsule, Rfl: 0    CIALIS 5 mg tablet, TAKE ONE TABLET BY MOUTH EVERY 24 HOURS, Disp: , Rfl: 5    fish oil-omega-3 fatty acids 300-1,000 mg capsule, Take 2 capsules by mouth once daily. , Disp: , Rfl:     gemfibrozil (LOPID) 600 MG tablet, Take 300 mg by mouth once daily., Disp: , Rfl: 0    hydrALAZINE (APRESOLINE) 100 MG tablet, Take 100 mg by mouth every 8 (eight) hours. , Disp: , Rfl:     levothyroxine (SYNTHROID) 200 MCG tablet, Take 1 tablet (200 mcg total) by mouth before breakfast., Disp: 90 tablet, Rfl: 1    LORazepam (ATIVAN) 2 MG Tab, Take 1 mg by mouth once daily. , Disp: , Rfl: 2    losartan-hydrochlorothiazide 100-25 mg (HYZAAR) 100-25 mg per tablet, Take 1 tablet by mouth once daily., Disp: , Rfl: 1    multivitamin capsule, Take 1 capsule by mouth once daily., Disp: , Rfl:     naproxen (NAPROSYN) 500 MG tablet, Take 1 tablet (500 mg total) by mouth 2 (two) times daily with meals., Disp: 60 tablet, Rfl: 0    tamsulosin (FLOMAX) 0.4 mg Cap, Take 0.4 mg by mouth once daily., Disp: , Rfl:     traMADol (ULTRAM) 50 mg tablet, Take 50 mg by mouth once daily., Disp: , Rfl:     HYDROcodone-acetaminophen (NORCO)  mg per tablet, Take 1 tablet by mouth every 12 (twelve)  "hours as needed for Pain., Disp: 40 tablet, Rfl: 0    predniSONE (DELTASONE) 5 MG tablet, Take 1 tablet (5 mg total) by mouth 2 (two) times daily., Disp: 60 tablet, Rfl: 0    Review of Systems   Constitutional: Negative for fatigue, fever and unexpected weight change.   HENT: Negative for ear pain, sinus pressure and sore throat.    Eyes: Negative for pain.   Respiratory: Negative for cough and shortness of breath.    Cardiovascular: Negative for chest pain and leg swelling.   Gastrointestinal: Negative for abdominal pain, constipation, nausea and vomiting.   Genitourinary: Negative for dysuria, frequency and urgency.   Musculoskeletal: Negative for arthralgias.   Skin: Negative for rash.   Neurological: Negative for dizziness, weakness and headaches.   Psychiatric/Behavioral: Negative for sleep disturbance.          Objective:      Vitals:    01/28/20 1144   BP: (!) 122/90   Pulse: 76   Weight: 85.7 kg (189 lb)   Height: 6' 1" (1.854 m)     Body mass index is 24.94 kg/m².  Physical Exam   Constitutional: He is oriented to person, place, and time. He appears well-developed and well-nourished.   Eyes: Pupils are equal, round, and reactive to light.   Neck: Neck supple. No tracheal deviation present.   Cardiovascular: Normal rate and regular rhythm. Exam reveals no gallop and no friction rub.   No murmur heard.  Pulmonary/Chest: Breath sounds normal. No stridor. He has no wheezes. He has no rales.   Musculoskeletal: He exhibits no edema or deformity.        Right shoulder: He exhibits decreased range of motion and tenderness.        Left shoulder: He exhibits decreased range of motion and tenderness.        Lumbar back: He exhibits decreased range of motion and tenderness.   Lymphadenopathy:     He has no cervical adenopathy.   Neurological: He is alert and oriented to person, place, and time. Coordination normal.   Skin: Skin is warm and dry.   Psychiatric: He has a normal mood and affect. Thought content normal.    "      Assessment:       1. Positive FARRAH (antinuclear antibody)    2. Arthralgia, unspecified joint    3. Essential hypertension    4. High cholesterol    5. Malignant neoplasm of prostate    6. Polymyalgia rheumatica         Plan:       Positive FARRAH (antinuclear antibody)  -     C-reactive protein; Future; Expected date: 01/28/2020  -     Comprehensive metabolic panel; Future; Expected date: 01/28/2020  -     Sedimentation rate; Future; Expected date: 01/28/2020    Arthralgia, unspecified joint  Comments:  repeat labs.   Orders:  -     C-reactive protein; Future; Expected date: 01/28/2020  -     Comprehensive metabolic panel; Future; Expected date: 01/28/2020  -     Sedimentation rate; Future; Expected date: 01/28/2020  -     predniSONE (DELTASONE) 5 MG tablet; Take 1 tablet (5 mg total) by mouth 2 (two) times daily.  Dispense: 60 tablet; Refill: 0  -     HYDROcodone-acetaminophen (NORCO)  mg per tablet; Take 1 tablet by mouth every 12 (twelve) hours as needed for Pain.  Dispense: 40 tablet; Refill: 0    Essential hypertension    High cholesterol    Malignant neoplasm of prostate    Polymyalgia rheumatica    Other orders  -     Sedimentation rate, automated      Follow up in about 2 weeks (around 2/11/2020) for medication management.

## 2020-02-02 NOTE — PROGRESS NOTES
"[facility] Progress Note     Phoenix Fragoso is a 59 y.o. male who presents for prostate cancer follow up     He has a family history of prostate cancer, evaluated for PSA of 4.9 in March 2018 finding a prostate volume of 46.7 g with median lobe and [place] 3 + 3 equals 6 prostate cancer in 1 core at the right base medial, with concurrent cystoscopic evidence of prostatic obstruction with ball valving median lobe.  He initially elected active surveillance and was managed on Flomax though his urinary symptoms progressed and his incomplete emptying worsened.  Despite increasing medical management regimens, his AUA symptom score persisted is 31/4, and after extensive risk benefit discussion with known prostate cancer as well as severe prostatic obstruction, he elected surgical management with robotic prostatectomy.  - of note on initial planned prostatectomy date in Aug 2019 had anaphylaxis after induction and underwent extensive allergy testing workup noting allergy to Ancef     10/16/19: Robot-assisted laparoscopic radical prostatectomy, bilateral nerve sparing   mild adhesions from prior surgery taken down laparoscopically to place ports, significant median lobe necessitating extended bladder neck dissection and subsequent bladder neck reconstruction with no intraop evidence of urine leak on testing anastamosis  PATHOLOGY: Catia 3+4=7 [id number], negative margins  10/24/19: cystogram negative with pham removal     11/22/19: Leaking Mostly with coughing, sneezing. Depends only for 1 week - now just pads - 2 per day, 1 lasts basically all day and changes at night  Good stream and flow. Significantly improved - "like a firehose". Kegels - Prob not as often as he should - can squeeze but doesn't feel right when releasing  Some soreness when sitting. Nocturia down to 1-2x from 4+. Frequent during day but no significant bothersome urgency, Denies UUI  Alternating constipation and loose stool. Soda in am as caffeine " "and 2 bottles water during day. No dysuria hematuria. Ucx negative    He returns today noting  PSA undetectable <0.01 on 1/31/20  He has had some hand and joint pain with +avani pending rheumatologic workup  Drips with pushing/straining with bowel movement  Ok with coughing, sneezing. Leaking more sitting to standing.  Is having urgency now, still not much UUI, though frequency increased. Nocturia x4 and urgent at night  1 light shield pad gets him through the day. Mostly dry at night.  Kegels - do them but not as often as recommended - maybe just 2x/day  No erectile function yet - every once in a while has a twinge he thinks.  Preop had cialis 5, would get erectile benefit with 2.5 though better if 2d in a row      ROS: A comprehensive 10 system review was performed and is negative except as noted above in HPI    PHYSICAL EXAM:    Vitals:    02/03/20 1135   BP: 133/81   Pulse: 78   Temp: 98.2 °F (36.8 °C)     Body mass index is 23.85 kg/m². Weight: 82 kg (180 lb 12.4 oz) Height: 6' 1" (185.4 cm)       General: Alert, cooperative, no distress, appears stated age   Head: Normocephalic, without obvious abnormality, atraumatic   Eyes: PERRL, conjunctiva/corneas clear   Lungs: Respirations unlabored   Heart: Warm and well perfused   Abdomen: soft NT ND lap incisions well healed  Extremities: Extremities normal, atraumatic, no cyanosis or edema   Skin: Skin color, texture, turgor normal, no rashes or lesions   Psych: Appropriate   Neurologic: Non-focal       Recent Results (from the past 336 hour(s))   C-reactive protein    Collection Time: 01/28/20 12:31 PM   Result Value Ref Range    CRP 29.2 (H) <8.0 mg/L   Comprehensive metabolic panel    Collection Time: 01/28/20 12:31 PM   Result Value Ref Range    Glucose 97 65 - 139 mg/dL    BUN, Bld 21 7 - 25 mg/dL    Creatinine 0.96 0.70 - 1.25 mg/dL    eGFR if non  86 > OR = 60 mL/min/1.73m2    eGFR if African American 99 > OR = 60 mL/min/1.73m2    BUN/Creatinine " Ratio NOT APPLICABLE 6 - 22 (calc)    Sodium 140 135 - 146 mmol/L    Potassium 3.7 3.5 - 5.3 mmol/L    Chloride 101 98 - 110 mmol/L    CO2 28 20 - 32 mmol/L    Calcium 9.7 8.6 - 10.3 mg/dL    Total Protein 6.9 6.1 - 8.1 g/dL    Albumin 3.9 3.6 - 5.1 g/dL    Globulin, Total 3.0 1.9 - 3.7 g/dL (calc)    Albumin/Globulin Ratio 1.3 1.0 - 2.5 (calc)    Total Bilirubin 1.1 0.2 - 1.2 mg/dL    Alkaline Phosphatase 79 40 - 115 U/L    AST 21 10 - 35 U/L    ALT 25 9 - 46 U/L   Sedimentation rate, automated    Collection Time: 01/28/20 12:31 PM   Result Value Ref Range    Sed Rate 36 (H) < OR = 20 mm/h   Prostate Specific Antigen, Diagnostic    Collection Time: 01/31/20  7:37 AM   Result Value Ref Range    PSA DIAGNOSTIC <0.01 0.00 - 4.00 ng/mL   POCT URINE DIPSTICK WITHOUT MICROSCOPE    Collection Time: 02/03/20 11:51 AM   Result Value Ref Range    Color, UA yellow     Spec Grav UA 1.025     pH, UA 7     WBC, UA neg     Nitrite, UA neg     Protein neg     Glucose, UA neg     Ketones, UA neg     Urobilinogen, UA 0.2     Bilirubin neg     Blood, UA neg        ASSESSMENT   1. History of prostate cancer  POCT URINE DIPSTICK WITHOUT MICROSCOPE    Prostate Specific Antigen, Diagnostic       Plan    He is doing very well just over 3 months status post prostatectomy.  Bilateral nerve-sparing procedure performed with good pathologic outcome, negative margins though there was mild pathologic upstaging to Catia 7 from Catia 6, though organ confined and completely resected and his initial postoperative PSA is undetectable.  We did discuss the importance of continued PSA screening, every 3 months for the 1st year postoperatively, then every 6 months out to year 5 then annually.    He has had significant improvement in his continence and is down to 1 light shield pad per day.  We did discuss that he is quite on track, and is close to continence, and is doing well with this despite his significant median lobe and bladder neck  reconstruction.  He is having some urgency and frequency, which he was not having initially, but is expected in the setting of relief of longstanding prostate obstruction, and we did discuss starting low-dose anticholinergic to help control the urgency frequency component and improved bladder storage for better focus on Kegel exercise and resolution of stress incontinence.  Ditropan XL 5 mg daily was prescribed.  Advised of common side effects such as drawing a constipation.  Kegels:  2-3 purposeful contractions with pause in between, and repeat the cycle 10 times a day.  Set alarm.  Make this your job.  This is physical therapy.  On that note, consider dedicated pelvic floor physical therapy in future if not continent at 6 mos    He has not had return of erectile function yet and we did discuss it is quite early in the earliest expected would be 6 months but usually closer to 10-18 months.  Given bilateral nerve-sparing procedure, however discussed starting penile rehabilitative protocol to help Foster recovery of this pathway.  We discussed overall rehabilitative protocols and on demand erection its post prostatectomy with oral medications, vacuum erection device, intracavernosal injection, and ultimately penile prosthesis if needed.  He was using Cialis preoperatively and prefers this.  Prescribed with directions provided as follows  Penile rehabilitative protocols for post prostatectomy erectile dysfunction using Cialis  Cialis 5 mg prescribed  Use 1/2 tablet, 2.5 mg, scheduled every other day in a rehabilitative dosing protocol.  This is not intended to provide erections  After some time taking the scheduled every other day low dosing, can use up to 10-20 mg on demand - 30-45 minutes in advance of an anticipated sexual encounter, better absorbed on emptier stomach.  - may not work 1st few times, or for 1st few months, do not be discouraged.  Consider in the future:  Vacuum erection device.  This can be used in  a rehabilitative fashion, as well as on demand, and can be used in combination with oral medications.    25 mins spent in encounter, over half in counseling  RTC 3 mos with psa prior

## 2020-02-03 ENCOUNTER — OFFICE VISIT (OUTPATIENT)
Dept: UROLOGY | Facility: CLINIC | Age: 60
End: 2020-02-03
Payer: COMMERCIAL

## 2020-02-03 ENCOUNTER — TELEPHONE (OUTPATIENT)
Dept: FAMILY MEDICINE | Facility: CLINIC | Age: 60
End: 2020-02-03

## 2020-02-03 VITALS
WEIGHT: 180.75 LBS | HEART RATE: 78 BPM | SYSTOLIC BLOOD PRESSURE: 133 MMHG | HEIGHT: 73 IN | TEMPERATURE: 98 F | BODY MASS INDEX: 23.96 KG/M2 | DIASTOLIC BLOOD PRESSURE: 81 MMHG

## 2020-02-03 DIAGNOSIS — Z85.46 HISTORY OF PROSTATE CANCER: Primary | ICD-10-CM

## 2020-02-03 LAB
BILIRUB SERPL-MCNC: NORMAL MG/DL
BLOOD URINE, POC: NORMAL
COLOR, POC UA: YELLOW
GLUCOSE UR QL STRIP: NORMAL
KETONES UR QL STRIP: NORMAL
LEUKOCYTE ESTERASE URINE, POC: NORMAL
NITRITE, POC UA: NORMAL
PH, POC UA: 7
PROTEIN, POC: NORMAL
SPECIFIC GRAVITY, POC UA: 1.02
UROBILINOGEN, POC UA: 0.2

## 2020-02-03 PROCEDURE — 3079F DIAST BP 80-89 MM HG: CPT | Mod: CPTII,S$GLB,, | Performed by: UROLOGY

## 2020-02-03 PROCEDURE — 99214 PR OFFICE/OUTPT VISIT, EST, LEVL IV, 30-39 MIN: ICD-10-PCS | Mod: 25,S$GLB,, | Performed by: UROLOGY

## 2020-02-03 PROCEDURE — 81002 POCT URINE DIPSTICK WITHOUT MICROSCOPE: ICD-10-PCS | Mod: S$GLB,,, | Performed by: UROLOGY

## 2020-02-03 PROCEDURE — 3075F SYST BP GE 130 - 139MM HG: CPT | Mod: CPTII,S$GLB,, | Performed by: UROLOGY

## 2020-02-03 PROCEDURE — 99999 PR PBB SHADOW E&M-EST. PATIENT-LVL III: CPT | Mod: PBBFAC,,, | Performed by: UROLOGY

## 2020-02-03 PROCEDURE — 99214 OFFICE O/P EST MOD 30 MIN: CPT | Mod: 25,S$GLB,, | Performed by: UROLOGY

## 2020-02-03 PROCEDURE — 3079F PR MOST RECENT DIASTOLIC BLOOD PRESSURE 80-89 MM HG: ICD-10-PCS | Mod: CPTII,S$GLB,, | Performed by: UROLOGY

## 2020-02-03 PROCEDURE — 81002 URINALYSIS NONAUTO W/O SCOPE: CPT | Mod: S$GLB,,, | Performed by: UROLOGY

## 2020-02-03 PROCEDURE — 3008F PR BODY MASS INDEX (BMI) DOCUMENTED: ICD-10-PCS | Mod: CPTII,S$GLB,, | Performed by: UROLOGY

## 2020-02-03 PROCEDURE — 3075F PR MOST RECENT SYSTOLIC BLOOD PRESS GE 130-139MM HG: ICD-10-PCS | Mod: CPTII,S$GLB,, | Performed by: UROLOGY

## 2020-02-03 PROCEDURE — 3008F BODY MASS INDEX DOCD: CPT | Mod: CPTII,S$GLB,, | Performed by: UROLOGY

## 2020-02-03 PROCEDURE — 99999 PR PBB SHADOW E&M-EST. PATIENT-LVL III: ICD-10-PCS | Mod: PBBFAC,,, | Performed by: UROLOGY

## 2020-02-03 RX ORDER — TADALAFIL 5 MG/1
5 TABLET, FILM COATED ORAL DAILY PRN
Qty: 30 TABLET | Refills: 5 | Status: SHIPPED | OUTPATIENT
Start: 2020-02-03 | End: 2021-03-06

## 2020-02-03 RX ORDER — OXYBUTYNIN CHLORIDE 5 MG/1
5 TABLET, EXTENDED RELEASE ORAL DAILY
Qty: 30 TABLET | Refills: 11 | Status: SHIPPED | OUTPATIENT
Start: 2020-02-03 | End: 2021-06-21 | Stop reason: SDUPTHER

## 2020-02-03 NOTE — TELEPHONE ENCOUNTER
Spoke with pts wife, states she called Dr. Nolan office on Friday and they want the last progress note and labs.

## 2020-02-03 NOTE — PATIENT INSTRUCTIONS
Kegels:  2-3 purposeful contractions with pause in between, and repeat the cycle 10 times a day.  Set alarm.  Make this your job.  This is physical therapy.  On that note, consider dedicated pelvic floor physical therapy - can refer    Ditropan for urgency/frequency. (can be constipating/drying)    Penile rehabilitative protocols for post prostatectomy erectile dysfunction using Cialis  Cialis 5 mg prescribed  Use 1/2 tablet, 2.5 mg, scheduled every other day in a rehabilitative dosing protocol.  This is not intended to provide erections  After some time taking the scheduled every other day low dosing, can use up to 10-20 mg on demand - 30-45 minutes in advance of an anticipated sexual encounter, better absorbed on emptier stomach.  - may not work 1st few times, or for 1st few months, do not be discouraged.    Consider in the future:  Vacuum erection device.  Can put you in touch with wrap.  This can be used in a rehabilitative fashion, as well as on demand, and can be used in combination with oral medications.

## 2020-02-10 DIAGNOSIS — M25.50 ARTHRALGIA, UNSPECIFIED JOINT: ICD-10-CM

## 2020-02-10 NOTE — TELEPHONE ENCOUNTER
----- Message from Evelyn Demarco sent at 2/10/2020 10:36 AM CST -----  Pt is needing an earlier appt tomorrow or a new appt on Wednesday, he cant wait until her next available in march   Pt [phone]

## 2020-02-11 RX ORDER — PREDNISONE 5 MG/1
5 TABLET ORAL DAILY
Qty: 30 TABLET | Refills: 0 | Status: SHIPPED | OUTPATIENT
Start: 2020-02-11 | End: 2020-02-27 | Stop reason: SDUPTHER

## 2020-02-11 NOTE — TELEPHONE ENCOUNTER
Spoke with pt - states Brigette wanted a 2 week follow up to discuss pain. States the steroid is helping a little. He is able to go to work but is still in some pain. Scheduled with rheumatologist in [place] for March 5th. Is about to run out of the steroids - wanted a refill since he cannot come in for an appointment today.

## 2020-02-26 ENCOUNTER — PATIENT MESSAGE (OUTPATIENT)
Dept: FAMILY MEDICINE | Facility: CLINIC | Age: 60
End: 2020-02-26

## 2020-02-26 DIAGNOSIS — M25.50 ARTHRALGIA, UNSPECIFIED JOINT: ICD-10-CM

## 2020-02-27 RX ORDER — PREDNISONE 5 MG/1
5 TABLET ORAL DAILY
Qty: 30 TABLET | Refills: 0 | Status: SHIPPED | OUTPATIENT
Start: 2020-02-27 | End: 2020-07-07

## 2020-03-12 DIAGNOSIS — M25.50 ARTHRALGIA, UNSPECIFIED JOINT: ICD-10-CM

## 2020-03-12 RX ORDER — LORAZEPAM 2 MG/1
2 TABLET ORAL DAILY
Qty: 30 TABLET | Refills: 2 | Status: SHIPPED | OUTPATIENT
Start: 2020-03-12 | End: 2020-06-29 | Stop reason: SDUPTHER

## 2020-03-12 RX ORDER — HYDROCODONE BITARTRATE AND ACETAMINOPHEN 10; 325 MG/1; MG/1
1 TABLET ORAL EVERY 12 HOURS PRN
Qty: 40 TABLET | Refills: 0 | Status: CANCELLED | OUTPATIENT
Start: 2020-03-12

## 2020-03-12 NOTE — TELEPHONE ENCOUNTER
----- Message from Leona Gabriel sent at 3/12/2020 12:43 PM CDT -----  Contact: Phoenix SHABAZZ   Refill lorazepam [facility] [phone]

## 2020-04-16 ENCOUNTER — PATIENT MESSAGE (OUTPATIENT)
Dept: FAMILY MEDICINE | Facility: CLINIC | Age: 60
End: 2020-04-16

## 2020-04-16 NOTE — LETTER
[address]  [place] [address]  Phone: [phone]   [phone]                        MD Dorina Dickerson MD Chequita Williams, MD Matthew Bassett, PA-C Allison Hoffritz, ABE Fortune, ABE      Date: 04/21/2020        Patient: Phoenix Fragoso  YOB: 1960       Please fax over pt recent lab work.        Sincerely,     Cathy Gorman MA

## 2020-04-20 NOTE — TELEPHONE ENCOUNTER
I dont see the records or labs. Am I overlooking? If not can you call and request info from Dr. Shi. ? Also find out which lab did he go to and we can get copy?

## 2020-04-21 NOTE — TELEPHONE ENCOUNTER
LMOR for pt to call back and let us know what lab he went to for blood work. Requested notes from Dr. Shi.

## 2020-04-24 ENCOUNTER — TELEPHONE (OUTPATIENT)
Dept: FAMILY MEDICINE | Facility: CLINIC | Age: 60
End: 2020-04-24

## 2020-04-24 NOTE — TELEPHONE ENCOUNTER
----- Message from Theresa Leung MA sent at 4/21/2020  3:42 PM CDT -----      ----- Message -----  From: Ayanna Rowland  Sent: 4/21/2020   3:42 PM CDT  To: Walker Lugo Staff    PROGRESS NOTES, DR. GILL, 3/25-3/06/20

## 2020-04-28 ENCOUNTER — TELEPHONE (OUTPATIENT)
Dept: FAMILY MEDICINE | Facility: CLINIC | Age: 60
End: 2020-04-28

## 2020-04-29 ENCOUNTER — OFFICE VISIT (OUTPATIENT)
Dept: FAMILY MEDICINE | Facility: CLINIC | Age: 60
End: 2020-04-29
Payer: COMMERCIAL

## 2020-04-29 VITALS — SYSTOLIC BLOOD PRESSURE: 145 MMHG | DIASTOLIC BLOOD PRESSURE: 90 MMHG

## 2020-04-29 DIAGNOSIS — E78.00 HIGH CHOLESTEROL: ICD-10-CM

## 2020-04-29 DIAGNOSIS — M35.3 POLYMYALGIA RHEUMATICA: Primary | ICD-10-CM

## 2020-04-29 DIAGNOSIS — R76.8 POSITIVE ANA (ANTINUCLEAR ANTIBODY): ICD-10-CM

## 2020-04-29 DIAGNOSIS — E03.9 HYPOTHYROIDISM, UNSPECIFIED TYPE: ICD-10-CM

## 2020-04-29 DIAGNOSIS — I10 ESSENTIAL HYPERTENSION: ICD-10-CM

## 2020-04-29 PROCEDURE — 3080F PR MOST RECENT DIASTOLIC BLOOD PRESSURE >= 90 MM HG: ICD-10-PCS | Mod: ,,, | Performed by: NURSE PRACTITIONER

## 2020-04-29 PROCEDURE — 3077F PR MOST RECENT SYSTOLIC BLOOD PRESSURE >= 140 MM HG: ICD-10-PCS | Mod: ,,, | Performed by: NURSE PRACTITIONER

## 2020-04-29 PROCEDURE — 3077F SYST BP >= 140 MM HG: CPT | Mod: ,,, | Performed by: NURSE PRACTITIONER

## 2020-04-29 PROCEDURE — 99213 PR OFFICE/OUTPT VISIT, EST, LEVL III, 20-29 MIN: ICD-10-PCS | Mod: 95,,, | Performed by: NURSE PRACTITIONER

## 2020-04-29 PROCEDURE — 3080F DIAST BP >= 90 MM HG: CPT | Mod: ,,, | Performed by: NURSE PRACTITIONER

## 2020-04-29 PROCEDURE — 99213 OFFICE O/P EST LOW 20 MIN: CPT | Mod: 95,,, | Performed by: NURSE PRACTITIONER

## 2020-04-29 RX ORDER — LEVOTHYROXINE SODIUM 25 UG/1
25 TABLET ORAL
Qty: 30 TABLET | Refills: 11 | Status: SHIPPED | OUTPATIENT
Start: 2020-04-29 | End: 2020-10-19

## 2020-04-29 NOTE — PROGRESS NOTES
Subjective:        The chief complaint leading to consultation is: check up  The patient location is:  Home  Visit type: Virtual visit with synchronous audio/video or audio only  This was a video visit in lieu of in-person visit due to the coronavirus emergency. Patient acknowledged and consented to the video visit encounter.     59 year old male presents for virtual visit. Being followed by Dr. Shi for polymyalgia rheumatica. Currently on steroids taking 2 in am and 1 in pm.  Also started Plaquenil 200 mg in the morning as well as in the afternoon.  Still has some pain but reports that it is manageable.  Sleeping okay.  Had labs done by Dr. Oleary and would like to discuss thyroid results.  Overall feels tired.  Is suffering from some constipation.  Trying to exercise and stay active as much as he can.      Past Surgical History:   Procedure Laterality Date    APPENDECTOMY      CHOLECYSTECTOMY      PROSTATE BIOPSY      PROSTATE SURGERY      right knee arthroscopy      ROBOT-ASSISTED LAPAROSCOPIC PROSTATECTOMY N/A 10/16/2019    Procedure: ROBOTIC PROSTATECTOMY;  Surgeon: Walker Brooks MD;  Location: [facility];  Service: Urology;  Laterality: N/A;     Past Medical History:   Diagnosis Date    Bell's palsy     High cholesterol     Hypertension     Insomnia     Thyroid disease      History reviewed. No pertinent family history.     Social History:   Marital Status:   Alcohol History:  reports that he drinks alcohol.  Tobacco History:  reports that he has never smoked. He has never used smokeless tobacco.  Drug History:  reports that he does not use drugs.    Review of patient's allergies indicates:   Allergen Reactions    Ancef [cefazolin] Anaphylaxis     Anaphylactic shock        Current Outpatient Medications   Medication Sig Dispense Refill    ALPRAZolam (XANAX) 1 MG tablet Take 1 mg by mouth once daily.  2    ascorbic acid, vitamin C, (VITAMIN C) 100 MG tablet Take 100 mg by mouth  once daily.      aspirin (ECOTRIN) 81 MG EC tablet Take 81 mg by mouth once daily.      carvedilol (COREG) 6.25 MG tablet Take 6.25 mg by mouth 2 (two) times daily.      celecoxib (CELEBREX) 200 MG capsule Take 1 capsule (200 mg total) by mouth 2 (two) times daily. 60 capsule 0    CIALIS 5 mg tablet Take 1 tablet (5 mg total) by mouth daily as needed for Erectile Dysfunction. 30 tablet 5    fish oil-omega-3 fatty acids 300-1,000 mg capsule Take 2 capsules by mouth once daily.       gemfibrozil (LOPID) 600 MG tablet Take 300 mg by mouth once daily.  0    hydrALAZINE (APRESOLINE) 100 MG tablet Take 100 mg by mouth every 8 (eight) hours.       HYDROcodone-acetaminophen (NORCO)  mg per tablet Take 1 tablet by mouth every 12 (twelve) hours as needed for Pain. 40 tablet 0    levothyroxine (SYNTHROID) 200 MCG tablet Take 1 tablet (200 mcg total) by mouth before breakfast. 90 tablet 1    levothyroxine (SYNTHROID) 25 MCG tablet Take 1 tablet (25 mcg total) by mouth before breakfast. 30 tablet 11    LORazepam (ATIVAN) 2 MG Tab Take 1 tablet (2 mg total) by mouth once daily. 30 tablet 2    losartan-hydrochlorothiazide 100-25 mg (HYZAAR) 100-25 mg per tablet Take 1 tablet by mouth once daily.  1    multivitamin capsule Take 1 capsule by mouth once daily.      naproxen (NAPROSYN) 500 MG tablet Take 1 tablet (500 mg total) by mouth 2 (two) times daily with meals. 60 tablet 0    oxybutynin (DITROPAN-XL) 5 MG TR24 Take 1 tablet (5 mg total) by mouth once daily. 30 tablet 11    predniSONE (DELTASONE) 5 MG tablet Take 1 tablet (5 mg total) by mouth once daily. 30 tablet 0    tamsulosin (FLOMAX) 0.4 mg Cap Take 0.4 mg by mouth once daily.      traMADol (ULTRAM) 50 mg tablet Take 50 mg by mouth once daily.       No current facility-administered medications for this visit.        Review of Systems   Constitutional: Positive for fatigue. Negative for fever and unexpected weight change.   HENT: Negative for ear  pain, sinus pressure and sore throat.    Respiratory: Negative for cough and shortness of breath.    Cardiovascular: Negative for chest pain and leg swelling.   Gastrointestinal: Positive for constipation. Negative for abdominal pain, nausea and vomiting.   Genitourinary: Negative for dysuria, frequency and urgency.   Musculoskeletal: Negative for arthralgias.   Skin: Negative for rash.   Neurological: Negative for dizziness, weakness and headaches.   Psychiatric/Behavioral: Negative for sleep disturbance.         Objective:        Physical Exam:   Physical Exam   Constitutional: He appears well-developed and well-nourished. No distress.   Psychiatric: He has a normal mood and affect.            Assessment:       1. Polymyalgia rheumatica    2. Essential hypertension    3. High cholesterol    4. Hypothyroidism, unspecified type    5. Positive FARRAH (antinuclear antibody)      Plan:   Polymyalgia rheumatica  -     T4, free; Future; Expected date: 04/29/2020  -     T3, free; Future; Expected date: 04/29/2020  -     TSH; Future; Expected date: 04/29/2020    Essential hypertension    High cholesterol    Hypothyroidism, unspecified type  Comments:  Increase levothyroxine to 225 mcg once a day.  Repeat labs in 6 weeks.  Orders:  -     levothyroxine (SYNTHROID) 25 MCG tablet; Take 1 tablet (25 mcg total) by mouth before breakfast.  Dispense: 30 tablet; Refill: 11  -     T4, free; Future; Expected date: 04/29/2020  -     T3, free; Future; Expected date: 04/29/2020  -     TSH; Future; Expected date: 04/29/2020    Positive FARRAH (antinuclear antibody)      Follow up in about 6 weeks (around 6/10/2020) for BP Check-Up with Nurse.    Total time spent with patient: 15 min    Each patient to whom he or she provides medical services by telemedicine is:  (1) informed of the relationship between the physician and patient and the respective role of any other health care provider with respect to management of the patient; and (2) notified  that he or she may decline to receive medical services by telemedicine and may withdraw from such care at any time.    This note was created using [employer] voice recognition software that occasionally misinterprets phrases or words.

## 2020-05-06 ENCOUNTER — PATIENT MESSAGE (OUTPATIENT)
Dept: UROLOGY | Facility: CLINIC | Age: 60
End: 2020-05-06

## 2020-05-07 ENCOUNTER — TELEPHONE (OUTPATIENT)
Dept: UROLOGY | Facility: CLINIC | Age: 60
End: 2020-05-07

## 2020-05-07 ENCOUNTER — LAB VISIT (OUTPATIENT)
Dept: LAB | Facility: HOSPITAL | Age: 60
End: 2020-05-07
Attending: UROLOGY
Payer: COMMERCIAL

## 2020-05-07 DIAGNOSIS — Z85.46 HISTORY OF PROSTATE CANCER: ICD-10-CM

## 2020-05-07 LAB — COMPLEXED PSA SERPL-MCNC: <0.01 NG/ML (ref 0–4)

## 2020-05-07 PROCEDURE — 84153 ASSAY OF PSA TOTAL: CPT

## 2020-05-07 PROCEDURE — 36415 COLL VENOUS BLD VENIPUNCTURE: CPT

## 2020-05-07 NOTE — TELEPHONE ENCOUNTER
Spoke w pt he was informed of appt virtual visit for Monday @ 1015 am. Made sure pt understood clara for [employer] was downloaded and pt voiced ok has done a virtual visit with another provider. Pt will have psa lab done today states busy tomm and will be ready for appt on June 11 @ 1015 am

## 2020-05-11 ENCOUNTER — TELEPHONE (OUTPATIENT)
Dept: UROLOGY | Facility: CLINIC | Age: 60
End: 2020-05-11

## 2020-05-11 ENCOUNTER — OFFICE VISIT (OUTPATIENT)
Dept: UROLOGY | Facility: CLINIC | Age: 60
End: 2020-05-11
Payer: COMMERCIAL

## 2020-05-11 DIAGNOSIS — Z85.46 HISTORY OF PROSTATE CANCER: Primary | ICD-10-CM

## 2020-05-11 PROCEDURE — 99213 PR OFFICE/OUTPT VISIT, EST, LEVL III, 20-29 MIN: ICD-10-PCS | Mod: 95,,, | Performed by: UROLOGY

## 2020-05-11 PROCEDURE — 99213 OFFICE O/P EST LOW 20 MIN: CPT | Mod: 95,,, | Performed by: UROLOGY

## 2020-05-11 NOTE — TELEPHONE ENCOUNTER
Patient has VV today at 10:15 with Dr. Brooks.  He has done the pre-check in process.  He is trying to arrive for his visit.    Advised patient that will need to start the arrival process at 10:00 for 10:15 visit.

## 2020-05-11 NOTE — PROGRESS NOTES
"[facility] Progress Note (virtual/video visit)    Phoenix Fragoso is a 59 y.o. male who presents for prostate cancer follow up     He has a family history of prostate cancer, evaluated for PSA of 4.9 in March 2018 finding a prostate volume of 46.7 g with median lobe and [place] 3 + 3 equals 6 prostate cancer in 1 core at the right base medial, with concurrent cystoscopic evidence of prostatic obstruction with ball valving median lobe.  He initially elected active surveillance and was managed on Flomax though his urinary symptoms progressed and his incomplete emptying worsened.  Despite increasing medical management regimens, his AUA symptom score persisted is 31/4, and after extensive risk benefit discussion with known prostate cancer as well as severe prostatic obstruction, he elected surgical management with robotic prostatectomy.  - of note on initial planned prostatectomy date in Aug 2019 had anaphylaxis after induction and underwent extensive allergy testing workup noting allergy to Ancef     10/16/19: Robot-assisted laparoscopic radical prostatectomy, bilateral nerve sparing   mild adhesions from prior surgery taken down laparoscopically to place ports, significant median lobe necessitating extended bladder neck dissection and subsequent bladder neck reconstruction with no intraop evidence of urine leak on testing anastamosis  PATHOLOGY: [place] 3+4=7 [id number], negative margins  10/24/19: cystogram negative with pham removal     11/22/19: Leaking Mostly with coughing, sneezing. Depends only for 1 week - now just pads - 2 per day, 1 lasts basically all day and changes at night  Good stream and flow. Significantly improved - "like a firehose". Kegels - Prob not as often as he should - can squeeze but doesn't feel right when releasing  Some soreness when sitting. Nocturia down to 1-2x from 4+. Frequent during day but no significant bothersome urgency, Denies UUI  Alternating constipation and loose stool. Soda " in am as caffeine and 2 bottles water during day. No dysuria hematuria. Ucx negative     2/7/20: PSA undetectable <0.01 on 1/31/20. Drips with pushing/straining with bowel movement. Ok with coughing, sneezing. Leaking more sitting to standing.  Is having urgency now, still not much UUI, though frequency increased. Nocturia x4 and urgent at night. 1 light shield pad gets him through the day. Mostly dry at night.  Kegels - do them but not as often as recommended - maybe just 2x/day. No erectile function yet - every once in a while has a twinge he thinks.  Preop had cialis 5, would get erectile benefit with 2.5 though better if 2d in a row    He returns today noting:  Workup for joint pain and +FARRAH has led to diagnosis of poly myalgia rheumatica and is followed by rheumatology and being treated with steroids.  PSA 5/7/20 <0.01  Now continence has improved. No pad use. Occasionally has small drip with squat/lift/heavy exertion  Was taking 5mg daily but has been a few weeks, but no erections. Only tried 10-20mg on demand dose once   No hematuria dysuria or abdominal.pelvic pain    ROS: A comprehensive 10 system review was performed and is negative except as noted above in HPI    PHYSICAL EXAM:     There were no vitals filed for this visit. 2/2 virtual visit, but denies fever     General: Alert, cooperative, no distress, appears stated age   Head: Normocephalic, without obvious abnormality, atraumatic   Eyes: PERRL, conjunctiva/corneas clear   Lungs: Respirations visibly unlabored, no accessory muscle use   CV: appears Warm and well perfused   Extremities: Extremities normal, atraumatic, no cyanosis or edema   Skin: Skin color, texture, turgor normal, no rashes or lesions   Psych: Appropriate   Neurologic: Non-focal    Recent Results (from the past 336 hour(s))   Prostate Specific Antigen, Diagnostic    Collection Time: 05/07/20 11:46 AM   Result Value Ref Range    PSA DIAGNOSTIC <0.01 0.00 - 4.00 ng/mL       ASSESSMENT    1. History of prostate cancer  Prostate Specific Antigen, Diagnostic       Plan    Doing very well 6-7 months status post robotic prostatectomy.  His continence has significantly improved and he is essentially continent with no pad use. Very rare drop BRANDIE and encouraged continued Kegel exercises. Good pathologic outcome and PSA remains undetectable.  For reviewed the need for continued PSA screening every 3 months in the 1st year, then every 6 months at year 5, then annually.   We did review post prostatectomy erectile dysfunction pathways and he has been taking low-dose rehabilitative protocol PDE 5 inhibitors and has minimally tried on demand dosing. Used pde5i preop.  We did discuss alternative options and as an adjunct he would like to try vacuum erection device which can be used both in a rehabilitative fashion as well as on demand, and in combination with PDE 5 inhibitors.  I will put him in touch with our device rep and I will see him back in 3 months with PSA prior.    The patient location is: [place] [address]  The chief complaint leading to consultation is: prostate CA follow up  Visit type: Virtual visit with synchronous audio and video  Time spent with patient: 15 mins, over half in counseling  Each patient to whom he or she provides medical services by telemedicine is:  (1) informed of the relationship between the physician and patient and the respective role of any other health care provider with respect to management of the patient; and (2) notified that he or she may decline to receive medical services by telemedicine and may withdraw from such care at any time

## 2020-05-11 NOTE — TELEPHONE ENCOUNTER
----- Message from Palomo Bell sent at 5/11/2020  9:32 AM CDT -----  Contact: [phone] / self   Patient states he is having technical issues setting up for his virtual appointment today, patient would like to speak with your office.  Please Advise.

## 2020-06-01 ENCOUNTER — TELEPHONE (OUTPATIENT)
Dept: UROLOGY | Facility: CLINIC | Age: 60
End: 2020-06-01

## 2020-06-29 DIAGNOSIS — F41.9 ANXIETY: Primary | ICD-10-CM

## 2020-06-29 RX ORDER — LORAZEPAM 2 MG/1
2 TABLET ORAL DAILY
Qty: 30 TABLET | Refills: 2 | Status: SHIPPED | OUTPATIENT
Start: 2020-06-29 | End: 2020-10-19 | Stop reason: SDUPTHER

## 2020-06-29 NOTE — TELEPHONE ENCOUNTER
----- Message from Leona Gabriel sent at 6/29/2020 12:39 PM CDT -----     12:30    Refill Lorazepam  2 MG  PTS # [phone] [facility]

## 2020-07-07 ENCOUNTER — OFFICE VISIT (OUTPATIENT)
Dept: FAMILY MEDICINE | Facility: CLINIC | Age: 60
End: 2020-07-07
Payer: COMMERCIAL

## 2020-07-07 VITALS
HEIGHT: 73 IN | SYSTOLIC BLOOD PRESSURE: 118 MMHG | BODY MASS INDEX: 24.65 KG/M2 | DIASTOLIC BLOOD PRESSURE: 78 MMHG | HEART RATE: 70 BPM | WEIGHT: 186 LBS | TEMPERATURE: 99 F

## 2020-07-07 DIAGNOSIS — E04.1 THYROID NODULE: Primary | ICD-10-CM

## 2020-07-07 DIAGNOSIS — E07.9 THYROID DISEASE: ICD-10-CM

## 2020-07-07 DIAGNOSIS — Z85.46 HISTORY OF PROSTATE CANCER: ICD-10-CM

## 2020-07-07 DIAGNOSIS — E03.9 HYPOTHYROIDISM, UNSPECIFIED TYPE: ICD-10-CM

## 2020-07-07 DIAGNOSIS — G51.0 BELL'S PALSY: ICD-10-CM

## 2020-07-07 DIAGNOSIS — M25.50 ARTHRALGIA, UNSPECIFIED JOINT: ICD-10-CM

## 2020-07-07 DIAGNOSIS — I10 ESSENTIAL HYPERTENSION: ICD-10-CM

## 2020-07-07 DIAGNOSIS — M35.3 POLYMYALGIA RHEUMATICA: ICD-10-CM

## 2020-07-07 PROCEDURE — 99214 OFFICE O/P EST MOD 30 MIN: CPT | Mod: S$GLB,,, | Performed by: NURSE PRACTITIONER

## 2020-07-07 PROCEDURE — 3078F PR MOST RECENT DIASTOLIC BLOOD PRESSURE < 80 MM HG: ICD-10-PCS | Mod: S$GLB,,, | Performed by: NURSE PRACTITIONER

## 2020-07-07 PROCEDURE — 3074F SYST BP LT 130 MM HG: CPT | Mod: S$GLB,,, | Performed by: NURSE PRACTITIONER

## 2020-07-07 PROCEDURE — 3008F PR BODY MASS INDEX (BMI) DOCUMENTED: ICD-10-PCS | Mod: S$GLB,,, | Performed by: NURSE PRACTITIONER

## 2020-07-07 PROCEDURE — 3074F PR MOST RECENT SYSTOLIC BLOOD PRESSURE < 130 MM HG: ICD-10-PCS | Mod: S$GLB,,, | Performed by: NURSE PRACTITIONER

## 2020-07-07 PROCEDURE — 3078F DIAST BP <80 MM HG: CPT | Mod: S$GLB,,, | Performed by: NURSE PRACTITIONER

## 2020-07-07 PROCEDURE — 3008F BODY MASS INDEX DOCD: CPT | Mod: S$GLB,,, | Performed by: NURSE PRACTITIONER

## 2020-07-07 PROCEDURE — 99214 PR OFFICE/OUTPT VISIT, EST, LEVL IV, 30-39 MIN: ICD-10-PCS | Mod: S$GLB,,, | Performed by: NURSE PRACTITIONER

## 2020-07-07 RX ORDER — PREDNISONE 5 MG/1
5 TABLET ORAL DAILY
Qty: 30 TABLET | Refills: 0
Start: 2020-07-07 | End: 2022-06-13

## 2020-07-07 RX ORDER — VALACYCLOVIR HYDROCHLORIDE 1 G/1
1000 TABLET, FILM COATED ORAL 3 TIMES DAILY
Qty: 21 TABLET | Refills: 0 | Status: SHIPPED | OUTPATIENT
Start: 2020-07-07 | End: 2020-10-06

## 2020-07-07 RX ORDER — HYDROXYCHLOROQUINE SULFATE 200 MG/1
200 TABLET, FILM COATED ORAL 2 TIMES DAILY
COMMUNITY
Start: 2020-06-24 | End: 2021-06-14

## 2020-07-07 NOTE — PROGRESS NOTES
SUBJECTIVE:    Patient ID: Phoenix Fragoso is a 59 y.o. male.    Chief Complaint: Follow-up    59 year old male presents for check up. Has not been feeling great. On chronic steroids and methotrexate for polymyalgia rheumatica. Still has daily pain. Seeing rheumatology in [place]. Sleeps ok. Naps daily. Recently saw Dr. Brooks for prostate check. Feels down. Frustrated with chronic pain. Does not want medication.   Concerned that may have reoccurrence of bells palsy. Has noticed tingling on right side of face.       Lab Visit on 05/07/2020   Component Date Value Ref Range Status    PSA DIAGNOSTIC 05/07/2020 <0.01  0.00 - 4.00 ng/mL Final   Office Visit on 04/29/2020   Component Date Value Ref Range Status    T4, Free 07/07/2020 2.6* 0.8 - 1.8 ng/dL Final    T3, Free 07/07/2020 4.2  2.3 - 4.2 pg/mL Final    TSH 07/07/2020 0.03* 0.40 - 4.50 mIU/L Final   Office Visit on 02/03/2020   Component Date Value Ref Range Status    Color, UA 02/03/2020 yellow   Final    Spec Grav UA 02/03/2020 1.025   Final    pH, UA 02/03/2020 7   Final    WBC, UA 02/03/2020 neg   Final    Nitrite, UA 02/03/2020 neg   Final    Protein 02/03/2020 neg   Final    Glucose, UA 02/03/2020 neg   Final    Ketones, UA 02/03/2020 neg   Final    Urobilinogen, UA 02/03/2020 0.2   Final    Bilirubin 02/03/2020 neg   Final    Blood, UA 02/03/2020 neg   Final   Lab Visit on 01/31/2020   Component Date Value Ref Range Status    PSA DIAGNOSTIC 01/31/2020 <0.01  0.00 - 4.00 ng/mL Final   Office Visit on 01/28/2020   Component Date Value Ref Range Status    CRP 01/28/2020 29.2* <8.0 mg/L Final    Glucose 01/28/2020 97  65 - 139 mg/dL Final    BUN, Bld 01/28/2020 21  7 - 25 mg/dL Final    Creatinine 01/28/2020 0.96  0.70 - 1.25 mg/dL Final    eGFR if non African American 01/28/2020 86  > OR = 60 mL/min/1.73m2 Final    eGFR if African American 01/28/2020 99  > OR = 60 mL/min/1.73m2 Final    BUN/Creatinine Ratio 01/28/2020 NOT  APPLICABLE  6 - 22 (calc) Final    Sodium 01/28/2020 140  135 - 146 mmol/L Final    Potassium 01/28/2020 3.7  3.5 - 5.3 mmol/L Final    Chloride 01/28/2020 101  98 - 110 mmol/L Final    CO2 01/28/2020 28  20 - 32 mmol/L Final    Calcium 01/28/2020 9.7  8.6 - 10.3 mg/dL Final    Total Protein 01/28/2020 6.9  6.1 - 8.1 g/dL Final    Albumin 01/28/2020 3.9  3.6 - 5.1 g/dL Final    Globulin, Total 01/28/2020 3.0  1.9 - 3.7 g/dL (calc) Final    Albumin/Globulin Ratio 01/28/2020 1.3  1.0 - 2.5 (calc) Final    Total Bilirubin 01/28/2020 1.1  0.2 - 1.2 mg/dL Final    Alkaline Phosphatase 01/28/2020 79  40 - 115 U/L Final    AST 01/28/2020 21  10 - 35 U/L Final    ALT 01/28/2020 25  9 - 46 U/L Final    Sed Rate 01/28/2020 36* < OR = 20 mm/h Final       Past Medical History:   Diagnosis Date    Bell's palsy     High cholesterol     Hypertension     Insomnia     Thyroid disease      Past Surgical History:   Procedure Laterality Date    APPENDECTOMY      CHOLECYSTECTOMY      PROSTATE BIOPSY      PROSTATE SURGERY      right knee arthroscopy      ROBOT-ASSISTED LAPAROSCOPIC PROSTATECTOMY N/A 10/16/2019    Procedure: ROBOTIC PROSTATECTOMY;  Surgeon: Walker Brooks MD;  Location: [facility];  Service: Urology;  Laterality: N/A;     History reviewed. No pertinent family history.    Marital Status:   Alcohol History:  reports current alcohol use.  Tobacco History:  reports that he has never smoked. He has never used smokeless tobacco.  Drug History:  reports no history of drug use.    Review of patient's allergies indicates:   Allergen Reactions    Ancef [cefazolin] Anaphylaxis     Anaphylactic shock        Current Outpatient Medications:     ascorbic acid, vitamin C, (VITAMIN C) 100 MG tablet, Take 100 mg by mouth once daily., Disp: , Rfl:     aspirin (ECOTRIN) 81 MG EC tablet, Take 81 mg by mouth once daily., Disp: , Rfl:     carvedilol (COREG) 6.25 MG tablet, Take 6.25 mg by mouth 2 (two) times  daily., Disp: , Rfl:     CIALIS 5 mg tablet, Take 1 tablet (5 mg total) by mouth daily as needed for Erectile Dysfunction., Disp: 30 tablet, Rfl: 5    fish oil-omega-3 fatty acids 300-1,000 mg capsule, Take 2 capsules by mouth once daily. , Disp: , Rfl:     hydrALAZINE (APRESOLINE) 100 MG tablet, Take 100 mg by mouth every 8 (eight) hours. , Disp: , Rfl:     hydroxychloroquine (PLAQUENIL) 200 mg tablet, , Disp: , Rfl:     levothyroxine (SYNTHROID) 200 MCG tablet, Take 1 tablet (200 mcg total) by mouth before breakfast., Disp: 90 tablet, Rfl: 1    levothyroxine (SYNTHROID) 25 MCG tablet, Take 1 tablet (25 mcg total) by mouth before breakfast., Disp: 30 tablet, Rfl: 11    LORazepam (ATIVAN) 2 MG Tab, Take 1 tablet (2 mg total) by mouth once daily., Disp: 30 tablet, Rfl: 2    losartan-hydrochlorothiazide 100-25 mg (HYZAAR) 100-25 mg per tablet, Take 1 tablet by mouth once daily., Disp: , Rfl: 1    multivitamin capsule, Take 1 capsule by mouth once daily., Disp: , Rfl:     oxybutynin (DITROPAN-XL) 5 MG TR24, Take 1 tablet (5 mg total) by mouth once daily., Disp: 30 tablet, Rfl: 11    predniSONE (DELTASONE) 5 MG tablet, Take 1 tablet (5 mg total) by mouth once daily., Disp: 30 tablet, Rfl: 0    celecoxib (CELEBREX) 200 MG capsule, Take 1 capsule (200 mg total) by mouth 2 (two) times daily., Disp: 60 capsule, Rfl: 0    valACYclovir (VALTREX) 1000 MG tablet, Take 1 tablet (1,000 mg total) by mouth 3 (three) times daily., Disp: 21 tablet, Rfl: 0    Review of Systems   Constitutional: Negative for fatigue, fever and unexpected weight change.   Respiratory: Negative for cough and shortness of breath.    Cardiovascular: Negative for chest pain and leg swelling.   Gastrointestinal: Negative for abdominal pain, constipation, nausea and vomiting.   Genitourinary: Negative for dysuria, frequency and urgency.   Musculoskeletal: Negative for arthralgias.   Skin: Negative for rash.   Neurological: Positive for facial  "asymmetry. Negative for dizziness, weakness and headaches.   Psychiatric/Behavioral: Negative for sleep disturbance.          Objective:      Vitals:    07/07/20 1209   BP: 118/78   Pulse: 70   Temp: 98.7 °F (37.1 °C)   Weight: 84.4 kg (186 lb)   Height: 6' 1" (1.854 m)     Body mass index is 24.54 kg/m².  Physical Exam  Constitutional:       Appearance: He is well-developed.   HENT:      Right Ear: External ear normal.      Left Ear: External ear normal.   Eyes:      Pupils: Pupils are equal, round, and reactive to light.   Neck:      Musculoskeletal: Neck supple.      Thyroid: Thyromegaly present.      Trachea: No tracheal deviation.   Cardiovascular:      Rate and Rhythm: Normal rate and regular rhythm.      Heart sounds: No murmur. No friction rub. No gallop.    Pulmonary:      Breath sounds: Normal breath sounds. No stridor. No wheezing or rales.   Abdominal:      Palpations: Abdomen is soft. There is no mass.      Tenderness: There is no abdominal tenderness.   Musculoskeletal:         General: No tenderness or deformity.   Lymphadenopathy:      Cervical: No cervical adenopathy.   Skin:     General: Skin is warm and dry.   Neurological:      Mental Status: He is alert and oriented to person, place, and time.      Motor: No weakness or tremor.      Coordination: Coordination is intact. Coordination normal.      Gait: Gait is intact.      Comments: Slight asymmetry to right side of lip.    Psychiatric:         Mood and Affect: Mood is depressed.         Thought Content: Thought content normal.           Assessment:       1. Thyroid nodule    2. Arthralgia, unspecified joint    3. Bell's palsy    4. Thyroid disease    5. Polymyalgia rheumatica    6. Essential hypertension    7. Hypothyroidism, unspecified type    8. History of prostate cancer         Plan:       Thyroid nodule  -     US Thyroid; Future; Expected date: 07/07/2020    Arthralgia, unspecified joint  Comments:  repeat labs.   Orders:  -     predniSONE " (DELTASONE) 5 MG tablet; Take 1 tablet (5 mg total) by mouth once daily.  Dispense: 30 tablet; Refill: 0    Bell's palsy  Comments:  call if symptoms worsen, speech changes.   Orders:  -     valACYclovir (VALTREX) 1000 MG tablet; Take 1 tablet (1,000 mg total) by mouth 3 (three) times daily.  Dispense: 21 tablet; Refill: 0    Thyroid disease    Polymyalgia rheumatica    Essential hypertension    Hypothyroidism, unspecified type    History of prostate cancer      Follow up in about 3 months (around 10/7/2020).

## 2020-07-08 LAB
T3FREE SERPL-MCNC: 4.2 PG/ML (ref 2.3–4.2)
T4 FREE SERPL-MCNC: 2.6 NG/DL (ref 0.8–1.8)
TSH SERPL-ACNC: 0.03 MIU/L (ref 0.4–4.5)

## 2020-07-09 ENCOUNTER — HOSPITAL ENCOUNTER (OUTPATIENT)
Dept: RADIOLOGY | Facility: HOSPITAL | Age: 60
Discharge: HOME | End: 2020-07-09
Attending: NURSE PRACTITIONER
Payer: COMMERCIAL

## 2020-07-09 ENCOUNTER — PATIENT MESSAGE (OUTPATIENT)
Dept: FAMILY MEDICINE | Facility: CLINIC | Age: 60
End: 2020-07-09

## 2020-07-09 DIAGNOSIS — E04.1 THYROID NODULE: ICD-10-CM

## 2020-07-09 PROCEDURE — 76536 US EXAM OF HEAD AND NECK: CPT | Mod: TC,PO

## 2020-08-04 ENCOUNTER — TELEPHONE (OUTPATIENT)
Dept: FAMILY MEDICINE | Facility: CLINIC | Age: 60
End: 2020-08-04

## 2020-08-04 NOTE — TELEPHONE ENCOUNTER
----- Message from Poornima Cisse sent at 8/4/2020 12:07 PM CDT -----   11:39 Refill for Levothyroxine. [facility].Pt # [phone]

## 2020-08-06 RX ORDER — LEVOTHYROXINE SODIUM 200 UG/1
200 TABLET ORAL
Qty: 90 TABLET | Refills: 1 | Status: SHIPPED | OUTPATIENT
Start: 2020-08-06 | End: 2020-10-19

## 2020-08-06 NOTE — TELEPHONE ENCOUNTER
----- Message from Lorena Rios sent at 8/6/2020 10:11 AM CDT -----  Vm- pt needs refill on levothyroxine 200mg, he said someone called and said it was already called in but that was the 25mg  [phone]

## 2020-10-06 ENCOUNTER — OFFICE VISIT (OUTPATIENT)
Dept: FAMILY MEDICINE | Facility: CLINIC | Age: 60
End: 2020-10-06
Payer: COMMERCIAL

## 2020-10-06 VITALS
SYSTOLIC BLOOD PRESSURE: 130 MMHG | HEART RATE: 70 BPM | HEIGHT: 73 IN | WEIGHT: 190 LBS | DIASTOLIC BLOOD PRESSURE: 84 MMHG | BODY MASS INDEX: 25.18 KG/M2 | TEMPERATURE: 99 F

## 2020-10-06 DIAGNOSIS — G47.00 INSOMNIA, UNSPECIFIED TYPE: ICD-10-CM

## 2020-10-06 DIAGNOSIS — F41.9 ANXIETY: ICD-10-CM

## 2020-10-06 DIAGNOSIS — F32.A DEPRESSION, UNSPECIFIED DEPRESSION TYPE: ICD-10-CM

## 2020-10-06 DIAGNOSIS — E03.9 HYPOTHYROIDISM, UNSPECIFIED TYPE: ICD-10-CM

## 2020-10-06 DIAGNOSIS — Z23 NEED FOR INFLUENZA VACCINATION: Primary | ICD-10-CM

## 2020-10-06 DIAGNOSIS — M35.3 POLYMYALGIA RHEUMATICA: ICD-10-CM

## 2020-10-06 DIAGNOSIS — Z79.899 HIGH RISK MEDICATION USE: ICD-10-CM

## 2020-10-06 DIAGNOSIS — E78.5 HYPERLIPIDEMIA, UNSPECIFIED HYPERLIPIDEMIA TYPE: ICD-10-CM

## 2020-10-06 PROCEDURE — 3075F SYST BP GE 130 - 139MM HG: CPT | Mod: S$GLB,,, | Performed by: NURSE PRACTITIONER

## 2020-10-06 PROCEDURE — 3008F BODY MASS INDEX DOCD: CPT | Mod: S$GLB,,, | Performed by: NURSE PRACTITIONER

## 2020-10-06 PROCEDURE — 90471 FLU VACCINE - QUADRIVALENT (RECOMBINANT) PRESERVATIVE FREE: ICD-10-PCS | Mod: S$GLB,,, | Performed by: NURSE PRACTITIONER

## 2020-10-06 PROCEDURE — 3008F PR BODY MASS INDEX (BMI) DOCUMENTED: ICD-10-PCS | Mod: S$GLB,,, | Performed by: NURSE PRACTITIONER

## 2020-10-06 PROCEDURE — 3079F DIAST BP 80-89 MM HG: CPT | Mod: S$GLB,,, | Performed by: NURSE PRACTITIONER

## 2020-10-06 PROCEDURE — 99214 OFFICE O/P EST MOD 30 MIN: CPT | Mod: 25,S$GLB,, | Performed by: NURSE PRACTITIONER

## 2020-10-06 PROCEDURE — 90471 IMMUNIZATION ADMIN: CPT | Mod: S$GLB,,, | Performed by: NURSE PRACTITIONER

## 2020-10-06 PROCEDURE — 99214 PR OFFICE/OUTPT VISIT, EST, LEVL IV, 30-39 MIN: ICD-10-PCS | Mod: 25,S$GLB,, | Performed by: NURSE PRACTITIONER

## 2020-10-06 PROCEDURE — 3075F PR MOST RECENT SYSTOLIC BLOOD PRESS GE 130-139MM HG: ICD-10-PCS | Mod: S$GLB,,, | Performed by: NURSE PRACTITIONER

## 2020-10-06 PROCEDURE — 90682 RIV4 VACC RECOMBINANT DNA IM: CPT | Mod: S$GLB,,, | Performed by: NURSE PRACTITIONER

## 2020-10-06 PROCEDURE — 3079F PR MOST RECENT DIASTOLIC BLOOD PRESSURE 80-89 MM HG: ICD-10-PCS | Mod: S$GLB,,, | Performed by: NURSE PRACTITIONER

## 2020-10-06 PROCEDURE — 90682 FLU VACCINE - QUADRIVALENT (RECOMBINANT) PRESERVATIVE FREE: ICD-10-PCS | Mod: S$GLB,,, | Performed by: NURSE PRACTITIONER

## 2020-10-06 RX ORDER — LORAZEPAM 2 MG/1
2 TABLET ORAL DAILY
Qty: 30 TABLET | Refills: 2 | Status: CANCELLED | OUTPATIENT
Start: 2020-10-06

## 2020-10-06 RX ORDER — TEMAZEPAM 15 MG/1
15 CAPSULE ORAL NIGHTLY PRN
Qty: 30 CAPSULE | Refills: 2 | Status: SHIPPED | OUTPATIENT
Start: 2020-10-06 | End: 2020-11-05

## 2020-10-06 RX ORDER — FLUOXETINE HYDROCHLORIDE 20 MG/1
20 CAPSULE ORAL DAILY
Qty: 30 CAPSULE | Refills: 11 | Status: SHIPPED | OUTPATIENT
Start: 2020-10-06 | End: 2021-02-24

## 2020-10-06 NOTE — PROGRESS NOTES
SUBJECTIVE:    Patient ID: Phoenix Fragoso is a 59 y.o. male.    Chief Complaint: Follow-up (- brought bottles - flu shot administered - tk)    59 year old male presents for check up. Being treated htn, hyperlipidemia, hypothyroid, prostate cancer, and polymyalgia rheumatica. Still on steroids and methotrexate. Does not feel like meds are helping pain. Still in pain by the end of the day. Feels down. Not sleeping great.   Scheduled to see rheumatology in November. Due for labs.       Lab Visit on 05/07/2020   Component Date Value Ref Range Status    PSA DIAGNOSTIC 05/07/2020 <0.01  0.00 - 4.00 ng/mL Final   Office Visit on 04/29/2020   Component Date Value Ref Range Status    T4, Free 07/07/2020 2.6* 0.8 - 1.8 ng/dL Final    T3, Free 07/07/2020 4.2  2.3 - 4.2 pg/mL Final    TSH 10/06/2020 0.12* 0.40 - 4.50 mIU/L Final    TSH 07/07/2020 0.03* 0.40 - 4.50 mIU/L Final       Past Medical History:   Diagnosis Date    Bell's palsy     High cholesterol     Hypertension     Insomnia     Thyroid disease      Social History     Socioeconomic History    Marital status:      Spouse name: Not on file    Number of children: Not on file    Years of education: Not on file    Highest education level: Not on file   Occupational History    Not on file   Social Needs    Financial resource strain: Somewhat hard    Food insecurity     Worry: Never true     Inability: Never true    Transportation needs     Medical: No     Non-medical: No   Tobacco Use    Smoking status: Never Smoker    Smokeless tobacco: Never Used   Substance and Sexual Activity    Alcohol use: Yes     Frequency: 2-4 times a month     Drinks per session: 1 or 2     Binge frequency: Never     Comment: OCCASIONALLY    Drug use: No    Sexual activity: Yes     Partners: Female   Lifestyle    Physical activity     Days per week: 2 days     Minutes per session: 30 min    Stress: Very much   Relationships    Social connections     Talks on  phone: Once a week     Gets together: Once a week     Attends Yazidism service: Not on file     Active member of club or organization: Yes     Attends meetings of clubs or organizations: 1 to 4 times per year     Relationship status:    Other Topics Concern    Not on file   Social History Narrative    Not on file     Past Surgical History:   Procedure Laterality Date    APPENDECTOMY      CHOLECYSTECTOMY      PROSTATE BIOPSY      PROSTATE SURGERY      right knee arthroscopy      ROBOT-ASSISTED LAPAROSCOPIC PROSTATECTOMY N/A 10/16/2019    Procedure: ROBOTIC PROSTATECTOMY;  Surgeon: Walker Brooks MD;  Location: [facility];  Service: Urology;  Laterality: N/A;     History reviewed. No pertinent family history.    Review of patient's allergies indicates:   Allergen Reactions    Ancef [cefazolin] Anaphylaxis     Anaphylactic shock        Current Outpatient Medications:     ascorbic acid, vitamin C, (VITAMIN C) 100 MG tablet, Take 100 mg by mouth once daily., Disp: , Rfl:     aspirin (ECOTRIN) 81 MG EC tablet, Take 81 mg by mouth once daily., Disp: , Rfl:     carvedilol (COREG) 6.25 MG tablet, Take 6.25 mg by mouth 2 (two) times daily., Disp: , Rfl:     CIALIS 5 mg tablet, Take 1 tablet (5 mg total) by mouth daily as needed for Erectile Dysfunction., Disp: 30 tablet, Rfl: 5    fish oil-omega-3 fatty acids 300-1,000 mg capsule, Take 2 capsules by mouth once daily. , Disp: , Rfl:     hydrALAZINE (APRESOLINE) 100 MG tablet, Take 150 mg by mouth 2 (two) times a day. , Disp: , Rfl:     hydroxychloroquine (PLAQUENIL) 200 mg tablet, , Disp: , Rfl:     levothyroxine (SYNTHROID) 200 MCG tablet, Take 1 tablet (200 mcg total) by mouth before breakfast., Disp: 90 tablet, Rfl: 1    LORazepam (ATIVAN) 2 MG Tab, Take 1 tablet (2 mg total) by mouth once daily., Disp: 30 tablet, Rfl: 2    losartan-hydrochlorothiazide 100-25 mg (HYZAAR) 100-25 mg per tablet, Take 1 tablet by mouth once daily., Disp: , Rfl: 1     "multivitamin capsule, Take 1 capsule by mouth once daily., Disp: , Rfl:     oxybutynin (DITROPAN-XL) 5 MG TR24, Take 1 tablet (5 mg total) by mouth once daily., Disp: 30 tablet, Rfl: 11    predniSONE (DELTASONE) 5 MG tablet, Take 1 tablet (5 mg total) by mouth once daily., Disp: 30 tablet, Rfl: 0    celecoxib (CELEBREX) 200 MG capsule, Take 1 capsule (200 mg total) by mouth 2 (two) times daily., Disp: 60 capsule, Rfl: 0    FLUoxetine 20 MG capsule, Take 1 capsule (20 mg total) by mouth once daily., Disp: 30 capsule, Rfl: 11    levothyroxine (SYNTHROID) 25 MCG tablet, Take 1 tablet (25 mcg total) by mouth before breakfast., Disp: 30 tablet, Rfl: 11    temazepam (RESTORIL) 15 mg Cap, Take 1 capsule (15 mg total) by mouth nightly as needed., Disp: 30 capsule, Rfl: 2    Review of Systems   Constitutional: Negative for fatigue, fever and unexpected weight change.   HENT: Negative for ear pain, sinus pressure and sore throat.    Eyes: Negative for pain.   Respiratory: Negative for cough and shortness of breath.    Cardiovascular: Negative for chest pain and leg swelling.   Gastrointestinal: Negative for abdominal pain, constipation, nausea and vomiting.   Genitourinary: Negative for dysuria, frequency and urgency.   Musculoskeletal: Positive for arthralgias.   Skin: Negative for rash.   Neurological: Negative for dizziness, weakness and headaches.   Psychiatric/Behavioral: Negative for sleep disturbance.          Objective:      Vitals:    10/06/20 1059   BP: 130/84   Pulse: 70   Temp: 98.8 °F (37.1 °C)   Weight: 86.2 kg (190 lb)   Height: 6' 1" (1.854 m)     Physical Exam  Constitutional:       Appearance: He is well-developed.   HENT:      Right Ear: External ear normal.      Left Ear: External ear normal.   Eyes:      Pupils: Pupils are equal, round, and reactive to light.   Neck:      Musculoskeletal: Neck supple.      Trachea: No tracheal deviation.   Cardiovascular:      Rate and Rhythm: Normal rate and " regular rhythm.      Heart sounds: No murmur. No friction rub. No gallop.    Pulmonary:      Breath sounds: Normal breath sounds. No stridor. No wheezing or rales.   Abdominal:      Palpations: Abdomen is soft. There is no mass.      Tenderness: There is no abdominal tenderness.   Musculoskeletal:         General: No tenderness or deformity.   Lymphadenopathy:      Cervical: No cervical adenopathy.   Skin:     General: Skin is warm and dry.   Neurological:      Mental Status: He is alert and oriented to person, place, and time.      Coordination: Coordination normal.   Psychiatric:         Thought Content: Thought content normal.           Assessment:       1. Need for influenza vaccination    2. Anxiety    3. Polymyalgia rheumatica    4. High risk medication use    5. Hypothyroidism, unspecified type    6. Insomnia, unspecified type    7. Depression, unspecified depression type    8. Hyperlipidemia, unspecified hyperlipidemia type         Plan:       Need for influenza vaccination  -     Influenza - Quadrivalent (Recombinant) (PF)    Anxiety  -     CBC auto differential; Future; Expected date: 10/06/2020  -     FLUoxetine 20 MG capsule; Take 1 capsule (20 mg total) by mouth once daily.  Dispense: 30 capsule; Refill: 11    Polymyalgia rheumatica  -     CBC auto differential; Future; Expected date: 10/06/2020  -     Comprehensive Metabolic Panel; Future; Expected date: 10/06/2020  -     TSH w/reflex to FT4; Future; Expected date: 10/06/2020    High risk medication use  -     CBC auto differential; Future; Expected date: 10/06/2020  -     Comprehensive Metabolic Panel; Future; Expected date: 10/06/2020  -     TSH w/reflex to FT4; Future; Expected date: 10/06/2020    Hypothyroidism, unspecified type  -     Comprehensive Metabolic Panel; Future; Expected date: 10/06/2020  -     TSH w/reflex to FT4; Future; Expected date: 10/06/2020    Insomnia, unspecified type  -     CBC auto differential; Future; Expected date:  10/06/2020  -     Comprehensive Metabolic Panel; Future; Expected date: 10/06/2020  -     TSH w/reflex to FT4; Future; Expected date: 10/06/2020  -     temazepam (RESTORIL) 15 mg Cap; Take 1 capsule (15 mg total) by mouth nightly as needed.  Dispense: 30 capsule; Refill: 2    Depression, unspecified depression type    Hyperlipidemia, unspecified hyperlipidemia type      Follow up in about 4 weeks (around 11/3/2020) for medication management.        10/13/2020 Brigette Robledo

## 2020-10-07 LAB — TSH SERPL-ACNC: 0.12 MIU/L (ref 0.4–4.5)

## 2020-10-15 ENCOUNTER — PATIENT MESSAGE (OUTPATIENT)
Dept: FAMILY MEDICINE | Facility: CLINIC | Age: 60
End: 2020-10-15

## 2020-10-15 DIAGNOSIS — F41.9 ANXIETY: ICD-10-CM

## 2020-10-19 ENCOUNTER — PATIENT MESSAGE (OUTPATIENT)
Dept: FAMILY MEDICINE | Facility: CLINIC | Age: 60
End: 2020-10-19

## 2020-10-19 RX ORDER — LORAZEPAM 2 MG/1
2 TABLET ORAL DAILY
Qty: 30 TABLET | Refills: 2 | Status: SHIPPED | OUTPATIENT
Start: 2020-10-19 | End: 2021-01-27 | Stop reason: SDUPTHER

## 2020-10-19 RX ORDER — LEVOTHYROXINE SODIUM 175 UG/1
175 TABLET ORAL
Qty: 30 TABLET | Refills: 11 | Status: SHIPPED | OUTPATIENT
Start: 2020-10-19 | End: 2021-10-19

## 2020-11-05 ENCOUNTER — TELEPHONE (OUTPATIENT)
Dept: FAMILY MEDICINE | Facility: CLINIC | Age: 60
End: 2020-11-05

## 2020-11-13 LAB
ALBUMIN SERPL-MCNC: 3.9 G/DL (ref 3.6–5.1)
ALBUMIN/GLOB SERPL: 1.6 (CALC) (ref 1–2.5)
ALP SERPL-CCNC: 50 U/L (ref 35–144)
ALT SERPL-CCNC: 29 U/L (ref 9–46)
AST SERPL-CCNC: 18 U/L (ref 10–35)
BASOPHILS # BLD AUTO: 29 CELLS/UL (ref 0–200)
BASOPHILS NFR BLD AUTO: 0.5 %
BILIRUB SERPL-MCNC: 0.8 MG/DL (ref 0.2–1.2)
BUN SERPL-MCNC: 19 MG/DL (ref 7–25)
BUN/CREAT SERPL: ABNORMAL (CALC) (ref 6–22)
CALCIUM SERPL-MCNC: 9 MG/DL (ref 8.6–10.3)
CHLORIDE SERPL-SCNC: 103 MMOL/L (ref 98–110)
CO2 SERPL-SCNC: 35 MMOL/L (ref 20–32)
CREAT SERPL-MCNC: 0.98 MG/DL (ref 0.7–1.25)
EOSINOPHIL # BLD AUTO: 68 CELLS/UL (ref 15–500)
EOSINOPHIL NFR BLD AUTO: 1.2 %
ERYTHROCYTE [DISTWIDTH] IN BLOOD BY AUTOMATED COUNT: 12.1 % (ref 11–15)
GFRSERPLBLD MDRD-ARVRAT: 83 ML/MIN/1.73M2
GLOBULIN SER CALC-MCNC: 2.5 G/DL (CALC) (ref 1.9–3.7)
GLUCOSE SERPL-MCNC: 89 MG/DL (ref 65–99)
HCT VFR BLD AUTO: 41 % (ref 38.5–50)
HGB BLD-MCNC: 13.9 G/DL (ref 13.2–17.1)
LYMPHOCYTES # BLD AUTO: 1818 CELLS/UL (ref 850–3900)
LYMPHOCYTES NFR BLD AUTO: 31.9 %
MCH RBC QN AUTO: 28.7 PG (ref 27–33)
MCHC RBC AUTO-ENTMCNC: 33.9 G/DL (ref 32–36)
MCV RBC AUTO: 84.5 FL (ref 80–100)
MONOCYTES # BLD AUTO: 376 CELLS/UL (ref 200–950)
MONOCYTES NFR BLD AUTO: 6.6 %
NEUTROPHILS # BLD AUTO: 3409 CELLS/UL (ref 1500–7800)
NEUTROPHILS NFR BLD AUTO: 59.8 %
PLATELET # BLD AUTO: 207 THOUSAND/UL (ref 140–400)
PMV BLD REES-ECKER: 8.9 FL (ref 7.5–12.5)
POTASSIUM SERPL-SCNC: 3.4 MMOL/L (ref 3.5–5.3)
PROT SERPL-MCNC: 6.4 G/DL (ref 6.1–8.1)
RBC # BLD AUTO: 4.85 MILLION/UL (ref 4.2–5.8)
SODIUM SERPL-SCNC: 142 MMOL/L (ref 135–146)
T4 FREE SERPL-MCNC: 1.7 NG/DL (ref 0.8–1.8)
TSH SERPL-ACNC: 0.19 MIU/L (ref 0.4–4.5)
WBC # BLD AUTO: 5.7 THOUSAND/UL (ref 3.8–10.8)

## 2021-01-27 ENCOUNTER — PATIENT MESSAGE (OUTPATIENT)
Dept: FAMILY MEDICINE | Facility: CLINIC | Age: 61
End: 2021-01-27

## 2021-01-27 DIAGNOSIS — F41.9 ANXIETY: ICD-10-CM

## 2021-01-27 RX ORDER — LORAZEPAM 2 MG/1
2 TABLET ORAL DAILY
Qty: 30 TABLET | Refills: 0 | Status: SHIPPED | OUTPATIENT
Start: 2021-01-27 | End: 2021-03-10 | Stop reason: SDUPTHER

## 2021-02-24 ENCOUNTER — OFFICE VISIT (OUTPATIENT)
Dept: FAMILY MEDICINE | Facility: CLINIC | Age: 61
End: 2021-02-24
Payer: COMMERCIAL

## 2021-02-24 VITALS
HEIGHT: 73 IN | BODY MASS INDEX: 25.58 KG/M2 | WEIGHT: 193 LBS | SYSTOLIC BLOOD PRESSURE: 138 MMHG | HEART RATE: 73 BPM | DIASTOLIC BLOOD PRESSURE: 88 MMHG

## 2021-02-24 DIAGNOSIS — G47.00 INSOMNIA, UNSPECIFIED TYPE: ICD-10-CM

## 2021-02-24 DIAGNOSIS — R20.0 NUMBNESS: ICD-10-CM

## 2021-02-24 DIAGNOSIS — E78.5 HYPERLIPIDEMIA, UNSPECIFIED HYPERLIPIDEMIA TYPE: ICD-10-CM

## 2021-02-24 DIAGNOSIS — Z79.899 HIGH RISK MEDICATION USE: ICD-10-CM

## 2021-02-24 DIAGNOSIS — E03.9 HYPOTHYROIDISM, UNSPECIFIED TYPE: ICD-10-CM

## 2021-02-24 DIAGNOSIS — H53.8 BLURRED VISION: ICD-10-CM

## 2021-02-24 DIAGNOSIS — F32.A DEPRESSION, UNSPECIFIED DEPRESSION TYPE: ICD-10-CM

## 2021-02-24 DIAGNOSIS — M35.3 POLYMYALGIA RHEUMATICA: ICD-10-CM

## 2021-02-24 DIAGNOSIS — F41.9 ANXIETY: Primary | ICD-10-CM

## 2021-02-24 DIAGNOSIS — G44.85 PRIMARY STABBING HEADACHE: ICD-10-CM

## 2021-02-24 DIAGNOSIS — M25.50 ARTHRALGIA, UNSPECIFIED JOINT: ICD-10-CM

## 2021-02-24 PROCEDURE — 3079F PR MOST RECENT DIASTOLIC BLOOD PRESSURE 80-89 MM HG: ICD-10-PCS | Mod: S$GLB,,, | Performed by: NURSE PRACTITIONER

## 2021-02-24 PROCEDURE — 3075F SYST BP GE 130 - 139MM HG: CPT | Mod: S$GLB,,, | Performed by: NURSE PRACTITIONER

## 2021-02-24 PROCEDURE — 3079F DIAST BP 80-89 MM HG: CPT | Mod: S$GLB,,, | Performed by: NURSE PRACTITIONER

## 2021-02-24 PROCEDURE — 3008F BODY MASS INDEX DOCD: CPT | Mod: S$GLB,,, | Performed by: NURSE PRACTITIONER

## 2021-02-24 PROCEDURE — 3075F PR MOST RECENT SYSTOLIC BLOOD PRESS GE 130-139MM HG: ICD-10-PCS | Mod: S$GLB,,, | Performed by: NURSE PRACTITIONER

## 2021-02-24 PROCEDURE — 99214 PR OFFICE/OUTPT VISIT, EST, LEVL IV, 30-39 MIN: ICD-10-PCS | Mod: S$GLB,,, | Performed by: NURSE PRACTITIONER

## 2021-02-24 PROCEDURE — 3008F PR BODY MASS INDEX (BMI) DOCUMENTED: ICD-10-PCS | Mod: S$GLB,,, | Performed by: NURSE PRACTITIONER

## 2021-02-24 PROCEDURE — 99214 OFFICE O/P EST MOD 30 MIN: CPT | Mod: S$GLB,,, | Performed by: NURSE PRACTITIONER

## 2021-02-24 RX ORDER — LEVOTHYROXINE SODIUM 200 UG/1
200 TABLET ORAL
COMMUNITY
End: 2021-12-28

## 2021-02-24 RX ORDER — POTASSIUM CHLORIDE 750 MG/1
TABLET, EXTENDED RELEASE ORAL
COMMUNITY
Start: 2021-02-17 | End: 2021-08-24

## 2021-02-24 RX ORDER — CARVEDILOL 12.5 MG/1
12.5 TABLET ORAL DAILY
COMMUNITY

## 2021-03-03 ENCOUNTER — TELEPHONE (OUTPATIENT)
Dept: FAMILY MEDICINE | Facility: CLINIC | Age: 61
End: 2021-03-03

## 2021-03-04 ENCOUNTER — PATIENT MESSAGE (OUTPATIENT)
Dept: FAMILY MEDICINE | Facility: CLINIC | Age: 61
End: 2021-03-04

## 2021-03-10 ENCOUNTER — PATIENT MESSAGE (OUTPATIENT)
Dept: FAMILY MEDICINE | Facility: CLINIC | Age: 61
End: 2021-03-10

## 2021-03-10 DIAGNOSIS — F41.9 ANXIETY: ICD-10-CM

## 2021-03-10 RX ORDER — LORAZEPAM 2 MG/1
2 TABLET ORAL DAILY
Qty: 30 TABLET | Refills: 2 | Status: SHIPPED | OUTPATIENT
Start: 2021-03-10 | End: 2021-06-29 | Stop reason: SDUPTHER

## 2021-03-16 LAB
ALBUMIN SERPL-MCNC: 4.1 G/DL (ref 3.6–5.1)
ALBUMIN/CREAT UR: 5 MCG/MG CREAT
ALBUMIN/GLOB SERPL: 1.4 (CALC) (ref 1–2.5)
ALP SERPL-CCNC: 54 U/L (ref 35–144)
ALT SERPL-CCNC: 26 U/L (ref 9–46)
APPEARANCE UR: CLEAR
AST SERPL-CCNC: 16 U/L (ref 10–35)
BACTERIA #/AREA URNS HPF: ABNORMAL /HPF
BACTERIA UR CULT: ABNORMAL
BASOPHILS # BLD AUTO: 43 CELLS/UL (ref 0–200)
BASOPHILS NFR BLD AUTO: 0.7 %
BILIRUB SERPL-MCNC: 0.6 MG/DL (ref 0.2–1.2)
BILIRUB UR QL STRIP: NEGATIVE
BUN SERPL-MCNC: 17 MG/DL (ref 7–25)
BUN/CREAT SERPL: ABNORMAL (CALC) (ref 6–22)
CALCIUM SERPL-MCNC: 9.8 MG/DL (ref 8.6–10.3)
CHLORIDE SERPL-SCNC: 101 MMOL/L (ref 98–110)
CHOLEST SERPL-MCNC: 175 MG/DL
CHOLEST/HDLC SERPL: 5.3 (CALC)
CO2 SERPL-SCNC: 27 MMOL/L (ref 20–32)
COLOR UR: YELLOW
CREAT SERPL-MCNC: 0.98 MG/DL (ref 0.7–1.25)
CREAT UR-MCNC: 197 MG/DL (ref 20–320)
EOSINOPHIL # BLD AUTO: 49 CELLS/UL (ref 15–500)
EOSINOPHIL NFR BLD AUTO: 0.8 %
ERYTHROCYTE [DISTWIDTH] IN BLOOD BY AUTOMATED COUNT: 12.5 % (ref 11–15)
ERYTHROCYTE [SEDIMENTATION RATE] IN BLOOD BY WESTERGREN METHOD: 6 MM/H
GFRSERPLBLD MDRD-ARVRAT: 83 ML/MIN/1.73M2
GLOBULIN SER CALC-MCNC: 2.9 G/DL (CALC) (ref 1.9–3.7)
GLUCOSE SERPL-MCNC: 98 MG/DL (ref 65–99)
GLUCOSE UR QL STRIP: NEGATIVE
HCT VFR BLD AUTO: 41.7 % (ref 38.5–50)
HDLC SERPL-MCNC: 33 MG/DL
HGB BLD-MCNC: 14.1 G/DL (ref 13.2–17.1)
HGB UR QL STRIP: NEGATIVE
HYALINE CASTS #/AREA URNS LPF: ABNORMAL /LPF
KETONES UR QL STRIP: NEGATIVE
LDLC SERPL CALC-MCNC: 117 MG/DL (CALC)
LEUKOCYTE ESTERASE UR QL STRIP: NEGATIVE
LYMPHOCYTES # BLD AUTO: 1745 CELLS/UL (ref 850–3900)
LYMPHOCYTES NFR BLD AUTO: 28.6 %
MCH RBC QN AUTO: 28.5 PG (ref 27–33)
MCHC RBC AUTO-ENTMCNC: 33.8 G/DL (ref 32–36)
MCV RBC AUTO: 84.2 FL (ref 80–100)
MICROALBUMIN UR-MCNC: 0.9 MG/DL
MONOCYTES # BLD AUTO: 506 CELLS/UL (ref 200–950)
MONOCYTES NFR BLD AUTO: 8.3 %
NEUTROPHILS # BLD AUTO: 3758 CELLS/UL (ref 1500–7800)
NEUTROPHILS NFR BLD AUTO: 61.6 %
NITRITE UR QL STRIP: NEGATIVE
NONHDLC SERPL-MCNC: 142 MG/DL (CALC)
PH UR STRIP: 7.5 [PH] (ref 5–8)
PLATELET # BLD AUTO: 244 THOUSAND/UL (ref 140–400)
PMV BLD REES-ECKER: 9.3 FL (ref 7.5–12.5)
POTASSIUM SERPL-SCNC: 3.4 MMOL/L (ref 3.5–5.3)
PROT SERPL-MCNC: 7 G/DL (ref 6.1–8.1)
PROT UR QL STRIP: ABNORMAL
RBC # BLD AUTO: 4.95 MILLION/UL (ref 4.2–5.8)
RBC #/AREA URNS HPF: ABNORMAL /HPF
SODIUM SERPL-SCNC: 143 MMOL/L (ref 135–146)
SP GR UR STRIP: 1.02 (ref 1–1.03)
SQUAMOUS #/AREA URNS HPF: ABNORMAL /HPF
TRIGL SERPL-MCNC: 137 MG/DL
TSH SERPL-ACNC: 2.56 MIU/L (ref 0.4–4.5)
WBC # BLD AUTO: 6.1 THOUSAND/UL (ref 3.8–10.8)
WBC #/AREA URNS HPF: ABNORMAL /HPF

## 2021-03-29 ENCOUNTER — PATIENT MESSAGE (OUTPATIENT)
Dept: FAMILY MEDICINE | Facility: CLINIC | Age: 61
End: 2021-03-29

## 2021-03-30 ENCOUNTER — PATIENT MESSAGE (OUTPATIENT)
Dept: FAMILY MEDICINE | Facility: CLINIC | Age: 61
End: 2021-03-30

## 2021-05-11 ENCOUNTER — PATIENT MESSAGE (OUTPATIENT)
Dept: UROLOGY | Facility: CLINIC | Age: 61
End: 2021-05-11

## 2021-05-11 RX ORDER — TADALAFIL 5 MG/1
TABLET ORAL
Qty: 30 TABLET | Refills: 5 | Status: SHIPPED | OUTPATIENT
Start: 2021-05-11 | End: 2021-10-22

## 2021-05-12 DIAGNOSIS — Z85.46 HISTORY OF PROSTATE CANCER: Primary | ICD-10-CM

## 2021-06-11 ENCOUNTER — TELEPHONE (OUTPATIENT)
Dept: FAMILY MEDICINE | Facility: CLINIC | Age: 61
End: 2021-06-11

## 2021-06-14 ENCOUNTER — TELEPHONE (OUTPATIENT)
Dept: FAMILY MEDICINE | Facility: CLINIC | Age: 61
End: 2021-06-14

## 2021-06-14 ENCOUNTER — HOSPITAL ENCOUNTER (OUTPATIENT)
Dept: RADIOLOGY | Facility: HOSPITAL | Age: 61
Discharge: HOME | End: 2021-06-14
Attending: NURSE PRACTITIONER
Payer: COMMERCIAL

## 2021-06-14 ENCOUNTER — OFFICE VISIT (OUTPATIENT)
Dept: FAMILY MEDICINE | Facility: CLINIC | Age: 61
End: 2021-06-14
Payer: COMMERCIAL

## 2021-06-14 VITALS
HEIGHT: 73 IN | HEART RATE: 64 BPM | WEIGHT: 189 LBS | SYSTOLIC BLOOD PRESSURE: 152 MMHG | DIASTOLIC BLOOD PRESSURE: 102 MMHG | BODY MASS INDEX: 25.05 KG/M2
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
## 2021-06-14 DIAGNOSIS — M35.3 POLYMYALGIA RHEUMATICA: ICD-10-CM

## 2021-06-14 DIAGNOSIS — I10 ESSENTIAL HYPERTENSION: ICD-10-CM

## 2021-06-14 DIAGNOSIS — Z79.52 LONG TERM (CURRENT) USE OF SYSTEMIC STEROIDS: Primary | ICD-10-CM

## 2021-06-14 DIAGNOSIS — R73.01 IFG (IMPAIRED FASTING GLUCOSE): ICD-10-CM

## 2021-06-14 DIAGNOSIS — B37.9 CANDIDIASIS: Primary | ICD-10-CM

## 2021-06-14 DIAGNOSIS — R07.81 RIB PAIN: ICD-10-CM

## 2021-06-14 DIAGNOSIS — Z79.899 HIGH RISK MEDICATION USE: ICD-10-CM

## 2021-06-14 LAB — HBA1C MFR BLD: 4.8 %

## 2021-06-14 PROCEDURE — 71046 X-RAY EXAM CHEST 2 VIEWS: CPT | Mod: TC,PO

## 2021-06-14 PROCEDURE — 3008F BODY MASS INDEX DOCD: CPT | Mod: S$GLB,,, | Performed by: NURSE PRACTITIONER

## 2021-06-14 PROCEDURE — 1125F PR PAIN SEVERITY QUANTIFIED, PAIN PRESENT: ICD-10-PCS | Mod: S$GLB,,, | Performed by: NURSE PRACTITIONER

## 2021-06-14 PROCEDURE — 3080F DIAST BP >= 90 MM HG: CPT | Mod: S$GLB,,, | Performed by: NURSE PRACTITIONER

## 2021-06-14 PROCEDURE — 1125F AMNT PAIN NOTED PAIN PRSNT: CPT | Mod: S$GLB,,, | Performed by: NURSE PRACTITIONER

## 2021-06-14 PROCEDURE — 72070 X-RAY EXAM THORAC SPINE 2VWS: CPT | Mod: TC,PO

## 2021-06-14 PROCEDURE — 3080F PR MOST RECENT DIASTOLIC BLOOD PRESSURE >= 90 MM HG: ICD-10-PCS | Mod: S$GLB,,, | Performed by: NURSE PRACTITIONER

## 2021-06-14 PROCEDURE — 3008F PR BODY MASS INDEX (BMI) DOCUMENTED: ICD-10-PCS | Mod: S$GLB,,, | Performed by: NURSE PRACTITIONER

## 2021-06-14 PROCEDURE — 3077F PR MOST RECENT SYSTOLIC BLOOD PRESSURE >= 140 MM HG: ICD-10-PCS | Mod: S$GLB,,, | Performed by: NURSE PRACTITIONER

## 2021-06-14 PROCEDURE — 99214 PR OFFICE/OUTPT VISIT, EST, LEVL IV, 30-39 MIN: ICD-10-PCS | Mod: S$GLB,,, | Performed by: NURSE PRACTITIONER

## 2021-06-14 PROCEDURE — 99214 OFFICE O/P EST MOD 30 MIN: CPT | Mod: S$GLB,,, | Performed by: NURSE PRACTITIONER

## 2021-06-14 PROCEDURE — 83036 HEMOGLOBIN GLYCOSYLATED A1C: CPT | Mod: QW,,, | Performed by: NURSE PRACTITIONER

## 2021-06-14 PROCEDURE — 3077F SYST BP >= 140 MM HG: CPT | Mod: S$GLB,,, | Performed by: NURSE PRACTITIONER

## 2021-06-14 PROCEDURE — 71100 X-RAY EXAM RIBS UNI 2 VIEWS: CPT | Mod: TC,PO,RT

## 2021-06-14 PROCEDURE — 83036 POCT HEMOGLOBIN A1C: ICD-10-PCS | Mod: QW,,, | Performed by: NURSE PRACTITIONER

## 2021-06-14 RX ORDER — METHOTREXATE 2.5 MG/1
2.5 TABLET ORAL
Qty: 4 TABLET | Refills: 11
Start: 2021-06-14 | End: 2022-03-08 | Stop reason: SDUPTHER

## 2021-06-14 RX ORDER — NYSTATIN 100000 [USP'U]/ML
6 SUSPENSION ORAL 4 TIMES DAILY
Qty: 473 ML | Refills: 1 | Status: SHIPPED | OUTPATIENT
Start: 2021-06-14 | End: 2021-06-24

## 2021-06-14 RX ORDER — AMLODIPINE BESYLATE 5 MG/1
5 TABLET ORAL DAILY
Qty: 30 TABLET | Refills: 11 | Status: SHIPPED | OUTPATIENT
Start: 2021-06-14 | End: 2021-08-24

## 2021-06-14 RX ORDER — HYDRALAZINE HYDROCHLORIDE 100 MG/1
100 TABLET, FILM COATED ORAL EVERY 8 HOURS
Qty: 90 TABLET | Refills: 11 | Status: SHIPPED | OUTPATIENT
Start: 2021-06-14 | End: 2023-08-02

## 2021-06-14 RX ORDER — FLUCONAZOLE 150 MG/1
150 TABLET ORAL DAILY
Qty: 2 TABLET | Refills: 0 | Status: SHIPPED | OUTPATIENT
Start: 2021-06-14 | End: 2021-06-15

## 2021-06-16 ENCOUNTER — TELEPHONE (OUTPATIENT)
Dept: FAMILY MEDICINE | Facility: CLINIC | Age: 61
End: 2021-06-16

## 2021-06-18 ENCOUNTER — PATIENT MESSAGE (OUTPATIENT)
Dept: UROLOGY | Facility: CLINIC | Age: 61
End: 2021-06-18

## 2021-06-21 RX ORDER — OXYBUTYNIN CHLORIDE 5 MG/1
5 TABLET, EXTENDED RELEASE ORAL DAILY
Qty: 30 TABLET | Refills: 11 | Status: SHIPPED | OUTPATIENT
Start: 2021-06-21 | End: 2021-08-18

## 2021-06-29 ENCOUNTER — PATIENT MESSAGE (OUTPATIENT)
Dept: FAMILY MEDICINE | Facility: CLINIC | Age: 61
End: 2021-06-29

## 2021-06-29 DIAGNOSIS — F41.9 ANXIETY: ICD-10-CM

## 2021-06-29 RX ORDER — LORAZEPAM 2 MG/1
2 TABLET ORAL DAILY
Qty: 30 TABLET | Refills: 2 | Status: SHIPPED | OUTPATIENT
Start: 2021-06-29 | End: 2021-10-11 | Stop reason: SDUPTHER

## 2021-07-08 ENCOUNTER — HOSPITAL ENCOUNTER (OUTPATIENT)
Dept: RADIOLOGY | Facility: HOSPITAL | Age: 61
Discharge: HOME | End: 2021-07-08
Attending: NURSE PRACTITIONER
Payer: COMMERCIAL

## 2021-07-08 DIAGNOSIS — M35.3 POLYMYALGIA RHEUMATICA: ICD-10-CM

## 2021-07-08 DIAGNOSIS — Z79.52 LONG TERM (CURRENT) USE OF SYSTEMIC STEROIDS: ICD-10-CM

## 2021-07-08 PROCEDURE — 77080 DXA BONE DENSITY AXIAL: CPT | Mod: TC,PO

## 2021-07-12 ENCOUNTER — TELEPHONE (OUTPATIENT)
Dept: FAMILY MEDICINE | Facility: CLINIC | Age: 61
End: 2021-07-12

## 2021-07-12 RX ORDER — CALCIUM CARBONATE 600 MG
600 TABLET ORAL 2 TIMES DAILY WITH MEALS
COMMUNITY

## 2021-07-12 RX ORDER — CHOLECALCIFEROL (VITAMIN D3) 25 MCG
2000 TABLET ORAL DAILY
COMMUNITY

## 2021-07-22 ENCOUNTER — LAB VISIT (OUTPATIENT)
Dept: LAB | Facility: HOSPITAL | Age: 61
End: 2021-07-22
Attending: UROLOGY
Payer: COMMERCIAL

## 2021-07-22 DIAGNOSIS — Z85.46 HISTORY OF PROSTATE CANCER: ICD-10-CM

## 2021-07-22 LAB — COMPLEXED PSA SERPL-MCNC: <0.01 NG/ML (ref 0–4)

## 2021-07-22 PROCEDURE — 84153 ASSAY OF PSA TOTAL: CPT | Performed by: UROLOGY

## 2021-07-22 PROCEDURE — 36415 COLL VENOUS BLD VENIPUNCTURE: CPT | Performed by: UROLOGY

## 2021-07-26 ENCOUNTER — PATIENT MESSAGE (OUTPATIENT)
Dept: UROLOGY | Facility: CLINIC | Age: 61
End: 2021-07-26

## 2021-07-26 ENCOUNTER — PATIENT MESSAGE (OUTPATIENT)
Dept: FAMILY MEDICINE | Facility: CLINIC | Age: 61
End: 2021-07-26

## 2021-07-27 ENCOUNTER — PATIENT MESSAGE (OUTPATIENT)
Dept: FAMILY MEDICINE | Facility: CLINIC | Age: 61
End: 2021-07-27

## 2021-07-27 ENCOUNTER — TELEPHONE (OUTPATIENT)
Dept: FAMILY MEDICINE | Facility: CLINIC | Age: 61
End: 2021-07-27

## 2021-07-27 DIAGNOSIS — U07.1 COVID-19: ICD-10-CM

## 2021-07-27 DIAGNOSIS — U07.1 COVID-19: Primary | ICD-10-CM

## 2021-07-27 DIAGNOSIS — M35.3 POLYMYALGIA RHEUMATICA: Primary | ICD-10-CM

## 2021-07-27 RX ORDER — HYDROXYCHLOROQUINE SULFATE 200 MG/1
400 TABLET, FILM COATED ORAL 2 TIMES DAILY
Qty: 28 TABLET | Refills: 0 | Status: SHIPPED | OUTPATIENT
Start: 2021-07-27 | End: 2021-08-03

## 2021-07-28 ENCOUNTER — INFUSION (OUTPATIENT)
Dept: INFECTIOUS DISEASES | Facility: HOSPITAL | Age: 61
End: 2021-07-28
Payer: COMMERCIAL

## 2021-07-28 VITALS
RESPIRATION RATE: 19 BRPM | DIASTOLIC BLOOD PRESSURE: 85 MMHG | SYSTOLIC BLOOD PRESSURE: 146 MMHG | HEART RATE: 60 BPM | TEMPERATURE: 99 F | OXYGEN SATURATION: 95 %

## 2021-07-28 DIAGNOSIS — U07.1 COVID-19: ICD-10-CM

## 2021-07-28 PROCEDURE — M0243 CASIRIVI AND IMDEVI INFUSION: HCPCS | Performed by: NURSE PRACTITIONER

## 2021-07-28 PROCEDURE — 63600175 PHARM REV CODE 636 W HCPCS: Performed by: NURSE PRACTITIONER

## 2021-07-28 PROCEDURE — 25000003 PHARM REV CODE 250: Performed by: NURSE PRACTITIONER

## 2021-07-28 RX ORDER — ALBUTEROL SULFATE 90 UG/1
2 AEROSOL, METERED RESPIRATORY (INHALATION)
Status: DISCONTINUED | OUTPATIENT
Start: 2021-07-28 | End: 2023-01-31

## 2021-07-28 RX ORDER — DIPHENHYDRAMINE HYDROCHLORIDE 50 MG/ML
25 INJECTION INTRAMUSCULAR; INTRAVENOUS ONCE AS NEEDED
Status: DISCONTINUED | OUTPATIENT
Start: 2021-07-28 | End: 2023-01-31

## 2021-07-28 RX ORDER — ACETAMINOPHEN 325 MG/1
650 TABLET ORAL ONCE AS NEEDED
Status: DISCONTINUED | OUTPATIENT
Start: 2021-07-28 | End: 2023-01-31

## 2021-07-28 RX ORDER — EPINEPHRINE 0.3 MG/.3ML
0.3 INJECTION SUBCUTANEOUS
Status: DISCONTINUED | OUTPATIENT
Start: 2021-07-28 | End: 2023-01-31

## 2021-07-28 RX ORDER — ONDANSETRON 2 MG/ML
4 INJECTION INTRAMUSCULAR; INTRAVENOUS ONCE AS NEEDED
Status: DISCONTINUED | OUTPATIENT
Start: 2021-07-28 | End: 2023-01-31

## 2021-07-28 RX ORDER — SODIUM CHLORIDE 0.9 % (FLUSH) 0.9 %
10 SYRINGE (ML) INJECTION
Status: DISCONTINUED | OUTPATIENT
Start: 2021-07-28 | End: 2022-06-13

## 2021-07-28 RX ADMIN — CASIRIVIMAB 600 MG: 1332 INJECTION, SOLUTION, CONCENTRATE INTRAVENOUS at 01:07

## 2021-07-28 RX ADMIN — SODIUM CHLORIDE: 0.9 INJECTION, SOLUTION INTRAVENOUS at 01:07

## 2021-07-29 ENCOUNTER — PATIENT MESSAGE (OUTPATIENT)
Dept: FAMILY MEDICINE | Facility: CLINIC | Age: 61
End: 2021-07-29

## 2021-08-06 ENCOUNTER — PATIENT MESSAGE (OUTPATIENT)
Dept: FAMILY MEDICINE | Facility: CLINIC | Age: 61
End: 2021-08-06

## 2021-08-12 ENCOUNTER — PATIENT MESSAGE (OUTPATIENT)
Dept: UROLOGY | Facility: CLINIC | Age: 61
End: 2021-08-12

## 2021-08-16 ENCOUNTER — PATIENT MESSAGE (OUTPATIENT)
Dept: UROLOGY | Facility: CLINIC | Age: 61
End: 2021-08-16

## 2021-08-18 ENCOUNTER — OFFICE VISIT (OUTPATIENT)
Dept: UROLOGY | Facility: CLINIC | Age: 61
End: 2021-08-18
Payer: COMMERCIAL

## 2021-08-18 DIAGNOSIS — N52.31 ERECTILE DYSFUNCTION AFTER RADICAL PROSTATECTOMY: ICD-10-CM

## 2021-08-18 DIAGNOSIS — Z85.46 HISTORY OF PROSTATE CANCER: Primary | ICD-10-CM

## 2021-08-18 PROCEDURE — 99215 OFFICE O/P EST HI 40 MIN: CPT | Mod: 95,,, | Performed by: UROLOGY

## 2021-08-18 PROCEDURE — 1160F RVW MEDS BY RX/DR IN RCRD: CPT | Mod: CPTII,,, | Performed by: UROLOGY

## 2021-08-18 PROCEDURE — 3044F HG A1C LEVEL LT 7.0%: CPT | Mod: CPTII,,, | Performed by: UROLOGY

## 2021-08-18 PROCEDURE — 1159F MED LIST DOCD IN RCRD: CPT | Mod: CPTII,,, | Performed by: UROLOGY

## 2021-08-18 PROCEDURE — 1160F PR REVIEW ALL MEDS BY PRESCRIBER/CLIN PHARMACIST DOCUMENTED: ICD-10-PCS | Mod: CPTII,,, | Performed by: UROLOGY

## 2021-08-18 PROCEDURE — 99215 PR OFFICE/OUTPT VISIT, EST, LEVL V, 40-54 MIN: ICD-10-PCS | Mod: 95,,, | Performed by: UROLOGY

## 2021-08-18 PROCEDURE — 1159F PR MEDICATION LIST DOCUMENTED IN MEDICAL RECORD: ICD-10-PCS | Mod: CPTII,,, | Performed by: UROLOGY

## 2021-08-18 PROCEDURE — 3044F PR MOST RECENT HEMOGLOBIN A1C LEVEL <7.0%: ICD-10-PCS | Mod: CPTII,,, | Performed by: UROLOGY

## 2021-08-18 RX ORDER — TADALAFIL 20 MG/1
20 TABLET ORAL
Qty: 9 TABLET | Refills: 11 | Status: SHIPPED | OUTPATIENT
Start: 2021-08-18 | End: 2022-08-18

## 2021-08-18 RX ORDER — SILDENAFIL 100 MG/1
100 TABLET, FILM COATED ORAL
Qty: 9 TABLET | Refills: 11 | Status: SHIPPED | OUTPATIENT
Start: 2021-08-18 | End: 2021-11-16 | Stop reason: SDUPTHER

## 2021-08-24 ENCOUNTER — OFFICE VISIT (OUTPATIENT)
Dept: FAMILY MEDICINE | Facility: CLINIC | Age: 61
End: 2021-08-24
Payer: COMMERCIAL

## 2021-08-24 ENCOUNTER — HOSPITAL ENCOUNTER (OUTPATIENT)
Dept: RADIOLOGY | Facility: HOSPITAL | Age: 61
Discharge: HOME | End: 2021-08-24
Attending: NURSE PRACTITIONER
Payer: COMMERCIAL

## 2021-08-24 VITALS
BODY MASS INDEX: 24.52 KG/M2 | DIASTOLIC BLOOD PRESSURE: 88 MMHG | WEIGHT: 185 LBS | SYSTOLIC BLOOD PRESSURE: 136 MMHG | HEART RATE: 64 BPM | HEIGHT: 73 IN

## 2021-08-24 DIAGNOSIS — Z86.16 HISTORY OF COVID-19: ICD-10-CM

## 2021-08-24 DIAGNOSIS — R07.81 RIB PAIN ON RIGHT SIDE: ICD-10-CM

## 2021-08-24 DIAGNOSIS — R05.9 COUGH: ICD-10-CM

## 2021-08-24 DIAGNOSIS — E03.9 HYPOTHYROIDISM, UNSPECIFIED TYPE: ICD-10-CM

## 2021-08-24 DIAGNOSIS — R06.02 SOB (SHORTNESS OF BREATH): Primary | ICD-10-CM

## 2021-08-24 DIAGNOSIS — Z79.52 LONG TERM (CURRENT) USE OF SYSTEMIC STEROIDS: ICD-10-CM

## 2021-08-24 DIAGNOSIS — R06.02 SOB (SHORTNESS OF BREATH): ICD-10-CM

## 2021-08-24 DIAGNOSIS — M35.3 POLYMYALGIA RHEUMATICA: ICD-10-CM

## 2021-08-24 DIAGNOSIS — F32.A DEPRESSION, UNSPECIFIED DEPRESSION TYPE: ICD-10-CM

## 2021-08-24 PROCEDURE — 3008F BODY MASS INDEX DOCD: CPT | Mod: S$GLB,,, | Performed by: NURSE PRACTITIONER

## 2021-08-24 PROCEDURE — 3079F PR MOST RECENT DIASTOLIC BLOOD PRESSURE 80-89 MM HG: ICD-10-PCS | Mod: S$GLB,,, | Performed by: NURSE PRACTITIONER

## 2021-08-24 PROCEDURE — 71046 X-RAY EXAM CHEST 2 VIEWS: CPT | Mod: TC,PO

## 2021-08-24 PROCEDURE — 3044F PR MOST RECENT HEMOGLOBIN A1C LEVEL <7.0%: ICD-10-PCS | Mod: S$GLB,,, | Performed by: NURSE PRACTITIONER

## 2021-08-24 PROCEDURE — 99214 OFFICE O/P EST MOD 30 MIN: CPT | Mod: S$GLB,,, | Performed by: NURSE PRACTITIONER

## 2021-08-24 PROCEDURE — 3079F DIAST BP 80-89 MM HG: CPT | Mod: S$GLB,,, | Performed by: NURSE PRACTITIONER

## 2021-08-24 PROCEDURE — 99214 PR OFFICE/OUTPT VISIT, EST, LEVL IV, 30-39 MIN: ICD-10-PCS | Mod: S$GLB,,, | Performed by: NURSE PRACTITIONER

## 2021-08-24 PROCEDURE — 3075F SYST BP GE 130 - 139MM HG: CPT | Mod: S$GLB,,, | Performed by: NURSE PRACTITIONER

## 2021-08-24 PROCEDURE — 3008F PR BODY MASS INDEX (BMI) DOCUMENTED: ICD-10-PCS | Mod: S$GLB,,, | Performed by: NURSE PRACTITIONER

## 2021-08-24 PROCEDURE — 3044F HG A1C LEVEL LT 7.0%: CPT | Mod: S$GLB,,, | Performed by: NURSE PRACTITIONER

## 2021-08-24 PROCEDURE — 1160F RVW MEDS BY RX/DR IN RCRD: CPT | Mod: S$GLB,,, | Performed by: NURSE PRACTITIONER

## 2021-08-24 PROCEDURE — 1160F PR REVIEW ALL MEDS BY PRESCRIBER/CLIN PHARMACIST DOCUMENTED: ICD-10-PCS | Mod: S$GLB,,, | Performed by: NURSE PRACTITIONER

## 2021-08-24 PROCEDURE — 3075F PR MOST RECENT SYSTOLIC BLOOD PRESS GE 130-139MM HG: ICD-10-PCS | Mod: S$GLB,,, | Performed by: NURSE PRACTITIONER

## 2021-08-24 PROCEDURE — 71100 X-RAY EXAM RIBS UNI 2 VIEWS: CPT | Mod: TC,PO,RT

## 2021-08-24 RX ORDER — POTASSIUM CHLORIDE 750 MG/1
10 TABLET, EXTENDED RELEASE ORAL DAILY
COMMUNITY
Start: 2021-04-12

## 2021-08-24 RX ORDER — FOLIC ACID 1 MG/1
1000 TABLET ORAL DAILY
COMMUNITY
Start: 2021-06-11

## 2021-08-25 LAB
APPEARANCE UR: CLEAR
BACTERIA #/AREA URNS HPF: NORMAL /HPF
BACTERIA UR CULT: NORMAL
BASOPHILS # BLD AUTO: 40 CELLS/UL (ref 0–200)
BASOPHILS NFR BLD AUTO: 0.7 %
BILIRUB UR QL STRIP: NEGATIVE
COLOR UR: YELLOW
D DIMER PPP FEU-MCNC: 0.33 MCG/ML FEU
EOSINOPHIL # BLD AUTO: 23 CELLS/UL (ref 15–500)
EOSINOPHIL NFR BLD AUTO: 0.4 %
ERYTHROCYTE [DISTWIDTH] IN BLOOD BY AUTOMATED COUNT: 14.1 % (ref 11–15)
GLUCOSE UR QL STRIP: NEGATIVE
HCT VFR BLD AUTO: 39.8 % (ref 38.5–50)
HGB BLD-MCNC: 13.3 G/DL (ref 13.2–17.1)
HGB UR QL STRIP: NEGATIVE
HYALINE CASTS #/AREA URNS LPF: NORMAL /LPF
KETONES UR QL STRIP: NEGATIVE
LEUKOCYTE ESTERASE UR QL STRIP: NEGATIVE
LYMPHOCYTES # BLD AUTO: 946 CELLS/UL (ref 850–3900)
LYMPHOCYTES NFR BLD AUTO: 16.6 %
MCH RBC QN AUTO: 28.9 PG (ref 27–33)
MCHC RBC AUTO-ENTMCNC: 33.4 G/DL (ref 32–36)
MCV RBC AUTO: 86.3 FL (ref 80–100)
MONOCYTES # BLD AUTO: 382 CELLS/UL (ref 200–950)
MONOCYTES NFR BLD AUTO: 6.7 %
NEUTROPHILS # BLD AUTO: 4309 CELLS/UL (ref 1500–7800)
NEUTROPHILS NFR BLD AUTO: 75.6 %
NITRITE UR QL STRIP: NEGATIVE
PH UR STRIP: 7.5 [PH] (ref 5–8)
PLATELET # BLD AUTO: 234 THOUSAND/UL (ref 140–400)
PMV BLD REES-ECKER: 9 FL (ref 7.5–12.5)
PROT UR QL STRIP: NEGATIVE
RBC # BLD AUTO: 4.61 MILLION/UL (ref 4.2–5.8)
RBC #/AREA URNS HPF: NORMAL /HPF
SP GR UR STRIP: 1.01 (ref 1–1.03)
SQUAMOUS #/AREA URNS HPF: NORMAL /HPF
WBC # BLD AUTO: 5.7 THOUSAND/UL (ref 3.8–10.8)
WBC #/AREA URNS HPF: NORMAL /HPF

## 2021-09-09 DIAGNOSIS — R06.02 SHORTNESS OF BREATH: Primary | ICD-10-CM

## 2021-10-04 ENCOUNTER — HOSPITAL ENCOUNTER (OUTPATIENT)
Dept: PULMONOLOGY | Facility: HOSPITAL | Age: 61
Discharge: HOME | End: 2021-10-04
Attending: INTERNAL MEDICINE
Payer: COMMERCIAL

## 2021-10-04 DIAGNOSIS — R06.02 SHORTNESS OF BREATH: ICD-10-CM

## 2021-10-04 PROCEDURE — 94010 BREATHING CAPACITY TEST: CPT

## 2021-10-04 PROCEDURE — 94727 GAS DIL/WSHOT DETER LNG VOL: CPT

## 2021-10-04 PROCEDURE — 94729 DIFFUSING CAPACITY: CPT

## 2021-10-11 ENCOUNTER — PATIENT MESSAGE (OUTPATIENT)
Dept: FAMILY MEDICINE | Facility: CLINIC | Age: 61
End: 2021-10-11

## 2021-10-11 DIAGNOSIS — F41.9 ANXIETY: ICD-10-CM

## 2021-10-12 RX ORDER — LORAZEPAM 2 MG/1
2 TABLET ORAL DAILY
Qty: 30 TABLET | Refills: 2 | Status: SHIPPED | OUTPATIENT
Start: 2021-10-12 | End: 2022-02-02 | Stop reason: SDUPTHER

## 2021-11-16 ENCOUNTER — OFFICE VISIT (OUTPATIENT)
Dept: UROLOGY | Facility: CLINIC | Age: 61
End: 2021-11-16
Payer: COMMERCIAL

## 2021-11-16 VITALS
BODY MASS INDEX: 22.79 KG/M2 | HEIGHT: 73 IN | SYSTOLIC BLOOD PRESSURE: 139 MMHG | DIASTOLIC BLOOD PRESSURE: 98 MMHG | WEIGHT: 171.94 LBS | HEART RATE: 64 BPM

## 2021-11-16 DIAGNOSIS — Z85.46 HISTORY OF PROSTATE CANCER: Primary | ICD-10-CM

## 2021-11-16 LAB
BILIRUB SERPL-MCNC: NORMAL MG/DL
BLOOD URINE, POC: NORMAL
CLARITY, POC UA: CLEAR
COLOR, POC UA: YELLOW
GLUCOSE UR QL STRIP: NORMAL
KETONES UR QL STRIP: NORMAL
LEUKOCYTE ESTERASE URINE, POC: NORMAL
NITRITE, POC UA: NORMAL
PH, POC UA: 7
PROTEIN, POC: NORMAL
SPECIFIC GRAVITY, POC UA: 1.01
UROBILINOGEN, POC UA: 0.2

## 2021-11-16 PROCEDURE — 3080F PR MOST RECENT DIASTOLIC BLOOD PRESSURE >= 90 MM HG: ICD-10-PCS | Mod: CPTII,S$GLB,, | Performed by: UROLOGY

## 2021-11-16 PROCEDURE — 3075F SYST BP GE 130 - 139MM HG: CPT | Mod: CPTII,S$GLB,, | Performed by: UROLOGY

## 2021-11-16 PROCEDURE — 1159F PR MEDICATION LIST DOCUMENTED IN MEDICAL RECORD: ICD-10-PCS | Mod: CPTII,S$GLB,, | Performed by: UROLOGY

## 2021-11-16 PROCEDURE — 99215 OFFICE O/P EST HI 40 MIN: CPT | Mod: S$GLB,,, | Performed by: UROLOGY

## 2021-11-16 PROCEDURE — 3061F PR NEG MICROALBUMINURIA RESULT DOCUMENTED/REVIEW: ICD-10-PCS | Mod: CPTII,S$GLB,, | Performed by: UROLOGY

## 2021-11-16 PROCEDURE — 99999 PR PBB SHADOW E&M-EST. PATIENT-LVL V: ICD-10-PCS | Mod: PBBFAC,,, | Performed by: UROLOGY

## 2021-11-16 PROCEDURE — 3044F PR MOST RECENT HEMOGLOBIN A1C LEVEL <7.0%: ICD-10-PCS | Mod: CPTII,S$GLB,, | Performed by: UROLOGY

## 2021-11-16 PROCEDURE — 99999 PR PBB SHADOW E&M-EST. PATIENT-LVL V: CPT | Mod: PBBFAC,,, | Performed by: UROLOGY

## 2021-11-16 PROCEDURE — 3066F NEPHROPATHY DOC TX: CPT | Mod: CPTII,S$GLB,, | Performed by: UROLOGY

## 2021-11-16 PROCEDURE — 3008F PR BODY MASS INDEX (BMI) DOCUMENTED: ICD-10-PCS | Mod: CPTII,S$GLB,, | Performed by: UROLOGY

## 2021-11-16 PROCEDURE — 1159F MED LIST DOCD IN RCRD: CPT | Mod: CPTII,S$GLB,, | Performed by: UROLOGY

## 2021-11-16 PROCEDURE — 3080F DIAST BP >= 90 MM HG: CPT | Mod: CPTII,S$GLB,, | Performed by: UROLOGY

## 2021-11-16 PROCEDURE — 3061F NEG MICROALBUMINURIA REV: CPT | Mod: CPTII,S$GLB,, | Performed by: UROLOGY

## 2021-11-16 PROCEDURE — 3066F PR DOCUMENTATION OF TREATMENT FOR NEPHROPATHY: ICD-10-PCS | Mod: CPTII,S$GLB,, | Performed by: UROLOGY

## 2021-11-16 PROCEDURE — 1160F PR REVIEW ALL MEDS BY PRESCRIBER/CLIN PHARMACIST DOCUMENTED: ICD-10-PCS | Mod: CPTII,S$GLB,, | Performed by: UROLOGY

## 2021-11-16 PROCEDURE — 99215 PR OFFICE/OUTPT VISIT, EST, LEVL V, 40-54 MIN: ICD-10-PCS | Mod: S$GLB,,, | Performed by: UROLOGY

## 2021-11-16 PROCEDURE — 3075F PR MOST RECENT SYSTOLIC BLOOD PRESS GE 130-139MM HG: ICD-10-PCS | Mod: CPTII,S$GLB,, | Performed by: UROLOGY

## 2021-11-16 PROCEDURE — 3008F BODY MASS INDEX DOCD: CPT | Mod: CPTII,S$GLB,, | Performed by: UROLOGY

## 2021-11-16 PROCEDURE — 81002 URINALYSIS NONAUTO W/O SCOPE: CPT | Mod: S$GLB,,, | Performed by: UROLOGY

## 2021-11-16 PROCEDURE — 81002 POCT URINE DIPSTICK WITHOUT MICROSCOPE: ICD-10-PCS | Mod: S$GLB,,, | Performed by: UROLOGY

## 2021-11-16 PROCEDURE — 3044F HG A1C LEVEL LT 7.0%: CPT | Mod: CPTII,S$GLB,, | Performed by: UROLOGY

## 2021-11-16 PROCEDURE — 1160F RVW MEDS BY RX/DR IN RCRD: CPT | Mod: CPTII,S$GLB,, | Performed by: UROLOGY

## 2021-11-16 RX ORDER — SILDENAFIL 100 MG/1
100 TABLET, FILM COATED ORAL
Qty: 9 TABLET | Refills: 11 | Status: SHIPPED | OUTPATIENT
Start: 2021-11-16 | End: 2022-06-13

## 2021-12-28 ENCOUNTER — PATIENT MESSAGE (OUTPATIENT)
Dept: FAMILY MEDICINE | Facility: CLINIC | Age: 61
End: 2021-12-28
Payer: COMMERCIAL

## 2021-12-28 RX ORDER — LEVOTHYROXINE SODIUM 150 UG/1
150 TABLET ORAL
Qty: 30 TABLET | Refills: 2 | Status: SHIPPED | OUTPATIENT
Start: 2021-12-28 | End: 2022-03-08 | Stop reason: SDUPTHER

## 2022-02-02 ENCOUNTER — PATIENT MESSAGE (OUTPATIENT)
Dept: FAMILY MEDICINE | Facility: CLINIC | Age: 62
End: 2022-02-02
Payer: COMMERCIAL

## 2022-02-02 DIAGNOSIS — F41.9 ANXIETY: ICD-10-CM

## 2022-02-02 RX ORDER — LORAZEPAM 2 MG/1
2 TABLET ORAL DAILY
Qty: 30 TABLET | Refills: 0 | Status: SHIPPED | OUTPATIENT
Start: 2022-02-02 | End: 2022-03-08 | Stop reason: SDUPTHER

## 2022-03-08 ENCOUNTER — OFFICE VISIT (OUTPATIENT)
Dept: FAMILY MEDICINE | Facility: CLINIC | Age: 62
End: 2022-03-08
Payer: COMMERCIAL

## 2022-03-08 ENCOUNTER — TELEPHONE (OUTPATIENT)
Dept: FAMILY MEDICINE | Facility: CLINIC | Age: 62
End: 2022-03-08
Payer: COMMERCIAL

## 2022-03-08 VITALS
DIASTOLIC BLOOD PRESSURE: 78 MMHG | HEART RATE: 68 BPM | BODY MASS INDEX: 24.78 KG/M2 | SYSTOLIC BLOOD PRESSURE: 138 MMHG | WEIGHT: 187 LBS | HEIGHT: 73 IN

## 2022-03-08 DIAGNOSIS — I10 PRIMARY HYPERTENSION: ICD-10-CM

## 2022-03-08 DIAGNOSIS — F41.9 ANXIETY: ICD-10-CM

## 2022-03-08 DIAGNOSIS — I10 ESSENTIAL HYPERTENSION: ICD-10-CM

## 2022-03-08 DIAGNOSIS — M35.3 POLYMYALGIA RHEUMATICA: ICD-10-CM

## 2022-03-08 DIAGNOSIS — E03.9 HYPOTHYROIDISM, UNSPECIFIED TYPE: ICD-10-CM

## 2022-03-08 DIAGNOSIS — Z79.899 HIGH RISK MEDICATION USE: Primary | ICD-10-CM

## 2022-03-08 DIAGNOSIS — E55.9 VITAMIN D DEFICIENCY: ICD-10-CM

## 2022-03-08 PROCEDURE — 4010F ACE/ARB THERAPY RXD/TAKEN: CPT | Mod: S$GLB,,, | Performed by: NURSE PRACTITIONER

## 2022-03-08 PROCEDURE — 3078F PR MOST RECENT DIASTOLIC BLOOD PRESSURE < 80 MM HG: ICD-10-PCS | Mod: S$GLB,,, | Performed by: NURSE PRACTITIONER

## 2022-03-08 PROCEDURE — 3066F NEPHROPATHY DOC TX: CPT | Mod: S$GLB,,, | Performed by: NURSE PRACTITIONER

## 2022-03-08 PROCEDURE — 99214 PR OFFICE/OUTPT VISIT, EST, LEVL IV, 30-39 MIN: ICD-10-PCS | Mod: S$GLB,,, | Performed by: NURSE PRACTITIONER

## 2022-03-08 PROCEDURE — 99214 OFFICE O/P EST MOD 30 MIN: CPT | Mod: S$GLB,,, | Performed by: NURSE PRACTITIONER

## 2022-03-08 PROCEDURE — 1160F PR REVIEW ALL MEDS BY PRESCRIBER/CLIN PHARMACIST DOCUMENTED: ICD-10-PCS | Mod: S$GLB,,, | Performed by: NURSE PRACTITIONER

## 2022-03-08 PROCEDURE — 3075F PR MOST RECENT SYSTOLIC BLOOD PRESS GE 130-139MM HG: ICD-10-PCS | Mod: S$GLB,,, | Performed by: NURSE PRACTITIONER

## 2022-03-08 PROCEDURE — 3008F PR BODY MASS INDEX (BMI) DOCUMENTED: ICD-10-PCS | Mod: S$GLB,,, | Performed by: NURSE PRACTITIONER

## 2022-03-08 PROCEDURE — 3075F SYST BP GE 130 - 139MM HG: CPT | Mod: S$GLB,,, | Performed by: NURSE PRACTITIONER

## 2022-03-08 PROCEDURE — 3061F NEG MICROALBUMINURIA REV: CPT | Mod: S$GLB,,, | Performed by: NURSE PRACTITIONER

## 2022-03-08 PROCEDURE — 3061F PR NEG MICROALBUMINURIA RESULT DOCUMENTED/REVIEW: ICD-10-PCS | Mod: S$GLB,,, | Performed by: NURSE PRACTITIONER

## 2022-03-08 PROCEDURE — 3078F DIAST BP <80 MM HG: CPT | Mod: S$GLB,,, | Performed by: NURSE PRACTITIONER

## 2022-03-08 PROCEDURE — 3008F BODY MASS INDEX DOCD: CPT | Mod: S$GLB,,, | Performed by: NURSE PRACTITIONER

## 2022-03-08 PROCEDURE — 1160F RVW MEDS BY RX/DR IN RCRD: CPT | Mod: S$GLB,,, | Performed by: NURSE PRACTITIONER

## 2022-03-08 PROCEDURE — 4010F PR ACE/ARB THEARPY RXD/TAKEN: ICD-10-PCS | Mod: S$GLB,,, | Performed by: NURSE PRACTITIONER

## 2022-03-08 PROCEDURE — 3066F PR DOCUMENTATION OF TREATMENT FOR NEPHROPATHY: ICD-10-PCS | Mod: S$GLB,,, | Performed by: NURSE PRACTITIONER

## 2022-03-08 RX ORDER — LEVOTHYROXINE SODIUM 175 UG/1
175 TABLET ORAL
Qty: 30 TABLET | Refills: 2 | Status: SHIPPED | OUTPATIENT
Start: 2022-03-08 | End: 2022-09-06

## 2022-03-08 RX ORDER — LEVOTHYROXINE SODIUM 175 UG/1
175 TABLET ORAL
COMMUNITY
End: 2022-03-08 | Stop reason: SDUPTHER

## 2022-03-08 RX ORDER — LEVOTHYROXINE SODIUM 150 UG/1
150 TABLET ORAL
Qty: 30 TABLET | Refills: 2 | Status: SHIPPED | OUTPATIENT
Start: 2022-03-08 | End: 2022-09-06

## 2022-03-08 RX ORDER — LORAZEPAM 2 MG/1
2 TABLET ORAL DAILY
Qty: 30 TABLET | Refills: 5 | Status: SHIPPED | OUTPATIENT
Start: 2022-03-08 | End: 2022-11-08 | Stop reason: SDUPTHER

## 2022-03-08 RX ORDER — LOSARTAN POTASSIUM 100 MG/1
TABLET ORAL
COMMUNITY
Start: 2022-02-23 | End: 2023-08-02

## 2022-03-08 RX ORDER — PANTOPRAZOLE SODIUM 40 MG/1
40 TABLET, DELAYED RELEASE ORAL DAILY
Qty: 90 TABLET | Refills: 1 | Status: SHIPPED | OUTPATIENT
Start: 2022-03-08 | End: 2023-08-02

## 2022-03-08 RX ORDER — OXYBUTYNIN CHLORIDE 5 MG/1
TABLET, EXTENDED RELEASE ORAL
COMMUNITY
Start: 2021-12-09 | End: 2024-01-08

## 2022-03-08 RX ORDER — HYDROCHLOROTHIAZIDE 25 MG/1
TABLET ORAL
COMMUNITY
Start: 2022-02-23 | End: 2023-08-02

## 2022-03-08 RX ORDER — METHOTREXATE 2.5 MG/1
15 TABLET ORAL
Qty: 4 TABLET | Refills: 11
Start: 2022-03-08 | End: 2023-08-02

## 2022-03-08 NOTE — TELEPHONE ENCOUNTER
----- Message from Danielle Cueto sent at 3/8/2022  2:31 PM CST -----  [facility] calling said they have two different strength of levothyroxine were received they need to know are they to be taken together or which dose is correct. [phone]

## 2022-03-08 NOTE — TELEPHONE ENCOUNTER
Spoke with pharmacy and let them know he is alternating between both doses, pharmacy verbalized understanding

## 2022-03-08 NOTE — PROGRESS NOTES
SUBJECTIVE:    Patient ID: Phoenix Fragoso is a 61 y.o. male.    Chief Complaint: Medication Refill (Brought bottles// SW)    61 year old male presents for check up. Being treated htn, hyperlipidemia, hypothyroid, prostate cancer, and polymyalgia rheumatica. Still on steroids and methotrexate. Taking steroids 5mg daily. Methotrexate 2.5mg 6 once a week. Still has some pain and tingling to hand. Seems to be worse at the end of the day. Has seen opthalmology regarding vision saw dr. Williamson. Wants to get second opinion. Due for labs. Sleeping ok. Some stressors with work      Office Visit on 03/08/2022   Component Date Value Ref Range Status    WBC 03/10/2022 5.4  3.8 - 10.8 Thousand/uL Final    RBC 03/10/2022 4.31  4.20 - 5.80 Million/uL Final    Hemoglobin 03/10/2022 13.2  13.2 - 17.1 g/dL Final    Hematocrit 03/10/2022 38.1 (A) 38.5 - 50.0 % Final    MCV 03/10/2022 88.4  80.0 - 100.0 fL Final    MCH 03/10/2022 30.6  27.0 - 33.0 pg Final    MCHC 03/10/2022 34.6  32.0 - 36.0 g/dL Final    RDW 03/10/2022 12.9  11.0 - 15.0 % Final    Platelets 03/10/2022 222  140 - 400 Thousand/uL Final    MPV 03/10/2022 8.9  7.5 - 12.5 fL Final    Neutrophils, Abs 03/10/2022 3,316  1,500 - 7,800 cells/uL Final    Lymph # 03/10/2022 1,544  850 - 3,900 cells/uL Final    Mono # 03/10/2022 421  200 - 950 cells/uL Final    Eos # 03/10/2022 70  15 - 500 cells/uL Final    Baso # 03/10/2022 49  0 - 200 cells/uL Final    Neutrophils Relative 03/10/2022 61.4  % Final    Lymph % 03/10/2022 28.6  % Final    Mono % 03/10/2022 7.8  % Final    Eosinophil % 03/10/2022 1.3  % Final    Basophil % 03/10/2022 0.9  % Final    Glucose 03/10/2022 95  65 - 99 mg/dL Final    BUN 03/10/2022 22  7 - 25 mg/dL Final    Creatinine 03/10/2022 1.02  0.70 - 1.25 mg/dL Final    eGFR if non African American 03/10/2022 78  > OR = 60 mL/min/1.73m2 Final    eGFR if  03/10/2022 91  > OR = 60 mL/min/1.73m2 Final    BUN/Creatinine  Ratio 03/10/2022 NOT APPLICABLE  6 - 22 (calc) Final    Sodium 03/10/2022 143  135 - 146 mmol/L Final    Potassium 03/10/2022 3.7  3.5 - 5.3 mmol/L Final    Chloride 03/10/2022 105  98 - 110 mmol/L Final    CO2 03/10/2022 32  20 - 32 mmol/L Final    Calcium 03/10/2022 9.1  8.6 - 10.3 mg/dL Final    Total Protein 03/10/2022 6.3  6.1 - 8.1 g/dL Final    Albumin 03/10/2022 3.9  3.6 - 5.1 g/dL Final    Globulin, Total 03/10/2022 2.4  1.9 - 3.7 g/dL (calc) Final    Albumin/Globulin Ratio 03/10/2022 1.6  1.0 - 2.5 (calc) Final    Total Bilirubin 03/10/2022 0.9  0.2 - 1.2 mg/dL Final    Alkaline Phosphatase 03/10/2022 54  35 - 144 U/L Final    AST 03/10/2022 16  10 - 35 U/L Final    ALT 03/10/2022 20  9 - 46 U/L Final    Cholesterol 03/10/2022 176  <200 mg/dL Final    HDL 03/10/2022 42  > OR = 40 mg/dL Final    Triglycerides 03/10/2022 133  <150 mg/dL Final    LDL Cholesterol 03/10/2022 109 (A) mg/dL (calc) Final    HDL/Cholesterol Ratio 03/10/2022 4.2  <5.0 (calc) Final    Non HDL Chol. (LDL+VLDL) 03/10/2022 134 (A) <130 mg/dL (calc) Final    TSH w/reflex to FT4 03/10/2022 2.89  0.40 - 4.50 mIU/L Final    Creatinine, Urine 03/10/2022 240  20 - 320 mg/dL Final    Microalb, Ur 03/10/2022 1.8  See Note: mg/dL Final    Microalb/Creat Ratio 03/10/2022 8  <30 mcg/mg creat Final    Color, UA 03/10/2022 YELLOW  YELLOW Final    Appearance, UA 03/10/2022 CLEAR  CLEAR Final    Specific Gravity, UA 03/10/2022 1.022  1.001 - 1.035 Final    pH, UA 03/10/2022 6.5  5.0 - 8.0 Final    Glucose, UA 03/10/2022 NEGATIVE  NEGATIVE Final    Bilirubin, UA 03/10/2022 NEGATIVE  NEGATIVE Final    Ketones, UA 03/10/2022 NEGATIVE  NEGATIVE Final    Occult Blood UA 03/10/2022 NEGATIVE  NEGATIVE Final    Protein, UA 03/10/2022 NEGATIVE  NEGATIVE Final    Nitrite, UA 03/10/2022 NEGATIVE  NEGATIVE Final    Leukocytes, UA 03/10/2022 NEGATIVE  NEGATIVE Final    WBC Casts, UA 03/10/2022 NONE SEEN  < OR = 5 /HPF Final     RBC Casts, UA 03/10/2022 NONE SEEN  < OR = 2 /HPF Final    Squam Epithel, UA 03/10/2022 NONE SEEN  < OR = 5 /HPF Final    Bacteria, UA 03/10/2022 NONE SEEN  NONE SEEN /HPF Final    Ca Oxalate Shira, UA 03/10/2022 FEW  NONE OR FEW /HPF Final    Hyaline Casts, UA 03/10/2022 NONE SEEN  NONE SEEN /LPF Final    Reflexive Urine Culture 03/10/2022    Final    Vitamin D, 25-OH, Total 03/10/2022 29 (A) 30 - 100 ng/mL Final   Office Visit on 11/16/2021   Component Date Value Ref Range Status    Color, UA 11/16/2021 Yellow   Final    pH, UA 11/16/2021 7   Final    WBC, UA 11/16/2021 neg   Final    Nitrite, UA 11/16/2021 neg   Final    Protein, POC 11/16/2021 neg   Final    Glucose, UA 11/16/2021 neg   Final    Ketones, UA 11/16/2021 neg   Final    Urobilinogen, UA 11/16/2021 0.2   Final    Bilirubin, POC 11/16/2021 neg   Final    Blood, UA 11/16/2021 neg   Final    Clarity, UA 11/16/2021 Clear   Final    Spec Grav UA 11/16/2021 1.015   Final       Past Medical History:   Diagnosis Date    Bell's palsy     High cholesterol     Hypertension     Insomnia     Thyroid disease      Social History     Socioeconomic History    Marital status:    Tobacco Use    Smoking status: Never Smoker    Smokeless tobacco: Never Used   Substance and Sexual Activity    Alcohol use: Yes     Comment: OCCASIONALLY    Drug use: No    Sexual activity: Yes     Partners: Female     Social Determinants of Health     Financial Resource Strain: Medium Risk    Difficulty of Paying Living Expenses: Somewhat hard   Food Insecurity: No Food Insecurity    Worried About Running Out of Food in the Last Year: Never true    Ran Out of Food in the Last Year: Never true   Transportation Needs: No Transportation Needs    Lack of Transportation (Medical): No    Lack of Transportation (Non-Medical): No   Physical Activity: Sufficiently Active    Days of Exercise per Week: 5 days    Minutes of Exercise per Session: 30 min   Stress:  Stress Concern Present    Feeling of Stress : Very much   Social Connections: Unknown    Frequency of Communication with Friends and Family: Once a week    Frequency of Social Gatherings with Friends and Family: Once a week    Active Member of Clubs or Organizations: Yes    Attends Club or Organization Meetings: 1 to 4 times per year    Marital Status:    Housing Stability: Low Risk     Unable to Pay for Housing in the Last Year: No    Number of Places Lived in the Last Year: 1    Unstable Housing in the Last Year: No     Past Surgical History:   Procedure Laterality Date    APPENDECTOMY      CHOLECYSTECTOMY      PROSTATE BIOPSY      PROSTATE SURGERY      right knee arthroscopy      ROBOT-ASSISTED LAPAROSCOPIC PROSTATECTOMY N/A 10/16/2019    Procedure: ROBOTIC PROSTATECTOMY;  Surgeon: Walker Brooks MD;  Location: [facility];  Service: Urology;  Laterality: N/A;     History reviewed. No pertinent family history.    Review of patient's allergies indicates:   Allergen Reactions    Ancef [cefazolin] Anaphylaxis     Anaphylactic shock        Current Outpatient Medications:     ascorbic acid, vitamin C, (VITAMIN C) 100 MG tablet, Take 100 mg by mouth once daily., Disp: , Rfl:     aspirin (ECOTRIN) 81 MG EC tablet, Take 81 mg by mouth once daily., Disp: , Rfl:     calcium carbonate (OS-BEAU) 600 mg calcium (1,500 mg) Tab, Take 600 mg by mouth 2 (two) times daily with meals., Disp: , Rfl:     carvediloL (COREG) 12.5 MG tablet, Take 12.5 mg by mouth 2 (two) times daily with meals., Disp: , Rfl:     fish oil-omega-3 fatty acids 300-1,000 mg capsule, Take 2 capsules by mouth once daily. , Disp: , Rfl:     folic acid (FOLVITE) 1 MG tablet, Take 1,000 mcg by mouth once daily., Disp: , Rfl:     hydrALAZINE (APRESOLINE) 100 MG tablet, Take 1 tablet (100 mg total) by mouth every 8 (eight) hours., Disp: 90 tablet, Rfl: 11    hydroCHLOROthiazide (HYDRODIURIL) 25 MG tablet, , Disp: , Rfl:     losartan  (COZAAR) 100 MG tablet, , Disp: , Rfl:     losartan-hydrochlorothiazide 100-25 mg (HYZAAR) 100-25 mg per tablet, Take 1 tablet by mouth once daily., Disp: , Rfl: 1    methotrexate 2.5 MG Tab, Take 6 tablets (15 mg total) by mouth every 7 days., Disp: 4 tablet, Rfl: 11    mirabegron (MYRBETRIQ) 50 mg Tb24, Take 1 tablet (50 mg total) by mouth once daily., Disp: 90 tablet, Rfl: 3    multivitamin-minerals-lutein Tab, Take 1 tablet by mouth once daily., Disp: , Rfl:     oxybutynin (DITROPAN-XL) 5 MG TR24, TAKE ONE TABLET (5MG TOTAL) BY MOUTH ONCE DAILY, Disp: , Rfl:     potassium chloride (KLOR-CON) 10 MEQ TbSR, Take 10 mEq by mouth once daily., Disp: , Rfl:     predniSONE (DELTASONE) 5 MG tablet, Take 1 tablet (5 mg total) by mouth once daily., Disp: 30 tablet, Rfl: 0    sildenafiL (VIAGRA) 100 MG tablet, Take 1 tablet (100 mg total) by mouth as needed for Erectile Dysfunction., Disp: 9 tablet, Rfl: 11    tadalafiL (CIALIS) 20 MG Tab, Take 1 tablet (20 mg total) by mouth as needed (ed)., Disp: 9 tablet, Rfl: 11    tadalafiL (CIALIS) 5 MG tablet, TAKE ONE TABLET BY MOUTH EVERY DAY AS NEEDED FOR erectile dysfunction, Disp: 30 tablet, Rfl: 5    vitamin D (VITAMIN D3) 1000 units Tab, Take 2,000 Units by mouth once daily., Disp: , Rfl:     levothyroxine (SYNTHROID) 150 MCG tablet, Take 1 tablet (150 mcg total) by mouth before breakfast. (Patient taking differently: Take 150 mcg by mouth every other day.), Disp: 30 tablet, Rfl: 2    levothyroxine (SYNTHROID, LEVOTHROID) 175 MCG tablet, Take 1 tablet (175 mcg total) by mouth before breakfast. (Patient taking differently: Take 175 mcg by mouth every other day.), Disp: 30 tablet, Rfl: 2    LORazepam (ATIVAN) 2 MG Tab, Take 1 tablet (2 mg total) by mouth once daily., Disp: 30 tablet, Rfl: 5    pantoprazole (PROTONIX) 40 MG tablet, Take 1 tablet (40 mg total) by mouth once daily., Disp: 90 tablet, Rfl: 1    Current Facility-Administered Medications:      "acetaminophen tablet 650 mg, 650 mg, Oral, Once PRN, Brigette Robledo NP    albuterol inhaler 2 puff, 2 puff, Inhalation, Q20 Min PRN, Brigette Robledo NP    diphenhydrAMINE injection 25 mg, 25 mg, Intravenous, Once PRN, Brigette Robledo NP    EPINEPHrine (EPIPEN) 0.3 mg/0.3 mL pen injection 0.3 mg, 0.3 mg, Intramuscular, PRN, Brigette Robledo NP    methylPREDNISolone sodium succinate injection 40 mg, 40 mg, Intravenous, Once PRN, Brigette Robledo NP    ondansetron injection 4 mg, 4 mg, Intravenous, Once PRN, Brigette Robledo NP    sodium chloride 0.9% 500 mL flush bag, , Intravenous, PRN, Brigette Robledo NP, Stopped at 07/28/21 1516    sodium chloride 0.9% flush 10 mL, 10 mL, Intravenous, PRN, Brigette Robledo NP    Review of Systems   Constitutional: Negative for fatigue, fever and unexpected weight change.   HENT: Negative for ear pain, sinus pressure and sore throat.    Eyes: Negative for pain.   Respiratory: Negative for cough and shortness of breath.    Cardiovascular: Negative for chest pain and leg swelling.   Gastrointestinal: Negative for abdominal pain, constipation, nausea and vomiting.   Genitourinary: Negative for dysuria, frequency and urgency.   Musculoskeletal: Positive for arthralgias and myalgias.   Skin: Negative for rash.   Neurological: Negative for dizziness, weakness and headaches.   Psychiatric/Behavioral: Negative for sleep disturbance.          Objective:      Vitals:    03/08/22 1323   BP: 138/78   Pulse: 68   Weight: 84.8 kg (187 lb)   Height: 6' 1" (1.854 m)     Physical Exam  Constitutional:       General: He is not in acute distress.     Appearance: Normal appearance. He is well-developed. He is not ill-appearing.   HENT:      Right Ear: External ear normal.      Left Ear: External ear normal.   Eyes:      Pupils: Pupils are equal, round, and reactive to light.   Neck:      Trachea: No tracheal deviation.   Cardiovascular:      Rate and Rhythm: Normal rate and regular rhythm.      Heart sounds: No murmur " heard.    No friction rub. No gallop.   Pulmonary:      Breath sounds: Normal breath sounds. No stridor. No wheezing or rales.      Comments: Pulse ox 95  Chest:       Abdominal:      Palpations: Abdomen is soft. There is no mass.      Tenderness: There is no abdominal tenderness.   Musculoskeletal:         General: No tenderness or deformity.      Cervical back: Neck supple.   Lymphadenopathy:      Cervical: No cervical adenopathy.   Skin:     General: Skin is warm and dry.   Neurological:      Mental Status: He is alert and oriented to person, place, and time.      Motor: No weakness.      Coordination: Coordination normal.      Gait: Gait is intact.   Psychiatric:         Thought Content: Thought content normal.           Assessment:       1. High risk medication use    2. Anxiety    3. Primary hypertension    4. Polymyalgia rheumatica    5. Essential hypertension    6. Hypothyroidism, unspecified type    7. Vitamin D deficiency         Plan:       High risk medication use  -     CBC Auto Differential; Future; Expected date: 03/08/2022  -     Comprehensive Metabolic Panel; Future; Expected date: 03/08/2022  -     Lipid Panel; Future; Expected date: 03/08/2022  -     TSH w/reflex to FT4; Future; Expected date: 03/08/2022  -     Microalbumin/Creatinine Ratio, Urine; Future; Expected date: 03/08/2022  -     Urinalysis, Reflex to Urine Culture Urine, Clean Catch; Future; Expected date: 03/08/2022    Anxiety  -     LORazepam (ATIVAN) 2 MG Tab; Take 1 tablet (2 mg total) by mouth once daily.  Dispense: 30 tablet; Refill: 5    Primary hypertension    Polymyalgia rheumatica  -     methotrexate 2.5 MG Tab; Take 6 tablets (15 mg total) by mouth every 7 days.  Dispense: 4 tablet; Refill: 11  -     CBC Auto Differential; Future; Expected date: 03/08/2022  -     Comprehensive Metabolic Panel; Future; Expected date: 03/08/2022  -     Lipid Panel; Future; Expected date: 03/08/2022  -     TSH w/reflex to FT4; Future; Expected  date: 03/08/2022  -     Microalbumin/Creatinine Ratio, Urine; Future; Expected date: 03/08/2022  -     Urinalysis, Reflex to Urine Culture Urine, Clean Catch; Future; Expected date: 03/08/2022    Essential hypertension  -     CBC Auto Differential; Future; Expected date: 03/08/2022  -     Comprehensive Metabolic Panel; Future; Expected date: 03/08/2022  -     Lipid Panel; Future; Expected date: 03/08/2022  -     TSH w/reflex to FT4; Future; Expected date: 03/08/2022  -     Microalbumin/Creatinine Ratio, Urine; Future; Expected date: 03/08/2022  -     Urinalysis, Reflex to Urine Culture Urine, Clean Catch; Future; Expected date: 03/08/2022    Hypothyroidism, unspecified type  -     levothyroxine (SYNTHROID) 150 MCG tablet; Take 1 tablet (150 mcg total) by mouth before breakfast. (Patient taking differently: Take 150 mcg by mouth every other day.)  Dispense: 30 tablet; Refill: 2  -     levothyroxine (SYNTHROID, LEVOTHROID) 175 MCG tablet; Take 1 tablet (175 mcg total) by mouth before breakfast. (Patient taking differently: Take 175 mcg by mouth every other day.)  Dispense: 30 tablet; Refill: 2  -     CBC Auto Differential; Future; Expected date: 03/08/2022  -     Comprehensive Metabolic Panel; Future; Expected date: 03/08/2022  -     Lipid Panel; Future; Expected date: 03/08/2022  -     TSH w/reflex to FT4; Future; Expected date: 03/08/2022  -     Microalbumin/Creatinine Ratio, Urine; Future; Expected date: 03/08/2022  -     Urinalysis, Reflex to Urine Culture Urine, Clean Catch; Future; Expected date: 03/08/2022    Vitamin D deficiency  -     Vitamin D; Future; Expected date: 03/08/2022    Other orders  -     pantoprazole (PROTONIX) 40 MG tablet; Take 1 tablet (40 mg total) by mouth once daily.  Dispense: 90 tablet; Refill: 1      Follow up in about 6 months (around 9/8/2022) for medication management.        3/14/2022 Brigette Robledo

## 2022-03-11 LAB
25(OH)D3 SERPL-MCNC: 29 NG/ML (ref 30–100)
ALBUMIN SERPL-MCNC: 3.9 G/DL (ref 3.6–5.1)
ALBUMIN/CREAT UR: 8 MCG/MG CREAT
ALBUMIN/GLOB SERPL: 1.6 (CALC) (ref 1–2.5)
ALP SERPL-CCNC: 54 U/L (ref 35–144)
ALT SERPL-CCNC: 20 U/L (ref 9–46)
APPEARANCE UR: CLEAR
AST SERPL-CCNC: 16 U/L (ref 10–35)
BACTERIA #/AREA URNS HPF: NORMAL /HPF
BACTERIA UR CULT: NORMAL
BASOPHILS # BLD AUTO: 49 CELLS/UL (ref 0–200)
BASOPHILS NFR BLD AUTO: 0.9 %
BILIRUB SERPL-MCNC: 0.9 MG/DL (ref 0.2–1.2)
BILIRUB UR QL STRIP: NEGATIVE
BUN SERPL-MCNC: 22 MG/DL (ref 7–25)
BUN/CREAT SERPL: NORMAL (CALC) (ref 6–22)
CALCIUM SERPL-MCNC: 9.1 MG/DL (ref 8.6–10.3)
CAOX CRY #/AREA URNS HPF: NORMAL /HPF
CHLORIDE SERPL-SCNC: 105 MMOL/L (ref 98–110)
CHOLEST SERPL-MCNC: 176 MG/DL
CHOLEST/HDLC SERPL: 4.2 (CALC)
CO2 SERPL-SCNC: 32 MMOL/L (ref 20–32)
COLOR UR: YELLOW
CREAT SERPL-MCNC: 1.02 MG/DL (ref 0.7–1.25)
CREAT UR-MCNC: 240 MG/DL (ref 20–320)
EOSINOPHIL # BLD AUTO: 70 CELLS/UL (ref 15–500)
EOSINOPHIL NFR BLD AUTO: 1.3 %
ERYTHROCYTE [DISTWIDTH] IN BLOOD BY AUTOMATED COUNT: 12.9 % (ref 11–15)
GLOBULIN SER CALC-MCNC: 2.4 G/DL (CALC) (ref 1.9–3.7)
GLUCOSE SERPL-MCNC: 95 MG/DL (ref 65–99)
GLUCOSE UR QL STRIP: NEGATIVE
HCT VFR BLD AUTO: 38.1 % (ref 38.5–50)
HDLC SERPL-MCNC: 42 MG/DL
HGB BLD-MCNC: 13.2 G/DL (ref 13.2–17.1)
HGB UR QL STRIP: NEGATIVE
HYALINE CASTS #/AREA URNS LPF: NORMAL /LPF
KETONES UR QL STRIP: NEGATIVE
LDLC SERPL CALC-MCNC: 109 MG/DL (CALC)
LEUKOCYTE ESTERASE UR QL STRIP: NEGATIVE
LYMPHOCYTES # BLD AUTO: 1544 CELLS/UL (ref 850–3900)
LYMPHOCYTES NFR BLD AUTO: 28.6 %
MCH RBC QN AUTO: 30.6 PG (ref 27–33)
MCHC RBC AUTO-ENTMCNC: 34.6 G/DL (ref 32–36)
MCV RBC AUTO: 88.4 FL (ref 80–100)
MICROALBUMIN UR-MCNC: 1.8 MG/DL
MONOCYTES # BLD AUTO: 421 CELLS/UL (ref 200–950)
MONOCYTES NFR BLD AUTO: 7.8 %
NEUTROPHILS # BLD AUTO: 3316 CELLS/UL (ref 1500–7800)
NEUTROPHILS NFR BLD AUTO: 61.4 %
NITRITE UR QL STRIP: NEGATIVE
NONHDLC SERPL-MCNC: 134 MG/DL (CALC)
PH UR STRIP: 6.5 [PH] (ref 5–8)
PLATELET # BLD AUTO: 222 THOUSAND/UL (ref 140–400)
PMV BLD REES-ECKER: 8.9 FL (ref 7.5–12.5)
POTASSIUM SERPL-SCNC: 3.7 MMOL/L (ref 3.5–5.3)
PROT SERPL-MCNC: 6.3 G/DL (ref 6.1–8.1)
PROT UR QL STRIP: NEGATIVE
RBC # BLD AUTO: 4.31 MILLION/UL (ref 4.2–5.8)
RBC #/AREA URNS HPF: NORMAL /HPF
SODIUM SERPL-SCNC: 143 MMOL/L (ref 135–146)
SP GR UR STRIP: 1.02 (ref 1–1.03)
SQUAMOUS #/AREA URNS HPF: NORMAL /HPF
TRIGL SERPL-MCNC: 133 MG/DL
TSH SERPL-ACNC: 2.89 MIU/L (ref 0.4–4.5)
WBC # BLD AUTO: 5.4 THOUSAND/UL (ref 3.8–10.8)
WBC #/AREA URNS HPF: NORMAL /HPF

## 2022-03-14 ENCOUNTER — TELEPHONE (OUTPATIENT)
Dept: FAMILY MEDICINE | Facility: CLINIC | Age: 62
End: 2022-03-14
Payer: COMMERCIAL

## 2022-03-14 NOTE — TELEPHONE ENCOUNTER
----- Message from Brigette Robledo NP sent at 3/14/2022 12:27 AM CDT -----  Vitamin d is low. How much is he currently taking? Rest of labs are normal.

## 2022-03-21 NOTE — LETTER
[address]  [place] [address]  Phone: [phone]   [phone]                        MD Dorina Dickerson MD Chequita Williams, MD Matthew Bassett, PA-C Allison Hoffritz, ABE Fortune, ABE      Date: 04/21/2020        Patient: Phoenix Fragoso  YOB: 1960       Please fax over pt records.       Sincerely,     Cathy Gorman MA       [FreeTextEntry1] : 1. osteoarthritis\par * start meloxicam as needed\par * advised home physical therapy exercises for hands\par 2. hypercholesterolemia\par * lab for fasting lipids\par * low cholesterol diet counselling\par 3. prediabetes\par * lab for serum A1c\par * low carbohydrates diet counselling\par 4. GERD\par * start pantoprazole 40 mg daily\par * antireflux measures discussed\par 5. abnormal left breast mammogram\par * patient completed diagnostic mammogram and breast US in December 2021; recommended repeat mammogram in one year\par * schedule follow up in one month

## 2022-04-07 ENCOUNTER — PATIENT MESSAGE (OUTPATIENT)
Dept: FAMILY MEDICINE | Facility: CLINIC | Age: 62
End: 2022-04-07
Payer: COMMERCIAL

## 2022-04-07 RX ORDER — NEOMYCIN/POLYMYXIN B/HYDROCORT 3.5-10K-1
1 SUSPENSION, DROPS(FINAL DOSAGE FORM)(ML) OPHTHALMIC (EYE) EVERY 4 HOURS
Qty: 7.5 ML | Refills: 0 | Status: SHIPPED | OUTPATIENT
Start: 2022-04-07 | End: 2022-09-06

## 2022-05-06 ENCOUNTER — PATIENT MESSAGE (OUTPATIENT)
Dept: UROLOGY | Facility: CLINIC | Age: 62
End: 2022-05-06
Payer: COMMERCIAL

## 2022-06-13 ENCOUNTER — OFFICE VISIT (OUTPATIENT)
Dept: FAMILY MEDICINE | Facility: CLINIC | Age: 62
End: 2022-06-13
Payer: COMMERCIAL

## 2022-06-13 ENCOUNTER — HOSPITAL ENCOUNTER (OUTPATIENT)
Dept: RADIOLOGY | Facility: HOSPITAL | Age: 62
Discharge: HOME | End: 2022-06-13
Attending: NURSE PRACTITIONER
Payer: COMMERCIAL

## 2022-06-13 ENCOUNTER — TELEPHONE (OUTPATIENT)
Dept: FAMILY MEDICINE | Facility: CLINIC | Age: 62
End: 2022-06-13

## 2022-06-13 VITALS
HEART RATE: 68 BPM | WEIGHT: 187 LBS | HEIGHT: 73 IN | BODY MASS INDEX: 24.78 KG/M2 | DIASTOLIC BLOOD PRESSURE: 96 MMHG | SYSTOLIC BLOOD PRESSURE: 136 MMHG

## 2022-06-13 DIAGNOSIS — M54.12 CERVICAL RADICULOPATHY: ICD-10-CM

## 2022-06-13 DIAGNOSIS — L30.9 DERMATITIS: ICD-10-CM

## 2022-06-13 DIAGNOSIS — F32.A DEPRESSION, UNSPECIFIED DEPRESSION TYPE: ICD-10-CM

## 2022-06-13 DIAGNOSIS — M35.3 POLYMYALGIA RHEUMATICA: ICD-10-CM

## 2022-06-13 DIAGNOSIS — R10.31 RIGHT LOWER QUADRANT PAIN: ICD-10-CM

## 2022-06-13 DIAGNOSIS — R10.9 ABDOMINAL PAIN, UNSPECIFIED ABDOMINAL LOCATION: Primary | ICD-10-CM

## 2022-06-13 DIAGNOSIS — I10 PRIMARY HYPERTENSION: ICD-10-CM

## 2022-06-13 DIAGNOSIS — E03.9 HYPOTHYROIDISM, UNSPECIFIED TYPE: ICD-10-CM

## 2022-06-13 DIAGNOSIS — Z79.899 HIGH RISK MEDICATION USE: Primary | ICD-10-CM

## 2022-06-13 DIAGNOSIS — R10.9 ABDOMINAL PAIN, UNSPECIFIED ABDOMINAL LOCATION: ICD-10-CM

## 2022-06-13 PROCEDURE — 99214 PR OFFICE/OUTPT VISIT, EST, LEVL IV, 30-39 MIN: ICD-10-PCS | Mod: S$GLB,,, | Performed by: NURSE PRACTITIONER

## 2022-06-13 PROCEDURE — 3066F PR DOCUMENTATION OF TREATMENT FOR NEPHROPATHY: ICD-10-PCS | Mod: CPTII,S$GLB,, | Performed by: NURSE PRACTITIONER

## 2022-06-13 PROCEDURE — 3075F SYST BP GE 130 - 139MM HG: CPT | Mod: CPTII,S$GLB,, | Performed by: NURSE PRACTITIONER

## 2022-06-13 PROCEDURE — 3066F NEPHROPATHY DOC TX: CPT | Mod: CPTII,S$GLB,, | Performed by: NURSE PRACTITIONER

## 2022-06-13 PROCEDURE — 3080F PR MOST RECENT DIASTOLIC BLOOD PRESSURE >= 90 MM HG: ICD-10-PCS | Mod: CPTII,S$GLB,, | Performed by: NURSE PRACTITIONER

## 2022-06-13 PROCEDURE — 3080F DIAST BP >= 90 MM HG: CPT | Mod: CPTII,S$GLB,, | Performed by: NURSE PRACTITIONER

## 2022-06-13 PROCEDURE — 3008F PR BODY MASS INDEX (BMI) DOCUMENTED: ICD-10-PCS | Mod: CPTII,S$GLB,, | Performed by: NURSE PRACTITIONER

## 2022-06-13 PROCEDURE — 1159F MED LIST DOCD IN RCRD: CPT | Mod: CPTII,S$GLB,, | Performed by: NURSE PRACTITIONER

## 2022-06-13 PROCEDURE — 1160F RVW MEDS BY RX/DR IN RCRD: CPT | Mod: CPTII,S$GLB,, | Performed by: NURSE PRACTITIONER

## 2022-06-13 PROCEDURE — 1159F PR MEDICATION LIST DOCUMENTED IN MEDICAL RECORD: ICD-10-PCS | Mod: CPTII,S$GLB,, | Performed by: NURSE PRACTITIONER

## 2022-06-13 PROCEDURE — 72050 X-RAY EXAM NECK SPINE 4/5VWS: CPT | Mod: TC,PO

## 2022-06-13 PROCEDURE — 3061F NEG MICROALBUMINURIA REV: CPT | Mod: CPTII,S$GLB,, | Performed by: NURSE PRACTITIONER

## 2022-06-13 PROCEDURE — 3008F BODY MASS INDEX DOCD: CPT | Mod: CPTII,S$GLB,, | Performed by: NURSE PRACTITIONER

## 2022-06-13 PROCEDURE — 4010F ACE/ARB THERAPY RXD/TAKEN: CPT | Mod: CPTII,S$GLB,, | Performed by: NURSE PRACTITIONER

## 2022-06-13 PROCEDURE — 3061F PR NEG MICROALBUMINURIA RESULT DOCUMENTED/REVIEW: ICD-10-PCS | Mod: CPTII,S$GLB,, | Performed by: NURSE PRACTITIONER

## 2022-06-13 PROCEDURE — 1160F PR REVIEW ALL MEDS BY PRESCRIBER/CLIN PHARMACIST DOCUMENTED: ICD-10-PCS | Mod: CPTII,S$GLB,, | Performed by: NURSE PRACTITIONER

## 2022-06-13 PROCEDURE — 4010F PR ACE/ARB THEARPY RXD/TAKEN: ICD-10-PCS | Mod: CPTII,S$GLB,, | Performed by: NURSE PRACTITIONER

## 2022-06-13 PROCEDURE — 99214 OFFICE O/P EST MOD 30 MIN: CPT | Mod: S$GLB,,, | Performed by: NURSE PRACTITIONER

## 2022-06-13 PROCEDURE — 3075F PR MOST RECENT SYSTOLIC BLOOD PRESS GE 130-139MM HG: ICD-10-PCS | Mod: CPTII,S$GLB,, | Performed by: NURSE PRACTITIONER

## 2022-06-13 NOTE — PROGRESS NOTES
SUBJECTIVE:    Patient ID: Phoenix Fragoso is a 61 y.o. male.    Chief Complaint: Rash (Patient states there is a rash that started Thursday on his abdomen spreading to his legs//bs) and Abdominal Pain (Off and on for a couple months and having discomfort with sleep)    61 year old male presents for check up. Being treated htn, hyperlipidemia, hypothyroid, prostate cancer, and polymyalgia rheumatica. Trying to wean off of steroids. Being followed by rheumatology in [place]..Taking steroids 5mg daily. Methotrexate 2.5mg 6 once a week. Still has some pain and tingling to hand. Seems to be worse at the end of the day.  Does not routinely check bp at home. bp is elevated in office today. Not takign hydralazine tid. Does admit to fair amount of stress. Had issue with rash this past weekend. Denies new soaps detergents etc. Started using steroid cream and has gotten some improvement.   Has been having ruq pain. Comes and goes. Seems worse at the end of the day as well. No relation to meals.       Office Visit on 06/13/2022   Component Date Value Ref Range Status    WBC 06/13/2022 6.6  3.8 - 10.8 Thousand/uL Final    RBC 06/13/2022 4.73  4.20 - 5.80 Million/uL Final    Hemoglobin 06/13/2022 13.5  13.2 - 17.1 g/dL Final    Hematocrit 06/13/2022 40.6  38.5 - 50.0 % Final    MCV 06/13/2022 85.8  80.0 - 100.0 fL Final    MCH 06/13/2022 28.5  27.0 - 33.0 pg Final    MCHC 06/13/2022 33.3  32.0 - 36.0 g/dL Final    RDW 06/13/2022 13.0  11.0 - 15.0 % Final    Platelets 06/13/2022 221  140 - 400 Thousand/uL Final    MPV 06/13/2022 8.8  7.5 - 12.5 fL Final    Neutrophils, Abs 06/13/2022 4,798  1,500 - 7,800 cells/uL Final    Lymph # 06/13/2022 1,195  850 - 3,900 cells/uL Final    Mono # 06/13/2022 515  200 - 950 cells/uL Final    Eos # 06/13/2022 53  15 - 500 cells/uL Final    Baso # 06/13/2022 40  0 - 200 cells/uL Final    Neutrophils Relative 06/13/2022 72.7  % Final    Lymph % 06/13/2022 18.1  % Final     Mono % 06/13/2022 7.8  % Final    Eosinophil % 06/13/2022 0.8  % Final    Basophil % 06/13/2022 0.6  % Final    Glucose 06/13/2022 95  65 - 99 mg/dL Final    BUN 06/13/2022 24  7 - 25 mg/dL Final    Creatinine 06/13/2022 1.09  0.70 - 1.25 mg/dL Final    eGFR if non African American 06/13/2022 72  > OR = 60 mL/min/1.73m2 Final    eGFR if  06/13/2022 84  > OR = 60 mL/min/1.73m2 Final    BUN/Creatinine Ratio 06/13/2022 NOT APPLICABLE  6 - 22 (calc) Final    Sodium 06/13/2022 142  135 - 146 mmol/L Final    Potassium 06/13/2022 3.4 (A) 3.5 - 5.3 mmol/L Final    Chloride 06/13/2022 100  98 - 110 mmol/L Final    CO2 06/13/2022 32  20 - 32 mmol/L Final    Calcium 06/13/2022 10.0  8.6 - 10.3 mg/dL Final    Total Protein 06/13/2022 7.0  6.1 - 8.1 g/dL Final    Albumin 06/13/2022 4.3  3.6 - 5.1 g/dL Final    Globulin, Total 06/13/2022 2.7  1.9 - 3.7 g/dL (calc) Final    Albumin/Globulin Ratio 06/13/2022 1.6  1.0 - 2.5 (calc) Final    Total Bilirubin 06/13/2022 1.1  0.2 - 1.2 mg/dL Final    Alkaline Phosphatase 06/13/2022 64  35 - 144 U/L Final    AST 06/13/2022 17  10 - 35 U/L Final    ALT 06/13/2022 19  9 - 46 U/L Final   Office Visit on 03/08/2022   Component Date Value Ref Range Status    WBC 03/10/2022 5.4  3.8 - 10.8 Thousand/uL Final    RBC 03/10/2022 4.31  4.20 - 5.80 Million/uL Final    Hemoglobin 03/10/2022 13.2  13.2 - 17.1 g/dL Final    Hematocrit 03/10/2022 38.1 (A) 38.5 - 50.0 % Final    MCV 03/10/2022 88.4  80.0 - 100.0 fL Final    MCH 03/10/2022 30.6  27.0 - 33.0 pg Final    MCHC 03/10/2022 34.6  32.0 - 36.0 g/dL Final    RDW 03/10/2022 12.9  11.0 - 15.0 % Final    Platelets 03/10/2022 222  140 - 400 Thousand/uL Final    MPV 03/10/2022 8.9  7.5 - 12.5 fL Final    Neutrophils, Abs 03/10/2022 3,316  1,500 - 7,800 cells/uL Final    Lymph # 03/10/2022 1,544  850 - 3,900 cells/uL Final    Mono # 03/10/2022 421  200 - 950 cells/uL Final    Eos # 03/10/2022 70  15 -  500 cells/uL Final    Baso # 03/10/2022 49  0 - 200 cells/uL Final    Neutrophils Relative 03/10/2022 61.4  % Final    Lymph % 03/10/2022 28.6  % Final    Mono % 03/10/2022 7.8  % Final    Eosinophil % 03/10/2022 1.3  % Final    Basophil % 03/10/2022 0.9  % Final    Glucose 03/10/2022 95  65 - 99 mg/dL Final    BUN 03/10/2022 22  7 - 25 mg/dL Final    Creatinine 03/10/2022 1.02  0.70 - 1.25 mg/dL Final    eGFR if non African American 03/10/2022 78  > OR = 60 mL/min/1.73m2 Final    eGFR if  03/10/2022 91  > OR = 60 mL/min/1.73m2 Final    BUN/Creatinine Ratio 03/10/2022 NOT APPLICABLE  6 - 22 (calc) Final    Sodium 03/10/2022 143  135 - 146 mmol/L Final    Potassium 03/10/2022 3.7  3.5 - 5.3 mmol/L Final    Chloride 03/10/2022 105  98 - 110 mmol/L Final    CO2 03/10/2022 32  20 - 32 mmol/L Final    Calcium 03/10/2022 9.1  8.6 - 10.3 mg/dL Final    Total Protein 03/10/2022 6.3  6.1 - 8.1 g/dL Final    Albumin 03/10/2022 3.9  3.6 - 5.1 g/dL Final    Globulin, Total 03/10/2022 2.4  1.9 - 3.7 g/dL (calc) Final    Albumin/Globulin Ratio 03/10/2022 1.6  1.0 - 2.5 (calc) Final    Total Bilirubin 03/10/2022 0.9  0.2 - 1.2 mg/dL Final    Alkaline Phosphatase 03/10/2022 54  35 - 144 U/L Final    AST 03/10/2022 16  10 - 35 U/L Final    ALT 03/10/2022 20  9 - 46 U/L Final    Cholesterol 03/10/2022 176  <200 mg/dL Final    HDL 03/10/2022 42  > OR = 40 mg/dL Final    Triglycerides 03/10/2022 133  <150 mg/dL Final    LDL Cholesterol 03/10/2022 109 (A) mg/dL (calc) Final    HDL/Cholesterol Ratio 03/10/2022 4.2  <5.0 (calc) Final    Non HDL Chol. (LDL+VLDL) 03/10/2022 134 (A) <130 mg/dL (calc) Final    TSH w/reflex to FT4 03/10/2022 2.89  0.40 - 4.50 mIU/L Final    Creatinine, Urine 03/10/2022 240  20 - 320 mg/dL Final    Microalb, Ur 03/10/2022 1.8  See Note: mg/dL Final    Microalb/Creat Ratio 03/10/2022 8  <30 mcg/mg creat Final    Color, UA 03/10/2022 YELLOW  YELLOW Final     Appearance, UA 03/10/2022 CLEAR  CLEAR Final    Specific Gravity, UA 03/10/2022 1.022  1.001 - 1.035 Final    pH, UA 03/10/2022 6.5  5.0 - 8.0 Final    Glucose, UA 03/10/2022 NEGATIVE  NEGATIVE Final    Bilirubin, UA 03/10/2022 NEGATIVE  NEGATIVE Final    Ketones, UA 03/10/2022 NEGATIVE  NEGATIVE Final    Occult Blood UA 03/10/2022 NEGATIVE  NEGATIVE Final    Protein, UA 03/10/2022 NEGATIVE  NEGATIVE Final    Nitrite, UA 03/10/2022 NEGATIVE  NEGATIVE Final    Leukocytes, UA 03/10/2022 NEGATIVE  NEGATIVE Final    WBC Casts, UA 03/10/2022 NONE SEEN  < OR = 5 /HPF Final    RBC Casts, UA 03/10/2022 NONE SEEN  < OR = 2 /HPF Final    Squam Epithel, UA 03/10/2022 NONE SEEN  < OR = 5 /HPF Final    Bacteria, UA 03/10/2022 NONE SEEN  NONE SEEN /HPF Final    Ca Oxalate Shira, UA 03/10/2022 FEW  NONE OR FEW /HPF Final    Hyaline Casts, UA 03/10/2022 NONE SEEN  NONE SEEN /LPF Final    Reflexive Urine Culture 03/10/2022    Final    Vitamin D, 25-OH, Total 03/10/2022 29 (A) 30 - 100 ng/mL Final       Past Medical History:   Diagnosis Date    Bell's palsy     High cholesterol     Hypertension     Insomnia     Thyroid disease      Social History     Socioeconomic History    Marital status:    Tobacco Use    Smoking status: Never Smoker    Smokeless tobacco: Never Used   Substance and Sexual Activity    Alcohol use: Yes     Comment: OCCASIONALLY    Drug use: No    Sexual activity: Yes     Partners: Female     Social Determinants of Health     Financial Resource Strain: Medium Risk    Difficulty of Paying Living Expenses: Somewhat hard   Food Insecurity: No Food Insecurity    Worried About Running Out of Food in the Last Year: Never true    Ran Out of Food in the Last Year: Never true   Transportation Needs: No Transportation Needs    Lack of Transportation (Medical): No    Lack of Transportation (Non-Medical): No   Physical Activity: Sufficiently Active    Days of Exercise per Week: 5 days     Minutes of Exercise per Session: 30 min   Stress: Stress Concern Present    Feeling of Stress : Very much   Social Connections: Unknown    Frequency of Communication with Friends and Family: Once a week    Frequency of Social Gatherings with Friends and Family: Once a week    Active Member of Clubs or Organizations: Yes    Attends Club or Organization Meetings: 1 to 4 times per year    Marital Status:    Housing Stability: Low Risk     Unable to Pay for Housing in the Last Year: No    Number of Places Lived in the Last Year: 1    Unstable Housing in the Last Year: No     Past Surgical History:   Procedure Laterality Date    APPENDECTOMY      CHOLECYSTECTOMY      PROSTATE BIOPSY      PROSTATE SURGERY      right knee arthroscopy      ROBOT-ASSISTED LAPAROSCOPIC PROSTATECTOMY N/A 10/16/2019    Procedure: ROBOTIC PROSTATECTOMY;  Surgeon: Walker Brooks MD;  Location: [facility];  Service: Urology;  Laterality: N/A;     History reviewed. No pertinent family history.    Review of patient's allergies indicates:   Allergen Reactions    Ancef [cefazolin] Anaphylaxis     Anaphylactic shock        Current Outpatient Medications:     ascorbic acid, vitamin C, (VITAMIN C) 100 MG tablet, Take 100 mg by mouth once daily., Disp: , Rfl:     aspirin (ECOTRIN) 81 MG EC tablet, Take 81 mg by mouth once daily., Disp: , Rfl:     calcium carbonate (OS-BEAU) 600 mg calcium (1,500 mg) Tab, Take 600 mg by mouth 2 (two) times daily with meals., Disp: , Rfl:     carvediloL (COREG) 12.5 MG tablet, Take 12.5 mg by mouth 2 (two) times daily with meals., Disp: , Rfl:     fish oil-omega-3 fatty acids 300-1,000 mg capsule, Take 2 capsules by mouth once daily. , Disp: , Rfl:     folic acid (FOLVITE) 1 MG tablet, Take 1,000 mcg by mouth once daily., Disp: , Rfl:     hydrALAZINE (APRESOLINE) 100 MG tablet, Take 1 tablet (100 mg total) by mouth every 8 (eight) hours., Disp: 90 tablet, Rfl: 11    hydroCHLOROthiazide  (HYDRODIURIL) 25 MG tablet, , Disp: , Rfl:     levothyroxine (SYNTHROID) 150 MCG tablet, Take 1 tablet (150 mcg total) by mouth before breakfast. (Patient taking differently: Take 150 mcg by mouth every other day.), Disp: 30 tablet, Rfl: 2    levothyroxine (SYNTHROID, LEVOTHROID) 175 MCG tablet, Take 1 tablet (175 mcg total) by mouth before breakfast. (Patient taking differently: Take 175 mcg by mouth every other day.), Disp: 30 tablet, Rfl: 2    LORazepam (ATIVAN) 2 MG Tab, Take 1 tablet (2 mg total) by mouth once daily., Disp: 30 tablet, Rfl: 5    losartan (COZAAR) 100 MG tablet, , Disp: , Rfl:     methotrexate 2.5 MG Tab, Take 6 tablets (15 mg total) by mouth every 7 days., Disp: 4 tablet, Rfl: 11    mirabegron (MYRBETRIQ) 50 mg Tb24, Take 1 tablet (50 mg total) by mouth once daily., Disp: 90 tablet, Rfl: 3    multivitamin-minerals-lutein Tab, Take 1 tablet by mouth once daily., Disp: , Rfl:     neomycin-polymyxin-hydrocortisone (CORTISPORIN) 3.5-10,000-10 mg-unit-mg/mL ophthalmic suspension, Place 1 drop into both eyes every 4 (four) hours., Disp: 7.5 mL, Rfl: 0    oxybutynin (DITROPAN-XL) 5 MG TR24, TAKE ONE TABLET (5MG TOTAL) BY MOUTH ONCE DAILY, Disp: , Rfl:     pantoprazole (PROTONIX) 40 MG tablet, Take 1 tablet (40 mg total) by mouth once daily., Disp: 90 tablet, Rfl: 1    potassium chloride (KLOR-CON) 10 MEQ TbSR, Take 10 mEq by mouth once daily., Disp: , Rfl:     tadalafiL (CIALIS) 20 MG Tab, Take 1 tablet (20 mg total) by mouth as needed (ed)., Disp: 9 tablet, Rfl: 11    tadalafiL (CIALIS) 5 MG tablet, TAKE ONE TABLET BY MOUTH EVERY DAY AS NEEDED FOR erectile dysfunction, Disp: 30 tablet, Rfl: 5    vitamin D (VITAMIN D3) 1000 units Tab, Take 2,000 Units by mouth once daily., Disp: , Rfl:     Current Facility-Administered Medications:     acetaminophen tablet 650 mg, 650 mg, Oral, Once PRN, Brigette Robledo NP    albuterol inhaler 2 puff, 2 puff, Inhalation, Q20 Min PRN, Brigette Robledo NP     "diphenhydrAMINE injection 25 mg, 25 mg, Intravenous, Once PRN, Brigette Robledo NP    EPINEPHrine (EPIPEN) 0.3 mg/0.3 mL pen injection 0.3 mg, 0.3 mg, Intramuscular, PRN, Brigette Robledo NP    methylPREDNISolone sodium succinate injection 40 mg, 40 mg, Intravenous, Once PRN, Brigette Robledo NP    ondansetron injection 4 mg, 4 mg, Intravenous, Once PRN, Brigette Robledo NP    Review of Systems   Constitutional: Negative for fatigue, fever and unexpected weight change.   HENT: Negative for ear pain, sinus pressure and sore throat.    Eyes: Negative for pain.   Respiratory: Negative for cough and shortness of breath.    Cardiovascular: Negative for chest pain and leg swelling.   Gastrointestinal: Positive for abdominal pain. Negative for constipation, nausea and vomiting.   Genitourinary: Negative for dysuria, frequency and urgency.   Musculoskeletal: Negative for arthralgias.   Skin: Positive for rash.   Neurological: Negative for dizziness, weakness and headaches.   Psychiatric/Behavioral: Negative for sleep disturbance. The patient is nervous/anxious.           Objective:      Vitals:    06/13/22 1308 06/13/22 1314   BP: (!) 138/92 (!) 136/96   Pulse: 68    Weight: 84.8 kg (187 lb)    Height: 6' 1" (1.854 m)      Physical Exam  Constitutional:       General: He is not in acute distress.     Appearance: He is well-developed. He is not ill-appearing.   HENT:      Head: Normocephalic and atraumatic.      Right Ear: External ear normal.      Left Ear: External ear normal.   Eyes:      Pupils: Pupils are equal, round, and reactive to light.   Neck:      Trachea: No tracheal deviation.   Cardiovascular:      Rate and Rhythm: Normal rate and regular rhythm.      Heart sounds: No murmur heard.    No friction rub. No gallop.   Pulmonary:      Breath sounds: Normal breath sounds. No stridor. No wheezing or rales.   Abdominal:      Palpations: Abdomen is soft. There is no mass.      Tenderness: There is abdominal tenderness in the right " upper quadrant.   Musculoskeletal:         General: No tenderness or deformity.      Cervical back: Neck supple.   Lymphadenopathy:      Cervical: No cervical adenopathy.   Skin:     General: Skin is warm and dry.      Comments: Erythematous petechiae appearing areas that are faded. Mild excoriation. Present in shirt area only.    Neurological:      Mental Status: He is alert and oriented to person, place, and time.      Coordination: Coordination normal.   Psychiatric:         Mood and Affect: Mood is anxious.         Thought Content: Thought content normal.           Assessment:       1. High risk medication use    2. Primary hypertension    3. Polymyalgia rheumatica    4. Hypothyroidism, unspecified type    5. Depression, unspecified depression type    6. Cervical radiculopathy    7. Abdominal pain, unspecified abdominal location    8. Right lower quadrant pain    9. Dermatitis         Plan:       High risk medication use  -     CBC Auto Differential; Future; Expected date: 06/13/2022  -     Comprehensive Metabolic Panel; Future; Expected date: 06/13/2022  -     Creatinine, serum; Future; Expected date: 06/13/2022    Primary hypertension    Polymyalgia rheumatica  -     CBC Auto Differential; Future; Expected date: 06/13/2022  -     Comprehensive Metabolic Panel; Future; Expected date: 06/13/2022    Hypothyroidism, unspecified type    Depression, unspecified depression type    Cervical radiculopathy  -     X-Ray Cervical Spine Complete 5 view; Future; Expected date: 06/13/2022    Abdominal pain, unspecified abdominal location  -     CT Abdomen Pelvis With Contrast; Future    Right lower quadrant pain  -     CT Abdomen Pelvis With Contrast; Future    Dermatitis  Comments:  continue steroid cream. labs today. call if no resolution. seems to be improvign.       Follow up in about 4 weeks (around 7/11/2022), or if symptoms worsen or fail to improve, for medication management.        6/20/2022 Brigette Robledo

## 2022-06-13 NOTE — TELEPHONE ENCOUNTER
----- Message from Poornima Cisse sent at 6/13/2022  9:30 AM CDT -----  Pt has a rash on his stomach and legs and wants to be seen asap. Please advise. Pt #[phone]

## 2022-06-14 ENCOUNTER — PATIENT MESSAGE (OUTPATIENT)
Dept: FAMILY MEDICINE | Facility: CLINIC | Age: 62
End: 2022-06-14

## 2022-06-14 LAB
ALBUMIN SERPL-MCNC: 4.3 G/DL (ref 3.6–5.1)
ALBUMIN/GLOB SERPL: 1.6 (CALC) (ref 1–2.5)
ALP SERPL-CCNC: 64 U/L (ref 35–144)
ALT SERPL-CCNC: 19 U/L (ref 9–46)
AST SERPL-CCNC: 17 U/L (ref 10–35)
BASOPHILS # BLD AUTO: 40 CELLS/UL (ref 0–200)
BASOPHILS NFR BLD AUTO: 0.6 %
BILIRUB SERPL-MCNC: 1.1 MG/DL (ref 0.2–1.2)
BUN SERPL-MCNC: 24 MG/DL (ref 7–25)
BUN/CREAT SERPL: ABNORMAL (CALC) (ref 6–22)
CALCIUM SERPL-MCNC: 10 MG/DL (ref 8.6–10.3)
CHLORIDE SERPL-SCNC: 100 MMOL/L (ref 98–110)
CO2 SERPL-SCNC: 32 MMOL/L (ref 20–32)
CREAT SERPL-MCNC: 1.09 MG/DL (ref 0.7–1.25)
EOSINOPHIL # BLD AUTO: 53 CELLS/UL (ref 15–500)
EOSINOPHIL NFR BLD AUTO: 0.8 %
ERYTHROCYTE [DISTWIDTH] IN BLOOD BY AUTOMATED COUNT: 13 % (ref 11–15)
GLOBULIN SER CALC-MCNC: 2.7 G/DL (CALC) (ref 1.9–3.7)
GLUCOSE SERPL-MCNC: 95 MG/DL (ref 65–99)
HCT VFR BLD AUTO: 40.6 % (ref 38.5–50)
HGB BLD-MCNC: 13.5 G/DL (ref 13.2–17.1)
LYMPHOCYTES # BLD AUTO: 1195 CELLS/UL (ref 850–3900)
LYMPHOCYTES NFR BLD AUTO: 18.1 %
MCH RBC QN AUTO: 28.5 PG (ref 27–33)
MCHC RBC AUTO-ENTMCNC: 33.3 G/DL (ref 32–36)
MCV RBC AUTO: 85.8 FL (ref 80–100)
MONOCYTES # BLD AUTO: 515 CELLS/UL (ref 200–950)
MONOCYTES NFR BLD AUTO: 7.8 %
NEUTROPHILS # BLD AUTO: 4798 CELLS/UL (ref 1500–7800)
NEUTROPHILS NFR BLD AUTO: 72.7 %
PLATELET # BLD AUTO: 221 THOUSAND/UL (ref 140–400)
PMV BLD REES-ECKER: 8.8 FL (ref 7.5–12.5)
POTASSIUM SERPL-SCNC: 3.4 MMOL/L (ref 3.5–5.3)
PROT SERPL-MCNC: 7 G/DL (ref 6.1–8.1)
RBC # BLD AUTO: 4.73 MILLION/UL (ref 4.2–5.8)
SODIUM SERPL-SCNC: 142 MMOL/L (ref 135–146)
WBC # BLD AUTO: 6.6 THOUSAND/UL (ref 3.8–10.8)

## 2022-06-15 NOTE — TELEPHONE ENCOUNTER
Labs look good. k is slightly decreased. Increase potassium in diet.   No fracture on xray  Foramen narrowing  Degenerative changes  Some curvature of spine  Refer to dr. underwood

## 2022-06-17 ENCOUNTER — HOSPITAL ENCOUNTER (OUTPATIENT)
Dept: RADIOLOGY | Facility: HOSPITAL | Age: 62
Discharge: HOME | End: 2022-06-17
Attending: NURSE PRACTITIONER
Payer: COMMERCIAL

## 2022-06-17 DIAGNOSIS — R10.9 ABDOMINAL PAIN, UNSPECIFIED ABDOMINAL LOCATION: ICD-10-CM

## 2022-06-17 DIAGNOSIS — R10.31 RIGHT LOWER QUADRANT PAIN: ICD-10-CM

## 2022-06-17 PROCEDURE — 76700 US EXAM ABDOM COMPLETE: CPT | Mod: TC

## 2022-06-20 ENCOUNTER — TELEPHONE (OUTPATIENT)
Dept: FAMILY MEDICINE | Facility: CLINIC | Age: 62
End: 2022-06-20

## 2022-06-20 NOTE — TELEPHONE ENCOUNTER
----- Message from Walker Lugo MD sent at 6/20/2022  6:09 AM CDT -----  Call pt. Ultrasound shows a small kidney stone in left  kidney.liver and spleen look normal. Otherwise normal.

## 2022-06-20 NOTE — PROGRESS NOTES
Call pt. Ultrasound shows a small kidney stone in left  kidney.liver and spleen look normal. Otherwise normal.

## 2022-09-06 ENCOUNTER — OFFICE VISIT (OUTPATIENT)
Dept: URGENT CARE | Facility: CLINIC | Age: 62
End: 2022-09-06
Payer: COMMERCIAL

## 2022-09-06 ENCOUNTER — TELEPHONE (OUTPATIENT)
Dept: FAMILY MEDICINE | Facility: CLINIC | Age: 62
End: 2022-09-06

## 2022-09-06 VITALS
HEART RATE: 82 BPM | TEMPERATURE: 99 F | HEIGHT: 73 IN | BODY MASS INDEX: 24.25 KG/M2 | WEIGHT: 183 LBS | OXYGEN SATURATION: 95 % | SYSTOLIC BLOOD PRESSURE: 132 MMHG | RESPIRATION RATE: 16 BRPM | DIASTOLIC BLOOD PRESSURE: 79 MMHG

## 2022-09-06 DIAGNOSIS — L03.012 PARONYCHIA OF FINGER, LEFT: Primary | ICD-10-CM

## 2022-09-06 DIAGNOSIS — L03.116 CELLULITIS OF LEFT LEG: ICD-10-CM

## 2022-09-06 PROCEDURE — 3008F BODY MASS INDEX DOCD: CPT | Mod: CPTII,S$GLB,,

## 2022-09-06 PROCEDURE — 3066F PR DOCUMENTATION OF TREATMENT FOR NEPHROPATHY: ICD-10-PCS | Mod: CPTII,S$GLB,,

## 2022-09-06 PROCEDURE — 1159F MED LIST DOCD IN RCRD: CPT | Mod: CPTII,S$GLB,,

## 2022-09-06 PROCEDURE — 3061F PR NEG MICROALBUMINURIA RESULT DOCUMENTED/REVIEW: ICD-10-PCS | Mod: CPTII,S$GLB,,

## 2022-09-06 PROCEDURE — 3008F PR BODY MASS INDEX (BMI) DOCUMENTED: ICD-10-PCS | Mod: CPTII,S$GLB,,

## 2022-09-06 PROCEDURE — 3075F PR MOST RECENT SYSTOLIC BLOOD PRESS GE 130-139MM HG: ICD-10-PCS | Mod: CPTII,S$GLB,,

## 2022-09-06 PROCEDURE — 1159F PR MEDICATION LIST DOCUMENTED IN MEDICAL RECORD: ICD-10-PCS | Mod: CPTII,S$GLB,,

## 2022-09-06 PROCEDURE — 4010F PR ACE/ARB THEARPY RXD/TAKEN: ICD-10-PCS | Mod: CPTII,S$GLB,,

## 2022-09-06 PROCEDURE — 99214 PR OFFICE/OUTPT VISIT, EST, LEVL IV, 30-39 MIN: ICD-10-PCS | Mod: S$GLB,,,

## 2022-09-06 PROCEDURE — 3066F NEPHROPATHY DOC TX: CPT | Mod: CPTII,S$GLB,,

## 2022-09-06 PROCEDURE — 3061F NEG MICROALBUMINURIA REV: CPT | Mod: CPTII,S$GLB,,

## 2022-09-06 PROCEDURE — 3075F SYST BP GE 130 - 139MM HG: CPT | Mod: CPTII,S$GLB,,

## 2022-09-06 PROCEDURE — 4010F ACE/ARB THERAPY RXD/TAKEN: CPT | Mod: CPTII,S$GLB,,

## 2022-09-06 PROCEDURE — 3078F PR MOST RECENT DIASTOLIC BLOOD PRESSURE < 80 MM HG: ICD-10-PCS | Mod: CPTII,S$GLB,,

## 2022-09-06 PROCEDURE — 3078F DIAST BP <80 MM HG: CPT | Mod: CPTII,S$GLB,,

## 2022-09-06 PROCEDURE — 99214 OFFICE O/P EST MOD 30 MIN: CPT | Mod: S$GLB,,,

## 2022-09-06 RX ORDER — MUPIROCIN 20 MG/G
OINTMENT TOPICAL 3 TIMES DAILY
Qty: 15 G | Refills: 0 | Status: SHIPPED | OUTPATIENT
Start: 2022-09-06 | End: 2023-08-02

## 2022-09-06 RX ORDER — DOXYCYCLINE HYCLATE 100 MG
100 TABLET ORAL EVERY 12 HOURS
Qty: 20 TABLET | Refills: 0 | Status: SHIPPED | OUTPATIENT
Start: 2022-09-06 | End: 2022-09-16

## 2022-09-06 NOTE — PATIENT INSTRUCTIONS
Apply antibiotic ointment to finger and left leg as directed.     Take antibiotics with food. Take daily probiotic.     Patient instructed to follow up with wound care as directed.     If you have increased redness, warmth, pain, swelling or worsening symptoms/signs of infection return immediatly for further evaluation.

## 2022-09-06 NOTE — TELEPHONE ENCOUNTER
----- Message from Lorena Rios sent at 9/6/2022  1:46 PM CDT -----  - pt thinks he has a spider bite. His leg is very infected and leaking fluid. Would like to see sandrine gonsales On his way to the office   [phone]

## 2022-09-06 NOTE — PROGRESS NOTES
"Subjective:       Patient ID: Phoenix Fragoso is a 61 y.o. male.    Vitals:  height is 6' 1" (1.854 m) and weight is 83 kg (183 lb). His temperature is 98.6 °F (37 °C). His blood pressure is 132/79 and his pulse is 82. His respiration is 16 and oxygen saturation is 95%.     Chief Complaint: Bleeding/Bruising    Pt states " bruising/bump on L leg started yesterday and swollen w/ pus. Iced the area last night. "      Constitution: Negative for activity change, appetite change, chills, sweating and fever.   Cardiovascular:  Negative for chest pain.   Respiratory:  Negative for shortness of breath.    Gastrointestinal:  Negative for nausea and vomiting.   Skin:  Positive for wound (on left leg and left ring finger).   Neurological:  Negative for dizziness, history of vertigo and altered mental status.   Psychiatric/Behavioral:  Negative for altered mental status.      Objective:      Physical Exam   Constitutional:  Non-toxic appearance. He does not appear ill. No distress.   HENT:   Head: Normocephalic.   Nose: Nose normal.   Eyes: Conjunctivae are normal. Extraocular movement intact   Cardiovascular: Normal rate, normal heart sounds and normal pulses.   Pulmonary/Chest: Effort normal and breath sounds normal. No respiratory distress.   Neurological: no focal deficit. He is alert.   Skin: Skin is not diaphoretic. Capillary refill takes 2 to 3 seconds.   Psychiatric: His behavior is normal. Mood normal.        Tender erythremic wound to left leg with some mild swelling in ankle, yellow drainage and serosanguinous drianage noted from wound opening, no fluctuation or induration felt with palpation. No red streaks up leg noted.         Tender left ring finger with white/yellow drainage noted on the medial aspect of the finger with palpation. Full rom in finger, sensation intact.   Assessment:       1. Paronychia of finger, left    2. Cellulitis of left leg          Plan:         Paronychia of finger, left  -     doxycycline " (VIBRA-TABS) 100 MG tablet; Take 1 tablet (100 mg total) by mouth every 12 (twelve) hours. for 10 days  Dispense: 20 tablet; Refill: 0  -     mupirocin (BACTROBAN) 2 % ointment; Apply topically 3 (three) times daily.  Dispense: 15 g; Refill: 0    Cellulitis of left leg  -     doxycycline (VIBRA-TABS) 100 MG tablet; Take 1 tablet (100 mg total) by mouth every 12 (twelve) hours. for 10 days  Dispense: 20 tablet; Refill: 0  -     mupirocin (BACTROBAN) 2 % ointment; Apply topically 3 (three) times daily.  Dispense: 15 g; Refill: 0  -     Ambulatory referral/consult to Wound Clinic       Patient presents with wound on left leg x 1 day, denies injury, fall, trauma or cut, he noticed wound yesterday and report it got moderately more red over night. Tdap is UTD. Wound appears dark in the center, no pmhx of PAD or chronic venous insufficieny, wound care referral placed to ensure proper treatment/ work up and resolution, wound cleanser applied to wound and mupirocin applied.       Patient also has paronychia on left ring finger, offered to make incision on finger, wound is already draining pus nicely, pressure applied and moderate amount of yellow/white drainage noted, patient reports improved pain and discomfort, mupirocin applied, patient will do warm soaks.

## 2022-09-08 ENCOUNTER — OFFICE VISIT (OUTPATIENT)
Dept: WOUND CARE | Facility: HOSPITAL | Age: 62
End: 2022-09-08
Attending: FAMILY MEDICINE
Payer: COMMERCIAL

## 2022-09-08 VITALS
TEMPERATURE: 98 F | HEART RATE: 78 BPM | DIASTOLIC BLOOD PRESSURE: 76 MMHG | RESPIRATION RATE: 18 BRPM | SYSTOLIC BLOOD PRESSURE: 136 MMHG

## 2022-09-08 DIAGNOSIS — S80.852A: Primary | ICD-10-CM
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
## 2022-09-08 DIAGNOSIS — L03.019 ONYCHIA AND PARONYCHIA OF FINGER: ICD-10-CM

## 2022-09-08 DIAGNOSIS — S80.862A INSECT BITE OF LEFT LOWER LEG, INITIAL ENCOUNTER: ICD-10-CM

## 2022-09-08 PROCEDURE — 1160F PR REVIEW ALL MEDS BY PRESCRIBER/CLIN PHARMACIST DOCUMENTED: ICD-10-PCS | Mod: CPTII,,, | Performed by: FAMILY MEDICINE

## 2022-09-08 PROCEDURE — 3075F SYST BP GE 130 - 139MM HG: CPT | Mod: CPTII,,, | Performed by: FAMILY MEDICINE

## 2022-09-08 PROCEDURE — 11042 DBRDMT SUBQ TIS 1ST 20SQCM/<: CPT | Mod: ,,, | Performed by: FAMILY MEDICINE

## 2022-09-08 PROCEDURE — 99213 OFFICE O/P EST LOW 20 MIN: CPT | Mod: 25 | Performed by: FAMILY MEDICINE

## 2022-09-08 PROCEDURE — 3061F PR NEG MICROALBUMINURIA RESULT DOCUMENTED/REVIEW: ICD-10-PCS | Mod: CPTII,,, | Performed by: FAMILY MEDICINE

## 2022-09-08 PROCEDURE — 3075F PR MOST RECENT SYSTOLIC BLOOD PRESS GE 130-139MM HG: ICD-10-PCS | Mod: CPTII,,, | Performed by: FAMILY MEDICINE

## 2022-09-08 PROCEDURE — 11042 PR DEBRIDEMENT, SKIN, SUB-Q TISSUE,=<20 SQ CM: ICD-10-PCS | Mod: ,,, | Performed by: FAMILY MEDICINE

## 2022-09-08 PROCEDURE — 1159F PR MEDICATION LIST DOCUMENTED IN MEDICAL RECORD: ICD-10-PCS | Mod: CPTII,,, | Performed by: FAMILY MEDICINE

## 2022-09-08 PROCEDURE — 99202 OFFICE O/P NEW SF 15 MIN: CPT | Mod: 25,,, | Performed by: FAMILY MEDICINE

## 2022-09-08 PROCEDURE — 4010F ACE/ARB THERAPY RXD/TAKEN: CPT | Mod: CPTII,,, | Performed by: FAMILY MEDICINE

## 2022-09-08 PROCEDURE — 1159F MED LIST DOCD IN RCRD: CPT | Mod: CPTII,,, | Performed by: FAMILY MEDICINE

## 2022-09-08 PROCEDURE — 1160F RVW MEDS BY RX/DR IN RCRD: CPT | Mod: CPTII,,, | Performed by: FAMILY MEDICINE

## 2022-09-08 PROCEDURE — 3061F NEG MICROALBUMINURIA REV: CPT | Mod: CPTII,,, | Performed by: FAMILY MEDICINE

## 2022-09-08 PROCEDURE — 3078F DIAST BP <80 MM HG: CPT | Mod: CPTII,,, | Performed by: FAMILY MEDICINE

## 2022-09-08 PROCEDURE — 3066F PR DOCUMENTATION OF TREATMENT FOR NEPHROPATHY: ICD-10-PCS | Mod: CPTII,,, | Performed by: FAMILY MEDICINE

## 2022-09-08 PROCEDURE — 3066F NEPHROPATHY DOC TX: CPT | Mod: CPTII,,, | Performed by: FAMILY MEDICINE

## 2022-09-08 PROCEDURE — 11042 DBRDMT SUBQ TIS 1ST 20SQCM/<: CPT | Performed by: FAMILY MEDICINE

## 2022-09-08 PROCEDURE — 4010F PR ACE/ARB THEARPY RXD/TAKEN: ICD-10-PCS | Mod: CPTII,,, | Performed by: FAMILY MEDICINE

## 2022-09-08 PROCEDURE — 99202 PR OFFICE/OUTPT VISIT, NEW, LEVL II, 15-29 MIN: ICD-10-PCS | Mod: 25,,, | Performed by: FAMILY MEDICINE

## 2022-09-08 PROCEDURE — 3078F PR MOST RECENT DIASTOLIC BLOOD PRESSURE < 80 MM HG: ICD-10-PCS | Mod: CPTII,,, | Performed by: FAMILY MEDICINE

## 2022-09-08 NOTE — PROGRESS NOTES
Chief complaint:  Wound Care      HPI:  Phoenix Fragoso is a 61 y.o. male presenting with a red fourth finger on the left hand due to possible infection. Pt also c/o an open wound of the left lower leg. Pt developed the open wound last Wednesday, and was seen at the  in [place] where they gave him bactroban and doxycycline. Pt does not know exactly how the two areas developed. Pt was in [place] the last week of [place] The symptoms started developing over a week later. 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 No other complaints today.      PMH:  As per HPI and below:  Past Medical History:   Diagnosis Date    Bell's palsy     High cholesterol     Hypertension     Insomnia     Thyroid disease        Social History     Socioeconomic History    Marital status:    Tobacco Use    Smoking status: Never    Smokeless tobacco: Never   Substance and Sexual Activity    Alcohol use: Yes     Comment: OCCASIONALLY    Drug use: No    Sexual activity: Yes     Partners: Female     Social Determinants of Health     Financial Resource Strain: Medium Risk    Difficulty of Paying Living Expenses: Somewhat hard   Food Insecurity: No Food Insecurity    Worried About Running Out of Food in the Last Year: Never true    Ran Out of Food in the Last Year: Never true   Transportation Needs: No Transportation Needs    Lack of Transportation (Medical): No    Lack of Transportation (Non-Medical): No   Physical Activity: Sufficiently Active    Days of Exercise per Week: 5 days    Minutes of Exercise per Session: 30 min   Stress: Stress Concern Present    Feeling of Stress : Very much   Social Connections: Unknown    Frequency of Communication with Friends and Family: Once a week    Frequency of Social Gatherings with Friends and Family: Once a week    Active Member of Clubs or Organizations: Yes    Attends Club or Organization Meetings: 1 to 4 times per year    Marital Status:     Housing Stability: Low Risk     Unable to Pay for Housing in the Last Year: No    Number of Places Lived in the Last Year: 1    Unstable Housing in the Last Year: No       Past Surgical History:   Procedure Laterality Date    APPENDECTOMY      CHOLECYSTECTOMY      PROSTATE BIOPSY      PROSTATE SURGERY      right knee arthroscopy      ROBOT-ASSISTED LAPAROSCOPIC PROSTATECTOMY N/A 10/16/2019    Procedure: ROBOTIC PROSTATECTOMY;  Surgeon: Walker Brooks MD;  [facility];  Service: Urology;  Laterality: N/A;       History reviewed. No pertinent family history.    Review of patient's allergies indicates:   Allergen Reactions    Ancef [cefazolin] Anaphylaxis     Anaphylactic shock        Current Outpatient Medications on File Prior to Visit   Medication Sig Dispense Refill    ascorbic acid, vitamin C, (VITAMIN C) 100 MG tablet Take 100 mg by mouth once daily.      aspirin (ECOTRIN) 81 MG EC tablet Take 81 mg by mouth once daily.      calcium carbonate (OS-BEAU) 600 mg calcium (1,500 mg) Tab Take 600 mg by mouth 2 (two) times daily with meals.      carvediloL (COREG) 12.5 MG tablet Take 12.5 mg by mouth 2 (two) times daily with meals.      doxycycline (VIBRA-TABS) 100 MG tablet Take 1 tablet (100 mg total) by mouth every 12 (twelve) hours. for 10 days 20 tablet 0    fish oil-omega-3 fatty acids 300-1,000 mg capsule Take 2 capsules by mouth once daily.       folic acid (FOLVITE) 1 MG tablet Take 1,000 mcg by mouth once daily.      hydrALAZINE (APRESOLINE) 100 MG tablet Take 1 tablet (100 mg total) by mouth every 8 (eight) hours. 90 tablet 11    hydroCHLOROthiazide (HYDRODIURIL) 25 MG tablet       LORazepam (ATIVAN) 2 MG Tab Take 1 tablet (2 mg total) by mouth once daily. 30 tablet 5    losartan (COZAAR) 100 MG tablet       methotrexate 2.5 MG Tab Take 6 tablets (15 mg total) by mouth every 7 days. 4 tablet 11    multivitamin-minerals-lutein Tab Take 1 tablet by mouth once daily.      mupirocin (BACTROBAN) 2 %  ointment Apply topically 3 (three) times daily. 15 g 0    oxybutynin (DITROPAN-XL) 5 MG TR24 TAKE ONE TABLET (5MG TOTAL) BY MOUTH ONCE DAILY      pantoprazole (PROTONIX) 40 MG tablet Take 1 tablet (40 mg total) by mouth once daily. 90 tablet 1    potassium chloride (KLOR-CON) 10 MEQ TbSR Take 10 mEq by mouth once daily.      tadalafiL (CIALIS) 5 MG tablet TAKE ONE TABLET BY MOUTH EVERY DAY AS NEEDED FOR erectile dysfunction 30 tablet 5    vitamin D (VITAMIN D3) 1000 units Tab Take 2,000 Units by mouth once daily.       Current Facility-Administered Medications on File Prior to Visit   Medication Dose Route Frequency Provider Last Rate Last Admin    acetaminophen tablet 650 mg  650 mg Oral Once PRN Brigette Robledo NP        albuterol inhaler 2 puff  2 puff Inhalation Q20 Min PRN Brigette Robledo NP        diphenhydrAMINE injection 25 mg  25 mg Intravenous Once PRN Brigette Robledo NP        EPINEPHrine (EPIPEN) 0.3 mg/0.3 mL pen injection 0.3 mg  0.3 mg Intramuscular PRN Brigette Robledo NP        methylPREDNISolone sodium succinate injection 40 mg  40 mg Intravenous Once PRN Brigette Robledo NP        ondansetron injection 4 mg  4 mg Intravenous Once PRN Brigette Robledo NP           ROS: As per HPI and below:  Pertinent items are noted in HPI.          Physical Exam:     Vitals:    09/08/22 1109   BP: 136/76   Pulse: 78   Resp: 18   Temp: 98.2 °F (36.8 °C)           There is no height or weight on file to calculate BMI.          General:             Well developed, well nourished, no apparent distress  HEENT:              NCAT, no JVD, mucous membranes moist, EOM intact  Cardiovascular:  Regular rate and rhythm, normal S1, normal S2, No murmurs, rubs, or gallops  Respiratory:        Normal breath sounds, no wheezes, no rales, no rhonchi  Abdomen:           Bowel sounds present, non tender, non distended, no masses, no hepatojugular reflux  Extremities:        No clubbing, no cyanosis, no edema  Neurological:      No focal deficits  Skin:                    Left 4th finger erythema, left leg open wound, see wound care assessment documentation in chart review scanned under the media tab    Lab Results   Component Value Date    WBC 6.6 06/13/2022    HGB 13.5 06/13/2022    HCT 40.6 06/13/2022    MCV 85.8 06/13/2022     06/13/2022     Lab Results   Component Value Date    CHOL 176 03/10/2022    CHOL 175 03/15/2021    CHOL 155 08/29/2019     Lab Results   Component Value Date    HDL 42 03/10/2022    HDL 33 (L) 03/15/2021    HDL 36 (L) 08/29/2019     Lab Results   Component Value Date    LDLCALC 109 (H) 03/10/2022    LDLCALC 117 (H) 03/15/2021    LDLCALC 100.4 08/29/2019     Lab Results   Component Value Date    TRIG 133 03/10/2022    TRIG 137 03/15/2021    TRIG 93 08/29/2019     Lab Results   Component Value Date    CHOLHDL 4.2 03/10/2022    CHOLHDL 5.3 (H) 03/15/2021    CHOLHDL 23.2 08/29/2019     CMP     Lab Results   Component Value Date    TSH 0.12 (L) 10/06/2020           Assessment and Recommendations       Diagnoses:    1. Paranychia of the left 4th finger  2. Left leg open wound, probable insect bite    Plan:  1. Please see wound care instructions which have been provided separately.

## 2022-10-20 ENCOUNTER — CLINICAL SUPPORT (OUTPATIENT)
Dept: FAMILY MEDICINE | Facility: CLINIC | Age: 62
End: 2022-10-20
Payer: COMMERCIAL

## 2022-10-20 DIAGNOSIS — Z23 NEED FOR INFLUENZA VACCINATION: Primary | ICD-10-CM

## 2022-10-20 PROCEDURE — 90682 RIV4 VACC RECOMBINANT DNA IM: CPT | Mod: S$GLB,,, | Performed by: FAMILY MEDICINE

## 2022-10-20 PROCEDURE — 90471 FLU VACCINE - QUADRIVALENT (RECOMBINANT) PRESERVATIVE FREE: ICD-10-PCS | Mod: S$GLB,,, | Performed by: FAMILY MEDICINE

## 2022-10-20 PROCEDURE — 90682 FLU VACCINE - QUADRIVALENT (RECOMBINANT) PRESERVATIVE FREE: ICD-10-PCS | Mod: S$GLB,,, | Performed by: FAMILY MEDICINE

## 2022-10-20 PROCEDURE — 90471 IMMUNIZATION ADMIN: CPT | Mod: S$GLB,,, | Performed by: FAMILY MEDICINE

## 2022-11-08 ENCOUNTER — PATIENT MESSAGE (OUTPATIENT)
Dept: FAMILY MEDICINE | Facility: CLINIC | Age: 62
End: 2022-11-08

## 2022-11-08 DIAGNOSIS — F41.9 ANXIETY: ICD-10-CM

## 2022-11-08 RX ORDER — LORAZEPAM 2 MG/1
2 TABLET ORAL DAILY
Qty: 30 TABLET | Refills: 5 | Status: SHIPPED | OUTPATIENT
Start: 2022-11-08 | End: 2023-05-31 | Stop reason: SDUPTHER

## 2022-11-08 RX ORDER — LEVOTHYROXINE SODIUM 150 UG/1
150 TABLET ORAL
Qty: 30 TABLET | Refills: 11 | Status: SHIPPED | OUTPATIENT
Start: 2022-11-08 | End: 2023-08-02

## 2022-11-08 RX ORDER — LEVOTHYROXINE SODIUM 175 UG/1
175 TABLET ORAL
Qty: 30 TABLET | Refills: 11 | Status: SHIPPED | OUTPATIENT
Start: 2022-11-08 | End: 2024-03-18 | Stop reason: DRUGHIGH

## 2023-01-30 ENCOUNTER — TELEPHONE (OUTPATIENT)
Dept: FAMILY MEDICINE | Facility: CLINIC | Age: 63
End: 2023-01-30

## 2023-01-30 NOTE — TELEPHONE ENCOUNTER
Spoke with pt he has been on steriod for 3 years. His dentist called in Nystatin 100,000 units and he is on his 3rd bottle of that.  He was also called in flagyl 150 #10 he took his last pill yesterday. He has been fighting with this for 3 weeks. He hd to leave work early today because he was feeling bad and had a fever of 103. He took tylenol and his temp is down to  99. Pt has been scheduled with Brigette on tomorrow.

## 2023-01-30 NOTE — TELEPHONE ENCOUNTER
----- Message from Dana Dillon sent at 1/30/2023 11:14 AM CST -----  Patient called and stated that he is not feeling well he stated that she has thrush on his tongue for the past three weeks and he got mouth wash from his dentist but it is not helping and he want to come in today please give him a call at [phone]

## 2023-01-31 ENCOUNTER — OFFICE VISIT (OUTPATIENT)
Dept: FAMILY MEDICINE | Facility: CLINIC | Age: 63
End: 2023-01-31
Payer: COMMERCIAL

## 2023-01-31 VITALS
SYSTOLIC BLOOD PRESSURE: 130 MMHG | OXYGEN SATURATION: 97 % | TEMPERATURE: 98 F | HEIGHT: 73 IN | HEART RATE: 64 BPM | BODY MASS INDEX: 24.12 KG/M2 | DIASTOLIC BLOOD PRESSURE: 70 MMHG | WEIGHT: 182 LBS

## 2023-01-31 DIAGNOSIS — B37.0 THRUSH: ICD-10-CM

## 2023-01-31 DIAGNOSIS — M35.3 POLYMYALGIA RHEUMATICA: ICD-10-CM

## 2023-01-31 DIAGNOSIS — R19.7 DIARRHEA, UNSPECIFIED TYPE: ICD-10-CM

## 2023-01-31 DIAGNOSIS — R50.9 FEVER, UNSPECIFIED FEVER CAUSE: Primary | ICD-10-CM

## 2023-01-31 DIAGNOSIS — R53.83 FATIGUE, UNSPECIFIED TYPE: ICD-10-CM

## 2023-01-31 DIAGNOSIS — Z12.11 SCREENING FOR COLON CANCER: ICD-10-CM

## 2023-01-31 LAB
CTP QC/QA: YES
CTP QC/QA: YES
FLUAV AG NPH QL: NEGATIVE
FLUBV AG NPH QL: NEGATIVE
SARS-COV-2 RDRP RESP QL NAA+PROBE: NEGATIVE

## 2023-01-31 PROCEDURE — 1160F RVW MEDS BY RX/DR IN RCRD: CPT | Mod: CPTII,S$GLB,, | Performed by: NURSE PRACTITIONER

## 2023-01-31 PROCEDURE — 3008F BODY MASS INDEX DOCD: CPT | Mod: CPTII,S$GLB,, | Performed by: NURSE PRACTITIONER

## 2023-01-31 PROCEDURE — 1160F PR REVIEW ALL MEDS BY PRESCRIBER/CLIN PHARMACIST DOCUMENTED: ICD-10-PCS | Mod: CPTII,S$GLB,, | Performed by: NURSE PRACTITIONER

## 2023-01-31 PROCEDURE — 3008F PR BODY MASS INDEX (BMI) DOCUMENTED: ICD-10-PCS | Mod: CPTII,S$GLB,, | Performed by: NURSE PRACTITIONER

## 2023-01-31 PROCEDURE — 87635: ICD-10-PCS | Mod: QW,S$GLB,, | Performed by: NURSE PRACTITIONER

## 2023-01-31 PROCEDURE — 3078F DIAST BP <80 MM HG: CPT | Mod: CPTII,S$GLB,, | Performed by: NURSE PRACTITIONER

## 2023-01-31 PROCEDURE — 1159F MED LIST DOCD IN RCRD: CPT | Mod: CPTII,S$GLB,, | Performed by: NURSE PRACTITIONER

## 2023-01-31 PROCEDURE — 1159F PR MEDICATION LIST DOCUMENTED IN MEDICAL RECORD: ICD-10-PCS | Mod: CPTII,S$GLB,, | Performed by: NURSE PRACTITIONER

## 2023-01-31 PROCEDURE — 99214 OFFICE O/P EST MOD 30 MIN: CPT | Mod: S$GLB,,, | Performed by: NURSE PRACTITIONER

## 2023-01-31 PROCEDURE — 87635 SARS-COV-2 COVID-19 AMP PRB: CPT | Mod: QW,S$GLB,, | Performed by: NURSE PRACTITIONER

## 2023-01-31 PROCEDURE — 87804 INFLUENZA ASSAY W/OPTIC: CPT | Mod: 59,QW,, | Performed by: NURSE PRACTITIONER

## 2023-01-31 PROCEDURE — 99214 PR OFFICE/OUTPT VISIT, EST, LEVL IV, 30-39 MIN: ICD-10-PCS | Mod: S$GLB,,, | Performed by: NURSE PRACTITIONER

## 2023-01-31 PROCEDURE — 3075F PR MOST RECENT SYSTOLIC BLOOD PRESS GE 130-139MM HG: ICD-10-PCS | Mod: CPTII,S$GLB,, | Performed by: NURSE PRACTITIONER

## 2023-01-31 PROCEDURE — 3078F PR MOST RECENT DIASTOLIC BLOOD PRESSURE < 80 MM HG: ICD-10-PCS | Mod: CPTII,S$GLB,, | Performed by: NURSE PRACTITIONER

## 2023-01-31 PROCEDURE — 87804 POCT INFLUENZA A/B: ICD-10-PCS | Mod: 59,QW,, | Performed by: NURSE PRACTITIONER

## 2023-01-31 PROCEDURE — 3075F SYST BP GE 130 - 139MM HG: CPT | Mod: CPTII,S$GLB,, | Performed by: NURSE PRACTITIONER

## 2023-01-31 RX ORDER — CIPROFLOXACIN 500 MG/1
500 TABLET ORAL EVERY 12 HOURS
Qty: 14 TABLET | Refills: 0 | Status: SHIPPED | OUTPATIENT
Start: 2023-01-31 | End: 2023-08-02

## 2023-01-31 RX ORDER — FLUCONAZOLE 150 MG/1
150 TABLET ORAL DAILY
COMMUNITY
End: 2023-08-02

## 2023-01-31 RX ORDER — GENTIAN VIOLET 1% 10 MG/ML
0.5 LIQUID TOPICAL 4 TIMES DAILY
Qty: 60 ML | Refills: 0 | Status: SHIPPED | OUTPATIENT
Start: 2023-01-31 | End: 2023-08-02

## 2023-01-31 RX ORDER — NYSTATIN 100000 [USP'U]/ML
SUSPENSION ORAL 4 TIMES DAILY
COMMUNITY
End: 2023-08-02

## 2023-01-31 RX ORDER — PREDNISONE 5 MG/1
5 TABLET ORAL DAILY
COMMUNITY
End: 2024-03-13

## 2023-02-01 NOTE — PROGRESS NOTES
SUBJECTIVE:    Patient ID: Phoenix Fragoso is a 62 y.o. male.    Chief Complaint: Thrush (X 2 week), Fatigue, Diarrhea, and Fever    62 year old male presents for urgent visit. Being treated htn, hyperlipidemia, hypothyroid, prostate cancer, and polymyalgia rheumatica. Taking steroids 5mg daily. Methotrexate 2.5mg 6 once a week. Has been treated for yeast for the last several weeks. Has not not noticed much improvement.Not feeling well. Symptoms started on Sunday. Initiall did have fever. No sore throat . Does have minimal congestion. No signifcant cough during the day. Started with diarrhea today . No vomiting. No nausea. Has not taken anything. Just recently returned from vacation. Did not leave resort.  Has not taken anything.       Office Visit on 01/31/2023   Component Date Value Ref Range Status    POC Rapid COVID 01/31/2023 Negative  Negative Final     Acceptable 01/31/2023 Yes   Final    Rapid Influenza A Ag 01/31/2023 Negative  Negative Final    Rapid Influenza B Ag 01/31/2023 Negative  Negative Final     Acceptable 01/31/2023 Yes   Final       Past Medical History:   Diagnosis Date    Bell's palsy     High cholesterol     Hypertension     Insomnia     Thyroid disease      Social History     Socioeconomic History    Marital status:    Tobacco Use    Smoking status: Never    Smokeless tobacco: Never   Substance and Sexual Activity    Alcohol use: Yes     Comment: OCCASIONALLY    Drug use: No    Sexual activity: Yes     Partners: Female     Social Determinants of Health     Financial Resource Strain: Low Risk     Difficulty of Paying Living Expenses: Not hard at all   Food Insecurity: No Food Insecurity    Worried About Running Out of Food in the Last Year: Never true    Ran Out of Food in the Last Year: Never true   Transportation Needs: No Transportation Needs    Lack of Transportation (Medical): No    Lack of Transportation (Non-Medical): No   Physical Activity:  Insufficiently Active    Days of Exercise per Week: 3 days    Minutes of Exercise per Session: 30 min   Stress: Stress Concern Present    Feeling of Stress : Very much   Social Connections: Unknown    Frequency of Communication with Friends and Family: Once a week    Frequency of Social Gatherings with Friends and Family: Once a week    Active Member of Clubs or Organizations: No    Attends Club or Organization Meetings: Never    Marital Status:    Housing Stability: Low Risk     Unable to Pay for Housing in the Last Year: No    Number of Places Lived in the Last Year: 1    Unstable Housing in the Last Year: No     Past Surgical History:   Procedure Laterality Date    APPENDECTOMY      CHOLECYSTECTOMY      PROSTATE BIOPSY      PROSTATE SURGERY      right knee arthroscopy      ROBOT-ASSISTED LAPAROSCOPIC PROSTATECTOMY N/A 10/16/2019    Procedure: ROBOTIC PROSTATECTOMY;  Surgeon: Walker Brooks MD;  Location: [facility];  Service: Urology;  Laterality: N/A;     History reviewed. No pertinent family history.    Review of patient's allergies indicates:   Allergen Reactions    Ancef [cefazolin] Anaphylaxis     Anaphylactic shock        Current Outpatient Medications:     ascorbic acid, vitamin C, (VITAMIN C) 100 MG tablet, Take 100 mg by mouth once daily., Disp: , Rfl:     aspirin (ECOTRIN) 81 MG EC tablet, Take 81 mg by mouth once daily., Disp: , Rfl:     calcium carbonate (OS-BEAU) 600 mg calcium (1,500 mg) Tab, Take 600 mg by mouth 2 (two) times daily with meals., Disp: , Rfl:     carvediloL (COREG) 12.5 MG tablet, Take 12.5 mg by mouth 2 (two) times daily with meals., Disp: , Rfl:     fish oil-omega-3 fatty acids 300-1,000 mg capsule, Take 2 capsules by mouth once daily. , Disp: , Rfl:     folic acid (FOLVITE) 1 MG tablet, Take 1,000 mcg by mouth once daily., Disp: , Rfl:     hydrALAZINE (APRESOLINE) 100 MG tablet, Take 1 tablet (100 mg total) by mouth every 8 (eight) hours., Disp: 90 tablet, Rfl: 11     levothyroxine (SYNTHROID) 150 MCG tablet, Take 1 tablet (150 mcg total) by mouth before breakfast., Disp: 30 tablet, Rfl: 11    levothyroxine (SYNTHROID, LEVOTHROID) 175 MCG tablet, Take 1 tablet (175 mcg total) by mouth before breakfast., Disp: 30 tablet, Rfl: 11    LORazepam (ATIVAN) 2 MG Tab, Take 1 tablet (2 mg total) by mouth once daily., Disp: 30 tablet, Rfl: 5    losartan (COZAAR) 100 MG tablet, , Disp: , Rfl:     methotrexate 2.5 MG Tab, Take 6 tablets (15 mg total) by mouth every 7 days., Disp: 4 tablet, Rfl: 11    multivitamin-minerals-lutein Tab, Take 1 tablet by mouth once daily., Disp: , Rfl:     nystatin (MYCOSTATIN) 100,000 unit/mL suspension, Take by mouth 4 (four) times daily. Swah and spit 5 mls by mouth four times daily for 7 days, Disp: , Rfl:     oxybutynin (DITROPAN-XL) 5 MG TR24, TAKE ONE TABLET (5MG TOTAL) BY MOUTH ONCE DAILY, Disp: , Rfl:     pantoprazole (PROTONIX) 40 MG tablet, Take 1 tablet (40 mg total) by mouth once daily., Disp: 90 tablet, Rfl: 1    potassium chloride (KLOR-CON) 10 MEQ TbSR, Take 10 mEq by mouth once daily., Disp: , Rfl:     predniSONE (DELTASONE) 5 MG tablet, Take 5 mg by mouth once daily. Take one by mouth daily, Disp: , Rfl:     tadalafiL (CIALIS) 5 MG tablet, TAKE ONE TABLET BY MOUTH EVERY DAY AS NEEDED FOR erectile dysfunction, Disp: 30 tablet, Rfl: 5    vitamin D (VITAMIN D3) 1000 units Tab, Take 2,000 Units by mouth once daily., Disp: , Rfl:     ciprofloxacin HCl (CIPRO) 500 MG tablet, Take 1 tablet (500 mg total) by mouth every 12 (twelve) hours., Disp: 14 tablet, Rfl: 0    fluconazole (DIFLUCAN) 150 MG Tab, Take 150 mg by mouth once daily. Take one tablet by mouth everyday for 10 days, Disp: , Rfl:     gentian violet 1 % topical solution, Use as directed 0.5 mLs in the mouth or throat 4 (four) times daily., Disp: 60 mL, Rfl: 0    hydroCHLOROthiazide (HYDRODIURIL) 25 MG tablet, , Disp: , Rfl:     mupirocin (BACTROBAN) 2 % ointment, Apply topically 3 (three)  "times daily. (Patient not taking: Reported on 1/31/2023), Disp: 15 g, Rfl: 0  No current facility-administered medications for this visit.    Review of Systems   Constitutional:  Positive for fatigue and fever. Negative for unexpected weight change.   HENT:  Negative for ear pain, sinus pressure and sore throat.    Eyes:  Negative for pain.   Respiratory:  Negative for cough and shortness of breath.    Cardiovascular:  Negative for chest pain and leg swelling.   Gastrointestinal:  Positive for diarrhea and nausea. Negative for abdominal pain, constipation and vomiting.   Genitourinary:  Negative for dysuria, frequency and urgency.   Musculoskeletal:  Positive for arthralgias.   Skin:  Negative for rash.   Neurological:  Negative for dizziness, weakness and headaches.   Psychiatric/Behavioral:  Negative for sleep disturbance.         Objective:      Vitals:    01/31/23 1127   BP: 130/70   Pulse: 64   Temp: 97.7 °F (36.5 °C)   TempSrc: Oral   SpO2: 97%   Weight: 82.6 kg (182 lb)   Height: 6' 1" (1.854 m)     Physical Exam  Constitutional:       General: He is not in acute distress.     Appearance: Normal appearance. He is well-developed. He is obese. He is ill-appearing.      Comments: pale   HENT:      Right Ear: External ear normal.      Left Ear: External ear normal.      Mouth/Throat:      Comments: White tongue  Eyes:      Pupils: Pupils are equal, round, and reactive to light.   Neck:      Trachea: No tracheal deviation.   Cardiovascular:      Rate and Rhythm: Normal rate and regular rhythm.      Heart sounds: No murmur heard.    No friction rub. No gallop.   Pulmonary:      Breath sounds: Normal breath sounds. No stridor. No wheezing or rales.   Abdominal:      Palpations: Abdomen is soft. There is no mass.      Tenderness: There is abdominal tenderness in the right lower quadrant. There is no right CVA tenderness.   Musculoskeletal:         General: No tenderness or deformity.      Cervical back: Neck supple. "   Lymphadenopathy:      Cervical: No cervical adenopathy.   Skin:     General: Skin is warm and dry.   Neurological:      Mental Status: He is alert and oriented to person, place, and time.      Coordination: Coordination normal.   Psychiatric:         Thought Content: Thought content normal.         Assessment:       1. Fever, unspecified fever cause    2. Diarrhea, unspecified type    3. Thrush    4. Polymyalgia rheumatica    5. Screening for colon cancer    6. Fatigue, unspecified type           Plan:       Fever, unspecified fever cause  -     POCT COVID-19 Rapid Screening  -     POCT Influenza A/B  -     CBC Auto Differential; Future; Expected date: 01/31/2023  -     Comprehensive Metabolic Panel; Future; Expected date: 01/31/2023  -     Urinalysis, Reflex to Urine Culture Urine, Clean Catch; Future; Expected date: 01/31/2023    Diarrhea, unspecified type  -     CBC Auto Differential; Future; Expected date: 01/31/2023  -     Comprehensive Metabolic Panel; Future; Expected date: 01/31/2023  -     Urinalysis, Reflex to Urine Culture Urine, Clean Catch; Future; Expected date: 01/31/2023  -     ciprofloxacin HCl (CIPRO) 500 MG tablet; Take 1 tablet (500 mg total) by mouth every 12 (twelve) hours.  Dispense: 14 tablet; Refill: 0    Thrush  -     ciprofloxacin HCl (CIPRO) 500 MG tablet; Take 1 tablet (500 mg total) by mouth every 12 (twelve) hours.  Dispense: 14 tablet; Refill: 0  -     gentian violet 1 % topical solution; Use as directed 0.5 mLs in the mouth or throat 4 (four) times daily.  Dispense: 60 mL; Refill: 0    Polymyalgia rheumatica  -     Ambulatory referral/consult to Rheumatology; Future; Expected date: 02/07/2023    Screening for colon cancer  -     Ambulatory referral/consult to Gastroenterology; Future; Expected date: 02/07/2023    Fatigue, unspecified type      Follow up in about 4 weeks (around 2/28/2023).        2/1/2023 Brigette Robledo

## 2023-02-02 LAB
ALBUMIN SERPL-MCNC: 3.8 G/DL (ref 3.6–5.1)
ALBUMIN/GLOB SERPL: 1.4 (CALC) (ref 1–2.5)
ALP SERPL-CCNC: 69 U/L (ref 35–144)
ALT SERPL-CCNC: 13 U/L (ref 9–46)
APPEARANCE UR: CLEAR
AST SERPL-CCNC: 17 U/L (ref 10–35)
BACTERIA #/AREA URNS HPF: ABNORMAL /HPF
BACTERIA UR CULT: ABNORMAL
BACTERIA UR CULT: ABNORMAL
BASOPHILS # BLD AUTO: 28 CELLS/UL (ref 0–200)
BASOPHILS NFR BLD AUTO: 0.6 %
BILIRUB SERPL-MCNC: 1.1 MG/DL (ref 0.2–1.2)
BILIRUB UR QL STRIP: NEGATIVE
BUN SERPL-MCNC: 15 MG/DL (ref 7–25)
BUN/CREAT SERPL: ABNORMAL (CALC) (ref 6–22)
CALCIUM SERPL-MCNC: 9.1 MG/DL (ref 8.6–10.3)
CHLORIDE SERPL-SCNC: 97 MMOL/L (ref 98–110)
CO2 SERPL-SCNC: 30 MMOL/L (ref 20–32)
COLOR UR: ABNORMAL
CREAT SERPL-MCNC: 1.08 MG/DL (ref 0.7–1.35)
EGFR: 77 ML/MIN/1.73M2
EOSINOPHIL # BLD AUTO: 52 CELLS/UL (ref 15–500)
EOSINOPHIL NFR BLD AUTO: 1.1 %
ERYTHROCYTE [DISTWIDTH] IN BLOOD BY AUTOMATED COUNT: 13.1 % (ref 11–15)
GLOBULIN SER CALC-MCNC: 2.7 G/DL (CALC) (ref 1.9–3.7)
GLUCOSE SERPL-MCNC: 96 MG/DL (ref 65–99)
GLUCOSE UR QL STRIP: NEGATIVE
HCT VFR BLD AUTO: 39 % (ref 38.5–50)
HGB BLD-MCNC: 13.7 G/DL (ref 13.2–17.1)
HGB UR QL STRIP: NEGATIVE
HYALINE CASTS #/AREA URNS LPF: ABNORMAL /LPF
KETONES UR QL STRIP: NEGATIVE
LEUKOCYTE ESTERASE UR QL STRIP: ABNORMAL
LYMPHOCYTES # BLD AUTO: 860 CELLS/UL (ref 850–3900)
LYMPHOCYTES NFR BLD AUTO: 18.3 %
MCH RBC QN AUTO: 30.3 PG (ref 27–33)
MCHC RBC AUTO-ENTMCNC: 35.1 G/DL (ref 32–36)
MCV RBC AUTO: 86.3 FL (ref 80–100)
MONOCYTES # BLD AUTO: 282 CELLS/UL (ref 200–950)
MONOCYTES NFR BLD AUTO: 6 %
NEUTROPHILS # BLD AUTO: 3478 CELLS/UL (ref 1500–7800)
NEUTROPHILS NFR BLD AUTO: 74 %
NITRITE UR QL STRIP: NEGATIVE
PH UR STRIP: 7.5 [PH] (ref 5–8)
PLATELET # BLD AUTO: 176 THOUSAND/UL (ref 140–400)
PMV BLD REES-ECKER: 9.4 FL (ref 7.5–12.5)
POTASSIUM SERPL-SCNC: 3.8 MMOL/L (ref 3.5–5.3)
PROT SERPL-MCNC: 6.5 G/DL (ref 6.1–8.1)
PROT UR QL STRIP: ABNORMAL
RBC # BLD AUTO: 4.52 MILLION/UL (ref 4.2–5.8)
RBC #/AREA URNS HPF: ABNORMAL /HPF
SERVICE CMNT-IMP: ABNORMAL
SODIUM SERPL-SCNC: 135 MMOL/L (ref 135–146)
SP GR UR STRIP: 1.02 (ref 1–1.03)
SQUAMOUS #/AREA URNS HPF: ABNORMAL /HPF
WBC # BLD AUTO: 4.7 THOUSAND/UL (ref 3.8–10.8)
WBC #/AREA URNS HPF: ABNORMAL /HPF

## 2023-05-31 DIAGNOSIS — F41.9 ANXIETY: ICD-10-CM

## 2023-06-01 RX ORDER — LORAZEPAM 2 MG/1
2 TABLET ORAL DAILY
Qty: 30 TABLET | Refills: 0 | Status: SHIPPED | OUTPATIENT
Start: 2023-06-01 | End: 2023-07-05 | Stop reason: SDUPTHER

## 2023-07-05 DIAGNOSIS — F41.9 ANXIETY: ICD-10-CM

## 2023-07-06 RX ORDER — LORAZEPAM 2 MG/1
2 TABLET ORAL DAILY
Qty: 30 TABLET | Refills: 0 | Status: SHIPPED | OUTPATIENT
Start: 2023-07-06 | End: 2023-08-02 | Stop reason: SDUPTHER

## 2023-08-02 ENCOUNTER — TELEPHONE (OUTPATIENT)
Dept: FAMILY MEDICINE | Facility: CLINIC | Age: 63
End: 2023-08-02

## 2023-08-02 ENCOUNTER — OFFICE VISIT (OUTPATIENT)
Dept: FAMILY MEDICINE | Facility: CLINIC | Age: 63
End: 2023-08-02
Payer: COMMERCIAL

## 2023-08-02 VITALS
WEIGHT: 176 LBS | HEIGHT: 73 IN | HEART RATE: 68 BPM | BODY MASS INDEX: 23.33 KG/M2 | SYSTOLIC BLOOD PRESSURE: 128 MMHG | DIASTOLIC BLOOD PRESSURE: 78 MMHG

## 2023-08-02 DIAGNOSIS — M35.3 POLYMYALGIA RHEUMATICA: Primary | ICD-10-CM

## 2023-08-02 DIAGNOSIS — Z85.46 HISTORY OF PROSTATE CANCER: ICD-10-CM

## 2023-08-02 DIAGNOSIS — E07.9 THYROID DISEASE: ICD-10-CM

## 2023-08-02 DIAGNOSIS — F32.A DEPRESSION, UNSPECIFIED DEPRESSION TYPE: ICD-10-CM

## 2023-08-02 DIAGNOSIS — F41.9 ANXIETY: ICD-10-CM

## 2023-08-02 DIAGNOSIS — I10 ESSENTIAL HYPERTENSION: ICD-10-CM

## 2023-08-02 PROCEDURE — 3008F PR BODY MASS INDEX (BMI) DOCUMENTED: ICD-10-PCS | Mod: CPTII,S$GLB,, | Performed by: NURSE PRACTITIONER

## 2023-08-02 PROCEDURE — 3074F PR MOST RECENT SYSTOLIC BLOOD PRESSURE < 130 MM HG: ICD-10-PCS | Mod: CPTII,S$GLB,, | Performed by: NURSE PRACTITIONER

## 2023-08-02 PROCEDURE — 3078F PR MOST RECENT DIASTOLIC BLOOD PRESSURE < 80 MM HG: ICD-10-PCS | Mod: CPTII,S$GLB,, | Performed by: NURSE PRACTITIONER

## 2023-08-02 PROCEDURE — 1160F PR REVIEW ALL MEDS BY PRESCRIBER/CLIN PHARMACIST DOCUMENTED: ICD-10-PCS | Mod: CPTII,S$GLB,, | Performed by: NURSE PRACTITIONER

## 2023-08-02 PROCEDURE — 1159F PR MEDICATION LIST DOCUMENTED IN MEDICAL RECORD: ICD-10-PCS | Mod: CPTII,S$GLB,, | Performed by: NURSE PRACTITIONER

## 2023-08-02 PROCEDURE — 1160F RVW MEDS BY RX/DR IN RCRD: CPT | Mod: CPTII,S$GLB,, | Performed by: NURSE PRACTITIONER

## 2023-08-02 PROCEDURE — 4010F ACE/ARB THERAPY RXD/TAKEN: CPT | Mod: CPTII,S$GLB,, | Performed by: NURSE PRACTITIONER

## 2023-08-02 PROCEDURE — 3008F BODY MASS INDEX DOCD: CPT | Mod: CPTII,S$GLB,, | Performed by: NURSE PRACTITIONER

## 2023-08-02 PROCEDURE — 1159F MED LIST DOCD IN RCRD: CPT | Mod: CPTII,S$GLB,, | Performed by: NURSE PRACTITIONER

## 2023-08-02 PROCEDURE — 3078F DIAST BP <80 MM HG: CPT | Mod: CPTII,S$GLB,, | Performed by: NURSE PRACTITIONER

## 2023-08-02 PROCEDURE — 99214 OFFICE O/P EST MOD 30 MIN: CPT | Mod: S$GLB,,, | Performed by: NURSE PRACTITIONER

## 2023-08-02 PROCEDURE — 4010F PR ACE/ARB THEARPY RXD/TAKEN: ICD-10-PCS | Mod: CPTII,S$GLB,, | Performed by: NURSE PRACTITIONER

## 2023-08-02 PROCEDURE — 3074F SYST BP LT 130 MM HG: CPT | Mod: CPTII,S$GLB,, | Performed by: NURSE PRACTITIONER

## 2023-08-02 PROCEDURE — 99214 PR OFFICE/OUTPT VISIT, EST, LEVL IV, 30-39 MIN: ICD-10-PCS | Mod: S$GLB,,, | Performed by: NURSE PRACTITIONER

## 2023-08-02 RX ORDER — LOSARTAN POTASSIUM AND HYDROCHLOROTHIAZIDE 25; 100 MG/1; MG/1
1 TABLET ORAL
COMMUNITY
Start: 2023-07-14 | End: 2023-08-02

## 2023-08-02 NOTE — TELEPHONE ENCOUNTER
----- Message from Dana Dillon sent at 8/2/2023 10:44 AM CDT -----  Patient called and stated that he just had lab work done and he would like to come in and see the provider. Also he need a refill of his lorazepam and he stated per the reading of his blood work he feel that he need to have something called in for his thyroid. Please give him a call at [phone]

## 2023-08-02 NOTE — TELEPHONE ENCOUNTER
Spoke with patient who states he went to his heart doctor and had labs done and there were a few things he was concerned about. Wanted to come in and discuss abnormal labs - scheduled.

## 2023-08-04 ENCOUNTER — TELEPHONE (OUTPATIENT)
Dept: FAMILY MEDICINE | Facility: CLINIC | Age: 63
End: 2023-08-04

## 2023-08-04 RX ORDER — TADALAFIL 20 MG/1
20 TABLET ORAL DAILY
Qty: 10 TABLET | Refills: 5 | Status: SHIPPED | OUTPATIENT
Start: 2023-08-04 | End: 2024-01-08 | Stop reason: SDUPTHER

## 2023-08-04 RX ORDER — LORAZEPAM 2 MG/1
2 TABLET ORAL DAILY
Qty: 90 TABLET | Refills: 1 | Status: SHIPPED | OUTPATIENT
Start: 2023-08-04 | End: 2024-02-27 | Stop reason: SDUPTHER

## 2023-08-04 RX ORDER — BUPROPION HYDROCHLORIDE 150 MG/1
150 TABLET ORAL DAILY
Qty: 30 TABLET | Refills: 11 | Status: SHIPPED | OUTPATIENT
Start: 2023-08-04 | End: 2024-08-03

## 2023-08-04 RX ORDER — LOSARTAN POTASSIUM 100 MG/1
100 TABLET ORAL DAILY
Qty: 90 TABLET | Refills: 3 | Status: CANCELLED | OUTPATIENT
Start: 2023-08-04 | End: 2024-08-03

## 2023-08-04 RX ORDER — HYDRALAZINE HYDROCHLORIDE 100 MG/1
50 TABLET, FILM COATED ORAL 2 TIMES DAILY
Qty: 30 TABLET | Refills: 11 | Status: CANCELLED | OUTPATIENT
Start: 2023-08-04 | End: 2024-08-03

## 2023-08-04 NOTE — TELEPHONE ENCOUNTER
----- Message from Aga Puri sent at 8/4/2023  9:41 AM CDT -----  Pt following up on scripts that needed to be called in a few days ago.    [address]

## 2023-08-14 NOTE — PROGRESS NOTES
SUBJECTIVE:    Patient ID: Phoenix Fragoso is a 62 y.o. male.    Chief Complaint: Medication Refill (Discuss labs, brought bottles, C-scope pt states insurance told him they will pay it after 10 years and it has only been 7 years// SW )    62 year old male presents for check up. Being treated htn, hyperlipidemia, hypothyroid, prostate cancer, and polymyalgia rheumatica. Still on chronic steroids. Being followed by rheumatology in [place]..has office visit in September. No longer taking methotrexate. Sleeps ok with ativan..bp in office is well controlled. Feels down and frustrated at times. Would like to discuss.         No visits with results within 6 Month(s) from this visit.   Latest known visit with results is:   Office Visit on 01/31/2023   Component Date Value Ref Range Status    POC Rapid COVID 01/31/2023 Negative  Negative Final     Acceptable 01/31/2023 Yes   Final    Rapid Influenza A Ag 01/31/2023 Negative  Negative Final    Rapid Influenza B Ag 01/31/2023 Negative  Negative Final     Acceptable 01/31/2023 Yes   Final    WBC 01/31/2023 4.7  3.8 - 10.8 Thousand/uL Final    RBC 01/31/2023 4.52  4.20 - 5.80 Million/uL Final    Hemoglobin 01/31/2023 13.7  13.2 - 17.1 g/dL Final    Hematocrit 01/31/2023 39.0  38.5 - 50.0 % Final    MCV 01/31/2023 86.3  80.0 - 100.0 fL Final    MCH 01/31/2023 30.3  27.0 - 33.0 pg Final    MCHC 01/31/2023 35.1  32.0 - 36.0 g/dL Final    RDW 01/31/2023 13.1  11.0 - 15.0 % Final    Platelets 01/31/2023 176  140 - 400 Thousand/uL Final    MPV 01/31/2023 9.4  7.5 - 12.5 fL Final    Neutrophils, Abs 01/31/2023 3,478  1,500 - 7,800 cells/uL Final    Lymph # 01/31/2023 860  850 - 3,900 cells/uL Final    Mono # 01/31/2023 282  200 - 950 cells/uL Final    Eos # 01/31/2023 52  15 - 500 cells/uL Final    Baso # 01/31/2023 28  0 - 200 cells/uL Final    Neutrophils Relative 01/31/2023 74  % Final    Lymph % 01/31/2023 18.3  % Final    Mono % 01/31/2023 6.0   % Final    Eosinophil % 01/31/2023 1.1  % Final    Basophil % 01/31/2023 0.6  % Final    Glucose 01/31/2023 96  65 - 99 mg/dL Final    BUN 01/31/2023 15  7 - 25 mg/dL Final    Creatinine 01/31/2023 1.08  0.70 - 1.35 mg/dL Final    eGFR 01/31/2023 77  > OR = 60 mL/min/1.73m2 Final    BUN/Creatinine Ratio 01/31/2023 NOT APPLICABLE  6 - 22 (calc) Final    Sodium 01/31/2023 135  135 - 146 mmol/L Final    Potassium 01/31/2023 3.8  3.5 - 5.3 mmol/L Final    Chloride 01/31/2023 97 (L)  98 - 110 mmol/L Final    CO2 01/31/2023 30  20 - 32 mmol/L Final    Calcium 01/31/2023 9.1  8.6 - 10.3 mg/dL Final    Total Protein 01/31/2023 6.5  6.1 - 8.1 g/dL Final    Albumin 01/31/2023 3.8  3.6 - 5.1 g/dL Final    Globulin, Total 01/31/2023 2.7  1.9 - 3.7 g/dL (calc) Final    Albumin/Globulin Ratio 01/31/2023 1.4  1.0 - 2.5 (calc) Final    Total Bilirubin 01/31/2023 1.1  0.2 - 1.2 mg/dL Final    Alkaline Phosphatase 01/31/2023 69  35 - 144 U/L Final    AST 01/31/2023 17  10 - 35 U/L Final    ALT 01/31/2023 13  9 - 46 U/L Final    Color, UA 01/31/2023 DARK YELLOW  YELLOW Final    Appearance, UA 01/31/2023 CLEAR  CLEAR Final    Specific Gravity, UA 01/31/2023 1.016  1.001 - 1.035 Final    pH, UA 01/31/2023 7.5  5.0 - 8.0 Final    Glucose, UA 01/31/2023 NEGATIVE  NEGATIVE Final    Bilirubin, UA 01/31/2023 NEGATIVE  NEGATIVE Final    Ketones, UA 01/31/2023 NEGATIVE  NEGATIVE Final    Occult Blood UA 01/31/2023 NEGATIVE  NEGATIVE Final    Protein, UA 01/31/2023 TRACE (A)  NEGATIVE Final    Nitrite, UA 01/31/2023 NEGATIVE  NEGATIVE Final    Leukocytes, UA 01/31/2023 TRACE (A)  NEGATIVE Final    WBC Casts, UA 01/31/2023 0-5  < OR = 5 /HPF Final    RBC Casts, UA 01/31/2023 3-10 (A)  < OR = 2 /HPF Final    Squam Epithel, UA 01/31/2023 NONE SEEN  < OR = 5 /HPF Final    Bacteria, UA 01/31/2023 NONE SEEN  NONE SEEN /HPF Final    Hyaline Casts, UA 01/31/2023 0-5 (A)  NONE SEEN /LPF Final    Service Cmt: 01/31/2023    Final    Reflexive Urine  Culture 01/31/2023    Final    Urine Culture, Routine 01/31/2023    Final       Past Medical History:   Diagnosis Date    Bell's palsy     High cholesterol     Hypertension     Insomnia     Thyroid disease      Social History     Socioeconomic History    Marital status:    Tobacco Use    Smoking status: Never    Smokeless tobacco: Never   Substance and Sexual Activity    Alcohol use: Yes     Comment: OCCASIONALLY    Drug use: No    Sexual activity: Yes     Partners: Female     Social Determinants of Health     Financial Resource Strain: Low Risk  (8/2/2023)    Overall Financial Resource Strain (CARDIA)     Difficulty of Paying Living Expenses: Not very hard   Food Insecurity: No Food Insecurity (8/2/2023)    Hunger Vital Sign     Worried About Running Out of Food in the Last Year: Never true     Ran Out of Food in the Last Year: Never true   Transportation Needs: No Transportation Needs (8/2/2023)    PRAPARE - Transportation     Lack of Transportation (Medical): No     Lack of Transportation (Non-Medical): No   Physical Activity: Unknown (8/2/2023)    Exercise Vital Sign     Days of Exercise per Week: 5 days   Stress: Stress Concern Present (8/2/2023)    [employer] - Occupational Stress Questionnaire     Feeling of Stress : Very much   Social Connections: Unknown (8/2/2023)    Social Connection and Isolation Panel [NHANES]     Frequency of Communication with Friends and Family: Once a week     Frequency of Social Gatherings with Friends and Family: Once a week     Active Member of Clubs or Organizations: Yes     Attends Club or Organization Meetings: 1 to 4 times per year     Marital Status:    Housing Stability: Low Risk  (8/2/2023)    Housing Stability Vital Sign     Unable to Pay for Housing in the Last Year: No     Number of Places Lived in the Last Year: 1     Unstable Housing in the Last Year: No     Past Surgical History:   Procedure Laterality Date    APPENDECTOMY       CHOLECYSTECTOMY      PROSTATE BIOPSY      PROSTATE SURGERY      right knee arthroscopy      ROBOT-ASSISTED LAPAROSCOPIC PROSTATECTOMY N/A 10/16/2019    Procedure: ROBOTIC PROSTATECTOMY;  Surgeon: Walker Brooks MD;  Location: [facility];  Service: Urology;  Laterality: N/A;     History reviewed. No pertinent family history.    Tests to Keep You Healthy    Colon Cancer Screening: DUE  Last Blood Pressure <= 139/89 (8/2/2023): Yes      Review of patient's allergies indicates:   Allergen Reactions    Ancef [cefazolin] Anaphylaxis     Anaphylactic shock        Current Outpatient Medications:     ascorbic acid, vitamin C, (VITAMIN C) 100 MG tablet, Take 100 mg by mouth once daily., Disp: , Rfl:     aspirin (ECOTRIN) 81 MG EC tablet, Take 81 mg by mouth once daily., Disp: , Rfl:     calcium carbonate (OS-BEAU) 600 mg calcium (1,500 mg) Tab, Take 600 mg by mouth 2 (two) times daily with meals., Disp: , Rfl:     carvediloL (COREG) 12.5 MG tablet, Take 12.5 mg by mouth Daily., Disp: , Rfl:     fish oil-omega-3 fatty acids 300-1,000 mg capsule, Take 2 capsules by mouth once daily. , Disp: , Rfl:     folic acid (FOLVITE) 1 MG tablet, Take 1,000 mcg by mouth once daily., Disp: , Rfl:     hydrALAZINE (APRESOLINE) 100 MG tablet, Take 1 tablet (100 mg total) by mouth every 8 (eight) hours., Disp: 90 tablet, Rfl: 11    levothyroxine (SYNTHROID, LEVOTHROID) 175 MCG tablet, Take 1 tablet (175 mcg total) by mouth before breakfast., Disp: 30 tablet, Rfl: 11    multivitamin-minerals-lutein Tab, Take 1 tablet by mouth once daily., Disp: , Rfl:     oxybutynin (DITROPAN-XL) 5 MG TR24, TAKE ONE TABLET (5MG TOTAL) BY MOUTH ONCE DAILY, Disp: , Rfl:     potassium chloride (KLOR-CON) 10 MEQ TbSR, Take 10 mEq by mouth once daily., Disp: , Rfl:     predniSONE (DELTASONE) 5 MG tablet, Take 5 mg by mouth once daily. Take one by mouth daily, Disp: , Rfl:     vitamin D (VITAMIN D3) 1000 units Tab, Take 2,000 Units by mouth once daily., Disp: ,  "Rfl:     buPROPion (WELLBUTRIN XL) 150 MG TB24 tablet, Take 1 tablet (150 mg total) by mouth once daily., Disp: 30 tablet, Rfl: 11    LORazepam (ATIVAN) 2 MG Tab, Take 1 tablet (2 mg total) by mouth once daily., Disp: 90 tablet, Rfl: 1    tadalafiL (CIALIS) 20 MG Tab, Take 1 tablet (20 mg total) by mouth once daily., Disp: 10 tablet, Rfl: 5    Review of Systems   Constitutional:  Negative for fatigue, fever and unexpected weight change.   HENT:  Negative for ear pain, sinus pressure and sore throat.    Eyes:  Negative for pain.   Respiratory:  Negative for cough and shortness of breath.    Cardiovascular:  Negative for chest pain and leg swelling.   Gastrointestinal:  Negative for abdominal pain, constipation, nausea and vomiting.   Genitourinary:  Negative for dysuria, frequency and urgency.   Musculoskeletal:  Positive for arthralgias.   Skin:  Negative for rash.   Neurological:  Negative for dizziness, weakness and headaches.   Psychiatric/Behavioral:  Negative for agitation and sleep disturbance.           Objective:      Vitals:    08/02/23 1337   BP: 128/78   Pulse: 68   Weight: 79.8 kg (176 lb)   Height: 6' 1" (1.854 m)     Physical Exam  Vitals and nursing note reviewed.   Constitutional:       General: He is not in acute distress.     Appearance: Normal appearance. He is well-developed.   HENT:      Head: Normocephalic and atraumatic.      Right Ear: External ear normal.      Left Ear: External ear normal.   Eyes:      Pupils: Pupils are equal, round, and reactive to light.   Neck:      Trachea: No tracheal deviation.   Cardiovascular:      Rate and Rhythm: Normal rate and regular rhythm.      Heart sounds: No murmur heard.     No friction rub. No gallop.   Pulmonary:      Breath sounds: Normal breath sounds. No stridor. No wheezing or rales.   Abdominal:      Palpations: Abdomen is soft. There is no mass.      Tenderness: There is no abdominal tenderness.   Musculoskeletal:         General: No tenderness " or deformity.      Cervical back: Neck supple.   Lymphadenopathy:      Cervical: No cervical adenopathy.   Skin:     General: Skin is warm and dry.   Neurological:      Mental Status: He is alert and oriented to person, place, and time.      Coordination: Coordination normal.   Psychiatric:         Thought Content: Thought content normal.           Assessment:       1. Polymyalgia rheumatica    2. Anxiety    3. Essential hypertension    4. Thyroid disease    5. Depression, unspecified depression type    6. History of prostate cancer         Plan:       Polymyalgia rheumatica  Comments:  see rheumatology as discussed    Anxiety  Comments:  ativan prn  Orders:  -     LORazepam (ATIVAN) 2 MG Tab; Take 1 tablet (2 mg total) by mouth once daily.  Dispense: 90 tablet; Refill: 1    Essential hypertension  Comments:  bp is well controlled    Thyroid disease  Comments:  synthroid as prescribed    Depression, unspecified depression type  Comments:  wellbutrin daily    History of prostate cancer  Comments:  refer back to dr. holland    Other orders  -     buPROPion (WELLBUTRIN XL) 150 MG TB24 tablet; Take 1 tablet (150 mg total) by mouth once daily.  Dispense: 30 tablet; Refill: 11  -     tadalafiL (CIALIS) 20 MG Tab; Take 1 tablet (20 mg total) by mouth once daily.  Dispense: 10 tablet; Refill: 5      Follow up in about 3 months (around 11/2/2023), or if symptoms worsen or fail to improve, for medication management.        8/14/2023 Brigette Robledo

## 2023-09-23 ENCOUNTER — HOSPITAL ENCOUNTER (EMERGENCY)
Facility: HOSPITAL | Age: 63
Discharge: HOME | End: 2023-09-23
Attending: STUDENT IN AN ORGANIZED HEALTH CARE EDUCATION/TRAINING PROGRAM
Payer: COMMERCIAL

## 2023-09-23 VITALS
DIASTOLIC BLOOD PRESSURE: 106 MMHG | WEIGHT: 175 LBS | SYSTOLIC BLOOD PRESSURE: 209 MMHG | HEART RATE: 77 BPM | BODY MASS INDEX: 23.19 KG/M2 | HEIGHT: 73 IN | RESPIRATION RATE: 16 BRPM | OXYGEN SATURATION: 95 % | TEMPERATURE: 99 F

## 2023-09-23 DIAGNOSIS — J02.9 SORE THROAT: ICD-10-CM

## 2023-09-23 DIAGNOSIS — R05.9 COUGH, UNSPECIFIED TYPE: ICD-10-CM

## 2023-09-23 DIAGNOSIS — J06.9 VIRAL URI: Primary | ICD-10-CM

## 2023-09-23 LAB
GROUP A STREP, MOLECULAR: NEGATIVE
INFLUENZA A, MOLECULAR: NEGATIVE
INFLUENZA B, MOLECULAR: NEGATIVE
SARS-COV-2 RDRP RESP QL NAA+PROBE: NEGATIVE
SPECIMEN SOURCE: NORMAL

## 2023-09-23 PROCEDURE — 25000003 PHARM REV CODE 250: Performed by: STUDENT IN AN ORGANIZED HEALTH CARE EDUCATION/TRAINING PROGRAM

## 2023-09-23 PROCEDURE — 99284 EMERGENCY DEPT VISIT MOD MDM: CPT

## 2023-09-23 PROCEDURE — 87651 STREP A DNA AMP PROBE: CPT | Performed by: STUDENT IN AN ORGANIZED HEALTH CARE EDUCATION/TRAINING PROGRAM

## 2023-09-23 PROCEDURE — 87502 INFLUENZA DNA AMP PROBE: CPT | Performed by: STUDENT IN AN ORGANIZED HEALTH CARE EDUCATION/TRAINING PROGRAM

## 2023-09-23 PROCEDURE — U0002 COVID-19 LAB TEST NON-CDC: HCPCS | Performed by: STUDENT IN AN ORGANIZED HEALTH CARE EDUCATION/TRAINING PROGRAM

## 2023-09-23 RX ORDER — CARVEDILOL 12.5 MG/1
12.5 TABLET ORAL ONCE
Status: COMPLETED | OUTPATIENT
Start: 2023-09-23 | End: 2023-09-23

## 2023-09-23 RX ORDER — HYDRALAZINE HYDROCHLORIDE 25 MG/1
100 TABLET, FILM COATED ORAL ONCE
Status: DISCONTINUED | OUTPATIENT
Start: 2023-09-23 | End: 2023-09-23

## 2023-09-23 RX ORDER — HYDRALAZINE HYDROCHLORIDE 25 MG/1
100 TABLET, FILM COATED ORAL ONCE
Status: COMPLETED | OUTPATIENT
Start: 2023-09-23 | End: 2023-09-23

## 2023-09-23 RX ORDER — CARVEDILOL 12.5 MG/1
12.5 TABLET ORAL ONCE
Status: DISCONTINUED | OUTPATIENT
Start: 2023-09-23 | End: 2023-09-23

## 2023-09-23 RX ORDER — ACETAMINOPHEN 500 MG
1000 TABLET ORAL
Status: COMPLETED | OUTPATIENT
Start: 2023-09-23 | End: 2023-09-23

## 2023-09-23 RX ORDER — SODIUM CHLORIDE 9 MG/ML
1000 INJECTION, SOLUTION INTRAVENOUS
Status: COMPLETED | OUTPATIENT
Start: 2023-09-23 | End: 2023-09-23

## 2023-09-23 RX ADMIN — SODIUM CHLORIDE 1000 ML: 0.9 INJECTION, SOLUTION INTRAVENOUS at 04:09

## 2023-09-23 RX ADMIN — CARVEDILOL 12.5 MG: 12.5 TABLET, FILM COATED ORAL at 04:09

## 2023-09-23 RX ADMIN — HYDRALAZINE HYDROCHLORIDE 100 MG: 25 TABLET ORAL at 04:09

## 2023-09-23 RX ADMIN — ACETAMINOPHEN 1000 MG: 500 TABLET ORAL at 04:09

## 2023-09-23 NOTE — DISCHARGE INSTRUCTIONS
Make sure to drink as much water as you can and take Tylenol and Aleve/Advil/Motrin/ibuprofen as needed

## 2023-09-23 NOTE — ED PROVIDER NOTES
Encounter Date: 9/23/2023       History     Chief Complaint   Patient presents with    Flu Like Symptoms     Patient c/o sore throat, cough, body aches that have gotten progressively worse since Monday     HPI  Phoenix Fragoso is a 62-year-old male who presents with one-week of myalgia, subjective fever, sore throat, cough, and congestion.  He states that he began feeling better yesterday however today became short of breath and had his cough worsened.  No recent weight loss, chest pain, abdominal pain, nausea/vomiting, constipation/diarrhea, any other complaints.  He has taken Aleve for his pain most recently at 10:30 p.m..  Review of patient's allergies indicates:   Allergen Reactions    Ancef [cefazolin] Anaphylaxis     Anaphylactic shock      Past Medical History:   Diagnosis Date    Bell's palsy     High cholesterol     Hypertension     Insomnia     Thyroid disease      Past Surgical History:   Procedure Laterality Date    APPENDECTOMY      CHOLECYSTECTOMY      PROSTATE BIOPSY      PROSTATE SURGERY      right knee arthroscopy      ROBOT-ASSISTED LAPAROSCOPIC PROSTATECTOMY N/A 10/16/2019    Procedure: ROBOTIC PROSTATECTOMY;  Surgeon: Walker Brooks MD;  Location: [facility];  Service: Urology;  Laterality: N/A;     No family history on file.  Social History     Tobacco Use    Smoking status: Never    Smokeless tobacco: Never   Substance Use Topics    Alcohol use: Yes     Comment: OCCASIONALLY    Drug use: No     Review of Systems   All other systems reviewed and are negative.      Physical Exam     Initial Vitals [09/23/23 0353]   BP Pulse Resp Temp SpO2   (!) 229/128 71 18 98.5 °F (36.9 °C) (!) 94 %      MAP       --         Physical Exam    Nursing note and vitals reviewed.  Constitutional: He appears well-developed and well-nourished.   HENT:   Head: Normocephalic and atraumatic.   Right Ear: External ear normal.   Left Ear: External ear normal.   Nose: Nose normal.   No oropharyngeal exudates however there  is some oropharyngeal erythema   Eyes: Conjunctivae are normal. Pupils are equal, round, and reactive to light.   Neck: Neck supple.   Normal range of motion.  Cardiovascular:  Normal rate, regular rhythm and normal heart sounds.           Pulmonary/Chest: Breath sounds normal. No respiratory distress. He has no wheezes.   Abdominal: Abdomen is soft. He exhibits no distension. There is no abdominal tenderness.   Musculoskeletal:         General: No edema. Normal range of motion.      Cervical back: Normal range of motion and neck supple.     Neurological: He is alert.   Skin: Skin is warm and dry.   Psychiatric: He has a normal mood and affect.         ED Course   Procedures  Labs Reviewed   GROUP A STREP, MOLECULAR   INFLUENZA A AND B ANTIGEN    Narrative:     Specimen Source->Nasopharyngeal Swab   SARS-COV-2 RNA AMPLIFICATION, QUAL          Imaging Results    None          Medications   0.9%  NaCl infusion (1,000 mLs Intravenous New Bag 9/23/23 0430)   acetaminophen tablet 1,000 mg (1,000 mg Oral Given 9/23/23 0436)   carvediloL tablet 12.5 mg (12.5 mg Oral Given 9/23/23 0436)   hydrALAZINE tablet 100 mg (100 mg Oral Given 9/23/23 0437)     Medical Decision Making  Initial differential diagnosis includes was not limited to viral upper respiratory infection including but not limited to COVID/flu/RSV, strep pharyngitis.  Low suspicion for superimposed pneumonia given lack of hypoxia/chest pain and no focal lung findings on exam.  We will obtain flu and COVID testing as well as strep testing and control symptoms with Tylenol and fluids.  Of note, patient did not take his home blood pressure medication and his blood pressure is significantly elevated.  We will give him his home Coreg and hydralazine.    Amount and/or Complexity of Data Reviewed  External Data Reviewed: notes.     Details: Recent progress note shows home blood pressure medications are up-to-date in the chart  Labs: ordered. Decision-making details  documented in ED Course.    Risk  OTC drugs.  Prescription drug management.               ED Course as of 09/23/23 0557   Sat Sep 23, 2023   0555 SARS-CoV-2 RNA, Amplification, Qual: Negative [GUILHERME]   0555 Influenza A, Molecular: Negative [GUILHERME]   0555 Influenza B, Molecular: Negative [GUILHERME]   0555 Group A Strep, Molecular: Negative [GUILHERME]   0557 Patient is viral workup negative.  Likely due to other upper respiratory virus.  Stable and ready for discharge at this time. [GUILHERME]      ED Course User Index  [GUILHERME] Anthony Nur MD                    Clinical Impression:   Final diagnoses:  [J06.9] Viral URI (Primary)  [R05.9] Cough, unspecified type  [J02.9] Sore throat        ED Disposition Condition    Discharge Stable          ED Prescriptions    None       Follow-up Information       Follow up With Specialties Details Why Contact Info    Walker Lugo MD Family Medicine, Home Health Services, Hospice Services Go to  As needed [address]  [address]  [facility] [address]  [phone]               Anthony Nur MD  Resident  09/23/23 0557

## 2023-09-25 ENCOUNTER — PATIENT OUTREACH (OUTPATIENT)
Dept: ADMINISTRATIVE | Facility: HOSPITAL | Age: 63
End: 2023-09-25
Payer: COMMERCIAL

## 2023-09-25 NOTE — PROGRESS NOTES
ED FOLLOW UP REPORT: chart reviewed, no further action necessary

## 2023-10-09 ENCOUNTER — TELEPHONE (OUTPATIENT)
Dept: FAMILY MEDICINE | Facility: CLINIC | Age: 63
End: 2023-10-09

## 2023-10-09 RX ORDER — DOXYCYCLINE HYCLATE 100 MG
100 TABLET ORAL 2 TIMES DAILY
Qty: 20 TABLET | Refills: 0 | Status: SHIPPED | OUTPATIENT
Start: 2023-10-09 | End: 2023-11-09

## 2023-10-09 NOTE — TELEPHONE ENCOUNTER
----- Message from Lorena Rios sent at 10/9/2023  1:57 PM CDT -----  Vm- 12:56- pt would like to talk to nurse about medication   [phone]

## 2023-10-30 ENCOUNTER — CLINICAL SUPPORT (OUTPATIENT)
Dept: FAMILY MEDICINE | Facility: CLINIC | Age: 63
End: 2023-10-30
Payer: COMMERCIAL

## 2023-10-30 DIAGNOSIS — Z23 NEED FOR VACCINATION: Primary | ICD-10-CM

## 2023-10-30 PROCEDURE — 90471 FLU VACCINE (QUAD) GREATER THAN OR EQUAL TO 3YO PRESERVATIVE FREE IM: ICD-10-PCS | Mod: S$GLB,,, | Performed by: FAMILY MEDICINE

## 2023-10-30 PROCEDURE — 90686 IIV4 VACC NO PRSV 0.5 ML IM: CPT | Mod: S$GLB,,, | Performed by: FAMILY MEDICINE

## 2023-10-30 PROCEDURE — 90686 FLU VACCINE (QUAD) GREATER THAN OR EQUAL TO 3YO PRESERVATIVE FREE IM: ICD-10-PCS | Mod: S$GLB,,, | Performed by: FAMILY MEDICINE

## 2023-10-30 PROCEDURE — 90471 IMMUNIZATION ADMIN: CPT | Mod: S$GLB,,, | Performed by: FAMILY MEDICINE

## 2023-11-06 ENCOUNTER — TELEPHONE (OUTPATIENT)
Dept: FAMILY MEDICINE | Facility: CLINIC | Age: 63
End: 2023-11-06

## 2023-11-06 NOTE — TELEPHONE ENCOUNTER
----- Message from Sue Cortes sent at 11/6/2023 12:00 PM CST -----  Pt wanted to cancel his current appointment and needs to reschedule it.     [phone]

## 2023-11-09 ENCOUNTER — OFFICE VISIT (OUTPATIENT)
Dept: URGENT CARE | Facility: CLINIC | Age: 63
End: 2023-11-09
Payer: COMMERCIAL

## 2023-11-09 VITALS
BODY MASS INDEX: 23.19 KG/M2 | DIASTOLIC BLOOD PRESSURE: 104 MMHG | OXYGEN SATURATION: 98 % | SYSTOLIC BLOOD PRESSURE: 167 MMHG | TEMPERATURE: 98 F | RESPIRATION RATE: 16 BRPM | HEIGHT: 73 IN | WEIGHT: 175 LBS | HEART RATE: 68 BPM

## 2023-11-09 DIAGNOSIS — J02.9 SORE THROAT: Primary | ICD-10-CM

## 2023-11-09 DIAGNOSIS — I10 ELEVATED BLOOD PRESSURE READING WITH DIAGNOSIS OF HYPERTENSION: ICD-10-CM

## 2023-11-09 DIAGNOSIS — J01.90 ACUTE BACTERIAL SINUSITIS: ICD-10-CM

## 2023-11-09 DIAGNOSIS — Z86.79 HISTORY OF HYPERTENSION: ICD-10-CM

## 2023-11-09 DIAGNOSIS — B96.89 ACUTE BACTERIAL SINUSITIS: ICD-10-CM

## 2023-11-09 LAB
CTP QC/QA: YES
S PYO RRNA THROAT QL PROBE: NEGATIVE

## 2023-11-09 PROCEDURE — 99214 PR OFFICE/OUTPT VISIT, EST, LEVL IV, 30-39 MIN: ICD-10-PCS | Mod: S$GLB,,, | Performed by: NURSE PRACTITIONER

## 2023-11-09 PROCEDURE — 87880 STREP A ASSAY W/OPTIC: CPT | Mod: QW,,, | Performed by: NURSE PRACTITIONER

## 2023-11-09 PROCEDURE — 87880 POCT RAPID STREP A: ICD-10-PCS | Mod: QW,,, | Performed by: NURSE PRACTITIONER

## 2023-11-09 PROCEDURE — 99214 OFFICE O/P EST MOD 30 MIN: CPT | Mod: S$GLB,,, | Performed by: NURSE PRACTITIONER

## 2023-11-09 RX ORDER — HYDRALAZINE HYDROCHLORIDE 50 MG/1
50 TABLET, FILM COATED ORAL 2 TIMES DAILY
COMMUNITY
Start: 2023-10-27

## 2023-11-09 RX ORDER — DOXYCYCLINE 100 MG/1
100 CAPSULE ORAL 2 TIMES DAILY
Qty: 20 CAPSULE | Refills: 0 | Status: SHIPPED | OUTPATIENT
Start: 2023-11-09 | End: 2023-11-19

## 2023-11-09 RX ORDER — GUAIFENESIN 600 MG/1
1200 TABLET, EXTENDED RELEASE ORAL 2 TIMES DAILY
Qty: 40 TABLET | Refills: 0 | Status: SHIPPED | OUTPATIENT
Start: 2023-11-09 | End: 2023-11-19

## 2023-11-09 RX ORDER — LOSARTAN POTASSIUM 50 MG/1
50 TABLET ORAL
COMMUNITY
Start: 2023-10-27

## 2023-11-09 NOTE — PROGRESS NOTES
"Subjective:      Patient ID: Phoenix Fragoso is a 63 y.o. male.    Vitals:  height is 6' 1" (1.854 m) and weight is 79.4 kg (175 lb). His temperature is 98.2 °F (36.8 °C). His blood pressure is 167/104 (abnormal) and his pulse is 68. His respiration is 16 and oxygen saturation is 98%.     Chief Complaint: Sore Throat    Pt c/o sore throat, L ear pain, and productive cough. Treatments tried: zyrtec with no relief.  Also reports cough and sinus congestion and drainage since September    Sore Throat   This is a new problem. Episode onset: x4 days. Associated symptoms include congestion, coughing and ear pain (Left). Pertinent negatives include no abdominal pain, diarrhea, ear discharge, shortness of breath or vomiting.       Constitution: Negative for activity change, appetite change, chills, fatigue and fever.   HENT:  Positive for ear pain (Left), congestion, postnasal drip and sore throat. Negative for ear discharge.    Respiratory:  Positive for cough. Negative for chest tightness, shortness of breath, wheezing and asthma.    Gastrointestinal:  Negative for abdominal pain, nausea, vomiting and diarrhea.   Skin:  Negative for rash.   Allergic/Immunologic: Negative for asthma.      Objective:     Physical Exam   Constitutional: He is oriented to person, place, and time. He is cooperative.  Non-toxic appearance. He does not appear ill. No distress. awake  HENT:   Head: Normocephalic and atraumatic.   Ears:   Right Ear: Tympanic membrane, external ear and ear canal normal.   Left Ear: Tympanic membrane, external ear and ear canal normal.   Nose: Congestion present. No rhinorrhea.   Mouth/Throat: Mucous membranes are moist. Posterior oropharyngeal erythema present. No oropharyngeal exudate.   Eyes: Conjunctivae are normal. Right eye exhibits no discharge. Left eye exhibits no discharge.   Neck: Neck supple. No neck rigidity present.   Cardiovascular: Normal rate, regular rhythm and normal heart sounds.   Pulmonary/Chest: " Effort normal and breath sounds normal. No accessory muscle usage. No tachypnea. No respiratory distress. He has no wheezes. He has no rhonchi. He has no rales. He exhibits no tenderness.   Abdominal: Normal appearance.   Musculoskeletal:      Cervical back: He exhibits tenderness (Left).   Lymphadenopathy:     He has cervical adenopathy (Left anterior cervical chain).   Neurological: no focal deficit. He is alert and oriented to person, place, and time. No sensory deficit.   Skin: Skin is warm, dry, not diaphoretic and no rash. Capillary refill takes 2 to 3 seconds.   Psychiatric: His behavior is normal. Mood normal.   Nursing note and vitals reviewed.      Assessment:     1. Sore throat    2. History of hypertension    3. Acute bacterial sinusitis    4. Elevated blood pressure reading with diagnosis of hypertension      Strep A negative  Declined COVID testing    Plan:       Sore throat  -     POCT rapid strep A    History of hypertension    Acute bacterial sinusitis  -     doxycycline (MONODOX) 100 MG capsule; Take 1 capsule (100 mg total) by mouth 2 (two) times daily. for 10 days  Dispense: 20 capsule; Refill: 0  -     guaiFENesin (MUCINEX) 600 mg 12 hr tablet; Take 2 tablets (1,200 mg total) by mouth 2 (two) times daily. for 10 days  Dispense: 40 tablet; Refill: 0    Elevated blood pressure reading with diagnosis of hypertension

## 2023-11-09 NOTE — PATIENT INSTRUCTIONS
Doxycycline twice a day for 10 days.  Always take with food and a full glass of water and do not lay down for 1 hour after each dose.  Be sure to protect her skin against the sun as doxycycline is well known to cause excessive skin/sun sensitivity that may result in severe sunburn, blistering, rash.    Is always advisable to take probiotics for intestinal health over-the-counter when taking any antibiotic and continue to take the probiotic for an additional 3-4 weeks after completion of the antibiotic to help restore the good intestinal bacteria  Guaifenesin twice a day for 10 days to help thin mucus  Increase fluid intake to help thin mucus to promote better drainage    Continue Zyrtec daily until symptoms have resolved.    It is also advisable to use nasal steroids as directed daily until symptoms have resolved such as Flonase, or equivalent

## 2023-11-13 LAB — CRC RECOMMENDATION EXT: NORMAL

## 2023-11-15 ENCOUNTER — TELEPHONE (OUTPATIENT)
Dept: FAMILY MEDICINE | Facility: CLINIC | Age: 63
End: 2023-11-15

## 2023-11-15 ENCOUNTER — PATIENT OUTREACH (OUTPATIENT)
Dept: ADMINISTRATIVE | Facility: HOSPITAL | Age: 63
End: 2023-11-15
Payer: COMMERCIAL

## 2023-11-15 DIAGNOSIS — E03.9 HYPOTHYROIDISM, UNSPECIFIED TYPE: ICD-10-CM

## 2023-11-15 DIAGNOSIS — E07.9 THYROID DISEASE: ICD-10-CM

## 2023-11-15 DIAGNOSIS — I10 ESSENTIAL HYPERTENSION: ICD-10-CM

## 2023-11-15 DIAGNOSIS — Z79.899 HIGH RISK MEDICATION USE: Primary | ICD-10-CM

## 2023-11-15 NOTE — PROGRESS NOTES
Population Health Chart Review & Patient Outreach Details    Outreach Performed: NO    Additional Pop Health Notes:           Updates Requested / Reviewed:      Updated Care Coordination Note and Care Team Updated         Health Maintenance Topics Overdue:    Health Maintenance Due   Topic Date Due    Hepatitis C Screening  Never done    HIV Screening  Never done    Shingles Vaccine (1 of 2) Never done    Pneumococcal Vaccines (Age 0-64) (2 - PCV) 11/18/2015    RSV Vaccine (Age 60+) (1 - 1-dose 60+ series) Never done    Colorectal Cancer Screening  02/16/2023    COVID-19 Vaccine (3 - 2023-24 season) 09/01/2023         Health Maintenance Topic(s) Outreach Outcomes & Actions Taken:    Colorectal Cancer Screening - Outreach Outcomes & Actions Taken  : External Records Uploaded, Care Team Updated, & History Updated if Applicable

## 2024-01-08 ENCOUNTER — LAB VISIT (OUTPATIENT)
Dept: LAB | Facility: HOSPITAL | Age: 64
End: 2024-01-08
Attending: UROLOGY
Payer: COMMERCIAL

## 2024-01-08 ENCOUNTER — OFFICE VISIT (OUTPATIENT)
Dept: UROLOGY | Facility: CLINIC | Age: 64
End: 2024-01-08
Payer: COMMERCIAL

## 2024-01-08 VITALS
BODY MASS INDEX: 23.19 KG/M2 | DIASTOLIC BLOOD PRESSURE: 83 MMHG | HEIGHT: 73 IN | WEIGHT: 175 LBS | SYSTOLIC BLOOD PRESSURE: 136 MMHG | HEART RATE: 74 BPM

## 2024-01-08 DIAGNOSIS — Z85.46 HISTORY OF PROSTATE CANCER: ICD-10-CM

## 2024-01-08 DIAGNOSIS — Z85.46 HISTORY OF PROSTATE CANCER: Primary | ICD-10-CM

## 2024-01-08 LAB
BILIRUBIN, UA POC OHS: NEGATIVE
BLOOD, UA POC OHS: ABNORMAL
CLARITY, UA POC OHS: CLEAR
COLOR, UA POC OHS: YELLOW
COMPLEXED PSA SERPL-MCNC: <0.01 NG/ML (ref 0–4)
GLUCOSE, UA POC OHS: NEGATIVE
KETONES, UA POC OHS: NEGATIVE
LEUKOCYTES, UA POC OHS: NEGATIVE
MICROSCOPIC COMMENT: ABNORMAL
NITRITE, UA POC OHS: NEGATIVE
PH, UA POC OHS: 7
PROTEIN, UA POC OHS: ABNORMAL
RBC #/AREA URNS AUTO: 11 /HPF (ref 0–4)
SPECIFIC GRAVITY, UA POC OHS: 1.01
UROBILINOGEN, UA POC OHS: 0.2
WBC #/AREA URNS AUTO: 1 /HPF (ref 0–5)

## 2024-01-08 PROCEDURE — 87086 URINE CULTURE/COLONY COUNT: CPT | Performed by: UROLOGY

## 2024-01-08 PROCEDURE — 81001 URINALYSIS AUTO W/SCOPE: CPT | Performed by: UROLOGY

## 2024-01-08 PROCEDURE — 3075F SYST BP GE 130 - 139MM HG: CPT | Mod: CPTII,S$GLB,, | Performed by: UROLOGY

## 2024-01-08 PROCEDURE — 3079F DIAST BP 80-89 MM HG: CPT | Mod: CPTII,S$GLB,, | Performed by: UROLOGY

## 2024-01-08 PROCEDURE — 99215 OFFICE O/P EST HI 40 MIN: CPT | Mod: S$GLB,,, | Performed by: UROLOGY

## 2024-01-08 PROCEDURE — 84153 ASSAY OF PSA TOTAL: CPT | Performed by: UROLOGY

## 2024-01-08 PROCEDURE — 1159F MED LIST DOCD IN RCRD: CPT | Mod: CPTII,S$GLB,, | Performed by: UROLOGY

## 2024-01-08 PROCEDURE — 99999 PR PBB SHADOW E&M-EST. PATIENT-LVL IV: CPT | Mod: PBBFAC,,, | Performed by: UROLOGY

## 2024-01-08 PROCEDURE — 81003 URINALYSIS AUTO W/O SCOPE: CPT | Mod: QW,S$GLB,, | Performed by: UROLOGY

## 2024-01-08 PROCEDURE — 36415 COLL VENOUS BLD VENIPUNCTURE: CPT | Performed by: UROLOGY

## 2024-01-08 PROCEDURE — 3008F BODY MASS INDEX DOCD: CPT | Mod: CPTII,S$GLB,, | Performed by: UROLOGY

## 2024-01-08 PROCEDURE — 1160F RVW MEDS BY RX/DR IN RCRD: CPT | Mod: CPTII,S$GLB,, | Performed by: UROLOGY

## 2024-01-08 RX ORDER — SOLIFENACIN SUCCINATE 10 MG/1
10 TABLET, FILM COATED ORAL DAILY
Qty: 30 TABLET | Refills: 11 | Status: SHIPPED | OUTPATIENT
Start: 2024-01-08 | End: 2025-01-07

## 2024-01-08 RX ORDER — TADALAFIL 20 MG/1
20 TABLET ORAL DAILY
Qty: 10 TABLET | Refills: 11 | Status: SHIPPED | OUTPATIENT
Start: 2024-01-08 | End: 2025-01-07

## 2024-01-08 RX ORDER — TADALAFIL 5 MG/1
5 TABLET ORAL DAILY
Qty: 30 TABLET | Refills: 11 | Status: SHIPPED | OUTPATIENT
Start: 2024-01-08 | End: 2025-01-07

## 2024-01-08 NOTE — PATIENT INSTRUCTIONS
On demand use of cialis:  20 mg tablet 45-60 minutes in advance of anticipated sexual encounter, better absorbed on empty stomach  - if significant facial flushing and headache okay to take aspirin or Advil with it    As well can use Vacuum device with ring 45-60 mins after taking for max benefit     Continue low-dose daily Cialis 5 mg daily for baseline, and then use on demand dose as needed as above        Discussed conservative measures to control urgency and frequency including   1. Avoiding/minimizing bladder irritants (see below), especially in afternoon and evening hours    Discussed bladder irritants include coffe (even decaf), tea, alcohol, soda, spicy foods, acidic juices (orange, tomato), vinegar, and artificial sweeteners/sugary beverages.    2. timed voiding - empty on a schedule (approx ~2-3 hours) in spite of need to urinate, to get ahead of urge    3. dont postponing voiding - dont hold it on purpose     4. bowel regimen as distended bowel has extrinsic compressive effect on bladder.   - any or all of the following in any combination, titrate to soft daily bowel movement without pushing or straining  - colace/stool softener capsule - once to twice daily  - miralax - 1 capful daily to start, can increase to 2x daily (or decrease to 1/2 cap daily)  - increase dietary fibery  - fiber supplements, such as metamucil  - prunes, prune juice    5. INCREASE water intake    6. Stop fluids 2 hours before bed, and urinate just before bed    Solifenacin/vesicare daily

## 2024-01-09 LAB — BACTERIA UR CULT: NO GROWTH

## 2024-01-10 DIAGNOSIS — R31.29 MICROHEMATURIA: Primary | ICD-10-CM

## 2024-01-11 ENCOUNTER — HOSPITAL ENCOUNTER (OUTPATIENT)
Dept: RADIOLOGY | Facility: HOSPITAL | Age: 64
Discharge: HOME | End: 2024-01-11
Attending: UROLOGY
Payer: COMMERCIAL

## 2024-01-11 DIAGNOSIS — R31.29 MICROHEMATURIA: ICD-10-CM

## 2024-01-11 PROCEDURE — 25500020 PHARM REV CODE 255

## 2024-01-11 PROCEDURE — 74178 CT ABD&PLV WO CNTR FLWD CNTR: CPT | Mod: TC

## 2024-01-11 PROCEDURE — 74178 CT ABD&PLV WO CNTR FLWD CNTR: CPT | Mod: 26,,, | Performed by: RADIOLOGY

## 2024-01-11 RX ADMIN — IOHEXOL 125 ML: 350 INJECTION, SOLUTION INTRAVENOUS at 07:01

## 2024-01-12 ENCOUNTER — PATIENT MESSAGE (OUTPATIENT)
Dept: UROLOGY | Facility: CLINIC | Age: 64
End: 2024-01-12
Payer: COMMERCIAL

## 2024-01-16 ENCOUNTER — TELEPHONE (OUTPATIENT)
Dept: UROLOGY | Facility: CLINIC | Age: 64
End: 2024-01-16
Payer: COMMERCIAL

## 2024-01-16 DIAGNOSIS — R31.29 MICROHEMATURIA: Primary | ICD-10-CM

## 2024-01-16 NOTE — TELEPHONE ENCOUNTER
----- Message from Yecenia Mills sent at 1/16/2024  3:27 PM CST -----  Type:  Test Results    Who Called:  Pt    Name of Test (Lab/Mammo/Etc):  CT    Date of Test:  1/11/24    Ordering Provider:  Dr Brooks    Where the test was performed:      Would the patient rather a call back or a response via [facility]?  Call back    Best Call Back Number:  [phone]    Additional Information:   Please call back to advise. Thanks!

## 2024-01-17 DIAGNOSIS — R31.29 MICROHEMATURIA: Primary | ICD-10-CM

## 2024-01-17 NOTE — PROGRESS NOTES
Procedure Order to Urology [[id number]]    Electronically signed by: Walker Brooks MD on 01/17/24 0843 Status: Active   Ordering user: Walker Brooks MD 01/17/24 0843 Authorized by: Walker Brooks MD   Ordering mode: Standard   Frequency:  01/17/24 -     Diagnoses  Microhematuria [R31.29]   Questionnaire    Question Answer   Procedure Cystoscopy Comment - 2/27   Facility Name: [facility], Please order Urine POCT without micro . Local sedation

## 2024-02-22 DIAGNOSIS — F41.9 ANXIETY: ICD-10-CM

## 2024-02-22 RX ORDER — LORAZEPAM 2 MG/1
2 TABLET ORAL DAILY
Qty: 90 TABLET | Refills: 1 | Status: CANCELLED | OUTPATIENT
Start: 2024-02-22

## 2024-02-27 ENCOUNTER — HOSPITAL ENCOUNTER (OUTPATIENT)
Facility: HOSPITAL | Age: 64
Discharge: HOME | End: 2024-02-27
Attending: UROLOGY | Admitting: UROLOGY
Payer: COMMERCIAL

## 2024-02-27 DIAGNOSIS — R31.29 MICROHEMATURIA: ICD-10-CM

## 2024-02-27 DIAGNOSIS — R31.29 MICROHEMATURIA: Primary | ICD-10-CM

## 2024-02-27 DIAGNOSIS — F41.9 ANXIETY: ICD-10-CM

## 2024-02-27 DIAGNOSIS — R97.20 ELEVATED PSA: Primary | ICD-10-CM

## 2024-02-27 LAB
BILIRUBIN, UA POC OHS: NEGATIVE
BLOOD, UA POC OHS: ABNORMAL
CLARITY, UA POC OHS: CLEAR
COLOR, UA POC OHS: YELLOW
GLUCOSE, UA POC OHS: NEGATIVE
KETONES, UA POC OHS: NEGATIVE
LEUKOCYTES, UA POC OHS: NEGATIVE
NITRITE, UA POC OHS: NEGATIVE
PH, UA POC OHS: 7
PROTEIN, UA POC OHS: NEGATIVE
SPECIFIC GRAVITY, UA POC OHS: 1.01
UROBILINOGEN, UA POC OHS: 0.2

## 2024-02-27 PROCEDURE — 25000003 PHARM REV CODE 250: Performed by: UROLOGY

## 2024-02-27 PROCEDURE — 52000 CYSTOURETHROSCOPY: CPT | Mod: ,,, | Performed by: UROLOGY

## 2024-02-27 PROCEDURE — 52000 CYSTOURETHROSCOPY: CPT | Performed by: UROLOGY

## 2024-02-27 RX ORDER — LIDOCAINE HYDROCHLORIDE 20 MG/ML
JELLY TOPICAL
Status: DISCONTINUED | OUTPATIENT
Start: 2024-02-27 | End: 2024-02-27 | Stop reason: HOSPADM

## 2024-02-27 RX ORDER — LORAZEPAM 2 MG/1
2 TABLET ORAL DAILY
Qty: 30 TABLET | Refills: 0 | Status: SHIPPED | OUTPATIENT
Start: 2024-02-27

## 2024-02-27 NOTE — TELEPHONE ENCOUNTER
----- Message from Poornima Cisse sent at 2/27/2024 10:17 AM CST -----  Pt needs an appt with Brigette. Pt #[phone]

## 2024-02-27 NOTE — H&P
[facility] Urology Progress Note     Phoenix Fragoso is a 60 y.o. male who presents for prostate cancer follow up     He has a family history of prostate cancer, evaluated for PSA of 4.9 in March 2018 finding a prostate volume of 46.7 g with median lobe and Catia 3 + 3 equals 6 prostate cancer in 1 core at the right base medial, with concurrent cystoscopic evidence of prostatic obstruction with ball valving median lobe.  He initially elected active surveillance and was managed on Flomax though his urinary symptoms progressed and his incomplete emptying worsened.  Despite increasing medical management regimens, his AUA symptom score persisted is 31/4, and after extensive risk benefit discussion with known prostate cancer as well as severe prostatic obstruction, he elected surgical management with robotic prostatectomy.  - of note on initial planned prostatectomy date in Aug 2019 had anaphylaxis after induction and underwent extensive allergy testing workup noting allergy to Ancef     10/16/19: Robot-assisted laparoscopic radical prostatectomy, bilateral nerve sparing. Mild adhesions from prior surgery taken down laparoscopically to place ports, significant median lobe necessitating extended bladder neck dissection and subsequent bladder neck reconstruction with no intraop evidence of urine leak on testing anastamosis  PATHOLOGY: Catia 3+4=7 [id number], negative margins     He has done well after with no evidence of disease and undetectable PSA.  He noted significant improvement in his stream and flow with relief of all obstructive voiding symptoms and had some progressive urgency for which he has been taking low-dose Ditropan XL 5 mg. Preop had cialis 5, would get erectile benefit with 2.5 though better if 2d in a row  At time of last visit in May 2020, PSA was again undetectable.  Continence had improved.  No pad use.  Occasional small drip was 1/lift/heavy exertion.  Was taking 5 mg daily of Cialis but no erections and  only tried a 10-20 mg on demand dose once.  As well, workup for joint pain and positive FARRAH led to a diagnosis of polymyalgia rheumatica and he is being followed by rheumatology and treated with steroids.     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     8/18/21  - still some residual SOB and stomach pain. Also on prednisone and MTX for polymyalgia rheumatica trying to transition off of steroids. PSA <0.01  Has urinary frequency and urgency. Still taking oxybutynin, but not regularly. Q2 hour nocturia. Stream still great and like a firehose. But urgency still a problem and may have to pull over on occ  occ few drop leakage at night, and some leakage pushing and straining for BM. + constipation. Alternates constipation/diarrhea. ++ dry mouth  Erection - shelter there, but not sufficient for penetration - with cialis. Has only taken 10mg on demand. Has BP effects and facial flushing. Only tried INA x2 - doesn't like it.   - changed to myrbetriq, discussed IPP     11/5/21: Last week took 100mg viagra on demand and was not full erection but was sufficient for penetration.   Only has tried on demand dosing once or twice.  Previously tried on demand half dose Cialis and was having side effects and discontinued and after discussion above still has low-dose Cialis on demand Cialis on demand Viagra available. Does still get facial flushing etc and doesn't take around BP meds.  Not taking daily low dose but will do cialis 5mg daily 2-3d leading. MTX weekly, still on prednisone.   In regards to continence, still mostly continent and just leaks some - usually when sitting, with urge. No need for  "pad  Still has some urgency frequency with DTF 10x and NTF Q2h  Did not switch to Myrbetriq secondary to cost.  Has continued oxybutynin 5 mg daily.  Still having significant dry eyes, dry mouth, constipation, and is urgency frequency is not managed.     Lost to follow up   Canceled appt 2/2022  Messaged in 5/2022 for right flank pain and was directed to pcp for eval and abdominal US had incidental 5mm LUP stone  Last seen by primary care 8/2023 noting his anxiety Rxed ativan and wellbutrin, and still on chronic steroids for his PMR  Last psa 7/22/21 <0.01     He presents today noting  Started injection for PMR but can't afford it. Last injection before Xmas, off pred x3 mos, following up with rheum this month to discuss plan  Still has Q90 min urinary frequency at night. Occ daytime urgency.   Has been off oab meds for a while.   No bloodthinners just asa 81  Udip trc blood trc prot  3 bp meds   Stream and flow are good.  No visible blood in urine.   Still largely continent. Very rare drip BRANDIE with cough/sneeze/lift.   Cialis 20mg gives partial erection not sufficient for penetration.   Only used INA 1x yrs ago as pt and partner dont like using it.   Still gets some flushing/bp effects.        Focused Physical Exam:         Vitals:     01/08/24 1022   BP: 136/83   Pulse: 74      Body mass index is 23.09 kg/m². Weight: 79.4 kg (175 lb) Height: 6' 1" (185.4 cm)      Abdomen: Soft, non-tender, nondistended, no CVA tenderness     Recent Results         Recent Results (from the past 336 hour(s))   POCT Urinalysis(Instrument)     Collection Time: 01/08/24 10:30 AM   Result Value Ref Range     Color, POC UA Yellow Yellow, Straw, Colorless     Clarity, POC UA Clear Clear     Glucose, POC UA Negative Negative     Bilirubin, POC UA Negative Negative     Ketones, POC UA Negative Negative     Spec Grav POC UA 1.015 1.005 - 1.030     Blood, POC UA Trace-intact (A) Negative     pH, POC UA 7.0 5.0 - 8.0     Protein, POC UA Trace " (A) Negative     Urobilinogen, POC UA 0.2 <=1.0     Nitrite, POC UA Negative Negative     WBC, POC UA Negative Negative            ASSESSMENT   1. History of prostate cancer  POCT Urinalysis(Instrument)     Prostate Specific Antigen, Diagnostic     Prostate Specific Antigen, Diagnostic     Urine culture     Urinalysis Microscopic                Plan   Overall doing well about 4-1/2 years status post robotic prostatectomy.  We reviewed his last PSA was undetectable with no evidence of disease in July of 2021 so he is overdue for PSA, and certainly if it is undetectable at this time can do once more 6 months later, which will be nearly 5 years postop and then can go out to annually out to 10 years.  His continence is stable and has no continence product use.  No pads or depends.  Very rare stress urinary incontinence.  He is however significantly bothered by his residual urgency frequency overactive bladder component.  We did review that preoperatively he had severe obstructive and irritative lower urinary tract symptoms from longstanding prostate obstruction and changes at the level of the bladder, and he does report that his stream and flow are good so I have no concerns for obstruction or anatomic considerations at this time, and is likely the unmasking of the overactive bladder component for which we have tried to manage him medically but he had significant side effect profile with anticholinergics and was not managed well, and was unable to afford beta agonist based on insurance coverage previously.  He has been off of medication for awhile as he been lost to follow-up so I did recommend starting OAB medication, and with poor coverage for beta agonist, as well with his significant hypertension, systolics in the 200s lately now requiring 3 blood pressure medications, would avoid mirabegron, and utilize Vibegron, however has no coverage for this and therefore will use quaternary amine anticholinergic with VESIcare  at maximum dose at this time to minimize side effect profile of anticholinergics and hopefully have effect on his OAB components.  As well we did discuss postprostatectomy ED management.  He has utilize 20 mg on demand Cialis with out sufficient results for penetration.  He did not ever resume low-dose daily medical therapy in addition to on demand use.  He has not utilize vacuum device since the initial try not liking how it is used.  At this time after discussion of intracavernosal injection and penile prosthesis, he would like to resume daily low-dose Cialis with the PRN on demand use over it.  I did encourage him to try again after on demand use of 20 mg Cialis 1 hour later utilize pump and ring for maximum benefit in the interim while he considers other options.  He is considering possibly proceeding with inflatable penile prosthesis next year should all this be refractory.  If he tries the above and is interested in injection therapy he will let us know as well.  At this time, given these issues and management, being lost to follow-up for a few years, will check PSA today to ensure PSA still remains undetectable and plan to see him in 6 months with a repeat PSA prior.  Urinalysis dipstick also noted trace blood so will get a UA micro today and urine culture to make sure there has no contribution to his urinary symptoms.  As well, conservative recommendations for urgency frequency and nocturia were provided.    Officially the workup for microscopic blood in the urine (more than 5rbc, and you have 11) is the ct and then cystoscopy     Your last cystoscopy was 2018 at time of biopsy     The small kidney stones are nonobstructing in your kidney and unlikely source, so cystocopy (passing camera through urethra into bladder with numbing jelly) is recommended. This will also assess any anatomic concerns for any residual urinary symptoms and rule out any bladder source for the microscopic blood     No urgency to  it  Next avail is 2/27 so I put you down for there

## 2024-02-27 NOTE — PLAN OF CARE
Discharge instructions given to pt, verbalized understanding.  Tolerating Po fluids.  Denies pain.  F/u 3 months.  Ambulating out to self care in no distress.

## 2024-02-28 NOTE — OP NOTE
[facility] Urology Operative/ Brief Discharge Note     Date: 2/27/24     Staff Surgeon: Walker Brooks MD     Pre-Op Diagnosis:   Microhematuria  History of prostate cancer, s/p prostatectomy     Post-Op Diagnosis: same     Procedure(s) Performed:   Cystoscopy, flexible     Specimen(s): none     Anesthesia: local urojet      Findings: normal bladder, patent bladder neck and anastamosis, good coaptation of sphincter, hypervascularity near sphincter     Estimated Blood Loss: minimal     Drains: none     Complications: none     Indications for procedure:  63 year old male with history of prostate cancer status postprostatectomy in 2019 who recovered his stress urinary incontinence but has had some continued overactive bladder symptoms which were present preop, as well as persistent distinct micro hematuria.  Urine culture negative, upper tract imaging with small nonobstructing stones, and presents today for cystoscopic evaluation.     Procedure in detail:  After informed consent, the patient was prepped and drapped in standard  cystoscopic fashion and 2% xylocaine jelly was used to anesthetize the urethra. A flexible cystoscope was passed into the bladder via the urethra.      Urethra appeared normal without any lesions or abnormalities. Absent prostate, with well healed patent smooth muscoalized anastamotic area. Urethral sphincter demonstrated good coaptation at rest even with flow of water on, and he made good kegel effort and contraction visualized with scope. Some hypervascularity near sphincter. Bilateral ureteral orifices in orthotopic position on the trigone with clear efflux bilaterally. Trigone and ureteral orifices close to bladder neck as expected.     The bladder was then systematically inspected. The bladder mucosa of the lateral walls was free of any lesions, tumors, or ulcerations. No bladder abnormalities.      Negative cystoscopy. He tolerated the procedure well with no complications.        Disposition:   His micro hematuria has been present previously, and present now to less extent, now having Perera been worked up without any significant concerns.  Can be due either to his hypervascularity within the urethra near the sphincter or his nonobstructing stones, or both.  I did review his CT scan imaging with him, as he has no known history of kidney stones, noting there was essentially 1 left upper pole 5 mm stone, stable per previous based on reports of abdominal ultrasound in June of 2022.  The other punctate stones noted are indeed punctate lateral, on right.  We reviewed that a 5 mm kidney stone has 85% chance of spontaneous passage, and discussed all recommendations for stone prevention which were provided on his after visit summary, also noting these will help prevent growth of larger stones.  We reviewed classic stone symptoms with renal colic symptoms in case he ever was to have a stone episode in the future, but as he has close follow-up set in July with PSA prior, will go ahead and get a KUB as a screening measure at time of his labs prior to his next office visit.  CT also mentioned hernias, and palpation of umbilicus and supraumbilical extraction site incision have no distinct bulge at rest or on Valsalva nor defect, and may be more umbilical rather than incisional, but he is asymptomatic, and will continue to examine these further follow-up visits.  RTC as planned with PSA and KUB prior     Discharge:  DC home s/p uncomplicated outpatient procedure as above.  Diet - resume previous plus stone prevention  Instructions - drink a lot of water, may see blood in urine, take abx as directed  Follow up: July 2024 as scheduled with PSA and KUB prior  Meds:     Medication List        CONTINUE taking these medications      ascorbic acid (vitamin C) 100 MG tablet  Commonly known as: VITAMIN C     aspirin 81 MG EC tablet  Commonly known as: ECOTRIN     buPROPion 150 MG TB24 tablet  Commonly known as:  WELLBUTRIN XL  Take 1 tablet (150 mg total) by mouth once daily.     calcium carbonate 600 mg calcium (1,500 mg) Tab  Commonly known as: OS-BEAU     carvediloL 12.5 MG tablet  Commonly known as: COREG     fish oil-omega-3 fatty acids 300-1,000 mg capsule     folic acid 1 MG tablet  Commonly known as: FOLVITE     hydrALAZINE 50 MG tablet  Commonly known as: APRESOLINE     levothyroxine 175 MCG tablet  Commonly known as: SYNTHROID, LEVOTHROID  Take 1 tablet (175 mcg total) by mouth before breakfast.     LORazepam 2 MG Tab  Commonly known as: ATIVAN  Take 1 tablet (2 mg total) by mouth once daily.     losartan 50 MG tablet  Commonly known as: COZAAR     multivitamin-minerals-lutein Tab     potassium chloride 10 MEQ Tbsr  Commonly known as: KLOR-CON     predniSONE 5 MG tablet  Commonly known as: DELTASONE     solifenacin 10 MG tablet  Commonly known as: VESICARE  Take 1 tablet (10 mg total) by mouth once daily.     * tadalafiL 20 MG Tab  Commonly known as: CIALIS  Take 1 tablet (20 mg total) by mouth once daily.     * tadalafiL 5 MG tablet  Commonly known as: CIALIS  Take 1 tablet (5 mg total) by mouth once daily.     vitamin D 1000 units Tab  Commonly known as: VITAMIN D3           * This list has 2 medication(s) that are the same as other medications prescribed for you. Read the directions carefully, and ask your doctor or other care provider to review them with you.

## 2024-02-29 VITALS
TEMPERATURE: 98 F | HEIGHT: 73 IN | HEART RATE: 58 BPM | WEIGHT: 175 LBS | OXYGEN SATURATION: 99 % | DIASTOLIC BLOOD PRESSURE: 100 MMHG | RESPIRATION RATE: 16 BRPM | BODY MASS INDEX: 23.19 KG/M2 | SYSTOLIC BLOOD PRESSURE: 188 MMHG

## 2024-03-13 ENCOUNTER — OFFICE VISIT (OUTPATIENT)
Dept: FAMILY MEDICINE | Facility: CLINIC | Age: 64
End: 2024-03-13
Payer: COMMERCIAL

## 2024-03-13 VITALS
SYSTOLIC BLOOD PRESSURE: 132 MMHG | DIASTOLIC BLOOD PRESSURE: 86 MMHG | BODY MASS INDEX: 23.86 KG/M2 | WEIGHT: 180 LBS | OXYGEN SATURATION: 98 % | HEIGHT: 73 IN | HEART RATE: 64 BPM

## 2024-03-13 DIAGNOSIS — I10 PRIMARY HYPERTENSION: Primary | ICD-10-CM

## 2024-03-13 DIAGNOSIS — F51.01 PRIMARY INSOMNIA: ICD-10-CM

## 2024-03-13 DIAGNOSIS — Z79.899 HIGH RISK MEDICATION USE: ICD-10-CM

## 2024-03-13 DIAGNOSIS — M35.3 POLYMYALGIA RHEUMATICA: ICD-10-CM

## 2024-03-13 DIAGNOSIS — D64.9 ANEMIA, UNSPECIFIED TYPE: ICD-10-CM

## 2024-03-13 DIAGNOSIS — E55.9 VITAMIN D DEFICIENCY: ICD-10-CM

## 2024-03-13 DIAGNOSIS — E03.9 HYPOTHYROIDISM, UNSPECIFIED TYPE: ICD-10-CM

## 2024-03-13 DIAGNOSIS — F41.9 ANXIETY: ICD-10-CM

## 2024-03-13 PROCEDURE — 99214 OFFICE O/P EST MOD 30 MIN: CPT | Mod: S$GLB,,, | Performed by: NURSE PRACTITIONER

## 2024-03-13 PROCEDURE — 3079F DIAST BP 80-89 MM HG: CPT | Mod: CPTII,S$GLB,, | Performed by: NURSE PRACTITIONER

## 2024-03-13 PROCEDURE — 3066F NEPHROPATHY DOC TX: CPT | Mod: CPTII,S$GLB,, | Performed by: NURSE PRACTITIONER

## 2024-03-13 PROCEDURE — 3008F BODY MASS INDEX DOCD: CPT | Mod: CPTII,S$GLB,, | Performed by: NURSE PRACTITIONER

## 2024-03-13 PROCEDURE — 1159F MED LIST DOCD IN RCRD: CPT | Mod: CPTII,S$GLB,, | Performed by: NURSE PRACTITIONER

## 2024-03-13 PROCEDURE — 3075F SYST BP GE 130 - 139MM HG: CPT | Mod: CPTII,S$GLB,, | Performed by: NURSE PRACTITIONER

## 2024-03-13 PROCEDURE — 1160F RVW MEDS BY RX/DR IN RCRD: CPT | Mod: CPTII,S$GLB,, | Performed by: NURSE PRACTITIONER

## 2024-03-13 PROCEDURE — 3061F NEG MICROALBUMINURIA REV: CPT | Mod: CPTII,S$GLB,, | Performed by: NURSE PRACTITIONER

## 2024-03-13 PROCEDURE — 4010F ACE/ARB THERAPY RXD/TAKEN: CPT | Mod: CPTII,S$GLB,, | Performed by: NURSE PRACTITIONER

## 2024-03-13 RX ORDER — PANTOPRAZOLE SODIUM 40 MG/1
40 TABLET, DELAYED RELEASE ORAL DAILY
COMMUNITY

## 2024-03-13 RX ORDER — CLONAZEPAM 1 MG/1
1 TABLET ORAL 2 TIMES DAILY PRN
Qty: 30 TABLET | Refills: 1 | Status: SHIPPED | OUTPATIENT
Start: 2024-03-13 | End: 2024-06-18 | Stop reason: SDUPTHER

## 2024-03-13 NOTE — PROGRESS NOTES
SUBJECTIVE:    Patient ID: Phoenix Fragoso is a 63 y.o. male.    Chief Complaint: Follow-up (Has bottles//6 month follow up HTN//ears feel clogged//irritated skin spot on scalp//recent cystoscope for hematuri-all normal)    63 year old male presents for check up. Being treated htn, hyperlipidemia, hypothyroid, prostate cancer, and polymyalgia rheumatica. No longer on steroids Being followed by rheumatology in [place]. Was recently on injectables . However insurance would not cover. Has some samples that will finish out. Has ongoing hematuria. Dr. Brooks recently performed cystoscope . Found 5mm kidney stone. Will follow up in July. Not sleeping well with ativan..bp in office is well controlled. Feels down and frustrated at times. Would like to discuss. Feels forgetful at times. No speech deficits or weakness    Follow-up  Associated symptoms include headaches and weakness. Pertinent negatives include no arthralgias, chest pain, joint swelling, neck pain or vomiting.       Office Visit on 03/13/2024   Component Date Value Ref Range Status    WBC 03/14/2024 4.1  3.8 - 10.8 Thousand/uL Final    RBC 03/14/2024 4.70  4.20 - 5.80 Million/uL Final    Hemoglobin 03/14/2024 13.5  13.2 - 17.1 g/dL Final    Hematocrit 03/14/2024 40.6  38.5 - 50.0 % Final    MCV 03/14/2024 86.4  80.0 - 100.0 fL Final    MCH 03/14/2024 28.7  27.0 - 33.0 pg Final    MCHC 03/14/2024 33.3  32.0 - 36.0 g/dL Final    RDW 03/14/2024 12.2  11.0 - 15.0 % Final    Platelets 03/14/2024 199  140 - 400 Thousand/uL Final    MPV 03/14/2024 9.2  7.5 - 12.5 fL Final    Neutrophils, Abs 03/14/2024 2,731  1,500 - 7,800 cells/uL Final    Lymph # 03/14/2024 923  850 - 3,900 cells/uL Final    Mono # 03/14/2024 295  200 - 950 cells/uL Final    Eos # 03/14/2024 111  15 - 500 cells/uL Final    Baso # 03/14/2024 41  0 - 200 cells/uL Final    Neutrophils Relative 03/14/2024 66.6  % Final    Lymph % 03/14/2024 22.5  % Final    Mono % 03/14/2024 7.2  % Final     Eosinophil % 03/14/2024 2.7  % Final    Basophil % 03/14/2024 1.0  % Final    Glucose 03/14/2024 95  65 - 99 mg/dL Final    BUN 03/14/2024 25  7 - 25 mg/dL Final    Creatinine 03/14/2024 1.01  0.70 - 1.35 mg/dL Final    eGFR 03/14/2024 83  > OR = 60 mL/min/1.73m2 Final    BUN/Creatinine Ratio 03/14/2024 SEE NOTE:  6 - 22 (calc) Final    Sodium 03/14/2024 140  135 - 146 mmol/L Final    Potassium 03/14/2024 3.6  3.5 - 5.3 mmol/L Final    Chloride 03/14/2024 102  98 - 110 mmol/L Final    CO2 03/14/2024 31  20 - 32 mmol/L Final    Calcium 03/14/2024 9.1  8.6 - 10.3 mg/dL Final    Total Protein 03/14/2024 6.6  6.1 - 8.1 g/dL Final    Albumin 03/14/2024 3.9  3.6 - 5.1 g/dL Final    Globulin, Total 03/14/2024 2.7  1.9 - 3.7 g/dL (calc) Final    Albumin/Globulin Ratio 03/14/2024 1.4  1.0 - 2.5 (calc) Final    Total Bilirubin 03/14/2024 0.8  0.2 - 1.2 mg/dL Final    Alkaline Phosphatase 03/14/2024 72  35 - 144 U/L Final    AST 03/14/2024 17  10 - 35 U/L Final    ALT 03/14/2024 18  9 - 46 U/L Final    Cholesterol 03/14/2024 161  <200 mg/dL Final    HDL 03/14/2024 31 (L)  > OR = 40 mg/dL Final    Triglycerides 03/14/2024 130  <150 mg/dL Final    LDL Cholesterol 03/14/2024 105 (H)  mg/dL (calc) Final    HDL/Cholesterol Ratio 03/14/2024 5.2 (H)  <5.0 (calc) Final    Non HDL Chol. (LDL+VLDL) 03/14/2024 130 (H)  <130 mg/dL (calc) Final    TSH w/reflex to FT4 03/14/2024 21.54 (H)  0.40 - 4.50 mIU/L Final    T4, Free 03/14/2024 1.2  0.8 - 1.8 ng/dL Final    Creatinine, Urine 03/14/2024 106  20 - 320 mg/dL Final    Microalb, Ur 03/14/2024 1.8  See Note: mg/dL Final    Microalb/Creat Ratio 03/14/2024 17  <30 mcg/mg creat Final    Color, UA 03/14/2024 YELLOW  YELLOW Final    Appearance, UA 03/14/2024 CLEAR  CLEAR Final    Specific Gravity, UA 03/14/2024 1.016  1.001 - 1.035 Final    pH, UA 03/14/2024 6.5  5.0 - 8.0 Final    Glucose, UA 03/14/2024 NEGATIVE  NEGATIVE Final    Bilirubin, UA 03/14/2024 NEGATIVE  NEGATIVE Final    Ketones,  UA 03/14/2024 NEGATIVE  NEGATIVE Final    Occult Blood UA 03/14/2024 NEGATIVE  NEGATIVE Final    Protein, UA 03/14/2024 NEGATIVE  NEGATIVE Final    Nitrite, UA 03/14/2024 NEGATIVE  NEGATIVE Final    Leukocytes, UA 03/14/2024 NEGATIVE  NEGATIVE Final    WBC Casts, UA 03/14/2024 NONE SEEN  < OR = 5 /HPF Final    RBC Casts, UA 03/14/2024 NONE SEEN  < OR = 2 /HPF Final    Squam Epithel, UA 03/14/2024 NONE SEEN  < OR = 5 /HPF Final    Bacteria, UA 03/14/2024 NONE SEEN  NONE SEEN /HPF Final    Hyaline Casts, UA 03/14/2024 NONE SEEN  NONE SEEN /LPF Final    Service Cmt: 03/14/2024    Final    Reflexive Urine Culture 03/14/2024    Final    Ferritin 03/14/2024 47  24 - 380 ng/mL Final    Iron 03/14/2024 75  50 - 180 mcg/dL Final    TIBC 03/14/2024 314  250 - 425 mcg/dL (calc) Final    Iron Saturation 03/14/2024 24  20 - 48 % (calc) Final   Admission on 02/27/2024, Discharged on 02/27/2024   Component Date Value Ref Range Status    Color, POC UA 02/27/2024 Yellow  Yellow, Straw, Colorless Final    Clarity, POC UA 02/27/2024 Clear  Clear Final    Glucose, POC UA 02/27/2024 Negative  Negative Final    Bilirubin, POC UA 02/27/2024 Negative  Negative Final    Ketones, POC UA 02/27/2024 Negative  Negative Final    Spec Grav POC UA 02/27/2024 1.015  1.005 - 1.030 Final    Blood, POC UA 02/27/2024 Trace-intact (A)  Negative Final    pH, POC UA 02/27/2024 7.0  5.0 - 8.0 Final    Protein, POC UA 02/27/2024 Negative  Negative Final    Urobilinogen, POC UA 02/27/2024 0.2  <=1.0 Final    Nitrite, POC UA 02/27/2024 Negative  Negative Final    WBC, POC UA 02/27/2024 Negative  Negative Final   Lab Visit on 01/11/2024   Component Date Value Ref Range Status    Creatinine 01/11/2024 1.1  0.5 - 1.4 mg/dL Final    eGFR 01/11/2024 >60  >60 mL/min/1.73 m^2 Final   Lab Visit on 01/08/2024   Component Date Value Ref Range Status    PSA Diagnostic 01/08/2024 <0.01  0.00 - 4.00 ng/mL Final   Office Visit on 01/08/2024   Component Date Value Ref Range  Status    Color, POC UA 01/08/2024 Yellow  Yellow, Straw, Colorless Final    Clarity, POC UA 01/08/2024 Clear  Clear Final    Glucose, POC UA 01/08/2024 Negative  Negative Final    Bilirubin, POC UA 01/08/2024 Negative  Negative Final    Ketones, POC UA 01/08/2024 Negative  Negative Final    Spec Grav POC UA 01/08/2024 1.015  1.005 - 1.030 Final    Blood, POC UA 01/08/2024 Trace-intact (A)  Negative Final    pH, POC UA 01/08/2024 7.0  5.0 - 8.0 Final    Protein, POC UA 01/08/2024 Trace (A)  Negative Final    Urobilinogen, POC UA 01/08/2024 0.2  <=1.0 Final    Nitrite, POC UA 01/08/2024 Negative  Negative Final    WBC, POC UA 01/08/2024 Negative  Negative Final    Urine Culture, Routine 01/08/2024 No growth   Final    RBC, UA 01/08/2024 11 (H)  0 - 4 /hpf Final    WBC, UA 01/08/2024 1  0 - 5 /hpf Final    Microscopic Comment 01/08/2024 SEE COMMENT   Final   Patient Outreach on 11/15/2023   Component Date Value Ref Range Status    CRC Recommendation External 11/13/2023 Repeat colonoscopy in 5 years   Final   Office Visit on 11/09/2023   Component Date Value Ref Range Status    Rapid Strep A Screen 11/09/2023 Negative  Negative Final     Acceptable 11/09/2023 Yes   Final   Admission on 09/23/2023, Discharged on 09/23/2023   Component Date Value Ref Range Status    Group A Strep, Molecular 09/23/2023 Negative  Negative Final    Influenza A, Molecular 09/23/2023 Negative  Negative Final    Influenza B, Molecular 09/23/2023 Negative  Negative Final    Flu A & B Source 09/23/2023 Nasal swab   Final    SARS-CoV-2 RNA, Amplification, Qual 09/23/2023 Negative  Negative Final       Past Medical History:   Diagnosis Date    Bell's palsy     High cholesterol     Hypertension     Insomnia     Thyroid disease      Social History     Socioeconomic History    Marital status:    Tobacco Use    Smoking status: Never    Smokeless tobacco: Never   Substance and Sexual Activity    Alcohol use: Yes     Comment:  OCCASIONALLY    Drug use: No    Sexual activity: Yes     Partners: Female     Social Determinants of Health     Financial Resource Strain: Low Risk  (8/2/2023)    Overall Financial Resource Strain (CARDIA)     Difficulty of Paying Living Expenses: Not very hard   Food Insecurity: No Food Insecurity (8/2/2023)    Hunger Vital Sign     Worried About Running Out of Food in the Last Year: Never true     Ran Out of Food in the Last Year: Never true   Transportation Needs: No Transportation Needs (8/2/2023)    PRAPARE - Transportation     Lack of Transportation (Medical): No     Lack of Transportation (Non-Medical): No   Physical Activity: Unknown (8/2/2023)    Exercise Vital Sign     Days of Exercise per Week: 5 days   Stress: Stress Concern Present (8/2/2023)    [employer] - Occupational Stress Questionnaire     Feeling of Stress : Very much   Social Connections: Unknown (8/2/2023)    Social Connection and Isolation Panel [NHANES]     Frequency of Communication with Friends and Family: Once a week     Frequency of Social Gatherings with Friends and Family: Once a week     Active Member of Clubs or Organizations: Yes     Attends Club or Organization Meetings: 1 to 4 times per year     Marital Status:    Housing Stability: Low Risk  (8/2/2023)    Housing Stability Vital Sign     Unable to Pay for Housing in the Last Year: No     Number of Places Lived in the Last Year: 1     Unstable Housing in the Last Year: No     Past Surgical History:   Procedure Laterality Date    APPENDECTOMY      CHOLECYSTECTOMY      CYSTOSCOPY N/A 2/27/2024    Procedure: CYSTOSCOPY;  Surgeon: Walker Brooks MD;  Location: [facility] OR;  Service: Urology;  Laterality: N/A;    PROSTATE BIOPSY      PROSTATE SURGERY      right knee arthroscopy      ROBOT-ASSISTED LAPAROSCOPIC PROSTATECTOMY N/A 10/16/2019    Procedure: ROBOTIC PROSTATECTOMY;  Surgeon: Walker Brooks MD;  Location: [facility] OR;  Service: Urology;   Laterality: N/A;     History reviewed. No pertinent family history.    All of your core healthy metrics are met.      Review of patient's allergies indicates:   Allergen Reactions    Ancef [cefazolin] Anaphylaxis     Anaphylactic shock        Current Outpatient Medications:     ascorbic acid, vitamin C, (VITAMIN C) 100 MG tablet, Take 100 mg by mouth once daily., Disp: , Rfl:     aspirin (ECOTRIN) 81 MG EC tablet, Take 81 mg by mouth once daily., Disp: , Rfl:     buPROPion (WELLBUTRIN XL) 150 MG TB24 tablet, Take 1 tablet (150 mg total) by mouth once daily., Disp: 30 tablet, Rfl: 11    calcium carbonate (OS-BEAU) 600 mg calcium (1,500 mg) Tab, Take 600 mg by mouth 2 (two) times daily with meals., Disp: , Rfl:     carvediloL (COREG) 12.5 MG tablet, Take 12.5 mg by mouth Daily., Disp: , Rfl:     fish oil-omega-3 fatty acids 300-1,000 mg capsule, Take 2 capsules by mouth once daily. , Disp: , Rfl:     folic acid (FOLVITE) 1 MG tablet, Take 1,000 mcg by mouth once daily., Disp: , Rfl:     hydrALAZINE (APRESOLINE) 50 MG tablet, Take 50 mg by mouth 2 (two) times daily., Disp: , Rfl:     levothyroxine (SYNTHROID, LEVOTHROID) 175 MCG tablet, Take 1 tablet (175 mcg total) by mouth before breakfast., Disp: 30 tablet, Rfl: 11    LORazepam (ATIVAN) 2 MG Tab, Take 1 tablet (2 mg total) by mouth once daily., Disp: 30 tablet, Rfl: 0    losartan (COZAAR) 50 MG tablet, Take 50 mg by mouth., Disp: , Rfl:     multivitamin-minerals-lutein Tab, Take 1 tablet by mouth once daily., Disp: , Rfl:     pantoprazole (PROTONIX) 40 MG tablet, Take 40 mg by mouth once daily., Disp: , Rfl:     potassium chloride (KLOR-CON) 10 MEQ TbSR, Take 10 mEq by mouth once daily., Disp: , Rfl:     solifenacin (VESICARE) 10 MG tablet, Take 1 tablet (10 mg total) by mouth once daily., Disp: 30 tablet, Rfl: 11    tadalafiL (CIALIS) 20 MG Tab, Take 1 tablet (20 mg total) by mouth once daily., Disp: 10 tablet, Rfl: 11    tadalafiL (CIALIS) 5 MG tablet, Take 1  "tablet (5 mg total) by mouth once daily., Disp: 30 tablet, Rfl: 11    vitamin D (VITAMIN D3) 1000 units Tab, Take 2,000 Units by mouth once daily., Disp: , Rfl:     clonazePAM (KLONOPIN) 1 MG tablet, Take 1 tablet (1 mg total) by mouth 2 (two) times daily as needed for Anxiety., Disp: 30 tablet, Rfl: 1    Review of Systems   Constitutional:  Negative for activity change and unexpected weight change.   HENT:  Positive for hearing loss. Negative for rhinorrhea and trouble swallowing.    Eyes:  Positive for discharge and visual disturbance.   Respiratory:  Negative for chest tightness and wheezing.    Cardiovascular:  Negative for chest pain and palpitations.   Gastrointestinal:  Positive for constipation. Negative for blood in stool, diarrhea and vomiting.   Endocrine: Positive for polyuria. Negative for polydipsia.   Genitourinary:  Positive for hematuria and urgency. Negative for difficulty urinating.   Musculoskeletal:  Negative for arthralgias, joint swelling and neck pain.   Neurological:  Positive for weakness and headaches.   Psychiatric/Behavioral:  Negative for confusion and dysphoric mood.           Objective:      Vitals:    03/13/24 1309   BP: 132/86   Pulse: 64   SpO2: 98%   Weight: 81.6 kg (180 lb)   Height: 6' 1" (1.854 m)     Physical Exam  Vitals and nursing note reviewed.   Constitutional:       General: He is not in acute distress.     Appearance: Normal appearance. He is well-developed.   HENT:      Head: Normocephalic and atraumatic.      Right Ear: External ear normal.      Left Ear: External ear normal.   Eyes:      Pupils: Pupils are equal, round, and reactive to light.   Neck:      Trachea: No tracheal deviation.   Cardiovascular:      Rate and Rhythm: Normal rate and regular rhythm.      Heart sounds: No murmur heard.     No friction rub. No gallop.   Pulmonary:      Breath sounds: Normal breath sounds. No stridor. No wheezing or rales.   Abdominal:      Palpations: Abdomen is soft. There is " no mass.      Tenderness: There is no abdominal tenderness.   Musculoskeletal:         General: No tenderness or deformity.      Cervical back: Neck supple.   Lymphadenopathy:      Cervical: No cervical adenopathy.   Skin:     General: Skin is warm and dry.   Neurological:      Mental Status: He is alert and oriented to person, place, and time.      Cranial Nerves: Cranial nerves 2-12 are intact. No cranial nerve deficit.      Coordination: Coordination normal.   Psychiatric:         Mood and Affect: Affect is flat.         Thought Content: Thought content normal.           Assessment:       1. Primary hypertension    2. Polymyalgia rheumatica    3. Anemia, unspecified type    4. High risk medication use    5. Vitamin D deficiency    6. Anxiety    7. Hypothyroidism, unspecified type    8. Primary insomnia         Plan:       Primary hypertension  Comments:  bp controlled  Orders:  -     CBC Auto Differential; Future; Expected date: 03/13/2024  -     Comprehensive Metabolic Panel; Future; Expected date: 03/13/2024  -     Lipid Panel; Future; Expected date: 03/13/2024  -     TSH w/reflex to FT4; Future; Expected date: 03/13/2024  -     Microalbumin/Creatinine Ratio, Urine; Future; Expected date: 03/13/2024  -     Urinalysis, Reflex to Urine Culture Urine, Clean Catch; Future; Expected date: 03/13/2024  -     Ferritin; Future; Expected date: 03/13/2024  -     Iron and TIBC; Future; Expected date: 03/13/2024    Polymyalgia rheumatica  Comments:  managed by rheumatology  Orders:  -     CBC Auto Differential; Future; Expected date: 03/13/2024  -     Comprehensive Metabolic Panel; Future; Expected date: 03/13/2024  -     Lipid Panel; Future; Expected date: 03/13/2024  -     TSH w/reflex to FT4; Future; Expected date: 03/13/2024  -     Microalbumin/Creatinine Ratio, Urine; Future; Expected date: 03/13/2024  -     Urinalysis, Reflex to Urine Culture Urine, Clean Catch; Future; Expected date: 03/13/2024  -     Ferritin;  Future; Expected date: 03/13/2024  -     Iron and TIBC; Future; Expected date: 03/13/2024    Anemia, unspecified type  Comments:  labs  Orders:  -     Ferritin; Future; Expected date: 03/13/2024  -     Iron and TIBC; Future; Expected date: 03/13/2024    High risk medication use  -     CBC Auto Differential; Future; Expected date: 03/13/2024  -     Comprehensive Metabolic Panel; Future; Expected date: 03/13/2024  -     Lipid Panel; Future; Expected date: 03/13/2024  -     TSH w/reflex to FT4; Future; Expected date: 03/13/2024  -     Microalbumin/Creatinine Ratio, Urine; Future; Expected date: 03/13/2024  -     Urinalysis, Reflex to Urine Culture Urine, Clean Catch; Future; Expected date: 03/13/2024  -     Ferritin; Future; Expected date: 03/13/2024  -     Iron and TIBC; Future; Expected date: 03/13/2024    Vitamin D deficiency  Comments:  labs  Orders:  -     Calcitriol; Future; Expected date: 03/13/2024    Anxiety  -     clonazePAM (KLONOPIN) 1 MG tablet; Take 1 tablet (1 mg total) by mouth 2 (two) times daily as needed for Anxiety.  Dispense: 30 tablet; Refill: 1    Hypothyroidism, unspecified type  Comments:  will have labs today    Primary insomnia  Comments:  klonopin    Other orders  -     Calcitriol      Follow up in about 3 months (around 6/13/2024), or if symptoms worsen or fail to improve, for medication management.        3/18/2024 Brigette Robledo

## 2024-03-18 ENCOUNTER — TELEPHONE (OUTPATIENT)
Dept: FAMILY MEDICINE | Facility: CLINIC | Age: 64
End: 2024-03-18
Payer: COMMERCIAL

## 2024-03-18 DIAGNOSIS — E03.9 HYPOTHYROIDISM, UNSPECIFIED TYPE: Primary | ICD-10-CM

## 2024-03-18 DIAGNOSIS — Z79.899 ENCOUNTER FOR LONG-TERM (CURRENT) USE OF OTHER MEDICATIONS: ICD-10-CM

## 2024-03-18 RX ORDER — LEVOTHYROXINE SODIUM 200 UG/1
200 TABLET ORAL
Qty: 30 TABLET | Refills: 2 | Status: SHIPPED | OUTPATIENT
Start: 2024-03-18 | End: 2024-06-18 | Stop reason: SDUPTHER

## 2024-03-18 NOTE — TELEPHONE ENCOUNTER
Spoke to patient with results verbatim per Brigette. States he is taking Levothyroxine daily as prescribed, will increase dose to 200mcg. Pharmacy verified. Also aware to take krill oil, added to med list, for HDL and low cholesterol diet. Orders pended. Remind me for lab.

## 2024-03-18 NOTE — TELEPHONE ENCOUNTER
----- Message from Brigette Robledo NP sent at 3/18/2024  1:57 AM CDT -----  Thyroid is underactive. Is he taking meds daily? If so needs to  Increase levothyroxine to 200mcg.   Healthy cholesterol is low. Start otc krill oil daily. Start low cholesterol diet. Rest of labs are normal. Repeat tsh in 6 weeks

## 2024-03-19 LAB
1,25(OH)2D SERPL-MCNC: 30 PG/ML (ref 18–72)
1,25(OH)2D2 SERPL-MCNC: <8 PG/ML
1,25(OH)2D3 SERPL-MCNC: 30 PG/ML
ALBUMIN SERPL-MCNC: 3.9 G/DL (ref 3.6–5.1)
ALBUMIN/CREAT UR: 17 MCG/MG CREAT
ALBUMIN/GLOB SERPL: 1.4 (CALC) (ref 1–2.5)
ALP SERPL-CCNC: 72 U/L (ref 35–144)
ALT SERPL-CCNC: 18 U/L (ref 9–46)
APPEARANCE UR: CLEAR
AST SERPL-CCNC: 17 U/L (ref 10–35)
BACTERIA #/AREA URNS HPF: NORMAL /HPF
BACTERIA UR CULT: NORMAL
BASOPHILS # BLD AUTO: 41 CELLS/UL (ref 0–200)
BASOPHILS NFR BLD AUTO: 1 %
BILIRUB SERPL-MCNC: 0.8 MG/DL (ref 0.2–1.2)
BILIRUB UR QL STRIP: NEGATIVE
BUN SERPL-MCNC: 25 MG/DL (ref 7–25)
BUN/CREAT SERPL: NORMAL (CALC) (ref 6–22)
CALCIUM SERPL-MCNC: 9.1 MG/DL (ref 8.6–10.3)
CHLORIDE SERPL-SCNC: 102 MMOL/L (ref 98–110)
CHOLEST SERPL-MCNC: 161 MG/DL
CHOLEST/HDLC SERPL: 5.2 (CALC)
CO2 SERPL-SCNC: 31 MMOL/L (ref 20–32)
COLOR UR: YELLOW
CREAT SERPL-MCNC: 1.01 MG/DL (ref 0.7–1.35)
CREAT UR-MCNC: 106 MG/DL (ref 20–320)
EGFR: 83 ML/MIN/1.73M2
EOSINOPHIL # BLD AUTO: 111 CELLS/UL (ref 15–500)
EOSINOPHIL NFR BLD AUTO: 2.7 %
ERYTHROCYTE [DISTWIDTH] IN BLOOD BY AUTOMATED COUNT: 12.2 % (ref 11–15)
FERRITIN SERPL-MCNC: 47 NG/ML (ref 24–380)
GLOBULIN SER CALC-MCNC: 2.7 G/DL (CALC) (ref 1.9–3.7)
GLUCOSE SERPL-MCNC: 95 MG/DL (ref 65–99)
GLUCOSE UR QL STRIP: NEGATIVE
HCT VFR BLD AUTO: 40.6 % (ref 38.5–50)
HDLC SERPL-MCNC: 31 MG/DL
HGB BLD-MCNC: 13.5 G/DL (ref 13.2–17.1)
HGB UR QL STRIP: NEGATIVE
HYALINE CASTS #/AREA URNS LPF: NORMAL /LPF
IRON SATN MFR SERPL: 24 % (CALC) (ref 20–48)
IRON SERPL-MCNC: 75 MCG/DL (ref 50–180)
KETONES UR QL STRIP: NEGATIVE
LDLC SERPL CALC-MCNC: 105 MG/DL (CALC)
LEUKOCYTE ESTERASE UR QL STRIP: NEGATIVE
LYMPHOCYTES # BLD AUTO: 923 CELLS/UL (ref 850–3900)
LYMPHOCYTES NFR BLD AUTO: 22.5 %
MCH RBC QN AUTO: 28.7 PG (ref 27–33)
MCHC RBC AUTO-ENTMCNC: 33.3 G/DL (ref 32–36)
MCV RBC AUTO: 86.4 FL (ref 80–100)
MICROALBUMIN UR-MCNC: 1.8 MG/DL
MONOCYTES # BLD AUTO: 295 CELLS/UL (ref 200–950)
MONOCYTES NFR BLD AUTO: 7.2 %
NEUTROPHILS # BLD AUTO: 2731 CELLS/UL (ref 1500–7800)
NEUTROPHILS NFR BLD AUTO: 66.6 %
NITRITE UR QL STRIP: NEGATIVE
NONHDLC SERPL-MCNC: 130 MG/DL (CALC)
PH UR STRIP: 6.5 [PH] (ref 5–8)
PLATELET # BLD AUTO: 199 THOUSAND/UL (ref 140–400)
PMV BLD REES-ECKER: 9.2 FL (ref 7.5–12.5)
POTASSIUM SERPL-SCNC: 3.6 MMOL/L (ref 3.5–5.3)
PROT SERPL-MCNC: 6.6 G/DL (ref 6.1–8.1)
PROT UR QL STRIP: NEGATIVE
RBC # BLD AUTO: 4.7 MILLION/UL (ref 4.2–5.8)
RBC #/AREA URNS HPF: NORMAL /HPF
SERVICE CMNT-IMP: NORMAL
SODIUM SERPL-SCNC: 140 MMOL/L (ref 135–146)
SP GR UR STRIP: 1.02 (ref 1–1.03)
SQUAMOUS #/AREA URNS HPF: NORMAL /HPF
T4 FREE SERPL-MCNC: 1.2 NG/DL (ref 0.8–1.8)
TIBC SERPL-MCNC: 314 MCG/DL (CALC) (ref 250–425)
TRIGL SERPL-MCNC: 130 MG/DL
TSH SERPL-ACNC: 21.54 MIU/L (ref 0.4–4.5)
WBC # BLD AUTO: 4.1 THOUSAND/UL (ref 3.8–10.8)
WBC #/AREA URNS HPF: NORMAL /HPF

## 2024-04-09 ENCOUNTER — TELEPHONE (OUTPATIENT)
Dept: FAMILY MEDICINE | Facility: CLINIC | Age: 64
End: 2024-04-09
Payer: COMMERCIAL

## 2024-04-09 NOTE — TELEPHONE ENCOUNTER
[facility] requesting additional codes for 3/14 labs. All labs were denied. These codes were used:    I10 ESSENTIAL (PRIM) HYPERTEN  M353 POLYMYALGIA RHEUMATICA  [id number] OT LNG TRM CURR DRG THER  D649 ANEMIA, UNSPECIFIED  E559 VITAMIN D DEFICIENCY NOS    I added E78.00 and E07.9 to billing trailer, accepted

## 2024-04-24 ENCOUNTER — TELEPHONE (OUTPATIENT)
Dept: FAMILY MEDICINE | Facility: CLINIC | Age: 64
End: 2024-04-24
Payer: COMMERCIAL

## 2024-04-24 NOTE — TELEPHONE ENCOUNTER
----- Message from Mary Carmen RT Brenna sent at 3/18/2024 10:35 AM CDT -----  Regarding: Lab due next week  ----- Message from Brigette Robledo NP sent at 3/18/2024  1:57 AM CDT -----  Thyroid is underactive. Is he taking meds daily? If so needs to  Increase levothyroxine to 200mcg.   Healthy cholesterol is low. Start otc krill oil daily. Start low cholesterol diet. Rest of labs are normal. Repeat tsh in 6 weeks

## 2024-04-24 NOTE — TELEPHONE ENCOUNTER
Spoke to patient that thyroid lab is due next week and he does not need to fast, order at [facility]. Updated remind me.

## 2024-04-28 LAB
T4 FREE SERPL-MCNC: 1.5 NG/DL (ref 0.8–1.8)
TSH SERPL-ACNC: 6.21 MIU/L (ref 0.4–4.5)

## 2024-05-30 ENCOUNTER — TELEPHONE (OUTPATIENT)
Dept: FAMILY MEDICINE | Facility: CLINIC | Age: 64
End: 2024-05-30
Payer: COMMERCIAL

## 2024-06-18 DIAGNOSIS — E03.9 HYPOTHYROIDISM, UNSPECIFIED TYPE: ICD-10-CM

## 2024-06-18 DIAGNOSIS — F41.9 ANXIETY: ICD-10-CM

## 2024-06-19 RX ORDER — CLONAZEPAM 1 MG/1
1 TABLET ORAL 2 TIMES DAILY PRN
Qty: 30 TABLET | Refills: 1 | Status: SHIPPED | OUTPATIENT
Start: 2024-06-19 | End: 2025-06-19

## 2024-06-19 RX ORDER — BUPROPION HYDROCHLORIDE 150 MG/1
150 TABLET ORAL DAILY
Qty: 30 TABLET | Refills: 11 | Status: SHIPPED | OUTPATIENT
Start: 2024-06-19 | End: 2025-06-19

## 2024-06-19 RX ORDER — LEVOTHYROXINE SODIUM 200 UG/1
200 TABLET ORAL
Qty: 30 TABLET | Refills: 2 | Status: SHIPPED | OUTPATIENT
Start: 2024-06-19 | End: 2024-09-17

## 2024-07-08 ENCOUNTER — HOSPITAL ENCOUNTER (OUTPATIENT)
Dept: RADIOLOGY | Facility: HOSPITAL | Age: 64
Discharge: HOME | End: 2024-07-08
Attending: UROLOGY
Payer: COMMERCIAL

## 2024-07-08 DIAGNOSIS — R31.29 MICROHEMATURIA: ICD-10-CM

## 2024-07-08 PROCEDURE — 74018 RADEX ABDOMEN 1 VIEW: CPT | Mod: TC

## 2024-07-08 PROCEDURE — 74018 RADEX ABDOMEN 1 VIEW: CPT | Mod: 26,,, | Performed by: RADIOLOGY

## 2024-07-15 ENCOUNTER — OFFICE VISIT (OUTPATIENT)
Dept: UROLOGY | Facility: CLINIC | Age: 64
End: 2024-07-15
Payer: COMMERCIAL

## 2024-07-15 VITALS — SYSTOLIC BLOOD PRESSURE: 165 MMHG | HEART RATE: 73 BPM | DIASTOLIC BLOOD PRESSURE: 70 MMHG

## 2024-07-15 DIAGNOSIS — N20.0 KIDNEY STONES: ICD-10-CM

## 2024-07-15 DIAGNOSIS — N20.0 RENAL CALCULUS, LEFT: ICD-10-CM

## 2024-07-15 DIAGNOSIS — N52.31 ERECTILE DYSFUNCTION FOLLOWING RADICAL PROSTATECTOMY: ICD-10-CM

## 2024-07-15 DIAGNOSIS — Z85.46 HISTORY OF PROSTATE CANCER: Primary | ICD-10-CM

## 2024-07-15 LAB
BILIRUBIN, UA POC OHS: NEGATIVE
BLOOD, UA POC OHS: ABNORMAL
CLARITY, UA POC OHS: CLEAR
COLOR, UA POC OHS: YELLOW
GLUCOSE, UA POC OHS: NEGATIVE
KETONES, UA POC OHS: NEGATIVE
LEUKOCYTES, UA POC OHS: NEGATIVE
NITRITE, UA POC OHS: NEGATIVE
PH, UA POC OHS: 6.5
PROTEIN, UA POC OHS: NEGATIVE
SPECIFIC GRAVITY, UA POC OHS: 1.01
UROBILINOGEN, UA POC OHS: 0.2

## 2024-07-15 PROCEDURE — 3077F SYST BP >= 140 MM HG: CPT | Mod: CPTII,S$GLB,, | Performed by: UROLOGY

## 2024-07-15 PROCEDURE — 99999 PR PBB SHADOW E&M-EST. PATIENT-LVL III: CPT | Mod: PBBFAC,,, | Performed by: UROLOGY

## 2024-07-15 PROCEDURE — 3078F DIAST BP <80 MM HG: CPT | Mod: CPTII,S$GLB,, | Performed by: UROLOGY

## 2024-07-15 PROCEDURE — 1159F MED LIST DOCD IN RCRD: CPT | Mod: CPTII,S$GLB,, | Performed by: UROLOGY

## 2024-07-15 PROCEDURE — 3061F NEG MICROALBUMINURIA REV: CPT | Mod: CPTII,S$GLB,, | Performed by: UROLOGY

## 2024-07-15 PROCEDURE — 3066F NEPHROPATHY DOC TX: CPT | Mod: CPTII,S$GLB,, | Performed by: UROLOGY

## 2024-07-15 PROCEDURE — 4010F ACE/ARB THERAPY RXD/TAKEN: CPT | Mod: CPTII,S$GLB,, | Performed by: UROLOGY

## 2024-07-15 PROCEDURE — 81003 URINALYSIS AUTO W/O SCOPE: CPT | Mod: QW,S$GLB,, | Performed by: UROLOGY

## 2024-07-15 PROCEDURE — 99215 OFFICE O/P EST HI 40 MIN: CPT | Mod: S$GLB,,, | Performed by: UROLOGY

## 2024-07-15 NOTE — PROGRESS NOTES
[facility] Urology Progress Note     Phoenix Fragoso is a 60 y.o. male who presents for prostate cancer follow up     He has a family history of prostate cancer, evaluated for PSA of 4.9 in March 2018 finding a prostate volume of 46.7 g with median lobe and [place] 3 + 3 equals 6 prostate cancer in 1 core at the right base medial, with concurrent cystoscopic evidence of prostatic obstruction with ball valving median lobe.  He initially elected active surveillance and was managed on Flomax though his urinary symptoms progressed and his incomplete emptying worsened.  Despite increasing medical management regimens, his AUA symptom score persisted is 31/4, and after extensive risk benefit discussion with known prostate cancer as well as severe prostatic obstruction, he elected surgical management with robotic prostatectomy.  - of note on initial planned prostatectomy date in Aug 2019 had anaphylaxis after induction and underwent extensive allergy testing workup noting allergy to Ancef     10/16/19: Robot-assisted laparoscopic radical prostatectomy, bilateral nerve sparing. Mild adhesions from prior surgery taken down laparoscopically to place ports, significant median lobe necessitating extended bladder neck dissection and subsequent bladder neck reconstruction with no intraop evidence of urine leak on testing anastamosis  PATHOLOGY: Catia 3+4=7 [id number], negative margins     He has done well after with no evidence of disease and undetectable PSA.  He noted significant improvement in his stream and flow with relief of all obstructive voiding symptoms and had some progressive urgency for which he has been taking low-dose Ditropan XL 5 mg. Preop had cialis 5, would get erectile benefit with 2.5 though better if 2d in a row  At time of last visit in May 2020, PSA was again undetectable.  Continence had improved.  No pad use.  Occasional small drip was 1/lift/heavy exertion.  Was taking 5 mg daily of Cialis but no erections  and only tried a 10-20 mg on demand dose once.  As well, workup for joint pain and positive FARRAH led to a diagnosis of polymyalgia rheumatica and he is being followed by rheumatology and treated with steroids.     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     8/18/21  - still some residual SOB and stomach pain. Also on prednisone and MTX for polymyalgia rheumatica trying to transition off of steroids. PSA <0.01  Has urinary frequency and urgency. Still taking oxybutynin, but not regularly. Q2 hour nocturia. Stream still great and like a firehose. But urgency still a problem and may have to pull over on occ  occ few drop leakage at night, and some leakage pushing and straining for BM. + constipation. Alternates constipation/diarrhea. ++ dry mouth  Erection - detention there, but not sufficient for penetration - with cialis. Has only taken 10mg on demand. Has BP effects and facial flushing. Only tried INA x2 - doesn't like it.   - changed to myrbetriq, discussed IPP     11/5/21: Last week took 100mg viagra on demand and was not full erection but was sufficient for penetration.   Only has tried on demand dosing once or twice.  Previously tried on demand half dose Cialis and was having side effects and discontinued and after discussion above still has low-dose Cialis on demand Cialis on demand Viagra available. Does still get facial flushing etc and doesn't take around BP meds.  Not taking daily low dose but will do cialis 5mg daily 2-3d leading. MTX weekly, still on prednisone.   In regards to continence, still mostly continent and just leaks some - usually when sitting, with urge. No need for  pad  Still has some urgency frequency with DTF 10x and NTF Q2h  Did not switch to Myrbetriq secondary to cost.  Has continued oxybutynin 5 mg daily.  Still having significant dry eyes, dry mouth, constipation, and is urgency frequency is not managed.     Lost to follow up   Canceled appt 2/2022  Messaged in 5/2022 for right flank pain and was directed to pcp for eval and abdominal US had incidental 5mm LUP stone  Last seen by primary care 8/2023 noting his anxiety Rxed ativan and wellbutrin, and still on chronic steroids for his PMR  Last psa 7/22/21 <0.01     1/8/24 returned to clinic:  Started injection for PMR but can't afford it. Last injection before Xmas, off pred x3 mos, following up with rheum this month to discuss plan  Still has Q90 min urinary frequency at night. Occ daytime urgency. Has been off oab meds for a while. No bloodthinners just asa 81. Udip trc blood trc prot  3 bp meds. Stream and flow are good. No visible blood in urine. Still largely continent. Very rare drip BRANDIE with cough/sneeze/lift.   Cialis 20mg gives partial erection not sufficient for penetration. Only used INA 1x yrs ago as pt and partner dont like using it. Still gets some flushing/bp effects.  - stared vesicare 10, resumed daily low-dose Cialis with the PRN on demand use over it.   - PSA 1/8/24 <0.01, UA micro 1/8/24 with 11 rbc    CTU 1/11/24: nonobst stones: left renal inter pole there is a 5 mm calculus. In the right mid kidney there is a 2 mm calculus in the right mid kidney there is a 3 mm calculus in the left lower kidney there is a 2 mm calculus there is no evidence of ureteral calculus or ureterectasis.   Cysto 2/27/24: normal bladder, patent bladder neck and anastamosis, good coaptation of sphincter, hypervascularity near sphincter     He returns today noting  7/8/24 PSA <0.01  KUB 7/8/24: 4 mm left upper quadrant calcifications suggesting left renal stone and appearing to corresponding to stone seen on CT scan of  01/11/2024 multiple pelvic calcifications suggesting phleboliths.   At night Q2hour voiding, better than 60-90 mins. Takes vesicare in afternoon after work. Doesn't go as much during day.   Is stopping fluids 2 hours bed. Occ damp when waking up at night like didn't get up in time.   Udip trc blood.   5mg daily cialis and 20 mg on demand. Doesn't take daily bc even at low dose has facial flushing.  With 20mg on demand still only 1/2 erection. Not going to do INA. Neither pt or partner like it  On prendisone last 3 yr.    Focused Physical Exam:    Vitals:    07/15/24 1456   BP: (!) 165/70   Pulse: 73       Abdomen: Soft, non-tender, nondistended, no CVA tenderness    Recent Results (from the past 336 hour(s))   Prostate Specific Antigen, Diagnostic    Collection Time: 07/08/24  8:11 AM   Result Value Ref Range    PSA Diagnostic <0.01 0.00 - 4.00 ng/mL   POCT Urinalysis(Instrument)    Collection Time: 07/15/24  2:58 PM   Result Value Ref Range    Color, POC UA Yellow Yellow, Straw, Colorless    Clarity, POC UA Clear Clear    Glucose, POC UA Negative Negative    Bilirubin, POC UA Negative Negative    Ketones, POC UA Negative Negative    Spec Grav POC UA 1.010 1.005 - 1.030    Blood, POC UA Trace-intact (A) Negative    pH, POC UA 6.5 5.0 - 8.0    Protein, POC UA Negative Negative    Urobilinogen, POC UA 0.2 <=1.0    Nitrite, POC UA Negative Negative    WBC, POC UA Negative Negative       ASSESSMENT   1. History of prostate cancer  POCT Urinalysis(Instrument)    Prostate Specific Antigen, Diagnostic    Prostate Specific Antigen, Diagnostic      2. Kidney stones        3. Renal calculus, left            Plan    Overall doing well 5 years status post robotic prostatectomy, just shy of 5 years.  PSA remains undetectable with no evidence of disease and reviewed pathology with favorable organ confined disease and now undetectable PSA close to 5 years out.  PSA can be annual.  Continence outcome is pretty good.  Overall  continent but does still have urgency frequency which persisted preop, and persists postop, and as slightly improved on overactive bladder medicines.  Feels okay with where he is now, as we did discuss alternative such as beta agonist, Botox etcetera.   We did review postprostatectomy ED management.  Refractory to medications and vacuum erection device which neither pt or partner like.   Discussed ICI and IPP. Pt interested in IPP. Ipp info given after discussion of procedure. Will review. Would like to return to discuss before end of year. November follow up set    I did review his CT scan imaging with him, and his KUB. As he has no known history of kidney stones, noting there was essentially 1 left upper pole 5 mm stone, stable per previous based on reports of abdominal ultrasound in June of 2022. The other punctate stones noted are indeed punctate lateral, on right. We reviewed that a 5 mm kidney stone has 85% chance of spontaneous passage, and discussed all recommendations for stone prevention again.  Reviewed KUB noting the 1 stone is distinctly radiopaque I have risk benefit discussion about continued observation versus ESWL.  Discuss signs and symptoms of renal colic, and ESWL for elective treatment to prevent any further growth and renal colic episodes, versus continued following of this nonobstructing stone.  Elected the latter.  He has a problem from it would like manage then.  Will get KUB with annual PSA and follow-up    Total time spent in/on encounter today, including face to face time with patient, counseling, medical record review, interpretation of tests/results, , and treatment plan coordination: 42 minutes

## 2024-07-20 NOTE — PATIENT INSTRUCTIONS
Sometimes penile prosthesis with private insurance is tricky, but it is person and plan dependent  Widely covered by medicare, but private payor plans are variable - some have no issue and some are fussy     Best you can do is speak with your payor, provide codes, and ask them specifically what is needed from your provider      3-piece inflatable penile prosthesis  CPT code (aka procedure code) 17438     Associated diagnoses (icd-10 codes)  N52.31 - erectile dysfunction following radical prostatectomy  Z85.46 - history prostate cancer     Would need to inquire if procedure is covered benefit - yes or no.  If yes, covered with these codes?   Does it just need letter of medical necessity? Documentation of it?   WIth codes above, documentation of failed alternatives?  Or Specific paperwork? Peer 2 peer? Etc  Or will it just not be covered despite any efforts as it is plan exclusion?

## 2024-08-19 ENCOUNTER — TELEPHONE (OUTPATIENT)
Dept: FAMILY MEDICINE | Facility: CLINIC | Age: 64
End: 2024-08-19
Payer: COMMERCIAL

## 2024-08-19 ENCOUNTER — PATIENT OUTREACH (OUTPATIENT)
Dept: FAMILY MEDICINE | Facility: CLINIC | Age: 64
End: 2024-08-19
Payer: COMMERCIAL

## 2024-08-19 NOTE — TELEPHONE ENCOUNTER
Spoke to pts and pts wife pt. Pt is having allergic reactions to medication. Pt is not in distress. Pt has rash all over body . Started Saturday while in the hospital for a dog bite which he got a bacterial infection from. Pt is here today for his rash. Per Brigette Montana at night, pepcid, and demian in the morning, pt scheduled tomorrow morning at 9:20 am with Brigette

## 2024-08-19 NOTE — TELEPHONE ENCOUNTER
----- Message from Leonila Dillard sent at 8/19/2024  1:55 PM CDT -----  Regarding: PT IS HERE IN WAITING ROOM  PT IS HERE left messages this am has a severe rash on torso wants to speak to nurse thanks verito

## 2024-08-19 NOTE — TELEPHONE ENCOUNTER
----- Message from Leona Gabriel sent at 8/19/2024  9:31 AM CDT -----  Loli the patients wife called this morning about a HFU today for her . She said she is waiting for a call back. Loli's # [phone] GH

## 2024-08-19 NOTE — TELEPHONE ENCOUNTER
----- Message from Blanche Ac sent at 8/19/2024  2:38 PM CDT -----  Does not remember directions for medications. Please give pt a call back   [phone]

## 2024-08-19 NOTE — TELEPHONE ENCOUNTER
----- Message from Blanche Ac sent at 8/19/2024  8:03 AM CDT -----  Contact: Loli  Loli wife calling Discharged from the hospital in [place] ([facility]) on Saturday and needs a hospital f/u today.   [phone]

## 2024-08-19 NOTE — PROGRESS NOTES
Discharge Information     Discharge Date:   8/17/2024    Primary Discharge Diagnosis:  Dog Bite      Discharge Summary:  Unavailable      Medication & Order Review     Were medication changes made or new medications added?   Yes    If so, has the patient filled the prescriptions?  Yes     Was Home Health ordered? No    If so, has Home Health contacted patient and/or initiated services?  No    Name of Home Health Agency? N/A    Durable Medical Equipment ordered?  No     If so, has the DME provider contacted patient and delivered equipment?  N/A    Follow Up               Any problems since discharge? Yes    How is the patient feeling since returning home?      Have you set up recommended follow up appointments?  (cardiology, surgery, etc.)    Schedule Hospital Follow-up appointment within 7-14 days (preferably 7).      Notes:  Spoke to pts and pts wife pt. Pt is having allergic reactions to medication. Pt is not in distress. Pt has rash all over body . Started Saturday while in the hospital for a dog bite which he got a bacterial infection from. Pt is here today for his rash. Per Brigette Montana at night, pepcid, and demian in the morning, pt scheduled tomorrow morning at 9:20 am with Brigette Casas

## 2024-08-20 ENCOUNTER — TELEPHONE (OUTPATIENT)
Dept: FAMILY MEDICINE | Facility: CLINIC | Age: 64
End: 2024-08-20

## 2024-08-20 ENCOUNTER — OFFICE VISIT (OUTPATIENT)
Dept: FAMILY MEDICINE | Facility: CLINIC | Age: 64
End: 2024-08-20
Payer: COMMERCIAL

## 2024-08-20 VITALS
HEIGHT: 73 IN | WEIGHT: 166 LBS | HEART RATE: 68 BPM | BODY MASS INDEX: 22 KG/M2 | DIASTOLIC BLOOD PRESSURE: 86 MMHG | SYSTOLIC BLOOD PRESSURE: 138 MMHG | OXYGEN SATURATION: 97 %

## 2024-08-20 DIAGNOSIS — E87.6 HYPOKALEMIA: ICD-10-CM
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
## 2024-08-20 DIAGNOSIS — E03.9 HYPOTHYROIDISM, UNSPECIFIED TYPE: ICD-10-CM

## 2024-08-20 DIAGNOSIS — F41.9 ANXIETY: ICD-10-CM

## 2024-08-20 DIAGNOSIS — Z09 HOSPITAL DISCHARGE FOLLOW-UP: ICD-10-CM

## 2024-08-20 DIAGNOSIS — Z79.899 HIGH RISK MEDICATION USE: ICD-10-CM

## 2024-08-20 DIAGNOSIS — I10 PRIMARY HYPERTENSION: ICD-10-CM

## 2024-08-20 DIAGNOSIS — T78.40XA ALLERGIC REACTION TO DRUG, INITIAL ENCOUNTER: ICD-10-CM

## 2024-08-20 DIAGNOSIS — Z79.899 ENCOUNTER FOR LONG-TERM (CURRENT) USE OF OTHER MEDICATIONS: Primary | ICD-10-CM

## 2024-08-20 DIAGNOSIS — Z51.81 ENCOUNTER FOR THERAPEUTIC DRUG MONITORING: ICD-10-CM

## 2024-08-20 DIAGNOSIS — M35.3 POLYMYALGIA RHEUMATICA: ICD-10-CM

## 2024-08-20 RX ORDER — MUPIROCIN 20 MG/G
OINTMENT TOPICAL 3 TIMES DAILY
Qty: 22 G | Refills: 0 | Status: SHIPPED | OUTPATIENT
Start: 2024-08-20

## 2024-08-20 RX ORDER — PREDNISONE 5 MG/1
5 TABLET ORAL DAILY
COMMUNITY

## 2024-08-20 RX ORDER — LEVOTHYROXINE SODIUM 200 UG/1
200 TABLET ORAL
Qty: 30 TABLET | Refills: 2 | Status: SHIPPED | OUTPATIENT
Start: 2024-08-20 | End: 2024-11-18

## 2024-08-20 NOTE — PROGRESS NOTES
SUBJECTIVE:    Patient ID: Phoenix Fragoso is a 63 y.o. male.    Chief Complaint: Hospital Follow Up (Bottles brought //no refills//patient reports rash on abdomen, upper legs//discuss GERD rx//-ERL)    63 year old male presents for hospital follow up. Wife is present during the exam. Being treated htn, hyperlipidemia, hypothyroid, prostate cancer, and polymyalgia rheumatica. Still  on steroids Being followed by rheumatology in [place]. Recently was traveling in [place]. Bit by brothers dog. Was hospitalized for 5 days with iv antibiotics. Sent home on 3 antibiotics. Clindamycin, doxycycline and augmentin. After 3 days developed a rash. Stopped all meds. No fever. No sob. No trouble breath. Awaiting cultures.     Follow-up  Associated symptoms include a rash. Pertinent negatives include no abdominal pain, arthralgias, chest pain, coughing, fatigue, fever, headaches, joint swelling, nausea, neck pain, sore throat, vomiting or weakness.           Admit Date: 08/12/24  Discharge Date: 08/17/24  Discharge Facility: Hospital      Family and/or Caretaker present at visit? Yes  Medication Reconciliation:  Medications changed/added/deleted. yes  New Prescriptions filled after discharge: yes  Discharge summary reviewed:  yes  Diagnostic tests reviewed/disposition: I have reviewed all completed as well as pending diagnostic tests at the time of discharge  Disease/illness education: y  Follow up appointments scheduled:  yes          here  Follow up labs/tests ordered:   yes  Home Health ordered on discharge: Patient does not have home health established from hospital visit.  They do not need home health.  If needed, we will set up home health for the patient    Establishment or re-establishment of referral orders for community resources: No other necessary community resources  DME ordered at discharge:   no  How patient is feeling since discharge from the hospital?  better now has rash      Discussion with other health care providers: No discussion with other health care providers necessary  Patient follow up phone call documented on separate encounter.    Office Visit on 08/20/2024   Component Date Value Ref Range Status    TSH w/reflex to FT4 08/20/2024 2.31  0.40 - 4.50 mIU/L Final    Glucose 08/20/2024 78  65 - 99 mg/dL Final    BUN 08/20/2024 19  7 - 25 mg/dL Final    Creatinine 08/20/2024 1.05  0.70 - 1.35 mg/dL Final    eGFR 08/20/2024 79  > OR = 60 mL/min/1.73m2 Final    BUN/Creatinine Ratio 08/20/2024 SEE NOTE:  6 - 22 (calc) Final    Sodium 08/20/2024 139  135 - 146 mmol/L Final    Potassium 08/20/2024 4.1  3.5 - 5.3 mmol/L Final    Chloride 08/20/2024 102  98 - 110 mmol/L Final    CO2 08/20/2024 27  20 - 32 mmol/L Final    Calcium 08/20/2024 9.3  8.6 - 10.3 mg/dL Final    Total Protein 08/20/2024 7.0  6.1 - 8.1 g/dL Final    Albumin 08/20/2024 3.7  3.6 - 5.1 g/dL Final    Globulin, Total 08/20/2024 3.3  1.9 - 3.7 g/dL (calc) Final    Albumin/Globulin Ratio 08/20/2024 1.1  1.0 - 2.5 (calc) Final    Total Bilirubin 08/20/2024 0.6  0.2 - 1.2 mg/dL Final    Alkaline Phosphatase 08/20/2024 58  35 - 144 U/L Final    AST 08/20/2024 23  10 - 35 U/L Final    ALT 08/20/2024 22  9 - 46 U/L Final   Office Visit on 07/15/2024   Component Date Value Ref Range Status    Color, POC UA 07/15/2024 Yellow  Yellow, Straw, Colorless Final    Clarity, POC UA 07/15/2024 Clear  Clear Final    Glucose, POC UA 07/15/2024 Negative  Negative Final    Bilirubin, POC UA 07/15/2024 Negative  Negative Final    Ketones, POC UA 07/15/2024 Negative  Negative Final    Spec Grav POC UA 07/15/2024 1.010  1.005 - 1.030 Final    Blood, POC UA 07/15/2024 Trace-intact (A)  Negative Final    pH, POC UA 07/15/2024 6.5  5.0 - 8.0 Final    Protein, POC UA 07/15/2024 Negative  Negative Final    Urobilinogen, POC UA 07/15/2024 0.2  <=1.0 Final    Nitrite, POC UA 07/15/2024 Negative  Negative Final    WBC, POC UA 07/15/2024 Negative   Negative Final   Lab Visit on 07/08/2024   Component Date Value Ref Range Status    PSA Diagnostic 07/08/2024 <0.01  0.00 - 4.00 ng/mL Final   Telephone on 03/18/2024   Component Date Value Ref Range Status    TSH w/reflex to FT4 04/26/2024 6.21 (H)  0.40 - 4.50 mIU/L Final    T4, Free 04/26/2024 1.5  0.8 - 1.8 ng/dL Final   Office Visit on 03/13/2024   Component Date Value Ref Range Status    WBC 03/14/2024 4.1  3.8 - 10.8 Thousand/uL Final    RBC 03/14/2024 4.70  4.20 - 5.80 Million/uL Final    Hemoglobin 03/14/2024 13.5  13.2 - 17.1 g/dL Final    Hematocrit 03/14/2024 40.6  38.5 - 50.0 % Final    MCV 03/14/2024 86.4  80.0 - 100.0 fL Final    MCH 03/14/2024 28.7  27.0 - 33.0 pg Final    MCHC 03/14/2024 33.3  32.0 - 36.0 g/dL Final    RDW 03/14/2024 12.2  11.0 - 15.0 % Final    Platelets 03/14/2024 199  140 - 400 Thousand/uL Final    MPV 03/14/2024 9.2  7.5 - 12.5 fL Final    Neutrophils, Abs 03/14/2024 2,731  1,500 - 7,800 cells/uL Final    Lymph # 03/14/2024 923  850 - 3,900 cells/uL Final    Mono # 03/14/2024 295  200 - 950 cells/uL Final    Eos # 03/14/2024 111  15 - 500 cells/uL Final    Baso # 03/14/2024 41  0 - 200 cells/uL Final    Neutrophils Relative 03/14/2024 66.6  % Final    Lymph % 03/14/2024 22.5  % Final    Mono % 03/14/2024 7.2  % Final    Eosinophil % 03/14/2024 2.7  % Final    Basophil % 03/14/2024 1.0  % Final    Glucose 03/14/2024 95  65 - 99 mg/dL Final    BUN 03/14/2024 25  7 - 25 mg/dL Final    Creatinine 03/14/2024 1.01  0.70 - 1.35 mg/dL Final    eGFR 03/14/2024 83  > OR = 60 mL/min/1.73m2 Final    BUN/Creatinine Ratio 03/14/2024 SEE NOTE:  6 - 22 (calc) Final    Sodium 03/14/2024 140  135 - 146 mmol/L Final    Potassium 03/14/2024 3.6  3.5 - 5.3 mmol/L Final    Chloride 03/14/2024 102  98 - 110 mmol/L Final    CO2 03/14/2024 31  20 - 32 mmol/L Final    Calcium 03/14/2024 9.1  8.6 - 10.3 mg/dL Final    Total Protein 03/14/2024 6.6  6.1 - 8.1 g/dL Final    Albumin 03/14/2024 3.9  3.6 - 5.1  g/dL Final    Globulin, Total 03/14/2024 2.7  1.9 - 3.7 g/dL (calc) Final    Albumin/Globulin Ratio 03/14/2024 1.4  1.0 - 2.5 (calc) Final    Total Bilirubin 03/14/2024 0.8  0.2 - 1.2 mg/dL Final    Alkaline Phosphatase 03/14/2024 72  35 - 144 U/L Final    AST 03/14/2024 17  10 - 35 U/L Final    ALT 03/14/2024 18  9 - 46 U/L Final    Cholesterol 03/14/2024 161  <200 mg/dL Final    HDL 03/14/2024 31 (L)  > OR = 40 mg/dL Final    Triglycerides 03/14/2024 130  <150 mg/dL Final    LDL Cholesterol 03/14/2024 105 (H)  mg/dL (calc) Final    HDL/Cholesterol Ratio 03/14/2024 5.2 (H)  <5.0 (calc) Final    Non HDL Chol. (LDL+VLDL) 03/14/2024 130 (H)  <130 mg/dL (calc) Final    TSH w/reflex to FT4 03/14/2024 21.54 (H)  0.40 - 4.50 mIU/L Final    T4, Free 03/14/2024 1.2  0.8 - 1.8 ng/dL Final    Creatinine, Urine 03/14/2024 106  20 - 320 mg/dL Final    Microalb, Ur 03/14/2024 1.8  See Note: mg/dL Final    Microalb/Creat Ratio 03/14/2024 17  <30 mcg/mg creat Final    Color, UA 03/14/2024 YELLOW  YELLOW Final    Appearance, UA 03/14/2024 CLEAR  CLEAR Final    Specific Gravity, UA 03/14/2024 1.016  1.001 - 1.035 Final    pH, UA 03/14/2024 6.5  5.0 - 8.0 Final    Glucose, UA 03/14/2024 NEGATIVE  NEGATIVE Final    Bilirubin, UA 03/14/2024 NEGATIVE  NEGATIVE Final    Ketones, UA 03/14/2024 NEGATIVE  NEGATIVE Final    Occult Blood UA 03/14/2024 NEGATIVE  NEGATIVE Final    Protein, UA 03/14/2024 NEGATIVE  NEGATIVE Final    Nitrite, UA 03/14/2024 NEGATIVE  NEGATIVE Final    Leukocytes, UA 03/14/2024 NEGATIVE  NEGATIVE Final    WBC Casts, UA 03/14/2024 NONE SEEN  < OR = 5 /HPF Final    RBC Casts, UA 03/14/2024 NONE SEEN  < OR = 2 /HPF Final    Squam Epithel, UA 03/14/2024 NONE SEEN  < OR = 5 /HPF Final    Bacteria, UA 03/14/2024 NONE SEEN  NONE SEEN /HPF Final    Hyaline Casts, UA 03/14/2024 NONE SEEN  NONE SEEN /LPF Final    Service Cmt: 03/14/2024    Final    Reflexive Urine Culture 03/14/2024    Final    Ferritin 03/14/2024 47  24 -  380 ng/mL Final    Iron 03/14/2024 75  50 - 180 mcg/dL Final    TIBC 03/14/2024 314  250 - 425 mcg/dL (calc) Final    Iron Saturation 03/14/2024 24  20 - 48 % (calc) Final    Vitamin D, 1,25 (OH)2 03/14/2024 30  18 - 72 pg/mL Final    Vitamin D3, 1,25 (OH)2 03/14/2024 30  pg/mL Final    Vitamin D2, 1,25 (OH)2 03/14/2024 <8  pg/mL Final   Admission on 02/27/2024, Discharged on 02/27/2024   Component Date Value Ref Range Status    Color, POC UA 02/27/2024 Yellow  Yellow, Straw, Colorless Final    Clarity, POC UA 02/27/2024 Clear  Clear Final    Glucose, POC UA 02/27/2024 Negative  Negative Final    Bilirubin, POC UA 02/27/2024 Negative  Negative Final    Ketones, POC UA 02/27/2024 Negative  Negative Final    Spec Grav POC UA 02/27/2024 1.015  1.005 - 1.030 Final    Blood, POC UA 02/27/2024 Trace-intact (A)  Negative Final    pH, POC UA 02/27/2024 7.0  5.0 - 8.0 Final    Protein, POC UA 02/27/2024 Negative  Negative Final    Urobilinogen, POC UA 02/27/2024 0.2  <=1.0 Final    Nitrite, POC UA 02/27/2024 Negative  Negative Final    WBC, POC UA 02/27/2024 Negative  Negative Final       Past Medical History:   Diagnosis Date    Bell's palsy     High cholesterol     Hypertension     Insomnia     Thyroid disease      Past Surgical History:   Procedure Laterality Date    APPENDECTOMY      CHOLECYSTECTOMY      CYSTOSCOPY N/A 2/27/2024    Procedure: CYSTOSCOPY;  Surgeon: Walker Brooks MD;  Location: [facility] OR;  Service: Urology;  Laterality: N/A;    PROSTATE BIOPSY      PROSTATE SURGERY      right knee arthroscopy      ROBOT-ASSISTED LAPAROSCOPIC PROSTATECTOMY N/A 10/16/2019    Procedure: ROBOTIC PROSTATECTOMY;  Surgeon: Walker Brooks MD;  Location: [facility] OR;  Service: Urology;  Laterality: N/A;     No family history on file.    Marital Status:   Alcohol History:  reports current alcohol use.  Tobacco History:  reports that he has never smoked. He has never used smokeless tobacco.  Drug History:  reports no  history of drug use.    Review of patient's allergies indicates:   Allergen Reactions    Ancef [cefazolin] Anaphylaxis     Anaphylactic shock        Current Outpatient Medications:     ascorbic acid, vitamin C, (VITAMIN C) 100 MG tablet, Take 100 mg by mouth once daily., Disp: , Rfl:     buPROPion (WELLBUTRIN XL) 150 MG TB24 tablet, Take 1 tablet (150 mg total) by mouth once daily., Disp: 30 tablet, Rfl: 11    calcium carbonate (OS-BEAU) 600 mg calcium (1,500 mg) Tab, Take 600 mg by mouth 2 (two) times daily with meals., Disp: , Rfl:     carvediloL (COREG) 12.5 MG tablet, Take 12.5 mg by mouth Daily., Disp: , Rfl:     clonazePAM (KLONOPIN) 1 MG tablet, Take 1 tablet (1 mg total) by mouth 2 (two) times daily as needed for Anxiety., Disp: 30 tablet, Rfl: 1    fish oil-omega-3 fatty acids 300-1,000 mg capsule, Take 2 capsules by mouth once daily. , Disp: , Rfl:     hydrALAZINE (APRESOLINE) 50 MG tablet, Take 50 mg by mouth 2 (two) times daily., Disp: , Rfl:     KRILL OIL ORAL, Take 1 capsule by mouth once daily., Disp: , Rfl:     losartan (COZAAR) 50 MG tablet, Take 50 mg by mouth., Disp: , Rfl:     multivitamin-minerals-lutein Tab, Take 1 tablet by mouth once daily., Disp: , Rfl:     predniSONE (DELTASONE) 5 MG tablet, Take 5 mg by mouth once daily., Disp: , Rfl:     solifenacin (VESICARE) 10 MG tablet, Take 1 tablet (10 mg total) by mouth once daily., Disp: 30 tablet, Rfl: 11    tadalafiL (CIALIS) 20 MG Tab, Take 1 tablet (20 mg total) by mouth once daily., Disp: 10 tablet, Rfl: 11    tadalafiL (CIALIS) 5 MG tablet, Take 1 tablet (5 mg total) by mouth once daily., Disp: 30 tablet, Rfl: 11    vitamin D (VITAMIN D3) 1000 units Tab, Take 2,000 Units by mouth once daily., Disp: , Rfl:     aspirin (ECOTRIN) 81 MG EC tablet, Take 81 mg by mouth once daily., Disp: , Rfl:     folic acid (FOLVITE) 1 MG tablet, Take 1,000 mcg by mouth once daily., Disp: , Rfl:     levothyroxine (SYNTHROID) 200 MCG tablet, Take 1 tablet (200 mcg  "total) by mouth before breakfast., Disp: 30 tablet, Rfl: 2    LORazepam (ATIVAN) 2 MG Tab, Take 1 tablet (2 mg total) by mouth once daily. (Patient not taking: Reported on 8/20/2024), Disp: 30 tablet, Rfl: 0    mupirocin (BACTROBAN) 2 % ointment, Apply topically 3 (three) times daily., Disp: 22 g, Rfl: 0    pantoprazole (PROTONIX) 40 MG tablet, Take 40 mg by mouth once daily. (Patient not taking: Reported on 8/20/2024), Disp: , Rfl:     potassium chloride (KLOR-CON) 10 MEQ TbSR, Take 10 mEq by mouth once daily., Disp: , Rfl:     Review of Systems     Objective:      Vitals:    08/20/24 0937   BP: 138/86   Pulse: 68   SpO2: 97%   Weight: 75.3 kg (166 lb)   Height: 6' 1" (1.854 m)     Physical Exam    Assessment:       1. Encounter for long-term (current) use of other medications    2. Anxiety    3. Encounter for therapeutic drug monitoring    4. Hypothyroidism, unspecified type    5. Hypokalemia    6. Primary hypertension    7. Polymyalgia rheumatica    8. High risk medication use    9. Dog bite, subsequent encounter    10. Allergic reaction to drug, initial encounter    11. Hospital discharge follow-up         Plan:       Encounter for long-term (current) use of other medications  -     Comprehensive Metabolic Panel; Future; Expected date: 08/20/2024    Anxiety    Encounter for therapeutic drug monitoring    Hypothyroidism, unspecified type  -     levothyroxine (SYNTHROID) 200 MCG tablet; Take 1 tablet (200 mcg total) by mouth before breakfast.  Dispense: 30 tablet; Refill: 2  -     TSH w/reflex to FT4; Future; Expected date: 08/20/2024    Hypokalemia  -     Comprehensive Metabolic Panel; Future; Expected date: 08/20/2024    Primary hypertension    Polymyalgia rheumatica    High risk medication use    Dog bite, subsequent encounter  Comments:  doxycycline    Allergic reaction to drug, initial encounter    Hospital discharge follow-up  Comments:  labs. cultures are still pending    Other orders  -     mupirocin " (BACTROBAN) 2 % ointment; Apply topically 3 (three) times daily.  Dispense: 22 g; Refill: 0      Follow up in about 3 days (around 8/23/2024), or if symptoms worsen or fail to improve, for medication management.

## 2024-08-21 LAB
ALBUMIN SERPL-MCNC: 3.7 G/DL (ref 3.6–5.1)
ALBUMIN/GLOB SERPL: 1.1 (CALC) (ref 1–2.5)
ALP SERPL-CCNC: 58 U/L (ref 35–144)
ALT SERPL-CCNC: 22 U/L (ref 9–46)
AST SERPL-CCNC: 23 U/L (ref 10–35)
BILIRUB SERPL-MCNC: 0.6 MG/DL (ref 0.2–1.2)
BUN SERPL-MCNC: 19 MG/DL (ref 7–25)
BUN/CREAT SERPL: NORMAL (CALC) (ref 6–22)
CALCIUM SERPL-MCNC: 9.3 MG/DL (ref 8.6–10.3)
CHLORIDE SERPL-SCNC: 102 MMOL/L (ref 98–110)
CO2 SERPL-SCNC: 27 MMOL/L (ref 20–32)
CREAT SERPL-MCNC: 1.05 MG/DL (ref 0.7–1.35)
EGFR: 79 ML/MIN/1.73M2
GLOBULIN SER CALC-MCNC: 3.3 G/DL (CALC) (ref 1.9–3.7)
GLUCOSE SERPL-MCNC: 78 MG/DL (ref 65–99)
POTASSIUM SERPL-SCNC: 4.1 MMOL/L (ref 3.5–5.3)
PROT SERPL-MCNC: 7 G/DL (ref 6.1–8.1)
SODIUM SERPL-SCNC: 139 MMOL/L (ref 135–146)
TSH SERPL-ACNC: 2.31 MIU/L (ref 0.4–4.5)

## 2024-08-22 ENCOUNTER — TELEPHONE (OUTPATIENT)
Dept: FAMILY MEDICINE | Facility: CLINIC | Age: 64
End: 2024-08-22

## 2024-08-22 ENCOUNTER — OFFICE VISIT (OUTPATIENT)
Dept: FAMILY MEDICINE | Facility: CLINIC | Age: 64
End: 2024-08-22
Payer: COMMERCIAL

## 2024-08-22 DIAGNOSIS — T78.40XA ALLERGIC REACTION TO DRUG, INITIAL ENCOUNTER: ICD-10-CM
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
## 2024-08-22 DIAGNOSIS — I10 PRIMARY HYPERTENSION: Primary | ICD-10-CM

## 2024-08-22 PROCEDURE — 99214 OFFICE O/P EST MOD 30 MIN: CPT | Mod: S$GLB,,, | Performed by: NURSE PRACTITIONER

## 2024-08-22 PROCEDURE — 1159F MED LIST DOCD IN RCRD: CPT | Mod: CPTII,S$GLB,, | Performed by: NURSE PRACTITIONER

## 2024-08-22 PROCEDURE — 4010F ACE/ARB THERAPY RXD/TAKEN: CPT | Mod: CPTII,S$GLB,, | Performed by: NURSE PRACTITIONER

## 2024-08-22 PROCEDURE — 3066F NEPHROPATHY DOC TX: CPT | Mod: CPTII,S$GLB,, | Performed by: NURSE PRACTITIONER

## 2024-08-22 PROCEDURE — 1160F RVW MEDS BY RX/DR IN RCRD: CPT | Mod: CPTII,S$GLB,, | Performed by: NURSE PRACTITIONER

## 2024-08-22 PROCEDURE — 3061F NEG MICROALBUMINURIA REV: CPT | Mod: CPTII,S$GLB,, | Performed by: NURSE PRACTITIONER

## 2024-08-29 ENCOUNTER — PATIENT MESSAGE (OUTPATIENT)
Dept: FAMILY MEDICINE | Facility: CLINIC | Age: 64
End: 2024-08-29
Payer: COMMERCIAL

## 2024-09-09 NOTE — PROGRESS NOTES
SUBJECTIVE:    Patient ID: Phoenix Fragoso is a 63 y.o. male.    Chief Complaint: No chief complaint on file.    63 year old male presents for  follow up.  Being treated htn, hyperlipidemia, hypothyroid, prostate cancer, and polymyalgia rheumatica. Was seenin our office last week for hospital follow up.  Recently was traveling in [place]. Bit by brothers dog. Was hospitalized for 5 days with iv antibiotics. Sent home on 3 antibiotics. Clindamycin, doxycycline and augmentin. After 3 days developed a rash. Stopped all meds. No fever. No sob. No trouble breath. Awaiting cultures.   Today wound is improving. As well as rash. No fever. Taking meds as prescribed          Office Visit on 08/20/2024   Component Date Value Ref Range Status    TSH w/reflex to FT4 08/20/2024 2.31  0.40 - 4.50 mIU/L Final    Glucose 08/20/2024 78  65 - 99 mg/dL Final    BUN 08/20/2024 19  7 - 25 mg/dL Final    Creatinine 08/20/2024 1.05  0.70 - 1.35 mg/dL Final    eGFR 08/20/2024 79  > OR = 60 mL/min/1.73m2 Final    BUN/Creatinine Ratio 08/20/2024 SEE NOTE:  6 - 22 (calc) Final    Sodium 08/20/2024 139  135 - 146 mmol/L Final    Potassium 08/20/2024 4.1  3.5 - 5.3 mmol/L Final    Chloride 08/20/2024 102  98 - 110 mmol/L Final    CO2 08/20/2024 27  20 - 32 mmol/L Final    Calcium 08/20/2024 9.3  8.6 - 10.3 mg/dL Final    Total Protein 08/20/2024 7.0  6.1 - 8.1 g/dL Final    Albumin 08/20/2024 3.7  3.6 - 5.1 g/dL Final    Globulin, Total 08/20/2024 3.3  1.9 - 3.7 g/dL (calc) Final    Albumin/Globulin Ratio 08/20/2024 1.1  1.0 - 2.5 (calc) Final    Total Bilirubin 08/20/2024 0.6  0.2 - 1.2 mg/dL Final    Alkaline Phosphatase 08/20/2024 58  35 - 144 U/L Final    AST 08/20/2024 23  10 - 35 U/L Final    ALT 08/20/2024 22  9 - 46 U/L Final   Office Visit on 07/15/2024   Component Date Value Ref Range Status    Color, POC UA 07/15/2024 Yellow  Yellow, Straw, Colorless Final    Clarity, POC UA 07/15/2024 Clear  Clear Final    Glucose, POC UA 07/15/2024  Negative  Negative Final    Bilirubin, POC UA 07/15/2024 Negative  Negative Final    Ketones, POC UA 07/15/2024 Negative  Negative Final    Spec Grav POC UA 07/15/2024 1.010  1.005 - 1.030 Final    Blood, POC UA 07/15/2024 Trace-intact (A)  Negative Final    pH, POC UA 07/15/2024 6.5  5.0 - 8.0 Final    Protein, POC UA 07/15/2024 Negative  Negative Final    Urobilinogen, POC UA 07/15/2024 0.2  <=1.0 Final    Nitrite, POC UA 07/15/2024 Negative  Negative Final    WBC, POC UA 07/15/2024 Negative  Negative Final   Lab Visit on 07/08/2024   Component Date Value Ref Range Status    PSA Diagnostic 07/08/2024 <0.01  0.00 - 4.00 ng/mL Final   Telephone on 03/18/2024   Component Date Value Ref Range Status    TSH w/reflex to FT4 04/26/2024 6.21 (H)  0.40 - 4.50 mIU/L Final    T4, Free 04/26/2024 1.5  0.8 - 1.8 ng/dL Final   Office Visit on 03/13/2024   Component Date Value Ref Range Status    WBC 03/14/2024 4.1  3.8 - 10.8 Thousand/uL Final    RBC 03/14/2024 4.70  4.20 - 5.80 Million/uL Final    Hemoglobin 03/14/2024 13.5  13.2 - 17.1 g/dL Final    Hematocrit 03/14/2024 40.6  38.5 - 50.0 % Final    MCV 03/14/2024 86.4  80.0 - 100.0 fL Final    MCH 03/14/2024 28.7  27.0 - 33.0 pg Final    MCHC 03/14/2024 33.3  32.0 - 36.0 g/dL Final    RDW 03/14/2024 12.2  11.0 - 15.0 % Final    Platelets 03/14/2024 199  140 - 400 Thousand/uL Final    MPV 03/14/2024 9.2  7.5 - 12.5 fL Final    Neutrophils, Abs 03/14/2024 2,731  1,500 - 7,800 cells/uL Final    Lymph # 03/14/2024 923  850 - 3,900 cells/uL Final    Mono # 03/14/2024 295  200 - 950 cells/uL Final    Eos # 03/14/2024 111  15 - 500 cells/uL Final    Baso # 03/14/2024 41  0 - 200 cells/uL Final    Neutrophils Relative 03/14/2024 66.6  % Final    Lymph % 03/14/2024 22.5  % Final    Mono % 03/14/2024 7.2  % Final    Eosinophil % 03/14/2024 2.7  % Final    Basophil % 03/14/2024 1.0  % Final    Glucose 03/14/2024 95  65 - 99 mg/dL Final    BUN 03/14/2024 25  7 - 25 mg/dL Final     Creatinine 03/14/2024 1.01  0.70 - 1.35 mg/dL Final    eGFR 03/14/2024 83  > OR = 60 mL/min/1.73m2 Final    BUN/Creatinine Ratio 03/14/2024 SEE NOTE:  6 - 22 (calc) Final    Sodium 03/14/2024 140  135 - 146 mmol/L Final    Potassium 03/14/2024 3.6  3.5 - 5.3 mmol/L Final    Chloride 03/14/2024 102  98 - 110 mmol/L Final    CO2 03/14/2024 31  20 - 32 mmol/L Final    Calcium 03/14/2024 9.1  8.6 - 10.3 mg/dL Final    Total Protein 03/14/2024 6.6  6.1 - 8.1 g/dL Final    Albumin 03/14/2024 3.9  3.6 - 5.1 g/dL Final    Globulin, Total 03/14/2024 2.7  1.9 - 3.7 g/dL (calc) Final    Albumin/Globulin Ratio 03/14/2024 1.4  1.0 - 2.5 (calc) Final    Total Bilirubin 03/14/2024 0.8  0.2 - 1.2 mg/dL Final    Alkaline Phosphatase 03/14/2024 72  35 - 144 U/L Final    AST 03/14/2024 17  10 - 35 U/L Final    ALT 03/14/2024 18  9 - 46 U/L Final    Cholesterol 03/14/2024 161  <200 mg/dL Final    HDL 03/14/2024 31 (L)  > OR = 40 mg/dL Final    Triglycerides 03/14/2024 130  <150 mg/dL Final    LDL Cholesterol 03/14/2024 105 (H)  mg/dL (calc) Final    HDL/Cholesterol Ratio 03/14/2024 5.2 (H)  <5.0 (calc) Final    Non HDL Chol. (LDL+VLDL) 03/14/2024 130 (H)  <130 mg/dL (calc) Final    TSH w/reflex to FT4 03/14/2024 21.54 (H)  0.40 - 4.50 mIU/L Final    T4, Free 03/14/2024 1.2  0.8 - 1.8 ng/dL Final    Creatinine, Urine 03/14/2024 106  20 - 320 mg/dL Final    Microalb, Ur 03/14/2024 1.8  See Note: mg/dL Final    Microalb/Creat Ratio 03/14/2024 17  <30 mcg/mg creat Final    Color, UA 03/14/2024 YELLOW  YELLOW Final    Appearance, UA 03/14/2024 CLEAR  CLEAR Final    Specific Gravity, UA 03/14/2024 1.016  1.001 - 1.035 Final    pH, UA 03/14/2024 6.5  5.0 - 8.0 Final    Glucose, UA 03/14/2024 NEGATIVE  NEGATIVE Final    Bilirubin, UA 03/14/2024 NEGATIVE  NEGATIVE Final    Ketones, UA 03/14/2024 NEGATIVE  NEGATIVE Final    Occult Blood UA 03/14/2024 NEGATIVE  NEGATIVE Final    Protein, UA 03/14/2024 NEGATIVE  NEGATIVE Final    Nitrite, UA  03/14/2024 NEGATIVE  NEGATIVE Final    Leukocytes, UA 03/14/2024 NEGATIVE  NEGATIVE Final    WBC Casts, UA 03/14/2024 NONE SEEN  < OR = 5 /HPF Final    RBC Casts, UA 03/14/2024 NONE SEEN  < OR = 2 /HPF Final    Squam Epithel, UA 03/14/2024 NONE SEEN  < OR = 5 /HPF Final    Bacteria, UA 03/14/2024 NONE SEEN  NONE SEEN /HPF Final    Hyaline Casts, UA 03/14/2024 NONE SEEN  NONE SEEN /LPF Final    Service Cmt: 03/14/2024    Final    Reflexive Urine Culture 03/14/2024    Final    Ferritin 03/14/2024 47  24 - 380 ng/mL Final    Iron 03/14/2024 75  50 - 180 mcg/dL Final    TIBC 03/14/2024 314  250 - 425 mcg/dL (calc) Final    Iron Saturation 03/14/2024 24  20 - 48 % (calc) Final    Vitamin D, 1,25 (OH)2 03/14/2024 30  18 - 72 pg/mL Final    Vitamin D3, 1,25 (OH)2 03/14/2024 30  pg/mL Final    Vitamin D2, 1,25 (OH)2 03/14/2024 <8  pg/mL Final   Admission on 02/27/2024, Discharged on 02/27/2024   Component Date Value Ref Range Status    Color, POC UA 02/27/2024 Yellow  Yellow, Straw, Colorless Final    Clarity, POC UA 02/27/2024 Clear  Clear Final    Glucose, POC UA 02/27/2024 Negative  Negative Final    Bilirubin, POC UA 02/27/2024 Negative  Negative Final    Ketones, POC UA 02/27/2024 Negative  Negative Final    Spec Grav POC UA 02/27/2024 1.015  1.005 - 1.030 Final    Blood, POC UA 02/27/2024 Trace-intact (A)  Negative Final    pH, POC UA 02/27/2024 7.0  5.0 - 8.0 Final    Protein, POC UA 02/27/2024 Negative  Negative Final    Urobilinogen, POC UA 02/27/2024 0.2  <=1.0 Final    Nitrite, POC UA 02/27/2024 Negative  Negative Final    WBC, POC UA 02/27/2024 Negative  Negative Final       Past Medical History:   Diagnosis Date    Bell's palsy     High cholesterol     Hypertension     Insomnia     Thyroid disease      Social History     Socioeconomic History    Marital status:    Tobacco Use    Smoking status: Never    Smokeless tobacco: Never   Substance and Sexual Activity    Alcohol use: Yes     Comment: OCCASIONALLY     Drug use: No    Sexual activity: Yes     Partners: Female     Social Determinants of Health     Financial Resource Strain: Low Risk  (8/20/2024)    Overall Financial Resource Strain (CARDIA)     Difficulty of Paying Living Expenses: Not very hard   Food Insecurity: No Food Insecurity (8/20/2024)    Hunger Vital Sign     Worried About Running Out of Food in the Last Year: Never true     Ran Out of Food in the Last Year: Never true   Transportation Needs: No Transportation Needs (8/2/2023)    PRAPARE - Transportation     Lack of Transportation (Medical): No     Lack of Transportation (Non-Medical): No   Physical Activity: Unknown (8/20/2024)    Exercise Vital Sign     Days of Exercise per Week: 3 days   Stress: Stress Concern Present (8/20/2024)    [employer] - Occupational Stress Questionnaire     Feeling of Stress : To some extent   Housing Stability: Low Risk  (8/2/2023)    Housing Stability Vital Sign     Unable to Pay for Housing in the Last Year: No     Number of Places Lived in the Last Year: 1     Unstable Housing in the Last Year: No     Past Surgical History:   Procedure Laterality Date    APPENDECTOMY      CHOLECYSTECTOMY      CYSTOSCOPY N/A 2/27/2024    Procedure: CYSTOSCOPY;  Surgeon: Walker Brooks MD;  Location: [facility] OR;  Service: Urology;  Laterality: N/A;    PROSTATE BIOPSY      PROSTATE SURGERY      right knee arthroscopy      ROBOT-ASSISTED LAPAROSCOPIC PROSTATECTOMY N/A 10/16/2019    Procedure: ROBOTIC PROSTATECTOMY;  Surgeon: Walkre Brooks MD;  Location: [facility] OR;  Service: Urology;  Laterality: N/A;     No family history on file.    All of your core healthy metrics are met.      Review of patient's allergies indicates:   Allergen Reactions    Ancef [cefazolin] Anaphylaxis     Anaphylactic shock        Current Outpatient Medications:     ascorbic acid, vitamin C, (VITAMIN C) 100 MG tablet, Take 100 mg by mouth once daily., Disp: , Rfl:     aspirin  (ECOTRIN) 81 MG EC tablet, Take 81 mg by mouth once daily., Disp: , Rfl:     buPROPion (WELLBUTRIN XL) 150 MG TB24 tablet, Take 1 tablet (150 mg total) by mouth once daily., Disp: 30 tablet, Rfl: 11    calcium carbonate (OS-BEAU) 600 mg calcium (1,500 mg) Tab, Take 600 mg by mouth 2 (two) times daily with meals., Disp: , Rfl:     carvediloL (COREG) 12.5 MG tablet, Take 12.5 mg by mouth Daily., Disp: , Rfl:     clonazePAM (KLONOPIN) 1 MG tablet, Take 1 tablet (1 mg total) by mouth 2 (two) times daily as needed for Anxiety., Disp: 30 tablet, Rfl: 1    fish oil-omega-3 fatty acids 300-1,000 mg capsule, Take 2 capsules by mouth once daily. , Disp: , Rfl:     folic acid (FOLVITE) 1 MG tablet, Take 1,000 mcg by mouth once daily., Disp: , Rfl:     hydrALAZINE (APRESOLINE) 50 MG tablet, Take 50 mg by mouth 2 (two) times daily., Disp: , Rfl:     KRILL OIL ORAL, Take 1 capsule by mouth once daily., Disp: , Rfl:     levothyroxine (SYNTHROID) 200 MCG tablet, Take 1 tablet (200 mcg total) by mouth before breakfast., Disp: 30 tablet, Rfl: 2    LORazepam (ATIVAN) 2 MG Tab, Take 1 tablet (2 mg total) by mouth once daily. (Patient not taking: Reported on 8/20/2024), Disp: 30 tablet, Rfl: 0    losartan (COZAAR) 50 MG tablet, Take 50 mg by mouth., Disp: , Rfl:     multivitamin-minerals-lutein Tab, Take 1 tablet by mouth once daily., Disp: , Rfl:     mupirocin (BACTROBAN) 2 % ointment, Apply topically 3 (three) times daily., Disp: 22 g, Rfl: 0    pantoprazole (PROTONIX) 40 MG tablet, Take 40 mg by mouth once daily. (Patient not taking: Reported on 8/20/2024), Disp: , Rfl:     potassium chloride (KLOR-CON) 10 MEQ TbSR, Take 10 mEq by mouth once daily., Disp: , Rfl:     predniSONE (DELTASONE) 5 MG tablet, Take 5 mg by mouth once daily., Disp: , Rfl:     solifenacin (VESICARE) 10 MG tablet, Take 1 tablet (10 mg total) by mouth once daily., Disp: 30 tablet, Rfl: 11    tadalafiL (CIALIS) 20 MG Tab, Take 1 tablet (20 mg total) by mouth once  daily., Disp: 10 tablet, Rfl: 11    tadalafiL (CIALIS) 5 MG tablet, Take 1 tablet (5 mg total) by mouth once daily., Disp: 30 tablet, Rfl: 11    vitamin D (VITAMIN D3) 1000 units Tab, Take 2,000 Units by mouth once daily., Disp: , Rfl:     Review of Systems   Constitutional:  Negative for fatigue, fever and unexpected weight change.   HENT:  Negative for ear pain, sinus pressure and sore throat.    Eyes:  Negative for pain.   Respiratory:  Negative for cough and shortness of breath.    Cardiovascular:  Negative for chest pain and leg swelling.   Gastrointestinal:  Negative for abdominal pain, constipation, nausea and vomiting.   Genitourinary:  Negative for dysuria, frequency and urgency.   Musculoskeletal:  Negative for arthralgias.   Skin:  Positive for rash and wound.   Neurological:  Negative for dizziness, weakness and headaches.   Psychiatric/Behavioral:  Negative for sleep disturbance.           Objective:      There were no vitals filed for this visit.  Physical Exam  Vitals and nursing note reviewed.   Constitutional:       General: He is not in acute distress.     Appearance: Normal appearance. He is well-developed.   Neck:      Trachea: No tracheal deviation.   Cardiovascular:      Rate and Rhythm: Normal rate and regular rhythm.      Heart sounds: No murmur heard.     No friction rub. No gallop.   Pulmonary:      Breath sounds: Normal breath sounds. No stridor. No wheezing or rales.   Musculoskeletal:         General: No tenderness or deformity.      Cervical back: Neck supple.   Lymphadenopathy:      Cervical: No cervical adenopathy.   Skin:     General: Skin is warm and dry.      Findings: Erythema and rash present.      Comments: Left hand with linear open wound. Edges well approximated. No fluctuance. No induration.    Neurological:      Mental Status: He is alert and oriented to person, place, and time.      Coordination: Coordination normal.   Psychiatric:         Thought Content: Thought content  normal.           Assessment:       1. Primary hypertension    2. Dog bite, subsequent encounter    3. Allergic reaction to drug, initial encounter         Plan:       Primary hypertension    Dog bite, subsequent encounter  Comments:  continue to keep clean and dry    Allergic reaction to drug, initial encounter  Comments:  continue meds as discussed      Follow up in about 3 days (around 8/25/2024).        9/8/2024 Brigette Robledo

## 2024-09-16 ENCOUNTER — OFFICE VISIT (OUTPATIENT)
Dept: FAMILY MEDICINE | Facility: CLINIC | Age: 64
End: 2024-09-16
Payer: COMMERCIAL

## 2024-09-16 VITALS
DIASTOLIC BLOOD PRESSURE: 70 MMHG | HEIGHT: 73 IN | OXYGEN SATURATION: 95 % | WEIGHT: 172.81 LBS | HEART RATE: 67 BPM | SYSTOLIC BLOOD PRESSURE: 114 MMHG | BODY MASS INDEX: 22.9 KG/M2

## 2024-09-16 DIAGNOSIS — I10 PRIMARY HYPERTENSION: ICD-10-CM

## 2024-09-16 DIAGNOSIS — R29.898 HAND WEAKNESS: Primary | ICD-10-CM

## 2024-09-16 DIAGNOSIS — F41.9 ANXIETY: ICD-10-CM
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
## 2024-09-16 DIAGNOSIS — E03.9 HYPOTHYROIDISM, UNSPECIFIED TYPE: ICD-10-CM

## 2024-09-16 DIAGNOSIS — M35.3 POLYMYALGIA RHEUMATICA: ICD-10-CM

## 2024-09-16 PROCEDURE — 99214 OFFICE O/P EST MOD 30 MIN: CPT | Mod: S$GLB,,, | Performed by: NURSE PRACTITIONER

## 2024-09-16 PROCEDURE — 1160F RVW MEDS BY RX/DR IN RCRD: CPT | Mod: CPTII,S$GLB,, | Performed by: NURSE PRACTITIONER

## 2024-09-16 PROCEDURE — 1159F MED LIST DOCD IN RCRD: CPT | Mod: CPTII,S$GLB,, | Performed by: NURSE PRACTITIONER

## 2024-09-16 PROCEDURE — 3008F BODY MASS INDEX DOCD: CPT | Mod: CPTII,S$GLB,, | Performed by: NURSE PRACTITIONER

## 2024-09-16 PROCEDURE — 3078F DIAST BP <80 MM HG: CPT | Mod: CPTII,S$GLB,, | Performed by: NURSE PRACTITIONER

## 2024-09-16 PROCEDURE — 3066F NEPHROPATHY DOC TX: CPT | Mod: CPTII,S$GLB,, | Performed by: NURSE PRACTITIONER

## 2024-09-16 PROCEDURE — 3061F NEG MICROALBUMINURIA REV: CPT | Mod: CPTII,S$GLB,, | Performed by: NURSE PRACTITIONER

## 2024-09-16 PROCEDURE — 3074F SYST BP LT 130 MM HG: CPT | Mod: CPTII,S$GLB,, | Performed by: NURSE PRACTITIONER

## 2024-09-16 PROCEDURE — 4010F ACE/ARB THERAPY RXD/TAKEN: CPT | Mod: CPTII,S$GLB,, | Performed by: NURSE PRACTITIONER

## 2024-09-16 RX ORDER — LEVOTHYROXINE SODIUM 200 UG/1
200 TABLET ORAL
Qty: 30 TABLET | Refills: 2 | Status: SHIPPED | OUTPATIENT
Start: 2024-09-16 | End: 2024-12-15

## 2024-09-17 ENCOUNTER — TELEPHONE (OUTPATIENT)
Dept: FAMILY MEDICINE | Facility: CLINIC | Age: 64
End: 2024-09-17
Payer: COMMERCIAL

## 2024-09-17 ENCOUNTER — PATIENT MESSAGE (OUTPATIENT)
Dept: FAMILY MEDICINE | Facility: CLINIC | Age: 64
End: 2024-09-17
Payer: COMMERCIAL

## 2024-09-17 NOTE — TELEPHONE ENCOUNTER
[facility] requesting additional code for TSH and CMP. This was used... should have covered:    E039 HYPOTHYROIDISM, UNSPEC  [id number] OTH LNG TRM CURR DRG THER  E876 HYPOKALEMIA    Added I10, E07.9 to billing trailer.

## 2024-09-23 NOTE — PROGRESS NOTES
SUBJECTIVE:    Patient ID: Phoenix Fragoso is a 63 y.o. male.    Chief Complaint: Follow-up (No bottles//Pt is here to follow up on a dog bite on right hand on 8/10/24, having troubles bending wrist and ring and middle fingers limp//KE)    63 year old male presents for  follow up.  Being treated htn, hyperlipidemia, hypothyroid, prostate cancer, and polymyalgia rheumatica. Dog bite in August. Hospitalized for 1 week after developing infection. Wound has healed. No fever. However today is reporting progressive weakness since last visit. No pain at site.       Follow-up  Pertinent negatives include no abdominal pain, arthralgias, chest pain, coughing, fatigue, fever, headaches, nausea, rash, sore throat, vomiting or weakness.       Office Visit on 08/20/2024   Component Date Value Ref Range Status    TSH w/reflex to FT4 08/20/2024 2.31  0.40 - 4.50 mIU/L Final    Glucose 08/20/2024 78  65 - 99 mg/dL Final    BUN 08/20/2024 19  7 - 25 mg/dL Final    Creatinine 08/20/2024 1.05  0.70 - 1.35 mg/dL Final    eGFR 08/20/2024 79  > OR = 60 mL/min/1.73m2 Final    BUN/Creatinine Ratio 08/20/2024 SEE NOTE:  6 - 22 (calc) Final    Sodium 08/20/2024 139  135 - 146 mmol/L Final    Potassium 08/20/2024 4.1  3.5 - 5.3 mmol/L Final    Chloride 08/20/2024 102  98 - 110 mmol/L Final    CO2 08/20/2024 27  20 - 32 mmol/L Final    Calcium 08/20/2024 9.3  8.6 - 10.3 mg/dL Final    Total Protein 08/20/2024 7.0  6.1 - 8.1 g/dL Final    Albumin 08/20/2024 3.7  3.6 - 5.1 g/dL Final    Globulin, Total 08/20/2024 3.3  1.9 - 3.7 g/dL (calc) Final    Albumin/Globulin Ratio 08/20/2024 1.1  1.0 - 2.5 (calc) Final    Total Bilirubin 08/20/2024 0.6  0.2 - 1.2 mg/dL Final    Alkaline Phosphatase 08/20/2024 58  35 - 144 U/L Final    AST 08/20/2024 23  10 - 35 U/L Final    ALT 08/20/2024 22  9 - 46 U/L Final   Office Visit on 07/15/2024   Component Date Value Ref Range Status    Color, POC UA 07/15/2024 Yellow  Yellow, Straw, Colorless Final    Clarity,  POC UA 07/15/2024 Clear  Clear Final    Glucose, POC UA 07/15/2024 Negative  Negative Final    Bilirubin, POC UA 07/15/2024 Negative  Negative Final    Ketones, POC UA 07/15/2024 Negative  Negative Final    Spec Grav POC UA 07/15/2024 1.010  1.005 - 1.030 Final    Blood, POC UA 07/15/2024 Trace-intact (A)  Negative Final    pH, POC UA 07/15/2024 6.5  5.0 - 8.0 Final    Protein, POC UA 07/15/2024 Negative  Negative Final    Urobilinogen, POC UA 07/15/2024 0.2  <=1.0 Final    Nitrite, POC UA 07/15/2024 Negative  Negative Final    WBC, POC UA 07/15/2024 Negative  Negative Final   Lab Visit on 07/08/2024   Component Date Value Ref Range Status    PSA Diagnostic 07/08/2024 <0.01  0.00 - 4.00 ng/mL Final   Telephone on 03/18/2024   Component Date Value Ref Range Status    TSH w/reflex to FT4 04/26/2024 6.21 (H)  0.40 - 4.50 mIU/L Final    T4, Free 04/26/2024 1.5  0.8 - 1.8 ng/dL Final       Past Medical History:   Diagnosis Date    Bell's palsy     High cholesterol     Hypertension     Insomnia     Thyroid disease      Social History     Socioeconomic History    Marital status:    Tobacco Use    Smoking status: Never    Smokeless tobacco: Never   Substance and Sexual Activity    Alcohol use: Yes     Comment: OCCASIONALLY    Drug use: No    Sexual activity: Yes     Partners: Female     Social Determinants of Health     Financial Resource Strain: Low Risk  (8/20/2024)    Overall Financial Resource Strain (CARDIA)     Difficulty of Paying Living Expenses: Not very hard   Food Insecurity: No Food Insecurity (8/20/2024)    Hunger Vital Sign     Worried About Running Out of Food in the Last Year: Never true     Ran Out of Food in the Last Year: Never true   Transportation Needs: No Transportation Needs (8/2/2023)    PRAPARE - Transportation     Lack of Transportation (Medical): No     Lack of Transportation (Non-Medical): No   Physical Activity: Unknown (8/20/2024)    Exercise Vital Sign     Days of Exercise per Week: 3  days   Stress: Stress Concern Present (8/20/2024)    [employer] - Occupational Stress Questionnaire     Feeling of Stress : To some extent   Housing Stability: Low Risk  (8/2/2023)    Housing Stability Vital Sign     Unable to Pay for Housing in the Last Year: No     Number of Places Lived in the Last Year: 1     Unstable Housing in the Last Year: No     Past Surgical History:   Procedure Laterality Date    APPENDECTOMY      CHOLECYSTECTOMY      CYSTOSCOPY N/A 2/27/2024    Procedure: CYSTOSCOPY;  Surgeon: Walker Brooks MD;  Location: [facility] OR;  Service: Urology;  Laterality: N/A;    PROSTATE BIOPSY      PROSTATE SURGERY      right knee arthroscopy      ROBOT-ASSISTED LAPAROSCOPIC PROSTATECTOMY N/A 10/16/2019    Procedure: ROBOTIC PROSTATECTOMY;  Surgeon: Walker Brooks MD;  Location: [facility] OR;  Service: Urology;  Laterality: N/A;     No family history on file.    All of your core healthy metrics are met.      Review of patient's allergies indicates:   Allergen Reactions    Ancef [cefazolin] Anaphylaxis     Anaphylactic shock        Current Outpatient Medications:     ascorbic acid, vitamin C, (VITAMIN C) 100 MG tablet, Take 100 mg by mouth once daily., Disp: , Rfl:     aspirin (ECOTRIN) 81 MG EC tablet, Take 81 mg by mouth once daily., Disp: , Rfl:     buPROPion (WELLBUTRIN XL) 150 MG TB24 tablet, Take 1 tablet (150 mg total) by mouth once daily., Disp: 30 tablet, Rfl: 11    calcium carbonate (OS-BEAU) 600 mg calcium (1,500 mg) Tab, Take 600 mg by mouth 2 (two) times daily with meals., Disp: , Rfl:     carvediloL (COREG) 12.5 MG tablet, Take 12.5 mg by mouth 2 (two) times daily., Disp: , Rfl:     clonazePAM (KLONOPIN) 1 MG tablet, Take 1 tablet (1 mg total) by mouth 2 (two) times daily as needed for Anxiety., Disp: 30 tablet, Rfl: 1    fish oil-omega-3 fatty acids 300-1,000 mg capsule, Take 2 capsules by mouth once daily. , Disp: , Rfl:     folic acid (FOLVITE) 1 MG tablet,  Take 1,000 mcg by mouth once daily., Disp: , Rfl:     hydrALAZINE (APRESOLINE) 50 MG tablet, Take 100 mg by mouth 2 (two) times daily., Disp: , Rfl:     KRILL OIL ORAL, Take 1 capsule by mouth once daily., Disp: , Rfl:     levothyroxine (SYNTHROID) 200 MCG tablet, Take 1 tablet (200 mcg total) by mouth before breakfast., Disp: 30 tablet, Rfl: 2    losartan (COZAAR) 50 MG tablet, Take 50 mg by mouth 2 (two) times a day., Disp: , Rfl:     multivitamin-minerals-lutein Tab, Take 1 tablet by mouth once daily., Disp: , Rfl:     mupirocin (BACTROBAN) 2 % ointment, Apply topically 3 (three) times daily., Disp: 22 g, Rfl: 0    potassium chloride (KLOR-CON) 10 MEQ TbSR, Take 10 mEq by mouth once daily., Disp: , Rfl:     predniSONE (DELTASONE) 5 MG tablet, Take 5 mg by mouth once daily., Disp: , Rfl:     solifenacin (VESICARE) 10 MG tablet, Take 1 tablet (10 mg total) by mouth once daily., Disp: 30 tablet, Rfl: 11    tadalafiL (CIALIS) 20 MG Tab, Take 1 tablet (20 mg total) by mouth once daily., Disp: 10 tablet, Rfl: 11    tadalafiL (CIALIS) 5 MG tablet, Take 1 tablet (5 mg total) by mouth once daily., Disp: 30 tablet, Rfl: 11    vitamin D (VITAMIN D3) 1000 units Tab, Take 2,000 Units by mouth once daily., Disp: , Rfl:     Review of Systems   Constitutional:  Negative for fatigue, fever and unexpected weight change.   HENT:  Negative for ear pain, sinus pressure and sore throat.    Eyes:  Negative for pain.   Respiratory:  Negative for cough and shortness of breath.    Cardiovascular:  Negative for chest pain and leg swelling.   Gastrointestinal:  Negative for abdominal pain, constipation, nausea and vomiting.   Genitourinary:  Negative for dysuria, frequency and urgency.   Musculoskeletal:  Negative for arthralgias.   Skin:  Negative for rash.   Neurological:  Negative for dizziness, weakness and headaches.   Psychiatric/Behavioral:  Negative for sleep disturbance.           Objective:      Vitals:    09/16/24 1340   BP:  "114/70   Pulse: 67   SpO2: 95%   Weight: 78.4 kg (172 lb 12.8 oz)   Height: 6' 1" (1.854 m)     Physical Exam  Vitals and nursing note reviewed.   Constitutional:       General: He is not in acute distress.     Appearance: Normal appearance. He is well-developed.   HENT:      Right Ear: External ear normal.      Left Ear: External ear normal.   Eyes:      Pupils: Pupils are equal, round, and reactive to light.   Neck:      Trachea: No tracheal deviation.   Cardiovascular:      Rate and Rhythm: Normal rate and regular rhythm.      Heart sounds: No murmur heard.     No friction rub. No gallop.   Pulmonary:      Breath sounds: Normal breath sounds. No stridor. No wheezing or rales.   Abdominal:      Palpations: Abdomen is soft. There is no mass.      Tenderness: There is no abdominal tenderness.   Musculoskeletal:         General: No tenderness or deformity.      Right hand: Decreased range of motion. Decreased strength of finger abduction and thumb/finger opposition. Normal sensation. Normal capillary refill.      Cervical back: Neck supple.   Lymphadenopathy:      Cervical: No cervical adenopathy.   Skin:     General: Skin is warm and dry.   Neurological:      Mental Status: He is alert and oriented to person, place, and time.      Coordination: Coordination normal.   Psychiatric:         Thought Content: Thought content normal.           Assessment:       1. Hand weakness    2. Hypothyroidism, unspecified type    3. Primary hypertension    4. Polymyalgia rheumatica    5. Anxiety    6. Dog bite, subsequent encounter         Plan:       Hand weakness  Comments:  refer to hand specialist  Orders:  -     Ambulatory referral/consult to Hand Surgery; Future; Expected date: 09/23/2024    Hypothyroidism, unspecified type  Comments:  levothyroxine 200mcg  Orders:  -     levothyroxine (SYNTHROID) 200 MCG tablet; Take 1 tablet (200 mcg total) by mouth before breakfast.  Dispense: 30 tablet; Refill: 2    Primary " hypertension  Comments:  bp controlled    Polymyalgia rheumatica  Comments:  followed by rheumatology    Anxiety    Dog bite, subsequent encounter  Comments:  refer to hand surgeon      Follow up in about 3 months (around 12/16/2024), or if symptoms worsen or fail to improve, for medication management.        9/23/2024 Brigette Robledo

## 2024-10-15 ENCOUNTER — HOSPITAL ENCOUNTER (OUTPATIENT)
Dept: RADIOLOGY | Facility: HOSPITAL | Age: 64
Discharge: HOME | End: 2024-10-15
Attending: NURSE PRACTITIONER
Payer: MEDICARE

## 2024-10-15 ENCOUNTER — OFFICE VISIT (OUTPATIENT)
Dept: FAMILY MEDICINE | Facility: CLINIC | Age: 64
End: 2024-10-15
Payer: MEDICARE

## 2024-10-15 VITALS
BODY MASS INDEX: 23.11 KG/M2 | SYSTOLIC BLOOD PRESSURE: 130 MMHG | WEIGHT: 174.38 LBS | OXYGEN SATURATION: 94 % | HEIGHT: 73 IN | HEART RATE: 83 BPM | DIASTOLIC BLOOD PRESSURE: 82 MMHG

## 2024-10-15 DIAGNOSIS — R29.898 HAND WEAKNESS: ICD-10-CM

## 2024-10-15 DIAGNOSIS — J40 BRONCHITIS: ICD-10-CM

## 2024-10-15 DIAGNOSIS — M35.3 POLYMYALGIA RHEUMATICA: ICD-10-CM

## 2024-10-15 DIAGNOSIS — I10 PRIMARY HYPERTENSION: ICD-10-CM

## 2024-10-15 DIAGNOSIS — J06.9 UPPER RESPIRATORY TRACT INFECTION, UNSPECIFIED TYPE: Primary | ICD-10-CM

## 2024-10-15 DIAGNOSIS — Z79.899 HIGH RISK MEDICATION USE: ICD-10-CM

## 2024-10-15 LAB
CTP QC/QA: YES
CTP QC/QA: YES
POC MOLECULAR INFLUENZA A AGN: NEGATIVE
POC MOLECULAR INFLUENZA B AGN: NEGATIVE
SARS-COV-2 RDRP RESP QL NAA+PROBE: NEGATIVE

## 2024-10-15 PROCEDURE — 71046 X-RAY EXAM CHEST 2 VIEWS: CPT | Mod: TC,PO

## 2024-10-15 PROCEDURE — 3008F BODY MASS INDEX DOCD: CPT | Mod: CPTII,S$GLB,, | Performed by: NURSE PRACTITIONER

## 2024-10-15 PROCEDURE — 3061F NEG MICROALBUMINURIA REV: CPT | Mod: CPTII,S$GLB,, | Performed by: NURSE PRACTITIONER

## 2024-10-15 PROCEDURE — 1160F RVW MEDS BY RX/DR IN RCRD: CPT | Mod: CPTII,S$GLB,, | Performed by: NURSE PRACTITIONER

## 2024-10-15 PROCEDURE — 3079F DIAST BP 80-89 MM HG: CPT | Mod: CPTII,S$GLB,, | Performed by: NURSE PRACTITIONER

## 2024-10-15 PROCEDURE — 3075F SYST BP GE 130 - 139MM HG: CPT | Mod: CPTII,S$GLB,, | Performed by: NURSE PRACTITIONER

## 2024-10-15 PROCEDURE — 87635 SARS-COV-2 COVID-19 AMP PRB: CPT | Mod: QW,S$GLB,, | Performed by: NURSE PRACTITIONER

## 2024-10-15 PROCEDURE — 99214 OFFICE O/P EST MOD 30 MIN: CPT | Mod: S$GLB,,, | Performed by: NURSE PRACTITIONER

## 2024-10-15 PROCEDURE — 1159F MED LIST DOCD IN RCRD: CPT | Mod: CPTII,S$GLB,, | Performed by: NURSE PRACTITIONER

## 2024-10-15 PROCEDURE — 3066F NEPHROPATHY DOC TX: CPT | Mod: CPTII,S$GLB,, | Performed by: NURSE PRACTITIONER

## 2024-10-15 PROCEDURE — 71046 X-RAY EXAM CHEST 2 VIEWS: CPT | Mod: 26,,, | Performed by: RADIOLOGY

## 2024-10-15 PROCEDURE — 4010F ACE/ARB THERAPY RXD/TAKEN: CPT | Mod: CPTII,S$GLB,, | Performed by: NURSE PRACTITIONER

## 2024-10-15 PROCEDURE — 87502 INFLUENZA DNA AMP PROBE: CPT | Mod: QW,,, | Performed by: NURSE PRACTITIONER

## 2024-10-15 RX ORDER — ONDANSETRON 4 MG/1
4 TABLET, ORALLY DISINTEGRATING ORAL EVERY 8 HOURS PRN
Qty: 30 TABLET | Refills: 0 | Status: SHIPPED | OUTPATIENT
Start: 2024-10-15

## 2024-10-15 RX ORDER — CLARITHROMYCIN 500 MG/1
500 TABLET, FILM COATED ORAL EVERY 12 HOURS
Qty: 14 TABLET | Refills: 0 | Status: SHIPPED | OUTPATIENT
Start: 2024-10-15 | End: 2024-10-16

## 2024-10-15 NOTE — PROGRESS NOTES
SUBJECTIVE:    Patient ID: Phoenix Fragoso is a 63 y.o. male.    Chief Complaint: Cough (Bottles brought//Coughing since 10/11/24, sore throat, ear hurts, no energy, started vomiting today-Robitussing DM-no relief, Dayquil-Slight relief, Coughing up phlegm-coughing more when he is laying down at night, no appetite only fluids//KE)    History of Present Illness    CHIEF COMPLAINT:  63 year old male presents for urgent visit. Wife is present during the exam. Todayis reporting not feeling well. has sore throat, ear pain, and vomiting.    CURRENT ILLNESS:  He reports a sore throat, cough (worse at night with yellow sputum), ear pain, nausea, and vomiting (started today). He denies fever or diarrhea. He has been taking Robitussin DM, DayQuil, and NyQuil for symptom management. He has not eaten for the past two days due to nausea and vomiting but is able to tolerate fluids.    EXPOSURE HISTORY:  He reports potential exposure to illness from his grandchildren whom he cared for the weekend before last. They were coughing at the time. Additionally, one of his granddaughters had a stomach bug with vomiting yesterday.    MEDICAL HISTORY:  He is currently on steroid therapy, which has resulted in a weakened immune system.    HAND INJURY:  He reports an ongoing hand injury from approximately 8 weeks ago. He is currently undergoing physical therapy and notes limited movement in two of his fingers, particularly difficulty with extension. While the condition has improved since his last visit, he still experiences restricted mobility when closing his fingers. Dr. Bardales evaluated the injury and informed him that no tendons were severed.      ROS:  General: -fever, -chills, -fatigue, -weight gain, -weight loss  Eyes: -vision changes, -redness, -discharge  ENT: -ear pain, -nasal congestion, +sore throat  Cardiovascular: -chest pain, -palpitations, -lower extremity edema  Respiratory: +cough, -shortness of breath  Gastrointestinal:  -abdominal pain, +nausea, +vomiting, -diarrhea, -constipation, -blood in stool  Genitourinary: -dysuria, -hematuria, -frequency  Musculoskeletal: -joint pain, -muscle pain  Skin: -rash, -lesion  Neurological: -headache, -dizziness, -numbness, -tingling  Psychiatric: -anxiety, -depression, -sleep difficulty         Office Visit on 10/15/2024   Component Date Value Ref Range Status    POC Molecular Influenza A Ag 10/15/2024 Negative  Negative Final    POC Molecular Influenza B Ag 10/15/2024 Negative  Negative Final     Acceptable 10/15/2024 Yes   Final    POC Rapid COVID 10/15/2024 Negative  Negative Final     Acceptable 10/15/2024 Yes   Final   Office Visit on 08/20/2024   Component Date Value Ref Range Status    TSH w/reflex to FT4 08/20/2024 2.31  0.40 - 4.50 mIU/L Final    Glucose 08/20/2024 78  65 - 99 mg/dL Final    BUN 08/20/2024 19  7 - 25 mg/dL Final    Creatinine 08/20/2024 1.05  0.70 - 1.35 mg/dL Final    eGFR 08/20/2024 79  > OR = 60 mL/min/1.73m2 Final    BUN/Creatinine Ratio 08/20/2024 SEE NOTE:  6 - 22 (calc) Final    Sodium 08/20/2024 139  135 - 146 mmol/L Final    Potassium 08/20/2024 4.1  3.5 - 5.3 mmol/L Final    Chloride 08/20/2024 102  98 - 110 mmol/L Final    CO2 08/20/2024 27  20 - 32 mmol/L Final    Calcium 08/20/2024 9.3  8.6 - 10.3 mg/dL Final    Total Protein 08/20/2024 7.0  6.1 - 8.1 g/dL Final    Albumin 08/20/2024 3.7  3.6 - 5.1 g/dL Final    Globulin, Total 08/20/2024 3.3  1.9 - 3.7 g/dL (calc) Final    Albumin/Globulin Ratio 08/20/2024 1.1  1.0 - 2.5 (calc) Final    Total Bilirubin 08/20/2024 0.6  0.2 - 1.2 mg/dL Final    Alkaline Phosphatase 08/20/2024 58  35 - 144 U/L Final    AST 08/20/2024 23  10 - 35 U/L Final    ALT 08/20/2024 22  9 - 46 U/L Final   Office Visit on 07/15/2024   Component Date Value Ref Range Status    Color, POC UA 07/15/2024 Yellow  Yellow, Straw, Colorless Final    Clarity, POC UA 07/15/2024 Clear  Clear Final    Glucose, POC UA  07/15/2024 Negative  Negative Final    Bilirubin, POC UA 07/15/2024 Negative  Negative Final    Ketones, POC UA 07/15/2024 Negative  Negative Final    Spec Grav POC UA 07/15/2024 1.010  1.005 - 1.030 Final    Blood, POC UA 07/15/2024 Trace-intact (A)  Negative Final    pH, POC UA 07/15/2024 6.5  5.0 - 8.0 Final    Protein, POC UA 07/15/2024 Negative  Negative Final    Urobilinogen, POC UA 07/15/2024 0.2  <=1.0 Final    Nitrite, POC UA 07/15/2024 Negative  Negative Final    WBC, POC UA 07/15/2024 Negative  Negative Final   Lab Visit on 07/08/2024   Component Date Value Ref Range Status    PSA Diagnostic 07/08/2024 <0.01  0.00 - 4.00 ng/mL Final       Past Medical History:   Diagnosis Date    Bell's palsy     High cholesterol     Hypertension     Insomnia     Thyroid disease      Social History     Socioeconomic History    Marital status:    Tobacco Use    Smoking status: Never    Smokeless tobacco: Never   Substance and Sexual Activity    Alcohol use: Yes     Comment: OCCASIONALLY    Drug use: No    Sexual activity: Yes     Partners: Female     Social Drivers of Health     Financial Resource Strain: Low Risk  (10/17/2024)    Overall Financial Resource Strain (CARDIA)     Difficulty of Paying Living Expenses: Not hard at all   Food Insecurity: No Food Insecurity (10/17/2024)    Hunger Vital Sign     Worried About Running Out of Food in the Last Year: Never true     Ran Out of Food in the Last Year: Never true   Transportation Needs: No Transportation Needs (10/17/2024)    TRANSPORTATION NEEDS     Transportation : No   Physical Activity: Inactive (10/17/2024)    Exercise Vital Sign     Days of Exercise per Week: 0 days     Minutes of Exercise per Session: 0 min   Stress: Stress Concern Present (10/17/2024)    [employer] - Occupational Stress Questionnaire     Feeling of Stress : Rather much   Housing Stability: Low Risk  (10/17/2024)    Housing Stability Vital Sign     Unable to Pay  for Housing in the Last Year: No     Homeless in the Last Year: No     Past Surgical History:   Procedure Laterality Date    APPENDECTOMY      CHOLECYSTECTOMY      CYSTOSCOPY N/A 2/27/2024    Procedure: CYSTOSCOPY;  Surgeon: Walker Brooks MD;  Location: [facility] OR;  Service: Urology;  Laterality: N/A;    PROSTATE BIOPSY      PROSTATE SURGERY      right knee arthroscopy      ROBOT-ASSISTED LAPAROSCOPIC PROSTATECTOMY N/A 10/16/2019    Procedure: ROBOTIC PROSTATECTOMY;  Surgeon: Walker Brooks MD;  Location: [facility] OR;  Service: Urology;  Laterality: N/A;     Family History   Problem Relation Name Age of Onset    Arthritis Mother         All of your core healthy metrics are met.      Review of patient's allergies indicates:   Allergen Reactions    Ancef [cefazolin] Anaphylaxis     Anaphylactic shock     Clarithromycin (bulk) Anaphylaxis and Shortness Of Breath     Allergic reaction       Current Outpatient Medications:     aspirin (ECOTRIN) 81 MG EC tablet, Take 81 mg by mouth once daily., Disp: , Rfl:     buPROPion (WELLBUTRIN XL) 150 MG TB24 tablet, Take 1 tablet (150 mg total) by mouth once daily., Disp: 30 tablet, Rfl: 11    calcium carbonate (OS-BEAU) 600 mg calcium (1,500 mg) Tab, Take 600 mg by mouth once daily., Disp: , Rfl:     carvediloL (COREG) 12.5 MG tablet, Take 12.5 mg by mouth 2 (two) times daily., Disp: , Rfl:     hydrALAZINE (APRESOLINE) 50 MG tablet, Take 100 mg by mouth 2 (two) times daily., Disp: , Rfl:     levothyroxine (SYNTHROID) 200 MCG tablet, Take 1 tablet (200 mcg total) by mouth before breakfast., Disp: 30 tablet, Rfl: 2    losartan (COZAAR) 50 MG tablet, Take 50 mg by mouth 2 (two) times a day., Disp: , Rfl:     solifenacin (VESICARE) 10 MG tablet, Take 1 tablet (10 mg total) by mouth once daily., Disp: 30 tablet, Rfl: 11    vitamin D (VITAMIN D3) 1000 units Tab, Take 2,000 Units by mouth once daily., Disp: , Rfl:     ascorbic acid, vitamin C, (VITAMIN C) 100 MG tablet, Take 100 mg  "by mouth once daily., Disp: , Rfl:     benzonatate (TESSALON) 100 MG capsule, Take 1 capsule (100 mg total) by mouth 3 (three) times daily as needed for Cough., Disp: 15 capsule, Rfl: 0    clonazePAM (KLONOPIN) 1 MG tablet, Take 1 tablet (1 mg total) by mouth 2 (two) times daily as needed for Anxiety., Disp: 30 tablet, Rfl: 1    diphenhydrAMINE (BENADRYL) 25 mg capsule, Take 1 capsule (25 mg total) by mouth every 8 (eight) hours. for 5 days, Disp: 15 capsule, Rfl: 0    doxycycline (VIBRA-TABS) 100 MG tablet, Take 1 tablet (100 mg total) by mouth 2 (two) times daily. for 5 days, Disp: 10 tablet, Rfl: 0    EPINEPHrine (EPIPEN JR 2-RAYRAY) 0.15 mg/0.3 mL pen injection, Inject 0.3 mLs (0.15 mg total) into the muscle as needed for Anaphylaxis., Disp: 2 each, Rfl: 12    famotidine (PEPCID) 20 MG tablet, Take 1 tablet (20 mg total) by mouth 2 (two) times daily., Disp: 60 tablet, Rfl: 11    methylPREDNISolone (MEDROL DOSEPACK) 4 mg tablet, use as directed, Disp: 21 each, Rfl: 0    multivitamin-minerals-lutein Tab, Take 1 tablet by mouth once daily., Disp: , Rfl:     ondansetron (ZOFRAN-ODT) 4 MG TbDL, Take 1 tablet (4 mg total) by mouth every 8 (eight) hours as needed., Disp: 30 tablet, Rfl: 0    predniSONE (DELTASONE) 5 MG tablet, Take 1 tablet (5 mg total) by mouth once daily. Can restart taking after you finish Medrol Dose Pack, Disp: 30 tablet, Rfl: 3    tadalafiL (CIALIS) 20 MG Tab, Take 1 tablet (20 mg total) by mouth once daily., Disp: 10 tablet, Rfl: 11    Objective:      Vitals:    10/15/24 1323   BP: 130/82   Pulse: 83   SpO2: (!) 94%   Weight: 79.1 kg (174 lb 6.4 oz)   Height: 6' 1" (1.854 m)     Physical Exam    General: No acute distress. Well-developed. Well-nourished.  Eyes: EOMI. Sclerae anicteric.  HENT: Normocephalic. Atraumatic. Nares patent. Moist oral mucosa. Pharyngeal erythema.  Ears: Bilateral TMs clear. Bilateral EACs clear. Fluid behind right ear.  Cardiovascular: Regular rate. Regular rhythm. No " murmurs. No rubs. No gallops. Normal S1, S2.  Respiratory: Normal respiratory effort. Clear to auscultation bilaterally. No rales. No rhonchi. Wheezing in upper airway.  Abdomen: Soft. Non-tender. Non-distended. Normoactive bowel sounds.  Musculoskeletal: No  obvious deformity.  Extremities: No lower extremity edema.  Neurological: Alert & oriented x3. No slurred speech. Normal gait.right hand unable to make fist  Psychiatric: Normal mood. Normal affect. Good insight. Good judgment.  Skin: Warm. Dry. No rash.       Assessment:       Assessment & Plan    - Assessed patient for possible COVID-19 and influenza, both tests negative  - Evaluated symptoms consistent with bronchitis, noting wheezing in upper airway  - Considered possibility of concurrent stomach virus due to recent exposure and immunosuppression from steroids  - Ordered chest XR to rule out pneumonia before determining antibiotic treatment  - Planned to review x-ray results before finalizing treatment plan, including potential antibiotic prescription  - Noted fluid behind right ear, potentially indicating secondary infection    GASTROENTERITIS:  - Explained that current stomach virus typically lasts less than 36 hours.  - Phoenix to maintain hydration with fluids, especially electrolyte-rich options like diluted Gatorade light or chicken broth.  - Phoenix to consume bland, easily digestible foods such as crackers, toast, and bananas if able to eat.  - Phoenix to avoid heavy foods that may irritate the stomach.  - Started Zofran for nausea and vomiting, to be taken every 4-6 hours as needed.  - Take every 8 hours for the next 24 hours, then as needed.    IMMUNOSUPPRESSION:  - Discussed increased susceptibility to infections due to steroid use.    RESPIRATORY CONCERN:  - Chest XR ordered to evaluate lungs and rule out pneumonia.  - Contact the office for further instructions regarding potential antibiotic treatment pending chest XR results.    INFLUENZA  VACCINATION:  - Follow up for flu vaccine when recovered from current illness.       Plan:       Upper respiratory tract infection, unspecified type  Comments:  treat symptoms  Orders:  -     POCT Influenza A/B Molecular  -     POCT COVID-19 Rapid Screening    Bronchitis  Comments:  xray today. covid/flu negative  Orders:  -     X-Ray Chest PA And Lateral; Future; Expected date: 10/15/2024    Primary hypertension  Comments:  bp ok    Polymyalgia rheumatica    High risk medication use  Comments:  chronic steroid use    Hand weakness  Comments:  continue pt    Other orders  -     ondansetron (ZOFRAN-ODT) 4 MG TbDL; Take 1 tablet (4 mg total) by mouth every 8 (eight) hours as needed.  Dispense: 30 tablet; Refill: 0  -     Discontinue: clarithromycin (BIAXIN) 500 MG tablet; Take 1 tablet (500 mg total) by mouth every 12 (twelve) hours.  Dispense: 14 tablet; Refill: 0      Follow up in about 3 months (around 1/15/2025), or if symptoms worsen or fail to improve, for medication management.        This note was generated with the assistance of ambient listening technology. Verbal consent was obtained by the patient and accompanying visitor(s) for the recording of patient appointment to facilitate this note. I attest to having reviewed and edited the generated note for accuracy, though some syntax or spelling errors may persist. Please contact the author of this note for any clarification.      10/21/2024 Brigette Robledo

## 2024-10-16 ENCOUNTER — HOSPITAL ENCOUNTER (INPATIENT)
Facility: HOSPITAL | Age: 64
LOS: 1 days | Discharge: HOME | DRG: 916 | End: 2024-10-18
Attending: EMERGENCY MEDICINE | Admitting: INTERNAL MEDICINE
Payer: MEDICARE

## 2024-10-16 ENCOUNTER — PATIENT MESSAGE (OUTPATIENT)
Dept: FAMILY MEDICINE | Facility: CLINIC | Age: 64
End: 2024-10-16
Payer: MEDICARE

## 2024-10-16 DIAGNOSIS — S80.862A INSECT BITE OF LEFT LOWER LEG, INITIAL ENCOUNTER: ICD-10-CM

## 2024-10-16 DIAGNOSIS — C61 PROSTATE CANCER: ICD-10-CM
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
## 2024-10-16 DIAGNOSIS — T78.40XA ALLERGIC REACTION, INITIAL ENCOUNTER: Primary | ICD-10-CM

## 2024-10-16 DIAGNOSIS — R07.9 CHEST PAIN: ICD-10-CM

## 2024-10-16 DIAGNOSIS — T78.2XXA: ICD-10-CM

## 2024-10-16 DIAGNOSIS — T78.2XXA ANAPHYLAXIS, INITIAL ENCOUNTER: ICD-10-CM

## 2024-10-16 LAB
ALBUMIN SERPL BCP-MCNC: 3.9 G/DL (ref 3.5–5.2)
ALP SERPL-CCNC: 65 U/L (ref 55–135)
ALT SERPL W/O P-5'-P-CCNC: 16 U/L (ref 10–44)
ANION GAP SERPL CALC-SCNC: 11 MMOL/L (ref 8–16)
AST SERPL-CCNC: 16 U/L (ref 10–40)
BASOPHILS # BLD AUTO: 0.02 K/UL (ref 0–0.2)
BASOPHILS NFR BLD: 0.2 % (ref 0–1.9)
BILIRUB SERPL-MCNC: 1.5 MG/DL (ref 0.1–1)
BUN SERPL-MCNC: 21 MG/DL (ref 8–23)
CALCIUM SERPL-MCNC: 9.6 MG/DL (ref 8.7–10.5)
CHLORIDE SERPL-SCNC: 91 MMOL/L (ref 95–110)
CO2 SERPL-SCNC: 28 MMOL/L (ref 23–29)
CREAT SERPL-MCNC: 1.1 MG/DL (ref 0.5–1.4)
DIFFERENTIAL METHOD BLD: ABNORMAL
EOSINOPHIL # BLD AUTO: 0 K/UL (ref 0–0.5)
EOSINOPHIL NFR BLD: 0.1 % (ref 0–8)
ERYTHROCYTE [DISTWIDTH] IN BLOOD BY AUTOMATED COUNT: 13.8 % (ref 11.5–14.5)
EST. GFR  (NO RACE VARIABLE): >60 ML/MIN/1.73 M^2
GLUCOSE SERPL-MCNC: 89 MG/DL (ref 70–110)
HCT VFR BLD AUTO: 40.3 % (ref 40–54)
HGB BLD-MCNC: 13.7 G/DL (ref 14–18)
IMM GRANULOCYTES # BLD AUTO: 0.06 K/UL (ref 0–0.04)
IMM GRANULOCYTES NFR BLD AUTO: 0.7 % (ref 0–0.5)
LYMPHOCYTES # BLD AUTO: 0.6 K/UL (ref 1–4.8)
LYMPHOCYTES NFR BLD: 6.3 % (ref 18–48)
MCH RBC QN AUTO: 28.6 PG (ref 27–31)
MCHC RBC AUTO-ENTMCNC: 34 G/DL (ref 32–36)
MCV RBC AUTO: 84 FL (ref 82–98)
MONOCYTES # BLD AUTO: 0.5 K/UL (ref 0.3–1)
MONOCYTES NFR BLD: 5.6 % (ref 4–15)
NEUTROPHILS # BLD AUTO: 8 K/UL (ref 1.8–7.7)
NEUTROPHILS NFR BLD: 87.1 % (ref 38–73)
NRBC BLD-RTO: 0 /100 WBC
PLATELET # BLD AUTO: 231 K/UL (ref 150–450)
PMV BLD AUTO: 9.9 FL (ref 9.2–12.9)
POTASSIUM SERPL-SCNC: 3.5 MMOL/L (ref 3.5–5.1)
PROT SERPL-MCNC: 7.6 G/DL (ref 6–8.4)
RBC # BLD AUTO: 4.79 M/UL (ref 4.6–6.2)
SODIUM SERPL-SCNC: 130 MMOL/L (ref 136–145)
WBC # BLD AUTO: 9.17 K/UL (ref 3.9–12.7)

## 2024-10-16 PROCEDURE — G0378 HOSPITAL OBSERVATION PER HR: HCPCS

## 2024-10-16 PROCEDURE — 96372 THER/PROPH/DIAG INJ SC/IM: CPT | Performed by: INTERNAL MEDICINE

## 2024-10-16 PROCEDURE — 63600175 PHARM REV CODE 636 W HCPCS: Performed by: INTERNAL MEDICINE

## 2024-10-16 PROCEDURE — 94761 N-INVAS EAR/PLS OXIMETRY MLT: CPT

## 2024-10-16 PROCEDURE — 25000003 PHARM REV CODE 250: Performed by: INTERNAL MEDICINE

## 2024-10-16 PROCEDURE — 25000003 PHARM REV CODE 250: Performed by: EMERGENCY MEDICINE

## 2024-10-16 PROCEDURE — 96372 THER/PROPH/DIAG INJ SC/IM: CPT | Performed by: EMERGENCY MEDICINE

## 2024-10-16 PROCEDURE — 99900035 HC TECH TIME PER 15 MIN (STAT)

## 2024-10-16 PROCEDURE — 99285 EMERGENCY DEPT VISIT HI MDM: CPT | Mod: 25

## 2024-10-16 PROCEDURE — 80053 COMPREHEN METABOLIC PANEL: CPT | Performed by: INTERNAL MEDICINE

## 2024-10-16 PROCEDURE — 96361 HYDRATE IV INFUSION ADD-ON: CPT

## 2024-10-16 PROCEDURE — 96376 TX/PRO/DX INJ SAME DRUG ADON: CPT

## 2024-10-16 PROCEDURE — 63600175 PHARM REV CODE 636 W HCPCS: Performed by: EMERGENCY MEDICINE

## 2024-10-16 PROCEDURE — 96374 THER/PROPH/DIAG INJ IV PUSH: CPT

## 2024-10-16 PROCEDURE — 96375 TX/PRO/DX INJ NEW DRUG ADDON: CPT

## 2024-10-16 PROCEDURE — 94799 UNLISTED PULMONARY SVC/PX: CPT

## 2024-10-16 PROCEDURE — 85025 COMPLETE CBC W/AUTO DIFF WBC: CPT | Performed by: INTERNAL MEDICINE

## 2024-10-16 PROCEDURE — 96374 THER/PROPH/DIAG INJ IV PUSH: CPT | Mod: 59

## 2024-10-16 RX ORDER — SODIUM,POTASSIUM PHOSPHATES 280-250MG
2 POWDER IN PACKET (EA) ORAL
Status: DISCONTINUED | OUTPATIENT
Start: 2024-10-16 | End: 2024-10-18 | Stop reason: HOSPADM

## 2024-10-16 RX ORDER — METHYLPREDNISOLONE SOD SUCC 125 MG
40 VIAL (EA) INJECTION EVERY 6 HOURS
Status: DISCONTINUED | OUTPATIENT
Start: 2024-10-16 | End: 2024-10-17

## 2024-10-16 RX ORDER — SODIUM CHLORIDE 9 MG/ML
1000 INJECTION, SOLUTION INTRAVENOUS
Status: COMPLETED | OUTPATIENT
Start: 2024-10-16 | End: 2024-10-16

## 2024-10-16 RX ORDER — LANOLIN ALCOHOL/MO/W.PET/CERES
800 CREAM (GRAM) TOPICAL
Status: DISCONTINUED | OUTPATIENT
Start: 2024-10-16 | End: 2024-10-18 | Stop reason: HOSPADM

## 2024-10-16 RX ORDER — FAMOTIDINE 10 MG/ML
20 INJECTION INTRAVENOUS
Status: COMPLETED | OUTPATIENT
Start: 2024-10-16 | End: 2024-10-16

## 2024-10-16 RX ORDER — ACETAMINOPHEN 325 MG/1
650 TABLET ORAL EVERY 8 HOURS PRN
Status: DISCONTINUED | OUTPATIENT
Start: 2024-10-16 | End: 2024-10-18 | Stop reason: HOSPADM

## 2024-10-16 RX ORDER — METHYLPREDNISOLONE SOD SUCC 125 MG
125 VIAL (EA) INJECTION
Status: COMPLETED | OUTPATIENT
Start: 2024-10-16 | End: 2024-10-16

## 2024-10-16 RX ORDER — FAMOTIDINE 10 MG/ML
20 INJECTION INTRAVENOUS 2 TIMES DAILY
Status: DISCONTINUED | OUTPATIENT
Start: 2024-10-16 | End: 2024-10-18 | Stop reason: HOSPADM

## 2024-10-16 RX ORDER — ONDANSETRON HYDROCHLORIDE 2 MG/ML
4 INJECTION, SOLUTION INTRAVENOUS EVERY 6 HOURS PRN
Status: DISCONTINUED | OUTPATIENT
Start: 2024-10-16 | End: 2024-10-18 | Stop reason: HOSPADM

## 2024-10-16 RX ORDER — LOSARTAN POTASSIUM 50 MG/1
50 TABLET ORAL 2 TIMES DAILY
Status: DISCONTINUED | OUTPATIENT
Start: 2024-10-16 | End: 2024-10-18 | Stop reason: HOSPADM

## 2024-10-16 RX ORDER — ACETAMINOPHEN 325 MG/1
650 TABLET ORAL EVERY 4 HOURS PRN
Status: DISCONTINUED | OUTPATIENT
Start: 2024-10-16 | End: 2024-10-18 | Stop reason: HOSPADM

## 2024-10-16 RX ORDER — CARVEDILOL 12.5 MG/1
12.5 TABLET ORAL 2 TIMES DAILY
Status: DISCONTINUED | OUTPATIENT
Start: 2024-10-16 | End: 2024-10-18 | Stop reason: HOSPADM

## 2024-10-16 RX ORDER — TALC
6 POWDER (GRAM) TOPICAL NIGHTLY PRN
Status: DISCONTINUED | OUTPATIENT
Start: 2024-10-16 | End: 2024-10-18 | Stop reason: HOSPADM

## 2024-10-16 RX ORDER — ASPIRIN 81 MG/1
81 TABLET ORAL DAILY
Status: DISCONTINUED | OUTPATIENT
Start: 2024-10-16 | End: 2024-10-18 | Stop reason: HOSPADM

## 2024-10-16 RX ORDER — HYDROCODONE BITARTRATE AND ACETAMINOPHEN 5; 325 MG/1; MG/1
1 TABLET ORAL EVERY 6 HOURS PRN
Status: DISCONTINUED | OUTPATIENT
Start: 2024-10-16 | End: 2024-10-18 | Stop reason: HOSPADM

## 2024-10-16 RX ORDER — HYDRALAZINE HYDROCHLORIDE 20 MG/ML
10 INJECTION INTRAMUSCULAR; INTRAVENOUS EVERY 4 HOURS PRN
Status: DISCONTINUED | OUTPATIENT
Start: 2024-10-16 | End: 2024-10-18 | Stop reason: HOSPADM

## 2024-10-16 RX ORDER — EPINEPHRINE 0.3 MG/.3ML
0.3 INJECTION SUBCUTANEOUS
Status: COMPLETED | OUTPATIENT
Start: 2024-10-16 | End: 2024-10-16

## 2024-10-16 RX ORDER — BUPROPION HYDROCHLORIDE 150 MG/1
150 TABLET ORAL DAILY
Status: DISCONTINUED | OUTPATIENT
Start: 2024-10-16 | End: 2024-10-18 | Stop reason: HOSPADM

## 2024-10-16 RX ORDER — DIPHENHYDRAMINE HYDROCHLORIDE 50 MG/ML
50 INJECTION INTRAMUSCULAR; INTRAVENOUS
Status: COMPLETED | OUTPATIENT
Start: 2024-10-16 | End: 2024-10-16

## 2024-10-16 RX ORDER — SODIUM CHLORIDE 9 MG/ML
INJECTION, SOLUTION INTRAVENOUS CONTINUOUS
Status: DISCONTINUED | OUTPATIENT
Start: 2024-10-16 | End: 2024-10-17

## 2024-10-16 RX ORDER — NALOXONE HCL 0.4 MG/ML
0.02 VIAL (ML) INJECTION
Status: DISCONTINUED | OUTPATIENT
Start: 2024-10-16 | End: 2024-10-18 | Stop reason: HOSPADM

## 2024-10-16 RX ORDER — ENOXAPARIN SODIUM 100 MG/ML
40 INJECTION SUBCUTANEOUS EVERY 24 HOURS
Status: DISCONTINUED | OUTPATIENT
Start: 2024-10-16 | End: 2024-10-18 | Stop reason: HOSPADM

## 2024-10-16 RX ORDER — ALUMINUM HYDROXIDE, MAGNESIUM HYDROXIDE, AND SIMETHICONE 1200; 120; 1200 MG/30ML; MG/30ML; MG/30ML
30 SUSPENSION ORAL 4 TIMES DAILY PRN
Status: DISCONTINUED | OUTPATIENT
Start: 2024-10-16 | End: 2024-10-18 | Stop reason: HOSPADM

## 2024-10-16 RX ORDER — DIPHENHYDRAMINE HYDROCHLORIDE 50 MG/ML
6.25 INJECTION INTRAMUSCULAR; INTRAVENOUS EVERY 8 HOURS
Status: DISCONTINUED | OUTPATIENT
Start: 2024-10-16 | End: 2024-10-17

## 2024-10-16 RX ORDER — CLONAZEPAM 0.5 MG/1
1 TABLET ORAL 2 TIMES DAILY PRN
Status: DISCONTINUED | OUTPATIENT
Start: 2024-10-16 | End: 2024-10-18 | Stop reason: HOSPADM

## 2024-10-16 RX ORDER — HYDRALAZINE HYDROCHLORIDE 25 MG/1
100 TABLET, FILM COATED ORAL 2 TIMES DAILY
Status: DISCONTINUED | OUTPATIENT
Start: 2024-10-16 | End: 2024-10-18 | Stop reason: HOSPADM

## 2024-10-16 RX ADMIN — SODIUM CHLORIDE 1000 ML: 9 INJECTION, SOLUTION INTRAVENOUS at 08:10

## 2024-10-16 RX ADMIN — FAMOTIDINE 20 MG: 10 INJECTION INTRAVENOUS at 09:10

## 2024-10-16 RX ADMIN — LOSARTAN POTASSIUM 50 MG: 50 TABLET, FILM COATED ORAL at 09:10

## 2024-10-16 RX ADMIN — HYDRALAZINE HYDROCHLORIDE 100 MG: 25 TABLET ORAL at 09:10

## 2024-10-16 RX ADMIN — SODIUM CHLORIDE: 9 INJECTION, SOLUTION INTRAVENOUS at 02:10

## 2024-10-16 RX ADMIN — CLONAZEPAM 1 MG: 0.5 TABLET ORAL at 09:10

## 2024-10-16 RX ADMIN — DIPHENHYDRAMINE HYDROCHLORIDE 6.25 MG: 50 INJECTION INTRAMUSCULAR; INTRAVENOUS at 02:10

## 2024-10-16 RX ADMIN — FAMOTIDINE 20 MG: 10 INJECTION, SOLUTION INTRAVENOUS at 08:10

## 2024-10-16 RX ADMIN — BUPROPION HYDROCHLORIDE 150 MG: 150 TABLET, EXTENDED RELEASE ORAL at 02:10

## 2024-10-16 RX ADMIN — CARVEDILOL 12.5 MG: 12.5 TABLET, FILM COATED ORAL at 09:10

## 2024-10-16 RX ADMIN — ENOXAPARIN SODIUM 40 MG: 40 INJECTION SUBCUTANEOUS at 04:10

## 2024-10-16 RX ADMIN — METHYLPREDNISOLONE SODIUM SUCCINATE 125 MG: 125 INJECTION, POWDER, FOR SOLUTION INTRAMUSCULAR; INTRAVENOUS at 08:10

## 2024-10-16 RX ADMIN — DIPHENHYDRAMINE HYDROCHLORIDE 50 MG: 50 INJECTION INTRAMUSCULAR; INTRAVENOUS at 08:10

## 2024-10-16 RX ADMIN — EPINEPHRINE 0.3 MG: 0.3 INJECTION SUBCUTANEOUS at 08:10

## 2024-10-16 RX ADMIN — DIPHENHYDRAMINE HYDROCHLORIDE 6.25 MG: 50 INJECTION INTRAMUSCULAR; INTRAVENOUS at 09:10

## 2024-10-16 RX ADMIN — METHYLPREDNISOLONE SODIUM SUCCINATE 40 MG: 125 INJECTION, POWDER, FOR SOLUTION INTRAMUSCULAR; INTRAVENOUS at 05:10

## 2024-10-16 RX ADMIN — ASPIRIN 81 MG: 81 TABLET, COATED ORAL at 02:10

## 2024-10-16 NOTE — PLAN OF CARE
Received patient from ER. Patient alert, oriented. Walks from stretcher to bed. No SOB or distress noted. Left side lower face and neck area with mild swelling. Patient states that it hurts to swallow because he has had a sore throat for days and that is why his outpatient doctor had started him on the antibiotic.  Tolerates pudding and water. Advanced to cardiac diet and patient has no difficulty with swallowing solids. Afebrile. VSS. Wife at bedside and involved in patient care. Safety measures in place and bed alarm in use.  Problem: Adult Inpatient Plan of Care  Goal: Plan of Care Review  Outcome: Progressing  Goal: Patient-Specific Goal (Individualized)  Outcome: Progressing  Goal: Absence of Hospital-Acquired Illness or Injury  Outcome: Progressing  Goal: Optimal Comfort and Wellbeing  Outcome: Progressing  Goal: Readiness for Transition of Care  Outcome: Progressing     Problem: Pain Acute  Goal: Optimal Pain Control and Function  Outcome: Progressing     Problem: Swallowing Impairment  Goal: Optimal Eating/Swallowing without Aspiration  Outcome: Progressing     Problem: Oral Intake Inadequate  Goal: Improved Oral Intake  Outcome: Progressing

## 2024-10-16 NOTE — ED PROVIDER NOTES
Encounter Date: 10/16/2024       History     Chief Complaint   Patient presents with    Allergic Reaction     Pt started biaxin yesterday and woke up at 6 am w/ trouble breathing. Pt states he feels like his throat is closing.     Patient presents emergency department with reported allergic reaction states took his Biaxin this morning proximally 4 a.m awoke feeling short of breath. feels like his throat is closing and he is having trouble swallowing he has had a reaction to Ancef in the past he was placed on the Biaxin for upper respiratory infection persistent cough took his 1st dose yesterday there was no noted rash        Review of patient's allergies indicates:   Allergen Reactions    Ancef [cefazolin] Anaphylaxis     Anaphylactic shock     Clarithromycin (bulk) Anaphylaxis and Shortness Of Breath     Allergic reaction     Past Medical History:   Diagnosis Date    Bell's palsy     High cholesterol     Hypertension     Insomnia     Thyroid disease      Past Surgical History:   Procedure Laterality Date    APPENDECTOMY      CHOLECYSTECTOMY      CYSTOSCOPY N/A 2/27/2024    Procedure: CYSTOSCOPY;  Surgeon: Walker Brooks MD;  Location: [facility] OR;  Service: Urology;  Laterality: N/A;    PROSTATE BIOPSY      PROSTATE SURGERY      right knee arthroscopy      ROBOT-ASSISTED LAPAROSCOPIC PROSTATECTOMY N/A 10/16/2019    Procedure: ROBOTIC PROSTATECTOMY;  Surgeon: Walker Brooks MD;  Location: [facility] OR;  Service: Urology;  Laterality: N/A;     Family History   Problem Relation Name Age of Onset    Arthritis Mother       Social History     Tobacco Use    Smoking status: Never    Smokeless tobacco: Never   Substance Use Topics    Alcohol use: Yes     Comment: OCCASIONALLY    Drug use: No     Review of Systems   Constitutional:  Negative for chills and fever.   HENT:  Positive for congestion, facial swelling, sore throat and trouble swallowing.    Respiratory:  Positive for cough and shortness of breath. Negative  for wheezing.    Gastrointestinal:  Negative for nausea.   All other systems reviewed and are negative.      Physical Exam     Initial Vitals   BP Pulse Resp Temp SpO2   10/16/24 0823 10/16/24 0823 10/16/24 0823 10/16/24 0826 10/16/24 0823   131/77 89 18 98.5 °F (36.9 °C) (!) 94 %      MAP       --                Physical Exam    Constitutional: He appears well-developed and well-nourished. He appears distressed.   HENT:   Head: Normocephalic and atraumatic.   Right Ear: External ear normal.   Left Ear: External ear normal.   Angioedema noted floor of the mouth tongue   Eyes: Pupils are equal, round, and reactive to light.   Neck: Neck supple. No stridor present.   Swelling noted submental area   Normal range of motion.  Cardiovascular:  Normal rate, regular rhythm, S1 normal, S2 normal and intact distal pulses.           Pulmonary/Chest: Tachypnea noted. He is in respiratory distress. He has decreased breath sounds. He has no wheezes.   Abdominal: Abdomen is soft. Bowel sounds are normal. There is no abdominal tenderness.   Musculoskeletal:         General: Normal range of motion.      Cervical back: Normal range of motion and neck supple.     Neurological: He is alert and oriented to person, place, and time. GCS eye subscore is 4. GCS verbal subscore is 5. GCS motor subscore is 6.   Skin: Skin is warm and dry. No rash noted.   Psychiatric: He has a normal mood and affect. His behavior is normal.         ED Course   Procedures  Labs Reviewed   CBC W/ AUTO DIFFERENTIAL - Abnormal       Result Value    WBC 9.17      RBC 4.79      Hemoglobin 13.7 (*)     Hematocrit 40.3      MCV 84      MCH 28.6      MCHC 34.0      RDW 13.8      Platelets 231      MPV 9.9      Immature Granulocytes 0.7 (*)     Gran # (ANC) 8.0 (*)     Immature Grans (Abs) 0.06 (*)     Lymph # 0.6 (*)     Mono # 0.5      Eos # 0.0      Baso # 0.02      nRBC 0      Gran % 87.1 (*)     Lymph % 6.3 (*)     Mono % 5.6      Eosinophil % 0.1      Basophil %  0.2      Differential Method Automated     COMPREHENSIVE METABOLIC PANEL - Abnormal    Sodium 130 (*)     Potassium 3.5      Chloride 91 (*)     CO2 28      Glucose 89      BUN 21      Creatinine 1.1      Calcium 9.6      Total Protein 7.6      Albumin 3.9      Total Bilirubin 1.5 (*)     Alkaline Phosphatase 65      AST 16      ALT 16      eGFR >60.0      Anion Gap 11            Imaging Results    None          Medications   aspirin EC tablet 81 mg (81 mg Oral Given 10/16/24 1404)   buPROPion TB24 tablet 150 mg (150 mg Oral Given 10/16/24 1404)   clonazePAM tablet 1 mg (has no administration in time range)   0.9%  NaCl infusion (has no administration in time range)   hydrALAZINE injection 10 mg (has no administration in time range)   melatonin tablet 6 mg (has no administration in time range)   ondansetron injection 4 mg (has no administration in time range)   acetaminophen tablet 650 mg (has no administration in time range)   aluminum-magnesium hydroxide-simethicone 200-200-20 mg/5 mL suspension 30 mL (has no administration in time range)   acetaminophen tablet 650 mg (has no administration in time range)   HYDROcodone-acetaminophen 5-325 mg per tablet 1 tablet (has no administration in time range)   naloxone 0.4 mg/mL injection 0.02 mg (has no administration in time range)   potassium bicarbonate disintegrating tablet 50 mEq (has no administration in time range)   potassium bicarbonate disintegrating tablet 35 mEq (has no administration in time range)   potassium bicarbonate disintegrating tablet 60 mEq (has no administration in time range)   magnesium oxide tablet 800 mg (has no administration in time range)   magnesium oxide tablet 800 mg (has no administration in time range)   potassium, sodium phosphates 280-160-250 mg packet 2 packet (has no administration in time range)   potassium, sodium phosphates 280-160-250 mg packet 2 packet (has no administration in time range)   potassium, sodium phosphates  280-160-250 mg packet 2 packet (has no administration in time range)   enoxaparin injection 40 mg (has no administration in time range)   famotidine (PF) injection 20 mg (has no administration in time range)   diphenhydrAMINE injection 6.25 mg (6.25 mg Intravenous Given 10/16/24 1404)   methylPREDNISolone sodium succinate injection 40 mg (has no administration in time range)   carvediloL tablet 12.5 mg (has no administration in time range)   hydrALAZINE tablet 100 mg (has no administration in time range)   losartan tablet 50 mg (has no administration in time range)   EPINEPHrine (EPIPEN) 0.3 mg/0.3 mL pen injection 0.3 mg (0.3 mg Intramuscular Given 10/16/24 0833)   methylPREDNISolone sodium succinate injection 125 mg (125 mg Intravenous Given 10/16/24 0834)   diphenhydrAMINE injection 50 mg (50 mg Intravenous Given 10/16/24 0833)   famotidine (PF) injection 20 mg (20 mg Intravenous Given 10/16/24 0834)   0.9%  NaCl infusion (1,000 mLs Intravenous New Bag 10/16/24 0844)     Medical Decision Making  Patient observed for over 4 hours in the emergency department with continued improvement of his swelling however symptoms do continue he does continue to have some mild swelling given the persistent angioedema will admit for further evaluation and treatment continued steroids I have discussed patient's findings with Dr. Aquino will evaluate patient in the ER for admission    Risk  Prescription drug management.              Attending Attestation:         Attending Critical Care:   Critical Care Times:   Direct Patient Care (initial evaluation, reassessments, and time considering the case)................................................................10 minutes.   Documentation..................................................................................................................................................................................8 minutes.   Consultation with other Physicians.  .................................................................................................................................................8 minutes.   ==============================================================  Total Critical Care Time - exclusive of procedural time: 26 minutes.  ==============================================================                                 Clinical Impression:  Final diagnoses:  [T78.40XA] Allergic reaction, initial encounter (Primary)  [T78.2XXA] Acute anaphylaxis          ED Disposition Condition    Observation                 Gavin Byrd MD  10/16/24 9269

## 2024-10-16 NOTE — ED NOTES
Patient denies feeling like his throat is closing up at this time. Patient asking for ice chips to help wet his mouth.

## 2024-10-16 NOTE — ASSESSMENT & PLAN NOTE
Maintain iv H1/H2 Blockers along with iv steroids  Admit in ICU  Need overnight observation   Epi Inj as needed

## 2024-10-16 NOTE — H&P
[facility] - Emergency [facility]  [facility] Medicine  History & Physical    Patient Name: Phoenix Fragoso  MRN: [id number]  Patient Class: OP- Observation  Admission Date: 10/16/2024  Attending Physician: Guillaume Aquino MD   Primary Care Provider: Brigette Robledo NP         Patient information was obtained from patient, past medical records, and ER records.     Subjective:     Principal Problem:Acute anaphylaxis    Chief Complaint:   Chief Complaint   Patient presents with    Allergic Reaction     Pt started biaxin yesterday and woke up at 6 am w/ trouble breathing. Pt states he feels like his throat is closing.        HPI: 63 year old pt getting admitted with acute anaphylaxis  Pt was prescribed clarithromycin yesterday for acute bronchitis  Pt took total of 2 tablets starting late evening yesterday and today AM  He noticed facial swelling,throat swelling with some breathing difficulties  He came to ER and received Epinephrine/H1&H2 blockers and steroids  Per records pt yesterday had CXR in imaging center which didn't showed any acute abnormalities    Past Medical History:   Diagnosis Date    Bell's palsy     High cholesterol     Hypertension     Insomnia     Thyroid disease        Past Surgical History:   Procedure Laterality Date    APPENDECTOMY      CHOLECYSTECTOMY      CYSTOSCOPY N/A 2/27/2024    Procedure: CYSTOSCOPY;  Surgeon: Walker Brooks MD;  Location: [facility] OR;  Service: Urology;  Laterality: N/A;    PROSTATE BIOPSY      PROSTATE SURGERY      right knee arthroscopy      ROBOT-ASSISTED LAPAROSCOPIC PROSTATECTOMY N/A 10/16/2019    Procedure: ROBOTIC PROSTATECTOMY;  Surgeon: Walker Brooks MD;  Location: [facility] OR;  Service: Urology;  Laterality: N/A;       Review of patient's allergies indicates:   Allergen Reactions    Ancef [cefazolin] Anaphylaxis     Anaphylactic shock     Clarithromycin (bulk) Anaphylaxis and Shortness Of Breath     Allergic reaction       No current facility-administered  medications on file prior to encounter.     Current Outpatient Medications on File Prior to Encounter   Medication Sig    ascorbic acid, vitamin C, (VITAMIN C) 100 MG tablet Take 100 mg by mouth once daily.    aspirin (ECOTRIN) 81 MG EC tablet Take 81 mg by mouth once daily.    buPROPion (WELLBUTRIN XL) 150 MG TB24 tablet Take 1 tablet (150 mg total) by mouth once daily.    calcium carbonate (OS-BEAU) 600 mg calcium (1,500 mg) Tab Take 600 mg by mouth 2 (two) times daily with meals.    carvediloL (COREG) 12.5 MG tablet Take 12.5 mg by mouth 2 (two) times daily.    clonazePAM (KLONOPIN) 1 MG tablet Take 1 tablet (1 mg total) by mouth 2 (two) times daily as needed for Anxiety.    fish oil-omega-3 fatty acids 300-1,000 mg capsule Take 2 capsules by mouth once daily.    folic acid (FOLVITE) 1 MG tablet Take 1,000 mcg by mouth once daily.    hydrALAZINE (APRESOLINE) 50 MG tablet Take 100 mg by mouth 2 (two) times daily.    KRILL OIL ORAL Take 1 capsule by mouth once daily.    levothyroxine (SYNTHROID) 200 MCG tablet Take 1 tablet (200 mcg total) by mouth before breakfast.    losartan (COZAAR) 50 MG tablet Take 50 mg by mouth 2 (two) times a day.    multivitamin-minerals-lutein Tab Take 1 tablet by mouth once daily.    mupirocin (BACTROBAN) 2 % ointment Apply topically 3 (three) times daily.    ondansetron (ZOFRAN-ODT) 4 MG TbDL Take 1 tablet (4 mg total) by mouth every 8 (eight) hours as needed.    potassium chloride (KLOR-CON) 10 MEQ TbSR Take 10 mEq by mouth once daily.    predniSONE (DELTASONE) 5 MG tablet Take 5 mg by mouth once daily.    solifenacin (VESICARE) 10 MG tablet Take 1 tablet (10 mg total) by mouth once daily.    tadalafiL (CIALIS) 20 MG Tab Take 1 tablet (20 mg total) by mouth once daily.    tadalafiL (CIALIS) 5 MG tablet Take 1 tablet (5 mg total) by mouth once daily.    vitamin D (VITAMIN D3) 1000 units Tab Take 2,000 Units by mouth once daily.    [DISCONTINUED] clarithromycin (BIAXIN) 500 MG tablet  Take 1 tablet (500 mg total) by mouth every 12 (twelve) hours.     Family History       Problem Relation (Age of Onset)    Arthritis Mother          Tobacco Use    Smoking status: Never    Smokeless tobacco: Never   Substance and Sexual Activity    Alcohol use: Yes     Comment: OCCASIONALLY    Drug use: No    Sexual activity: Yes     Partners: Female     Review of Systems   Constitutional:  Negative for activity change and appetite change.   HENT:  Negative for congestion and dental problem.    Eyes:  Negative for discharge and itching.   Respiratory:  Negative for shortness of breath.    Cardiovascular:  Negative for chest pain.   Gastrointestinal:  Negative for abdominal distention and abdominal pain.   Endocrine: Negative for cold intolerance.   Genitourinary:  Negative for difficulty urinating and dysuria.   Musculoskeletal:  Negative for arthralgias and back pain.   Skin:  Negative for color change.   Neurological:  Negative for dizziness and facial asymmetry.   Hematological:  Negative for adenopathy.   Psychiatric/Behavioral:  Negative for agitation and behavioral problems.      Objective:     Vital Signs (Most Recent):  Temp: 98.5 °F (36.9 °C) (10/16/24 0826)  Pulse: 79 (10/16/24 1200)  Resp: 18 (10/16/24 0823)  BP: (!) 146/73 (10/16/24 1200)  SpO2: 95 % (10/16/24 1200) Vital Signs (24h Range):  Temp:  [98.5 °F (36.9 °C)] 98.5 °F (36.9 °C)  Pulse:  [78-89] 79  Resp:  [18] 18  SpO2:  [94 %-97 %] 95 %  BP: (125-150)/(73-81) 146/73     Weight: 79.4 kg (175 lb)  Body mass index is 23.09 kg/m².     Physical Exam  Vitals and nursing note reviewed.   Constitutional:       Appearance: He is well-developed.   HENT:      Head: Atraumatic.      Comments: Throat swelling and lower lip swelling     Right Ear: External ear normal.      Left Ear: External ear normal.      Nose: Nose normal.      Mouth/Throat:      Mouth: Mucous membranes are moist.   Eyes:      Extraocular Movements: Extraocular movements intact.    Cardiovascular:      Rate and Rhythm: Normal rate.   Pulmonary:      Effort: Pulmonary effort is normal.   Abdominal:      Palpations: Abdomen is soft.   Musculoskeletal:         General: Normal range of motion.      Cervical back: Full passive range of motion without pain and normal range of motion.   Skin:     General: Skin is warm.   Neurological:      Mental Status: He is alert and oriented to person, place, and time.   Psychiatric:         Behavior: Behavior normal.                Significant Labs: All pertinent labs within the past 24 hours have been reviewed.  CBC:   Recent Labs   Lab 10/16/24  1353   WBC 9.17   HGB 13.7*   HCT 40.3          Significant Imaging: I have reviewed all pertinent imaging results/findings within the past 24 hours.    Assessment/Plan:     * Acute anaphylaxis  Maintain iv H1/H2 Blockers along with iv steroids  Admit in ICU  Need overnight observation   Epi Inj as needed       Malignant neoplasm of prostate  Aware         VTE Risk Mitigation (From admission, onward)           Ordered     enoxaparin injection 40 mg  Daily         10/16/24 1313     IP VTE HIGH RISK PATIENT  Once         10/16/24 1313     Place sequential compression device  Until discontinued         10/16/24 1313                                    Guillaume Aquino MD  Department of Hospital Medicine  [facility] - Emergency Dept

## 2024-10-16 NOTE — HPI
63 year old pt getting admitted with acute anaphylaxis  Pt was prescribed clarithromycin yesterday for acute bronchitis  Pt took total of 2 tablets starting late evening yesterday and today AM  He noticed facial swelling,throat swelling with some breathing difficulties  He came to ER and received Epinephrine/H1&H2 blockers and steroids  Per records pt yesterday had CXR in imaging center which didn't showed any acute abnormalities

## 2024-10-16 NOTE — RESPIRATORY THERAPY
10/16/24 1746   PRE-TX-O2   Device (Oxygen Therapy) room air   SpO2 97 %   Pulse Oximetry Type Continuous   $ Pulse Oximetry - Multiple Charge Pulse Oximetry - Multiple   Pulse 85   Resp (!) 24

## 2024-10-16 NOTE — SUBJECTIVE & OBJECTIVE
Past Medical History:   Diagnosis Date    Bell's palsy     High cholesterol     Hypertension     Insomnia     Thyroid disease        Past Surgical History:   Procedure Laterality Date    APPENDECTOMY      CHOLECYSTECTOMY      CYSTOSCOPY N/A 2/27/2024    Procedure: CYSTOSCOPY;  Surgeon: Walker Brooks MD;  Location: [facility] OR;  Service: Urology;  Laterality: N/A;    PROSTATE BIOPSY      PROSTATE SURGERY      right knee arthroscopy      ROBOT-ASSISTED LAPAROSCOPIC PROSTATECTOMY N/A 10/16/2019    Procedure: ROBOTIC PROSTATECTOMY;  Surgeon: Walker Brooks MD;  Location: [facility] OR;  Service: Urology;  Laterality: N/A;       Review of patient's allergies indicates:   Allergen Reactions    Ancef [cefazolin] Anaphylaxis     Anaphylactic shock     Clarithromycin (bulk) Anaphylaxis and Shortness Of Breath     Allergic reaction       No current facility-administered medications on file prior to encounter.     Current Outpatient Medications on File Prior to Encounter   Medication Sig    ascorbic acid, vitamin C, (VITAMIN C) 100 MG tablet Take 100 mg by mouth once daily.    aspirin (ECOTRIN) 81 MG EC tablet Take 81 mg by mouth once daily.    buPROPion (WELLBUTRIN XL) 150 MG TB24 tablet Take 1 tablet (150 mg total) by mouth once daily.    calcium carbonate (OS-BEAU) 600 mg calcium (1,500 mg) Tab Take 600 mg by mouth 2 (two) times daily with meals.    carvediloL (COREG) 12.5 MG tablet Take 12.5 mg by mouth 2 (two) times daily.    clonazePAM (KLONOPIN) 1 MG tablet Take 1 tablet (1 mg total) by mouth 2 (two) times daily as needed for Anxiety.    fish oil-omega-3 fatty acids 300-1,000 mg capsule Take 2 capsules by mouth once daily.    folic acid (FOLVITE) 1 MG tablet Take 1,000 mcg by mouth once daily.    hydrALAZINE (APRESOLINE) 50 MG tablet Take 100 mg by mouth 2 (two) times daily.    KRILL OIL ORAL Take 1 capsule by mouth once daily.    levothyroxine (SYNTHROID) 200 MCG tablet Take 1 tablet (200 mcg total) by mouth before  breakfast.    losartan (COZAAR) 50 MG tablet Take 50 mg by mouth 2 (two) times a day.    multivitamin-minerals-lutein Tab Take 1 tablet by mouth once daily.    mupirocin (BACTROBAN) 2 % ointment Apply topically 3 (three) times daily.    ondansetron (ZOFRAN-ODT) 4 MG TbDL Take 1 tablet (4 mg total) by mouth every 8 (eight) hours as needed.    potassium chloride (KLOR-CON) 10 MEQ TbSR Take 10 mEq by mouth once daily.    predniSONE (DELTASONE) 5 MG tablet Take 5 mg by mouth once daily.    solifenacin (VESICARE) 10 MG tablet Take 1 tablet (10 mg total) by mouth once daily.    tadalafiL (CIALIS) 20 MG Tab Take 1 tablet (20 mg total) by mouth once daily.    tadalafiL (CIALIS) 5 MG tablet Take 1 tablet (5 mg total) by mouth once daily.    vitamin D (VITAMIN D3) 1000 units Tab Take 2,000 Units by mouth once daily.    [DISCONTINUED] clarithromycin (BIAXIN) 500 MG tablet Take 1 tablet (500 mg total) by mouth every 12 (twelve) hours.     Family History       Problem Relation (Age of Onset)    Arthritis Mother          Tobacco Use    Smoking status: Never    Smokeless tobacco: Never   Substance and Sexual Activity    Alcohol use: Yes     Comment: OCCASIONALLY    Drug use: No    Sexual activity: Yes     Partners: Female     Review of Systems   Constitutional:  Negative for activity change and appetite change.   HENT:  Negative for congestion and dental problem.    Eyes:  Negative for discharge and itching.   Respiratory:  Negative for shortness of breath.    Cardiovascular:  Negative for chest pain.   Gastrointestinal:  Negative for abdominal distention and abdominal pain.   Endocrine: Negative for cold intolerance.   Genitourinary:  Negative for difficulty urinating and dysuria.   Musculoskeletal:  Negative for arthralgias and back pain.   Skin:  Negative for color change.   Neurological:  Negative for dizziness and facial asymmetry.   Hematological:  Negative for adenopathy.   Psychiatric/Behavioral:  Negative for agitation  and behavioral problems.      Objective:     Vital Signs (Most Recent):  Temp: 98.5 °F (36.9 °C) (10/16/24 0826)  Pulse: 79 (10/16/24 1200)  Resp: 18 (10/16/24 0823)  BP: (!) 146/73 (10/16/24 1200)  SpO2: 95 % (10/16/24 1200) Vital Signs (24h Range):  Temp:  [98.5 °F (36.9 °C)] 98.5 °F (36.9 °C)  Pulse:  [78-89] 79  Resp:  [18] 18  SpO2:  [94 %-97 %] 95 %  BP: (125-150)/(73-81) 146/73     Weight: 79.4 kg (175 lb)  Body mass index is 23.09 kg/m².     Physical Exam  Vitals and nursing note reviewed.   Constitutional:       Appearance: He is well-developed.   HENT:      Head: Atraumatic.      Comments: Throat swelling and lower lip swelling     Right Ear: External ear normal.      Left Ear: External ear normal.      Nose: Nose normal.      Mouth/Throat:      Mouth: Mucous membranes are moist.   Eyes:      Extraocular Movements: Extraocular movements intact.   Cardiovascular:      Rate and Rhythm: Normal rate.   Pulmonary:      Effort: Pulmonary effort is normal.   Abdominal:      Palpations: Abdomen is soft.   Musculoskeletal:         General: Normal range of motion.      Cervical back: Full passive range of motion without pain and normal range of motion.   Skin:     General: Skin is warm.   Neurological:      Mental Status: He is alert and oriented to person, place, and time.   Psychiatric:         Behavior: Behavior normal.                Significant Labs: All pertinent labs within the past 24 hours have been reviewed.  CBC:   Recent Labs   Lab 10/16/24  1353   WBC 9.17   HGB 13.7*   HCT 40.3          Significant Imaging: I have reviewed all pertinent imaging results/findings within the past 24 hours.

## 2024-10-17 LAB
ALBUMIN SERPL BCP-MCNC: 3.2 G/DL (ref 3.5–5.2)
ALP SERPL-CCNC: 50 U/L (ref 55–135)
ALT SERPL W/O P-5'-P-CCNC: 13 U/L (ref 10–44)
ANION GAP SERPL CALC-SCNC: 6 MMOL/L (ref 8–16)
AST SERPL-CCNC: 12 U/L (ref 10–40)
BASOPHILS # BLD AUTO: 0.01 K/UL (ref 0–0.2)
BASOPHILS NFR BLD: 0.2 % (ref 0–1.9)
BILIRUB SERPL-MCNC: 0.5 MG/DL (ref 0.1–1)
BUN SERPL-MCNC: 24 MG/DL (ref 8–23)
CALCIUM SERPL-MCNC: 8.9 MG/DL (ref 8.7–10.5)
CHLORIDE SERPL-SCNC: 103 MMOL/L (ref 95–110)
CO2 SERPL-SCNC: 29 MMOL/L (ref 23–29)
CREAT SERPL-MCNC: 0.9 MG/DL (ref 0.5–1.4)
DIFFERENTIAL METHOD BLD: ABNORMAL
EOSINOPHIL # BLD AUTO: 0 K/UL (ref 0–0.5)
EOSINOPHIL NFR BLD: 0 % (ref 0–8)
ERYTHROCYTE [DISTWIDTH] IN BLOOD BY AUTOMATED COUNT: 13.4 % (ref 11.5–14.5)
EST. GFR  (NO RACE VARIABLE): >60 ML/MIN/1.73 M^2
GLUCOSE SERPL-MCNC: 143 MG/DL (ref 70–110)
HCT VFR BLD AUTO: 34 % (ref 40–54)
HGB BLD-MCNC: 11 G/DL (ref 14–18)
IMM GRANULOCYTES # BLD AUTO: 0.03 K/UL (ref 0–0.04)
IMM GRANULOCYTES NFR BLD AUTO: 0.5 % (ref 0–0.5)
LYMPHOCYTES # BLD AUTO: 0.4 K/UL (ref 1–4.8)
LYMPHOCYTES NFR BLD: 6.6 % (ref 18–48)
MAGNESIUM SERPL-MCNC: 2.2 MG/DL (ref 1.6–2.6)
MCH RBC QN AUTO: 27.2 PG (ref 27–31)
MCHC RBC AUTO-ENTMCNC: 32.4 G/DL (ref 32–36)
MCV RBC AUTO: 84 FL (ref 82–98)
MONOCYTES # BLD AUTO: 0.3 K/UL (ref 0.3–1)
MONOCYTES NFR BLD: 5.1 % (ref 4–15)
NEUTROPHILS # BLD AUTO: 5.5 K/UL (ref 1.8–7.7)
NEUTROPHILS NFR BLD: 87.6 % (ref 38–73)
NRBC BLD-RTO: 0 /100 WBC
PLATELET # BLD AUTO: 195 K/UL (ref 150–450)
PMV BLD AUTO: 9.4 FL (ref 9.2–12.9)
POTASSIUM SERPL-SCNC: 3.6 MMOL/L (ref 3.5–5.1)
PROT SERPL-MCNC: 6.5 G/DL (ref 6–8.4)
RBC # BLD AUTO: 4.04 M/UL (ref 4.6–6.2)
SODIUM SERPL-SCNC: 138 MMOL/L (ref 136–145)
WBC # BLD AUTO: 6.23 K/UL (ref 3.9–12.7)

## 2024-10-17 PROCEDURE — 85025 COMPLETE CBC W/AUTO DIFF WBC: CPT | Performed by: INTERNAL MEDICINE

## 2024-10-17 PROCEDURE — 25000003 PHARM REV CODE 250: Performed by: INTERNAL MEDICINE

## 2024-10-17 PROCEDURE — 27000221 HC OXYGEN, UP TO 24 HOURS

## 2024-10-17 PROCEDURE — 12000002 HC ACUTE/MED SURGE SEMI-PRIVATE ROOM

## 2024-10-17 PROCEDURE — 63600175 PHARM REV CODE 636 W HCPCS: Performed by: INTERNAL MEDICINE

## 2024-10-17 PROCEDURE — 96361 HYDRATE IV INFUSION ADD-ON: CPT

## 2024-10-17 PROCEDURE — 36415 COLL VENOUS BLD VENIPUNCTURE: CPT | Performed by: INTERNAL MEDICINE

## 2024-10-17 PROCEDURE — 83735 ASSAY OF MAGNESIUM: CPT | Performed by: INTERNAL MEDICINE

## 2024-10-17 PROCEDURE — 99900031 HC PATIENT EDUCATION (STAT)

## 2024-10-17 PROCEDURE — 96376 TX/PRO/DX INJ SAME DRUG ADON: CPT

## 2024-10-17 PROCEDURE — 94761 N-INVAS EAR/PLS OXIMETRY MLT: CPT

## 2024-10-17 PROCEDURE — 80053 COMPREHEN METABOLIC PANEL: CPT | Performed by: INTERNAL MEDICINE

## 2024-10-17 RX ORDER — GUAIFENESIN 600 MG/1
600 TABLET, EXTENDED RELEASE ORAL 2 TIMES DAILY
Status: DISCONTINUED | OUTPATIENT
Start: 2024-10-17 | End: 2024-10-18 | Stop reason: HOSPADM

## 2024-10-17 RX ORDER — BENZONATATE 100 MG/1
100 CAPSULE ORAL 3 TIMES DAILY PRN
Status: DISCONTINUED | OUTPATIENT
Start: 2024-10-17 | End: 2024-10-18 | Stop reason: HOSPADM

## 2024-10-17 RX ORDER — DIPHENHYDRAMINE HCL 25 MG
25 CAPSULE ORAL EVERY 8 HOURS
Status: DISCONTINUED | OUTPATIENT
Start: 2024-10-17 | End: 2024-10-18 | Stop reason: HOSPADM

## 2024-10-17 RX ORDER — LEVOTHYROXINE SODIUM 100 UG/1
200 TABLET ORAL
Status: DISCONTINUED | OUTPATIENT
Start: 2024-10-17 | End: 2024-10-18 | Stop reason: HOSPADM

## 2024-10-17 RX ADMIN — BUPROPION HYDROCHLORIDE 150 MG: 150 TABLET, EXTENDED RELEASE ORAL at 08:10

## 2024-10-17 RX ADMIN — GUAIFENESIN 600 MG: 600 TABLET, EXTENDED RELEASE ORAL at 08:10

## 2024-10-17 RX ADMIN — FAMOTIDINE 20 MG: 10 INJECTION INTRAVENOUS at 08:10

## 2024-10-17 RX ADMIN — ASPIRIN 81 MG: 81 TABLET, COATED ORAL at 08:10

## 2024-10-17 RX ADMIN — HYDRALAZINE HYDROCHLORIDE 100 MG: 25 TABLET ORAL at 08:10

## 2024-10-17 RX ADMIN — DIPHENHYDRAMINE HYDROCHLORIDE 6.25 MG: 50 INJECTION INTRAMUSCULAR; INTRAVENOUS at 05:10

## 2024-10-17 RX ADMIN — METHYLPREDNISOLONE SODIUM SUCCINATE 40 MG: 125 INJECTION, POWDER, FOR SOLUTION INTRAMUSCULAR; INTRAVENOUS at 05:10

## 2024-10-17 RX ADMIN — LOSARTAN POTASSIUM 50 MG: 50 TABLET, FILM COATED ORAL at 08:10

## 2024-10-17 RX ADMIN — DIPHENHYDRAMINE HYDROCHLORIDE 25 MG: 25 CAPSULE ORAL at 01:10

## 2024-10-17 RX ADMIN — PREDNISONE 25 MG: 5 TABLET ORAL at 10:10

## 2024-10-17 RX ADMIN — CARVEDILOL 12.5 MG: 12.5 TABLET, FILM COATED ORAL at 08:10

## 2024-10-17 RX ADMIN — DIPHENHYDRAMINE HYDROCHLORIDE 25 MG: 25 CAPSULE ORAL at 09:10

## 2024-10-17 RX ADMIN — ENOXAPARIN SODIUM 40 MG: 40 INJECTION SUBCUTANEOUS at 05:10

## 2024-10-17 RX ADMIN — LEVOTHYROXINE SODIUM 200 MCG: 0.1 TABLET ORAL at 08:10

## 2024-10-17 RX ADMIN — METHYLPREDNISOLONE SODIUM SUCCINATE 40 MG: 125 INJECTION, POWDER, FOR SOLUTION INTRAMUSCULAR; INTRAVENOUS at 12:10

## 2024-10-17 RX ADMIN — POTASSIUM BICARBONATE 50 MEQ: 978 TABLET, EFFERVESCENT ORAL at 05:10

## 2024-10-17 RX ADMIN — BENZONATATE 100 MG: 100 CAPSULE ORAL at 01:10

## 2024-10-17 NOTE — HOSPITAL COURSE
Patient admitted to intensive care unit where patient was closely monitored for any worsening respiratory status and throat swelling.  Patient was maintained on intravenous Solu-Medrol, Benadryl and intravenous Pepcid use.  Patient's symptoms progressively improved.  Intravenous Solu-Medrol transitioned to oral prednisone therapy.  Patient counseled regarding avoidance of penicillin based medications and macrolides including azithromycin and clarithromycin in future.  Patient voiced understanding.  Patient also had erythema of the face, upper torso, a couple of episodes of red tinged sputum, worsening cough with underlying history of prostate cancer, CTA chest was ordered and pulmonology was consulted.  Dr. Norris evaluated the patient and mentioned the red tinge in the sputum was from his throat spray and the rash is likely secondary to anaphylaxis that is slowly spreading down his torso and is blanching, recommended that he can follow up as outpatient and can hold off on inpatient CTA. He was discharged on a Medrol Dosepak after that he will continue his home prednisone 5 mg daily.  He will also be discharged on Benadryl and famotidine.  His chest x-ray also showed acute bronchitis for which he will be sent on doxycycline (patient confirmed he tolerated in the in the past and we consulted pharmacy, tolerated 1 dose while in the hospital) He was eager for discharge and mentioned he felt much better compared to his presentation.  Ambulatory referral was placed to allergist.  Anaphylaxis instructions were provided and EpiPen was prescribed.  He verbalized understanding of all instructions.

## 2024-10-17 NOTE — ASSESSMENT & PLAN NOTE
Continue intensive care unit monitoring.  Continue H1/H2 Blockers use.  Discontinue intravenous Solu-Medrol.  Start prednisone 25 mg with taper dose plan.  Avoid hot beverages and hot food.  Patient counseled to avoid use of penicillin based medications and macrolides such as azithromycin and clarithromycin.  Patient voiced understanding.

## 2024-10-17 NOTE — SUBJECTIVE & OBJECTIVE
Interval History:  Patient is seen and examined.  Patient admitted to intensive care unit at with anaphylactic shock secondary to Biaxin use.  Patient reports throat swelling is improving.  Patient is able to eat, speak and breathe better.  On significant high doses of intravenous Solu-Medrol.  Patient takes prednisone 5 mg daily for polymyalgia rheumatica on regular basis.  No significant cough, expectoration, sinus symptoms or wheezing noted.  Patient is currently afebrile.  Patient does report prior history of anaphylactic reaction to Ancef.    Review of Systems   Constitutional:  Negative for activity change and appetite change.   HENT:  Negative for congestion and dental problem.    Eyes:  Negative for discharge and itching.   Respiratory:  Negative for shortness of breath.    Cardiovascular:  Negative for chest pain.   Gastrointestinal:  Negative for abdominal distention and abdominal pain.   Endocrine: Negative for cold intolerance.   Genitourinary:  Negative for difficulty urinating and dysuria.   Musculoskeletal:  Negative for arthralgias and back pain.   Skin:  Negative for color change.   Neurological:  Negative for dizziness and facial asymmetry.   Hematological:  Negative for adenopathy.   Psychiatric/Behavioral:  Negative for agitation and behavioral problems.      Objective:     Vital Signs (Most Recent):  Temp: 97.9 °F (36.6 °C) (10/17/24 0330)  Pulse: 72 (10/17/24 0615)  Resp: 16 (10/17/24 0615)  BP: 135/81 (10/17/24 0530)  SpO2: 97 % (10/17/24 0615) Vital Signs (24h Range):  Temp:  [97.6 °F (36.4 °C)-98.5 °F (36.9 °C)] 97.9 °F (36.6 °C)  Pulse:  [69-92] 72  Resp:  [9-32] 16  SpO2:  [90 %-97 %] 97 %  BP: (123-179)/(66-99) 135/81     Weight: 76.5 kg (168 lb 10.4 oz)  Body mass index is 22.25 kg/m².    Intake/Output Summary (Last 24 hours) at 10/17/2024 0712  Last data filed at 10/17/2024 0623  Gross per 24 hour   Intake 1318.75 ml   Output 2475 ml   Net -1156.25 ml         Physical Exam  Vitals and  nursing note reviewed.   Constitutional:       Appearance: Normal appearance. He is well-developed.   HENT:      Head: Normocephalic and atraumatic.      Nose: Nose normal. No septal deviation.      Mouth/Throat:      Comments: No significant pharyngeal erythema or exudate noted.  Eyes:      Conjunctiva/sclera: Conjunctivae normal.      Pupils: Pupils are equal, round, and reactive to light.   Neck:      Thyroid: No thyroid mass.      Vascular: No JVD.      Trachea: No tracheal tenderness or tracheal deviation.   Cardiovascular:      Rate and Rhythm: Normal rate and regular rhythm.      Heart sounds: S1 normal and S2 normal. No murmur heard.     No friction rub. No gallop.   Pulmonary:      Effort: Pulmonary effort is normal.      Breath sounds: Normal breath sounds. No decreased breath sounds, wheezing, rhonchi or rales.   Abdominal:      General: Bowel sounds are normal. There is no distension.      Palpations: Abdomen is soft. There is no hepatomegaly, splenomegaly or mass.      Tenderness: There is no abdominal tenderness.   Skin:     General: Skin is warm.      Findings: No rash.   Neurological:      General: No focal deficit present.      Mental Status: He is alert and oriented to person, place, and time.      Cranial Nerves: No cranial nerve deficit.      Sensory: No sensory deficit.   Psychiatric:         Mood and Affect: Mood normal.         Behavior: Behavior normal.             Significant Labs: All pertinent labs within the past 24 hours have been reviewed.  CBC:   Recent Labs   Lab 10/16/24  1353 10/17/24  0252   WBC 9.17 6.23   HGB 13.7* 11.0*   HCT 40.3 34.0*    195     CMP:   Recent Labs   Lab 10/16/24  1353 10/17/24  0252   * 138   K 3.5 3.6   CL 91* 103   CO2 28 29   GLU 89 143*   BUN 21 24*   CREATININE 1.1 0.9   CALCIUM 9.6 8.9   PROT 7.6 6.5   ALBUMIN 3.9 3.2*   BILITOT 1.5* 0.5   ALKPHOS 65 50*   AST 16 12   ALT 16 13   ANIONGAP 11 6*     Microbiology Results (last 7 days)       **  No results found for the last 168 hours. **          Significant Imaging:   CXR 2 view:  1. With lateral positioning, there is a tiny pleural effusion, likely on the left.  2. Mild left basilar atelectasis.  3. No other significant findings.    CXR:   1. Small left pleural effusion.  2. Cardiomegaly.

## 2024-10-17 NOTE — PROGRESS NOTES
[facility]  Progress Note    Patient Name: Phoenix Fragoso  MRN: [id number]  Patient Class: OP- Observation   Admission Date: 10/16/2024  Length of Stay: 0 days  Attending Physician: Guillaume Aquino MD  Primary Care Provider: Brigette Robledo NP        Subjective:     Principal Problem:Acute anaphylaxis        HPI:  63 year old pt getting admitted with acute anaphylaxis  Pt was prescribed clarithromycin yesterday for acute bronchitis  Pt took total of 2 tablets starting late evening yesterday and today AM  He noticed facial swelling,throat swelling with some breathing difficulties  He came to ER and received Epinephrine/H1&H2 blockers and steroids  Per records pt yesterday had CXR in imaging center which didn't showed any acute abnormalities    Overview/Hospital Course:  No notes on file    Interval History:  Patient is seen and examined.  Patient admitted to intensive care unit at with anaphylactic shock secondary to Biaxin use.  Patient reports throat swelling is improving.  Patient is able to eat, speak and breathe better.  On significant high doses of intravenous Solu-Medrol.  Patient takes prednisone 5 mg daily for polymyalgia rheumatica on regular basis.  No significant cough, expectoration, sinus symptoms or wheezing noted.  Patient is currently afebrile.  Patient does report prior history of anaphylactic reaction to Ancef.    Review of Systems   Constitutional:  Negative for activity change and appetite change.   HENT:  Negative for congestion and dental problem.    Eyes:  Negative for discharge and itching.   Respiratory:  Negative for shortness of breath.    Cardiovascular:  Negative for chest pain.   Gastrointestinal:  Negative for abdominal distention and abdominal pain.   Endocrine: Negative for cold intolerance.   Genitourinary:  Negative for difficulty urinating and dysuria.   Musculoskeletal:  Negative for arthralgias and back pain.   Skin:  Negative for color change.    Neurological:  Negative for dizziness and facial asymmetry.   Hematological:  Negative for adenopathy.   Psychiatric/Behavioral:  Negative for agitation and behavioral problems.      Objective:     Vital Signs (Most Recent):  Temp: 97.9 °F (36.6 °C) (10/17/24 0330)  Pulse: 72 (10/17/24 0615)  Resp: 16 (10/17/24 0615)  BP: 135/81 (10/17/24 0530)  SpO2: 97 % (10/17/24 0615) Vital Signs (24h Range):  Temp:  [97.6 °F (36.4 °C)-98.5 °F (36.9 °C)] 97.9 °F (36.6 °C)  Pulse:  [69-92] 72  Resp:  [9-32] 16  SpO2:  [90 %-97 %] 97 %  BP: (123-179)/(66-99) 135/81     Weight: 76.5 kg (168 lb 10.4 oz)  Body mass index is 22.25 kg/m².    Intake/Output Summary (Last 24 hours) at 10/17/2024 0712  Last data filed at 10/17/2024 0623  Gross per 24 hour   Intake 1318.75 ml   Output 2475 ml   Net -1156.25 ml         Physical Exam  Vitals and nursing note reviewed.   Constitutional:       Appearance: Normal appearance. He is well-developed.   HENT:      Head: Normocephalic and atraumatic.      Nose: Nose normal. No septal deviation.      Mouth/Throat:      Comments: No significant pharyngeal erythema or exudate noted.  Eyes:      Conjunctiva/sclera: Conjunctivae normal.      Pupils: Pupils are equal, round, and reactive to light.   Neck:      Thyroid: No thyroid mass.      Vascular: No JVD.      Trachea: No tracheal tenderness or tracheal deviation.   Cardiovascular:      Rate and Rhythm: Normal rate and regular rhythm.      Heart sounds: S1 normal and S2 normal. No murmur heard.     No friction rub. No gallop.   Pulmonary:      Effort: Pulmonary effort is normal.      Breath sounds: Normal breath sounds. No decreased breath sounds, wheezing, rhonchi or rales.   Abdominal:      General: Bowel sounds are normal. There is no distension.      Palpations: Abdomen is soft. There is no hepatomegaly, splenomegaly or mass.      Tenderness: There is no abdominal tenderness.   Skin:     General: Skin is warm.      Findings: No rash.    Neurological:      General: No focal deficit present.      Mental Status: He is alert and oriented to person, place, and time.      Cranial Nerves: No cranial nerve deficit.      Sensory: No sensory deficit.   Psychiatric:         Mood and Affect: Mood normal.         Behavior: Behavior normal.             Significant Labs: All pertinent labs within the past 24 hours have been reviewed.  CBC:   Recent Labs   Lab 10/16/24  1353 10/17/24  0252   WBC 9.17 6.23   HGB 13.7* 11.0*   HCT 40.3 34.0*    195     CMP:   Recent Labs   Lab 10/16/24  1353 10/17/24  0252   * 138   K 3.5 3.6   CL 91* 103   CO2 28 29   GLU 89 143*   BUN 21 24*   CREATININE 1.1 0.9   CALCIUM 9.6 8.9   PROT 7.6 6.5   ALBUMIN 3.9 3.2*   BILITOT 1.5* 0.5   ALKPHOS 65 50*   AST 16 12   ALT 16 13   ANIONGAP 11 6*     Microbiology Results (last 7 days)       ** No results found for the last 168 hours. **          Significant Imaging:   CXR 2 view:  1. With lateral positioning, there is a tiny pleural effusion, likely on the left.  2. Mild left basilar atelectasis.  3. No other significant findings.    CXR:   1. Small left pleural effusion.  2. Cardiomegaly.    Assessment/Plan:      * Acute anaphylaxis  Continue intensive care unit monitoring.  Continue H1/H2 Blockers use.  Discontinue intravenous Solu-Medrol.  Start prednisone 25 mg with taper dose plan.  Avoid hot beverages and hot food.  Patient counseled to avoid use of penicillin based medications and macrolides such as azithromycin and clarithromycin.  Patient voiced understanding.        Thyroid disease  Chronic problem. Will continue chronic medications and monitor for any changes, adjusting as needed.        Hypertension  Patients blood pressure range in the last 24 hours was: BP  Min: 123/72  Max: 179/90.The patient's inpatient anti-hypertensive regimen is listed below:  Current Antihypertensives  hydrALAZINE injection 10 mg, Every 4 hours PRN, Intravenous  carvediloL tablet 12.5  mg, 2 times daily, Oral  hydrALAZINE tablet 100 mg, 2 times daily, Oral  losartan tablet 50 mg, 2 times daily, Oral    Plan  - BP is controlled, no changes needed to their regimen      Malignant neoplasm of prostate  Aware        Discussed with bedside nurse and .    VTE Risk Mitigation (From admission, onward)           Ordered     enoxaparin injection 40 mg  Daily         10/16/24 1313     IP VTE HIGH RISK PATIENT  Once         10/16/24 1313     Place sequential compression device  Until discontinued         10/16/24 1313                    Discharge Planning   MAGALI:  October 18, 2024    Code Status: Full Code   Is the patient medically ready for discharge?:     Reason for patient still in hospital (select all that apply): Patient trending condition and Consult recommendations               Critical care time spent on the evaluation and treatment of severe organ dysfunction, review of pertinent labs and imaging studies, discussions with consulting providers and discussions with patient/family: 35 minutes.      Chato Dior MD  Department of Hospital Medicine   [facility]

## 2024-10-17 NOTE — ASSESSMENT & PLAN NOTE
Patients blood pressure range in the last 24 hours was: BP  Min: 123/72  Max: 179/90.The patient's inpatient anti-hypertensive regimen is listed below:  Current Antihypertensives  hydrALAZINE injection 10 mg, Every 4 hours PRN, Intravenous  carvediloL tablet 12.5 mg, 2 times daily, Oral  hydrALAZINE tablet 100 mg, 2 times daily, Oral  losartan tablet 50 mg, 2 times daily, Oral    Plan  - BP is controlled, no changes needed to their regimen

## 2024-10-17 NOTE — PLAN OF CARE
10/17/24 1432   YEN Message   Medicare Outpatient and Observation Notification regarding financial responsibility Given to patient/caregiver;Explained to patient/caregiver;Signed/date by patient/caregiver   Date YEN was signed 10/17/24   Time YEN was signed [id number]

## 2024-10-17 NOTE — PLAN OF CARE
Neuro:Patient is oriented X4. Patient moves all extremities PERRLA.  Patient opens eyes spontaneously.     Respiratory: Patient maintains oxygen saturations above 92% while on room air.  Patient's lung sounds:   clear diminished    Cardiac: Cardiac monitor maintained.  Rhythm:  NSR    Gi/Gu: Patient independently voids spontaneously.  Patient tolerating Cardiac diet. Positive Bowel sounds. LBM: 10/15    Skin: Cheeks are red, mild swelling on left side of face, unchanged from previous shift, and patchy spots on tongue. Free from altered skin integrity.    Lines: 20 G PIV Left upper arm. 20 G PIV Left forearm.    Gtts: NS @ 100.     Plan of care reviewed with Patient and family.  Safety precautions maintained. Patient remains free from injury. Turned/repositioned independently.. Call light and personal items within reach. Bed in lowest position and locked. Vital signs stable.  Afebrile.    Problem: Adult Inpatient Plan of Care  Goal: Plan of Care Review  Outcome: Progressing  Goal: Patient-Specific Goal (Individualized)  Outcome: Progressing  Goal: Absence of Hospital-Acquired Illness or Injury  Outcome: Progressing  Goal: Optimal Comfort and Wellbeing  Outcome: Progressing  Goal: Readiness for Transition of Care  Outcome: Progressing     Problem: Pain Acute  Goal: Optimal Pain Control and Function  Outcome: Progressing     Problem: Swallowing Impairment  Goal: Optimal Eating/Swallowing without Aspiration  Outcome: Progressing     Problem: Oral Intake Inadequate  Goal: Improved Oral Intake  Outcome: Progressing

## 2024-10-17 NOTE — PLAN OF CARE
[facility]  Initial Discharge Assessment    CM met with pt to discuss discharge planning.  Pt lives with spouse in a two story home with no steps going into home. Pt is independent in functioning, own CG, drives. No POA/AD.  No dialysis or DME and denies current discharge needs or barriers to discharge. Plans to return home with outpt and spouse to drive home. Will continue to follow.     Primary Care Provider: Brigette Robledo NP    Admission Diagnosis: Acute anaphylaxis [T78.2XXA]  Allergic reaction, initial encounter [T78.40XA]    Admission Date: 10/16/2024  Expected Discharge Date: 10/18/2024    Transition of Care Barriers: None    Payor: [employer] / Plan: [employer] / Product Type: Medicare Advantage /     Extended Emergency Contact Information  Primary Emergency Contact: Loli Fragoso  Address: [address] [place] [address]           [place] [address] [facility]  Home Phone: [phone]  Mobile Phone: [phone]  Relation: Spouse    Discharge Plan A: Home with family  Discharge Plan B: Home Health      [facility] [place] - [address]  [address] [place] [address]  Phone: [phone] Fax: [phone]      Initial Assessment (most recent)       Adult Discharge Assessment - 10/17/24 1351          Discharge Assessment    Assessment Type Discharge Planning Assessment     Confirmed/corrected address, phone number and insurance Yes     Confirmed Demographics Correct on Facesheet     Source of Information patient     When was your last doctors appointment? --   Last Tuesday    Does patient/caregiver understand observation status Yes     Communicated MAGALI with patient/caregiver Yes     Reason For Admission anaphylaxis     People in Home spouse     Do you expect to return to your current living situation? Yes     Do you have help at home or someone to help you manage your care at home? No     Prior to hospitilization cognitive status: Unable to  Assess     Current cognitive status: Alert/Oriented     Walking or Climbing Stairs Difficulty no     Dressing/Bathing Difficulty no     Home Accessibility not wheelchair accessible     Home Layout Able to live on 1st floor   Two story home with  no steps going into home    Equipment Currently Used at Home none     Readmission within 30 days? No     Patient currently being followed by outpatient case management? No     Do you currently have service(s) that help you manage your care at home? No     Do you take prescription medications? Yes     Do you have prescription coverage? Yes     Coverage [employer]     Do you have any problems affording any of your prescribed medications? No     Is the patient taking medications as prescribed? yes     Who is going to help you get home at discharge? Loli Fragoso (Spouse)  [phone]     How do you get to doctors appointments? car, drives self     Are you on dialysis? No     Do you take coumadin? No     Discharge Plan A Home with family     Discharge Plan B Home Health     DME Needed Upon Discharge  none     Discharge Plan discussed with: Patient     Transition of Care Barriers None        Physical Activity    On average, how many days per week do you engage in moderate to strenuous exercise (like a brisk walk)? 0 days   States is generally active all day but no structured exercise    On average, how many minutes do you engage in exercise at this level? 0 min        Financial Resource Strain    How hard is it for you to pay for the very basics like food, housing, medical care, and heating? Not hard at all        Housing Stability    In the last 12 months, was there a time when you were not able to pay the mortgage or rent on time? No     At any time in the past 12 months, were you homeless or living in a shelter (including now)? No        Transportation Needs    Has the lack of transportation kept you from medical appointments, meetings, work or from getting things needed for  daily living? No        Food Insecurity    Within the past 12 months, you worried that your food would run out before you got the money to buy more. Never true     Within the past 12 months, the food you bought just didn't last and you didn't have money to get more. Never true        Stress    Do you feel stress - tense, restless, nervous, or anxious, or unable to sleep at night because your mind is troubled all the time - these days? Rather much        Social Isolation    How often do you feel lonely or isolated from those around you?  Never        Alcohol Use    Q1: How often do you have a drink containing alcohol? Monthly or less     Q2: How many drinks containing alcohol do you have on a typical day when you are drinking? 1 or 2     Q3: How often do you have six or more drinks on one occasion? Never        Utilities    In the past 12 months has the electric, gas, oil, or water company threatened to shut off services in your home? No        Health Literacy    How often do you need to have someone help you when you read instructions, pamphlets, or other written material from your doctor or pharmacy? Never        OTHER    Name(s) of People in Home Loli Fragoso (Spouse)  [phone]

## 2024-10-18 VITALS
HEART RATE: 57 BPM | HEIGHT: 73 IN | SYSTOLIC BLOOD PRESSURE: 147 MMHG | TEMPERATURE: 98 F | RESPIRATION RATE: 19 BRPM | WEIGHT: 168.63 LBS | DIASTOLIC BLOOD PRESSURE: 80 MMHG | OXYGEN SATURATION: 95 % | BODY MASS INDEX: 22.35 KG/M2

## 2024-10-18 LAB
ALBUMIN SERPL BCP-MCNC: 3 G/DL (ref 3.5–5.2)
ALP SERPL-CCNC: 48 U/L (ref 55–135)
ALT SERPL W/O P-5'-P-CCNC: 17 U/L (ref 10–44)
ANION GAP SERPL CALC-SCNC: 7 MMOL/L (ref 8–16)
AST SERPL-CCNC: 15 U/L (ref 10–40)
BASOPHILS # BLD AUTO: 0.02 K/UL (ref 0–0.2)
BASOPHILS NFR BLD: 0.2 % (ref 0–1.9)
BILIRUB SERPL-MCNC: 0.4 MG/DL (ref 0.1–1)
BUN SERPL-MCNC: 34 MG/DL (ref 8–23)
CALCIUM SERPL-MCNC: 8.7 MG/DL (ref 8.7–10.5)
CHLORIDE SERPL-SCNC: 105 MMOL/L (ref 95–110)
CO2 SERPL-SCNC: 30 MMOL/L (ref 23–29)
CREAT SERPL-MCNC: 0.9 MG/DL (ref 0.5–1.4)
DIFFERENTIAL METHOD BLD: ABNORMAL
EOSINOPHIL # BLD AUTO: 0 K/UL (ref 0–0.5)
EOSINOPHIL NFR BLD: 0 % (ref 0–8)
ERYTHROCYTE [DISTWIDTH] IN BLOOD BY AUTOMATED COUNT: 13.4 % (ref 11.5–14.5)
EST. GFR  (NO RACE VARIABLE): >60 ML/MIN/1.73 M^2
GLUCOSE SERPL-MCNC: 124 MG/DL (ref 70–110)
HCT VFR BLD AUTO: 33.5 % (ref 40–54)
HGB BLD-MCNC: 11.1 G/DL (ref 14–18)
IMM GRANULOCYTES # BLD AUTO: 0.11 K/UL (ref 0–0.04)
IMM GRANULOCYTES NFR BLD AUTO: 1 % (ref 0–0.5)
LYMPHOCYTES # BLD AUTO: 0.7 K/UL (ref 1–4.8)
LYMPHOCYTES NFR BLD: 6.8 % (ref 18–48)
MAGNESIUM SERPL-MCNC: 2.4 MG/DL (ref 1.6–2.6)
MCH RBC QN AUTO: 28.5 PG (ref 27–31)
MCHC RBC AUTO-ENTMCNC: 33.1 G/DL (ref 32–36)
MCV RBC AUTO: 86 FL (ref 82–98)
MONOCYTES # BLD AUTO: 0.6 K/UL (ref 0.3–1)
MONOCYTES NFR BLD: 5.7 % (ref 4–15)
NEUTROPHILS # BLD AUTO: 9.3 K/UL (ref 1.8–7.7)
NEUTROPHILS NFR BLD: 86.3 % (ref 38–73)
NRBC BLD-RTO: 0 /100 WBC
PLATELET # BLD AUTO: 251 K/UL (ref 150–450)
PMV BLD AUTO: 9.2 FL (ref 9.2–12.9)
POTASSIUM SERPL-SCNC: 3.8 MMOL/L (ref 3.5–5.1)
PROT SERPL-MCNC: 6.1 G/DL (ref 6–8.4)
RBC # BLD AUTO: 3.9 M/UL (ref 4.6–6.2)
SODIUM SERPL-SCNC: 142 MMOL/L (ref 136–145)
WBC # BLD AUTO: 10.72 K/UL (ref 3.9–12.7)

## 2024-10-18 PROCEDURE — 83735 ASSAY OF MAGNESIUM: CPT | Performed by: INTERNAL MEDICINE

## 2024-10-18 PROCEDURE — 63600175 PHARM REV CODE 636 W HCPCS: Performed by: INTERNAL MEDICINE

## 2024-10-18 PROCEDURE — 27000221 HC OXYGEN, UP TO 24 HOURS

## 2024-10-18 PROCEDURE — 25000003 PHARM REV CODE 250: Performed by: INTERNAL MEDICINE

## 2024-10-18 PROCEDURE — 94761 N-INVAS EAR/PLS OXIMETRY MLT: CPT

## 2024-10-18 PROCEDURE — 99223 1ST HOSP IP/OBS HIGH 75: CPT | Mod: ,,, | Performed by: INTERNAL MEDICINE

## 2024-10-18 PROCEDURE — 99900031 HC PATIENT EDUCATION (STAT)

## 2024-10-18 PROCEDURE — 25000003 PHARM REV CODE 250

## 2024-10-18 PROCEDURE — 80053 COMPREHEN METABOLIC PANEL: CPT | Performed by: INTERNAL MEDICINE

## 2024-10-18 PROCEDURE — 85025 COMPLETE CBC W/AUTO DIFF WBC: CPT | Performed by: INTERNAL MEDICINE

## 2024-10-18 PROCEDURE — 36415 COLL VENOUS BLD VENIPUNCTURE: CPT | Performed by: INTERNAL MEDICINE

## 2024-10-18 RX ORDER — DIPHENHYDRAMINE HCL 25 MG
25 CAPSULE ORAL EVERY 8 HOURS
Qty: 15 CAPSULE | Refills: 0 | Status: SHIPPED | OUTPATIENT
Start: 2024-10-18 | End: 2024-10-23

## 2024-10-18 RX ORDER — FAMOTIDINE 20 MG/1
20 TABLET, FILM COATED ORAL 2 TIMES DAILY
Qty: 60 TABLET | Refills: 11 | Status: SHIPPED | OUTPATIENT
Start: 2024-10-18 | End: 2025-10-18

## 2024-10-18 RX ORDER — BENZONATATE 100 MG/1
100 CAPSULE ORAL 3 TIMES DAILY PRN
Qty: 15 CAPSULE | Refills: 0 | Status: SHIPPED | OUTPATIENT
Start: 2024-10-18 | End: 2024-10-28

## 2024-10-18 RX ORDER — METHYLPREDNISOLONE 4 MG/1
TABLET ORAL
Qty: 21 EACH | Refills: 0 | Status: SHIPPED | OUTPATIENT
Start: 2024-10-18 | End: 2024-11-08

## 2024-10-18 RX ORDER — EPINEPHRINE 0.15 MG/.3ML
0.15 INJECTION INTRAMUSCULAR
Qty: 2 EACH | Refills: 12 | Status: SHIPPED | OUTPATIENT
Start: 2024-10-18 | End: 2024-10-23

## 2024-10-18 RX ORDER — DOXYCYCLINE HYCLATE 100 MG
100 TABLET ORAL 2 TIMES DAILY
Qty: 10 TABLET | Refills: 0 | Status: SHIPPED | OUTPATIENT
Start: 2024-10-18 | End: 2024-10-23

## 2024-10-18 RX ORDER — PREDNISONE 5 MG/1
5 TABLET ORAL DAILY
Qty: 30 TABLET | Refills: 3 | Status: SHIPPED | OUTPATIENT
Start: 2024-10-18

## 2024-10-18 RX ORDER — MUPIROCIN 20 MG/G
OINTMENT TOPICAL 2 TIMES DAILY
Status: DISCONTINUED | OUTPATIENT
Start: 2024-10-18 | End: 2024-10-18

## 2024-10-18 RX ORDER — DOXYCYCLINE 100 MG/1
100 CAPSULE ORAL EVERY 12 HOURS
Status: DISCONTINUED | OUTPATIENT
Start: 2024-10-18 | End: 2024-10-18 | Stop reason: HOSPADM

## 2024-10-18 RX ADMIN — BUPROPION HYDROCHLORIDE 150 MG: 150 TABLET, EXTENDED RELEASE ORAL at 08:10

## 2024-10-18 RX ADMIN — DOXYCYCLINE HYCLATE 100 MG: 100 CAPSULE ORAL at 12:10

## 2024-10-18 RX ADMIN — GUAIFENESIN 600 MG: 600 TABLET, EXTENDED RELEASE ORAL at 08:10

## 2024-10-18 RX ADMIN — LEVOTHYROXINE SODIUM 200 MCG: 0.1 TABLET ORAL at 06:10

## 2024-10-18 RX ADMIN — LOSARTAN POTASSIUM 50 MG: 50 TABLET, FILM COATED ORAL at 08:10

## 2024-10-18 RX ADMIN — DIPHENHYDRAMINE HYDROCHLORIDE 25 MG: 25 CAPSULE ORAL at 06:10

## 2024-10-18 RX ADMIN — HYDRALAZINE HYDROCHLORIDE 100 MG: 25 TABLET ORAL at 08:10

## 2024-10-18 RX ADMIN — FAMOTIDINE 20 MG: 10 INJECTION INTRAVENOUS at 08:10

## 2024-10-18 RX ADMIN — HYDRALAZINE HYDROCHLORIDE 10 MG: 20 INJECTION INTRAMUSCULAR; INTRAVENOUS at 11:10

## 2024-10-18 RX ADMIN — CARVEDILOL 12.5 MG: 12.5 TABLET, FILM COATED ORAL at 08:10

## 2024-10-18 RX ADMIN — ASPIRIN 81 MG: 81 TABLET, COATED ORAL at 08:10

## 2024-10-18 RX ADMIN — BENZONATATE 100 MG: 100 CAPSULE ORAL at 01:10

## 2024-10-18 RX ADMIN — PREDNISONE 25 MG: 5 TABLET ORAL at 08:10

## 2024-10-18 NOTE — PLAN OF CARE
Hospital follow up appointment scheduled and added to AVS as well as allergy referral placed for appointment.    Discharge orders and chart reviewed with no further post-acute discharge needs identified at this time.  At this time, patient is cleared for discharge from Case Management standpoint.        10/18/24 1150   Final Note   Assessment Type Final Discharge Note   Anticipated Discharge Disposition Home   Hospital Resources/Appts/Education Provided Appointments scheduled and added to AVS   Post-Acute Status   Discharge Delays None known at this time

## 2024-10-18 NOTE — DISCHARGE SUMMARY
[facility]  Discharge Summary      Patient Name: Phoenix Fragoso  MRN: [id number]  FANNY: [id number]  Patient Class: IP- Inpatient  Admission Date: 10/16/2024  Hospital Length of Stay: 1 days  Discharge Date and Time: No discharge date for patient encounter.  Attending Physician: Neri Jung, *   Discharging Provider: Neri Jung MD  Primary Care Provider: Brigette Robledo NP    Primary Care Team: Networked reference to record PCT     HPI:   63 year old pt getting admitted with acute anaphylaxis  Pt was prescribed clarithromycin yesterday for acute bronchitis  Pt took total of 2 tablets starting late evening yesterday and today AM  He noticed facial swelling,throat swelling with some breathing difficulties  He came to ER and received Epinephrine/H1&H2 blockers and steroids  Per records pt yesterday had CXR in imaging center which didn't showed any acute abnormalities    * No surgery found *      Hospital Course:   Patient admitted to intensive care unit where patient was closely monitored for any worsening respiratory status and throat swelling.  Patient was maintained on intravenous Solu-Medrol, Benadryl and intravenous Pepcid use.  Patient's symptoms progressively improved.  Intravenous Solu-Medrol transitioned to oral prednisone therapy.  Patient counseled regarding avoidance of penicillin based medications and macrolides including azithromycin and clarithromycin in future.  Patient voiced understanding.  Patient also had erythema of the face, upper torso, a couple of episodes of red tinged sputum, worsening cough with underlying history of prostate cancer, CTA chest was ordered and pulmonology was consulted.  Dr. Norris evaluated the patient and mentioned the red tinge in the sputum was from his throat spray and the rash is likely secondary to anaphylaxis that is slowly spreading down his torso and is blanching, recommended that he can follow up as outpatient and  can hold off on inpatient CTA. He was discharged on a Medrol Dosepak after that he will continue his home prednisone 5 mg daily.  He will also be discharged on Benadryl and famotidine.  His chest x-ray also showed acute bronchitis for which he will be sent on doxycycline (patient confirmed he tolerated in the in the past and we consulted pharmacy, tolerated 1 dose while in the hospital) He was eager for discharge and mentioned he felt much better compared to his presentation.  Ambulatory referral was placed to allergist.  Anaphylaxis instructions were provided and EpiPen was prescribed.  He verbalized understanding of all instructions.     Goals of Care Treatment Preferences:  Code Status: Full Code      SDOH Screening:  The patient was screened for utility difficulties, food insecurity, transport difficulties, housing insecurity, and interpersonal safety and there were no concerns identified this admission.     Consults:   Consults (From admission, onward)          Status Ordering Provider     Inpatient consult to Pulmonology  Once        Provider:  Lorena Norris MD    Completed SUKHWINDER MCFARLAND            No new Assessment & Plan notes have been filed under this hospital service since the last note was generated.  Service: Hospital Medicine    Final Active Diagnoses:    Diagnosis Date Noted POA    PRINCIPAL PROBLEM:  Acute anaphylaxis [T78.2XXA] 10/16/2024 Yes    Malignant neoplasm of prostate [C61] 08/28/2019 Yes    Hypertension [I10] 08/28/2019 Yes    Thyroid disease [E07.9] 08/28/2019 Yes      Problems Resolved During this Admission:       Discharged Condition: stable    Disposition: Home or Self Care    Follow Up:   Follow-up Information       Brigette Robledo NP Follow up on 10/23/2024.    Specialty: Family Medicine  Why: Hospital follow up 10/23/24 1020  Contact information:  [address] [place] [address]  [address] [place] [address] [id number] [phone]                           Patient Instructions:      Ambulatory  referral/consult to Allergy   Standing Status: Future   Referral Priority: Routine Referral Type: Allergy Testing   Referral Reason: Specialty Services Required   Requested Specialty: Allergy   Number of Visits Requested: 1     Notify your health care provider if you experience any of the following:  temperature >100.4     Notify your health care provider if you experience any of the following:  persistent nausea and vomiting or diarrhea     Notify your health care provider if you experience any of the following:  severe uncontrolled pain     Notify your health care provider if you experience any of the following:  redness, tenderness, or signs of infection (pain, swelling, redness, odor or green/yellow discharge around incision site)     Notify your health care provider if you experience any of the following:  difficulty breathing or increased cough     Notify your health care provider if you experience any of the following:  severe persistent headache     Notify your health care provider if you experience any of the following:  worsening rash     Notify your health care provider if you experience any of the following:  persistent dizziness, light-headedness, or visual disturbances     Notify your health care provider if you experience any of the following:  increased confusion or weakness     Notify your health care provider if you experience any of the following:     Activity as tolerated       Significant Diagnostic Studies: N/A    Pending Diagnostic Studies:       None           Medications:  Reconciled Home Medications:      Medication List        START taking these medications      benzonatate 100 MG capsule  Commonly known as: TESSALON  Take 1 capsule (100 mg total) by mouth 3 (three) times daily as needed for Cough.     diphenhydrAMINE 25 mg capsule  Commonly known as: BENADRYL  Take 1 capsule (25 mg total) by mouth every 8 (eight) hours. for 5 days     doxycycline 100 MG tablet  Commonly known as:  VIBRA-TABS  Take 1 tablet (100 mg total) by mouth 2 (two) times daily. for 5 days     EPINEPHrine 0.15 mg/0.3 mL pen injection  Commonly known as: EPIPEN JR 2-RAYRAY  Inject 0.3 mLs (0.15 mg total) into the muscle as needed for Anaphylaxis.     famotidine 20 MG tablet  Commonly known as: PEPCID  Take 1 tablet (20 mg total) by mouth 2 (two) times daily.     methylPREDNISolone 4 mg tablet  Commonly known as: MEDROL DOSEPACK  use as directed            CHANGE how you take these medications      predniSONE 5 MG tablet  Commonly known as: DELTASONE  Take 1 tablet (5 mg total) by mouth once daily. Can restart taking after you finish Medrol Dose Pack  What changed: additional instructions            CONTINUE taking these medications      ascorbic acid (vitamin C) 100 MG tablet  Commonly known as: VITAMIN C  Take 100 mg by mouth once daily.     aspirin 81 MG EC tablet  Commonly known as: ECOTRIN  Take 81 mg by mouth once daily.     buPROPion 150 MG TB24 tablet  Commonly known as: WELLBUTRIN XL  Take 1 tablet (150 mg total) by mouth once daily.     calcium carbonate 600 mg calcium (1,500 mg) Tab  Commonly known as: OS-BEAU  Take 600 mg by mouth once daily.     carvediloL 12.5 MG tablet  Commonly known as: COREG  Take 12.5 mg by mouth 2 (two) times daily.     clonazePAM 1 MG tablet  Commonly known as: KlonoPIN  Take 1 tablet (1 mg total) by mouth 2 (two) times daily as needed for Anxiety.     hydrALAZINE 50 MG tablet  Commonly known as: APRESOLINE  Take 100 mg by mouth 2 (two) times daily.     levothyroxine 200 MCG tablet  Commonly known as: SYNTHROID  Take 1 tablet (200 mcg total) by mouth before breakfast.     losartan 50 MG tablet  Commonly known as: COZAAR  Take 50 mg by mouth 2 (two) times a day.     multivitamin-minerals-lutein Tab  Take 1 tablet by mouth once daily.     ondansetron 4 MG Tbdl  Commonly known as: ZOFRAN-ODT  Take 1 tablet (4 mg total) by mouth every 8 (eight) hours as needed.     solifenacin 10 MG  tablet  Commonly known as: VESICARE  Take 1 tablet (10 mg total) by mouth once daily.     tadalafiL 20 MG Tab  Commonly known as: CIALIS  Take 1 tablet (20 mg total) by mouth once daily.     vitamin D 1000 units Tab  Commonly known as: VITAMIN D3  Take 2,000 Units by mouth once daily.              Indwelling Lines/Drains at time of discharge:   Lines/Drains/Airways       None                   Time spent on the discharge of patient: 40 minutes    Critical care time spent on the evaluation and treatment of severe organ dysfunction, review of pertinent labs and imaging studies, discussions with consulting providers and discussions with patient/family: 40 minutes.     Neri Jung MD  Department of Hospital Medicine  [facility]

## 2024-10-18 NOTE — CONSULTS
10/18/2024      Admit Date: 10/16/2024  Phoenix MERA Fragoso  New Patient Consult    Chief Complaint   Patient presents with    Allergic Reaction     Pt started biaxin yesterday and woke up at 6 am w/ trouble breathing. Pt states he feels like his throat is closing.       History of Present Illness:  Pt is a 62 yo male with hx PMR on prednisone, prostate cancer, Bell's palsy, thyroid disease, HTN, HLD who presented to the ED on 10/16 due to tongue and throat swelling with sudden onset after taking clarithromycin starting Tues 10/15. He had been having URI symptoms about 2 wks now, assoc with productive cough yellow sputum about 7 days and today having red tinged secretions. His throat swelling is improved but he is developing a bright red rash over face, chest and back. He has also been using chloraseptic and menthol cough drops which are red colored. Pulmonary is consulted due to concern for hemoptysis.  Pt is a nonsmoker with no prior lung disease or respiratory infections. His mother was a smoker and had COPD. His 64th birthday is coming up and wife is throwing him a birthday party tomorrow at the house so he wants to go home.    PFSH:  Past Medical History:   Diagnosis Date    Bell's palsy     High cholesterol     Hypertension     Insomnia     Thyroid disease      Past Surgical History:   Procedure Laterality Date    APPENDECTOMY      CHOLECYSTECTOMY      CYSTOSCOPY N/A 2/27/2024    Procedure: CYSTOSCOPY;  Surgeon: Walker Brooks MD;  Location: [facility] OR;  Service: Urology;  Laterality: N/A;    PROSTATE BIOPSY      PROSTATE SURGERY      right knee arthroscopy      ROBOT-ASSISTED LAPAROSCOPIC PROSTATECTOMY N/A 10/16/2019    Procedure: ROBOTIC PROSTATECTOMY;  Surgeon: Walker Brooks MD;  Location: [facility] OR;  Service: Urology;  Laterality: N/A;     Social History     Tobacco Use    Smoking status: Never    Smokeless tobacco: Never   Substance Use Topics    Alcohol use: Yes     Comment: OCCASIONALLY    Drug  "use: No     Family History   Problem Relation Name Age of Onset    Arthritis Mother       Review of patient's allergies indicates:   Allergen Reactions    Ancef [cefazolin] Anaphylaxis     Anaphylactic shock     Clarithromycin (bulk) Anaphylaxis and Shortness Of Breath     Allergic reaction       Performance Status:Performance Status:The patient's activity level is was not done.    Review of Systems:  a review of eleven systems covering constitutional, Psych, Eye, HEENT, Respiratory, Cardiac, GI, , Musculoskeletal, Endocrine, Dermatologicwas negative except the above mentioned abnormalities and for any pertinent findings as listed below:  Ear hurting  Shortness of breath- chronic      Exam:Comprehensive exam done. BP (!) 147/80   Pulse (!) 57   Temp 98.3 °F (36.8 °C)   Resp 19   Ht 6' 1" (1.854 m)   Wt 76.5 kg (168 lb 10.4 oz)   SpO2 95%   BMI 22.25 kg/m²   Exam included Vitals as listed, and patient's appearance and affect and alertness and mood, oral exam for yeast and hygiene and pharynx lesions and Mallapatti (M) score, neck with inspection for jvd and masses and thyroid abnormalities and lymph nodes (supraclavicular and infraclavicular nodes also examined and noted if abn), chest exam included symmetry and effort and fremitus and percussion and auscultation, cardiac exam included rhythm and gallops and murmur and rubs and jvd and edema, abdominal exam for mass and hepatosplenomegaly and tenderness and hernias and bowel sounds, Musculoskeletal exam with muscle tone and posture and mobility/gait and  strenght, and skin for rashes and cyanosis and pallor and turgor, extremity for clubbing.  Findings were normal except as listed below:  Awake, alert  Slight facial asymmetry  Oropharynx clear, M3, normal appearing tongue and buccal mucosa, neck normal to palpation  Blanching erythematous rash over face, back and chest down to mid abdomen  HR regular  Breath sounds clear bilaterally  Red-pink tinge " "secretions mixed with yellow phlegm in suction tubing and suction container  No edema/clubbing    Radiographs reviewed: view by direct vision   CXR 10/16/24 clear lungs          Labs     Recent Labs   Lab 10/18/24  0505   WBC 10.72   HGB 11.1*   HCT 33.5*        Recent Labs   Lab 10/18/24  0505      K 3.8      CO2 30*   BUN 34*   CREATININE 0.9   *   CALCIUM 8.7   MG 2.4   AST 15   ALT 17   ALKPHOS 48*   BILITOT 0.4   PROT 6.1   ALBUMIN 3.0*   No results for input(s): "PH", "PCO2", "PO2", "HCO3" in the last 24 hours.  Microbiology Results (last 7 days)       ** No results found for the last 168 hours. **            Impression:  Active Hospital Problems    Diagnosis  POA    *Acute anaphylaxis [T78.2XXA]  Yes    Malignant neoplasm of prostate [C61]  Yes    Hypertension [I10]  Yes    Thyroid disease [E07.9]  Yes      Resolved Hospital Problems   No resolved problems to display.               Plan:   Acute bronchitis/URI  Pseudohemoptysis due to red dye   Allergic drug rash  Anaphylactic reaction with airway edema now resolved      - continue benadryl, pepcid, steroids- give steroid taper on dc  - clarithromycin listed as allergy  - discontinue phenol throat spray - can cause anaphylactoid reaction  - consider doxycycline if needs antibiotic for bronchitis  - instruct pt to monitor red tinge secretions at home- should go away with avoiding red cough drops/throat spray-- if worsens or concern for active bleeding he should return to ED  - f/u with PCP in 1 week  - stable for discharge  - d/w hospitalist    Lorena Norris MD  Pulmonary & Critical Care Medicine      "

## 2024-10-18 NOTE — CARE UPDATE
10/17/24 2015   PRE-TX-O2   SpO2 95 %   Pulse 76   Resp (!) 32   Education   $ Education Oxygen;15 min

## 2024-10-18 NOTE — DISCHARGE INSTRUCTIONS
Discharge Instructions, [facility]    Thank you for choosing [facility] for your medical care. The primary doctor who is taking care of you at the time of your discharge is Neri Jung, *.     You were admitted to the hospital with Acute anaphylaxis.     Please note your discharge instructions, including diet/activity restrictions, follow-up appointments, and medication changes.  If you have any questions about your medical issues, prescriptions, or any other questions, please feel free to contact the [facility] Medicine Dept at [phone] and we will help.    If you are previously with Home health, outpatient PT/OT or under a therapy program, you are cleared to return to those programs.    Please direct all long term medication refills and follow up to your primary care provider, Brigette Robledo NP. Thank you again for letting us take care of your health care needs.    Please note the following discharge instructions per your discharging physician-    Please follow up with the primary care doctor     Please follow up with allergy specialist    Please carry the EpiPen with you in case of emergency during anaphylaxis    Please avoid penicillin and macrolides like azithromycin, clarithromycin in the future    Please also avoid phenol spray as that could cause allergic reaction    Monitor your rash, if it is worsening, please call your doctor      You can restart taking your prednisone 5 mg after you finish the Medrol Dosepak    For any other worsening signs or symptoms, please call your doctor, go to the emergency department    In case of any other emergency, please call [phone]

## 2024-10-21 ENCOUNTER — PATIENT OUTREACH (OUTPATIENT)
Dept: FAMILY MEDICINE | Facility: CLINIC | Age: 64
End: 2024-10-21
Payer: MEDICARE

## 2024-10-21 ENCOUNTER — TELEPHONE (OUTPATIENT)
Dept: FAMILY MEDICINE | Facility: CLINIC | Age: 64
End: 2024-10-21
Payer: MEDICARE

## 2024-10-21 NOTE — PROGRESS NOTES
Discharge Information     Discharge Date:   10/18/2024    Primary Discharge Diagnosis:  Allergic Reaction      Discharge Summary:  Reviewed      Medication & Order Review     Were medication changes made or new medications added?   Yes    If so, has the patient filled the prescriptions?  Yes     Was Home Health ordered? No    If so, has Home Health contacted patient and/or initiated services?  No    Name of Home Health Agency? N/A    Durable Medical Equipment ordered?  No     If so, has the DME provider contacted patient and delivered equipment?  N/A    Follow Up               Any problems since discharge? No    How is the patient feeling since returning home?      Have you set up recommended follow up appointments?  (cardiology, surgery, etc.)    Schedule Hospital Follow-up appointment within 7-14 days (preferably 7).      Notes:  Spoke to pt and he is doing okay. Has gotten all medications prescribed. Pt is scheduled to come in to see Brigette for a HFU appointment on 10/23 at 10:20 am. Pt confirmed. No complaints at this time. Pt will call if he feels he needs to be seen sooner             Sheela Casas

## 2024-10-21 NOTE — TELEPHONE ENCOUNTER
----- Message from Leona sent at 10/21/2024  9:40 AM CDT -----  The patient has 2 HFU appointments this week. Please call pt's # [phone] GH

## 2024-10-21 NOTE — TELEPHONE ENCOUNTER
----- Message from Nurse Malik sent at 10/17/2024  4:35 PM CDT -----  Regarding: HFU  Call patient - needs post-hospital phone call within 2 business days and hospital follow up visit scheduled within 7-14 days.

## 2024-10-22 ENCOUNTER — PATIENT OUTREACH (OUTPATIENT)
Dept: ADMINISTRATIVE | Facility: CLINIC | Age: 64
End: 2024-10-22
Payer: MEDICARE

## 2024-10-22 NOTE — PROGRESS NOTES
C3 nurse spoke with Phoenix Fragoso for a TCC post hospital discharge follow up call. The patient has a scheduled [facility] appointment with Brigette Robledo NP  on 10/23/24 @ 1020.

## 2024-10-23 ENCOUNTER — HOSPITAL ENCOUNTER (OUTPATIENT)
Dept: RADIOLOGY | Facility: HOSPITAL | Age: 64
Discharge: HOME | End: 2024-10-23
Attending: NURSE PRACTITIONER
Payer: MEDICARE

## 2024-10-23 ENCOUNTER — OFFICE VISIT (OUTPATIENT)
Dept: FAMILY MEDICINE | Facility: CLINIC | Age: 64
End: 2024-10-23
Payer: MEDICARE

## 2024-10-23 VITALS
OXYGEN SATURATION: 98 % | DIASTOLIC BLOOD PRESSURE: 62 MMHG | BODY MASS INDEX: 22.11 KG/M2 | WEIGHT: 166.81 LBS | HEART RATE: 68 BPM | HEIGHT: 73 IN | SYSTOLIC BLOOD PRESSURE: 116 MMHG

## 2024-10-23 DIAGNOSIS — J90 PLEURAL EFFUSION: ICD-10-CM

## 2024-10-23 DIAGNOSIS — T88.6XXD: ICD-10-CM

## 2024-10-23 DIAGNOSIS — J90 PLEURAL EFFUSION: Primary | ICD-10-CM

## 2024-10-23 DIAGNOSIS — Z09 HOSPITAL DISCHARGE FOLLOW-UP: ICD-10-CM

## 2024-10-23 DIAGNOSIS — T78.40XA ALLERGIC REACTION TO DRUG, INITIAL ENCOUNTER: ICD-10-CM

## 2024-10-23 DIAGNOSIS — M35.3 POLYMYALGIA RHEUMATICA: ICD-10-CM

## 2024-10-23 DIAGNOSIS — T78.2XXD ANAPHYLAXIS, SUBSEQUENT ENCOUNTER: ICD-10-CM

## 2024-10-23 PROCEDURE — 71046 X-RAY EXAM CHEST 2 VIEWS: CPT | Mod: TC,PO

## 2024-10-23 PROCEDURE — 71046 X-RAY EXAM CHEST 2 VIEWS: CPT | Mod: 26,,, | Performed by: RADIOLOGY

## 2024-10-23 RX ORDER — EPINEPHRINE 0.3 MG/.3ML
1 INJECTION SUBCUTANEOUS ONCE
Qty: 0.6 ML | Refills: 11 | Status: SHIPPED | OUTPATIENT
Start: 2024-10-23 | End: 2024-10-23

## 2024-10-23 NOTE — PROGRESS NOTES
SUBJECTIVE:    Patient ID: Phoenix Fragoso is a 64 y.o. male.    Chief Complaint: Hospital Follow Up (No bottles//Pt is here for a hospital follow up on 10/16/24 for anaphylaxis to an antibiotic//Requesting CWA form to [facility]//NAE)    History of Present Illness    CHIEF COMPLAINT:  Phoenix presents today for follow-up after a recent hospitalization due to a severe allergic reaction.    63 year old pt getting admitted with acute anaphylaxis  Pt was prescribed clarithromycin yesterday for acute bronchitis  Pt took total of 2 tablets starting late evening yesterday and today AM  He noticed facial swelling,throat swelling with some breathing difficulties  He came to ER and received Epinephrine/H1&H2 blockers and steroids  Per records pt yesterday had CXR in imaging center which didn't showed any acute abnormalities   Patient admitted to intensive care unit where patient was closely monitored for any worsening respiratory status and throat swelling.  Patient was maintained on intravenous Solu-Medrol, Benadryl and intravenous Pepcid use.  Patient's symptoms progressively improved.  Intravenous Solu-Medrol transitioned to oral prednisone therapy.  Patient counseled regarding avoidance of penicillin based medications and macrolides including azithromycin and clarithromycin in future.  Patient voiced understanding.  Patient also had erythema of the face, upper torso, a couple of episodes of red tinged sputum, worsening cough with underlying history of prostate cancer, CTA chest was ordered and pulmonology was consulted.  Dr. Norris evaluated the patient and mentioned the red tinge in the sputum was from his throat spray and the rash is likely secondary to anaphylaxis that is slowly spreading down his torso and is blanching, recommended that he can follow up as outpatient and can hold off on inpatient CTA. He was discharged on a Medrol Dosepak after that he will continue his home prednisone 5 mg daily.  He will also be  discharged on Benadryl and famotidine.  His chest x-ray also showed acute bronchitis for which he will be sent on doxycycline (patient confirmed he tolerated in the in the past and we consulted pharmacy, tolerated 1 dose while in the hospital) He was eager for discharge and mentioned he felt much better compared to his presentation.  Ambulatory referral was placed to allergist.  Anaphylaxis instructions were provided and EpiPen was prescribed.  He verbalized understanding of all instructions    RECENT HOSPITALIZATION:  He was admitted on the 16th and discharged on the 18th due to a severe allergic reaction to a newly prescribed medication. Symptoms included significant swelling in the neck, tongue, and throat. He acknowledges that without prompt medical attention, respiratory compromise could have occurred. Upon discharge, medications were prescribed, and an EpiPen was provided.    CURRENT SYMPTOMS:  He reports persistent cough with clear sputum and difficulty taking deep breaths, which triggers coughing. He also reports low energy levels. He denies fever, vomiting, and diarrhea, and is able to hold down food and fluids.    ALLERGY HISTORY:  He has a history of severe allergic reactions. He experienced a reaction to Ancef during a surgical procedure, resulting in surgery cancellation and widespread rash development. He also had a recent allergic reaction to antibiotics prescribed for a dog bite. He notes that doxycycline has been well-tolerated since the incident and denies any adverse reactions to azithromycin (Z-Alejo) in the past.    MEDICAL HISTORY:  He has a history of autoimmune disease and long-term steroid use. He recently experienced a dog bite that developed into a bacterial infection despite being minor. He attributes this to his compromised immune system due to his underlying autoimmune condition and steroid use, stating he has difficulty fighting off infections. He also mentions developing a rash  following the dog bite incident.    RECENT DIAGNOSTIC TESTS:  A chest XR during recent hospitalization showed a small pleural effusion and mild cardiomegaly. Oxygen levels were initially low, requiring supplemental oxygen. With oxygen, saturation was around 97%, and when removed, it only dropped by 2 points.    ONGOING TREATMENTS:  He reports ongoing physical therapy for his hand, which he thought was improving at his last visit.      ROS:  General: -fever, -chills, +fatigue, -weight gain, -weight loss  Eyes: -vision changes, -redness, -discharge  ENT: -ear pain, -nasal congestion, -sore throat  Cardiovascular: -chest pain, -palpitations, -lower extremity edema  Respiratory: +cough, -shortness of breath, +difficulty breathing  Gastrointestinal: -abdominal pain, -nausea, -vomiting, -diarrhea, -constipation, -blood in stool  Genitourinary: -dysuria, -hematuria, -frequency  Musculoskeletal: -joint pain, -muscle pain  Skin: +rash, -lesion  Neurological: -headache, -dizziness, -numbness, -tingling  Psychiatric: -anxiety, -depression, -sleep difficulty         Admit Date: 10/16/24  Discharge Date: 10/18/24  Discharge Facility: Hospital      Family and/or Caretaker present at visit? No  Medication Reconciliation:  Medications changed/added/deleted. Med list reconciled  New Prescriptions filled after discharge: yes  Discharge summary reviewed:  yes  Diagnostic tests reviewed/disposition: No diagnosic tests pending after this hospitalization  Disease/illness education: y  Follow up appointments scheduled:  no          no  Follow up labs/tests ordered:   no  Home Health ordered on discharge: Patient does not have home health established from hospital visit.  They do not need home health.  If needed, we will set up home health for the patient  Home Health company name: n/a  Establishment or re-establishment of referral orders for community resources: No other necessary community resources  DME ordered at discharge:   no  How  patient is feeling since discharge from the hospital?  tired. Hard to take deep breath. No fever.   Discussion with other health care providers: No discussion with other health care providers necessary  Patient follow up phone call documented on separate encounter.    Admission on 10/16/2024, Discharged on 10/18/2024   Component Date Value Ref Range Status    WBC 10/16/2024 9.17  3.90 - 12.70 K/uL Final    RBC 10/16/2024 4.79  4.60 - 6.20 M/uL Final    Hemoglobin 10/16/2024 13.7 (L)  14.0 - 18.0 g/dL Final    Hematocrit 10/16/2024 40.3  40.0 - 54.0 % Final    MCV 10/16/2024 84  82 - 98 fL Final    MCH 10/16/2024 28.6  27.0 - 31.0 pg Final    MCHC 10/16/2024 34.0  32.0 - 36.0 g/dL Final    RDW 10/16/2024 13.8  11.5 - 14.5 % Final    Platelets 10/16/2024 231  150 - 450 K/uL Final    MPV 10/16/2024 9.9  9.2 - 12.9 fL Final    Immature Granulocytes 10/16/2024 0.7 (H)  0.0 - 0.5 % Final    Gran # (ANC) 10/16/2024 8.0 (H)  1.8 - 7.7 K/uL Final    Immature Grans (Abs) 10/16/2024 0.06 (H)  0.00 - 0.04 K/uL Final    Lymph # 10/16/2024 0.6 (L)  1.0 - 4.8 K/uL Final    Mono # 10/16/2024 0.5  0.3 - 1.0 K/uL Final    Eos # 10/16/2024 0.0  0.0 - 0.5 K/uL Final    Baso # 10/16/2024 0.02  0.00 - 0.20 K/uL Final    nRBC 10/16/2024 0  0 /100 WBC Final    Gran % 10/16/2024 87.1 (H)  38.0 - 73.0 % Final    Lymph % 10/16/2024 6.3 (L)  18.0 - 48.0 % Final    Mono % 10/16/2024 5.6  4.0 - 15.0 % Final    Eosinophil % 10/16/2024 0.1  0.0 - 8.0 % Final    Basophil % 10/16/2024 0.2  0.0 - 1.9 % Final    Differential Method 10/16/2024 Automated   Final    Sodium 10/16/2024 130 (L)  136 - 145 mmol/L Final    Potassium 10/16/2024 3.5  3.5 - 5.1 mmol/L Final    Chloride 10/16/2024 91 (L)  95 - 110 mmol/L Final    CO2 10/16/2024 28  23 - 29 mmol/L Final    Glucose 10/16/2024 89  70 - 110 mg/dL Final    BUN 10/16/2024 21  8 - 23 mg/dL Final    Creatinine 10/16/2024 1.1  0.5 - 1.4 mg/dL Final    Calcium 10/16/2024 9.6  8.7 - 10.5 mg/dL Final     Total Protein 10/16/2024 7.6  6.0 - 8.4 g/dL Final    Albumin 10/16/2024 3.9  3.5 - 5.2 g/dL Final    Total Bilirubin 10/16/2024 1.5 (H)  0.1 - 1.0 mg/dL Final    Alkaline Phosphatase 10/16/2024 65  55 - 135 U/L Final    AST 10/16/2024 16  10 - 40 U/L Final    ALT 10/16/2024 16  10 - 44 U/L Final    eGFR 10/16/2024 >60.0  >60 mL/min/1.73 m^2 Final    Anion Gap 10/16/2024 11  8 - 16 mmol/L Final    Sodium 10/17/2024 138  136 - 145 mmol/L Final    Potassium 10/17/2024 3.6  3.5 - 5.1 mmol/L Final    Chloride 10/17/2024 103  95 - 110 mmol/L Final    CO2 10/17/2024 29  23 - 29 mmol/L Final    Glucose 10/17/2024 143 (H)  70 - 110 mg/dL Final    BUN 10/17/2024 24 (H)  8 - 23 mg/dL Final    Creatinine 10/17/2024 0.9  0.5 - 1.4 mg/dL Final    Calcium 10/17/2024 8.9  8.7 - 10.5 mg/dL Final    Total Protein 10/17/2024 6.5  6.0 - 8.4 g/dL Final    Albumin 10/17/2024 3.2 (L)  3.5 - 5.2 g/dL Final    Total Bilirubin 10/17/2024 0.5  0.1 - 1.0 mg/dL Final    Alkaline Phosphatase 10/17/2024 50 (L)  55 - 135 U/L Final    AST 10/17/2024 12  10 - 40 U/L Final    ALT 10/17/2024 13  10 - 44 U/L Final    eGFR 10/17/2024 >60.0  >60 mL/min/1.73 m^2 Final    Anion Gap 10/17/2024 6 (L)  8 - 16 mmol/L Final    Magnesium 10/17/2024 2.2  1.6 - 2.6 mg/dL Final    WBC 10/17/2024 6.23  3.90 - 12.70 K/uL Final    RBC 10/17/2024 4.04 (L)  4.60 - 6.20 M/uL Final    Hemoglobin 10/17/2024 11.0 (L)  14.0 - 18.0 g/dL Final    Hematocrit 10/17/2024 34.0 (L)  40.0 - 54.0 % Final    MCV 10/17/2024 84  82 - 98 fL Final    MCH 10/17/2024 27.2  27.0 - 31.0 pg Final    MCHC 10/17/2024 32.4  32.0 - 36.0 g/dL Final    RDW 10/17/2024 13.4  11.5 - 14.5 % Final    Platelets 10/17/2024 195  150 - 450 K/uL Final    MPV 10/17/2024 9.4  9.2 - 12.9 fL Final    Immature Granulocytes 10/17/2024 0.5  0.0 - 0.5 % Final    Gran # (ANC) 10/17/2024 5.5  1.8 - 7.7 K/uL Final    Immature Grans (Abs) 10/17/2024 0.03  0.00 - 0.04 K/uL Final    Lymph # 10/17/2024 0.4 (L)  1.0 - 4.8  K/uL Final    Mono # 10/17/2024 0.3  0.3 - 1.0 K/uL Final    Eos # 10/17/2024 0.0  0.0 - 0.5 K/uL Final    Baso # 10/17/2024 0.01  0.00 - 0.20 K/uL Final    nRBC 10/17/2024 0  0 /100 WBC Final    Gran % 10/17/2024 87.6 (H)  38.0 - 73.0 % Final    Lymph % 10/17/2024 6.6 (L)  18.0 - 48.0 % Final    Mono % 10/17/2024 5.1  4.0 - 15.0 % Final    Eosinophil % 10/17/2024 0.0  0.0 - 8.0 % Final    Basophil % 10/17/2024 0.2  0.0 - 1.9 % Final    Differential Method 10/17/2024 Automated   Final    Sodium 10/18/2024 142  136 - 145 mmol/L Final    Potassium 10/18/2024 3.8  3.5 - 5.1 mmol/L Final    Chloride 10/18/2024 105  95 - 110 mmol/L Final    CO2 10/18/2024 30 (H)  23 - 29 mmol/L Final    Glucose 10/18/2024 124 (H)  70 - 110 mg/dL Final    BUN 10/18/2024 34 (H)  8 - 23 mg/dL Final    Creatinine 10/18/2024 0.9  0.5 - 1.4 mg/dL Final    Calcium 10/18/2024 8.7  8.7 - 10.5 mg/dL Final    Total Protein 10/18/2024 6.1  6.0 - 8.4 g/dL Final    Albumin 10/18/2024 3.0 (L)  3.5 - 5.2 g/dL Final    Total Bilirubin 10/18/2024 0.4  0.1 - 1.0 mg/dL Final    Alkaline Phosphatase 10/18/2024 48 (L)  55 - 135 U/L Final    AST 10/18/2024 15  10 - 40 U/L Final    ALT 10/18/2024 17  10 - 44 U/L Final    eGFR 10/18/2024 >60.0  >60 mL/min/1.73 m^2 Final    Anion Gap 10/18/2024 7 (L)  8 - 16 mmol/L Final    Magnesium 10/18/2024 2.4  1.6 - 2.6 mg/dL Final    WBC 10/18/2024 10.72  3.90 - 12.70 K/uL Final    RBC 10/18/2024 3.90 (L)  4.60 - 6.20 M/uL Final    Hemoglobin 10/18/2024 11.1 (L)  14.0 - 18.0 g/dL Final    Hematocrit 10/18/2024 33.5 (L)  40.0 - 54.0 % Final    MCV 10/18/2024 86  82 - 98 fL Final    MCH 10/18/2024 28.5  27.0 - 31.0 pg Final    MCHC 10/18/2024 33.1  32.0 - 36.0 g/dL Final    RDW 10/18/2024 13.4  11.5 - 14.5 % Final    Platelets 10/18/2024 251  150 - 450 K/uL Final    MPV 10/18/2024 9.2  9.2 - 12.9 fL Final    Immature Granulocytes 10/18/2024 1.0 (H)  0.0 - 0.5 % Final    Gran # (ANC) 10/18/2024 9.3 (H)  1.8 - 7.7 K/uL Final     Immature Grans (Abs) 10/18/2024 0.11 (H)  0.00 - 0.04 K/uL Final    Lymph # 10/18/2024 0.7 (L)  1.0 - 4.8 K/uL Final    Mono # 10/18/2024 0.6  0.3 - 1.0 K/uL Final    Eos # 10/18/2024 0.0  0.0 - 0.5 K/uL Final    Baso # 10/18/2024 0.02  0.00 - 0.20 K/uL Final    nRBC 10/18/2024 0  0 /100 WBC Final    Gran % 10/18/2024 86.3 (H)  38.0 - 73.0 % Final    Lymph % 10/18/2024 6.8 (L)  18.0 - 48.0 % Final    Mono % 10/18/2024 5.7  4.0 - 15.0 % Final    Eosinophil % 10/18/2024 0.0  0.0 - 8.0 % Final    Basophil % 10/18/2024 0.2  0.0 - 1.9 % Final    Differential Method 10/18/2024 Automated   Final   Office Visit on 10/15/2024   Component Date Value Ref Range Status    POC Molecular Influenza A Ag 10/15/2024 Negative  Negative Final    POC Molecular Influenza B Ag 10/15/2024 Negative  Negative Final     Acceptable 10/15/2024 Yes   Final    POC Rapid COVID 10/15/2024 Negative  Negative Final     Acceptable 10/15/2024 Yes   Final   Office Visit on 08/20/2024   Component Date Value Ref Range Status    TSH w/reflex to FT4 08/20/2024 2.31  0.40 - 4.50 mIU/L Final    Glucose 08/20/2024 78  65 - 99 mg/dL Final    BUN 08/20/2024 19  7 - 25 mg/dL Final    Creatinine 08/20/2024 1.05  0.70 - 1.35 mg/dL Final    eGFR 08/20/2024 79  > OR = 60 mL/min/1.73m2 Final    BUN/Creatinine Ratio 08/20/2024 SEE NOTE:  6 - 22 (calc) Final    Sodium 08/20/2024 139  135 - 146 mmol/L Final    Potassium 08/20/2024 4.1  3.5 - 5.3 mmol/L Final    Chloride 08/20/2024 102  98 - 110 mmol/L Final    CO2 08/20/2024 27  20 - 32 mmol/L Final    Calcium 08/20/2024 9.3  8.6 - 10.3 mg/dL Final    Total Protein 08/20/2024 7.0  6.1 - 8.1 g/dL Final    Albumin 08/20/2024 3.7  3.6 - 5.1 g/dL Final    Globulin, Total 08/20/2024 3.3  1.9 - 3.7 g/dL (calc) Final    Albumin/Globulin Ratio 08/20/2024 1.1  1.0 - 2.5 (calc) Final    Total Bilirubin 08/20/2024 0.6  0.2 - 1.2 mg/dL Final    Alkaline Phosphatase 08/20/2024 58  35 - 144 U/L Final     AST 08/20/2024 23  10 - 35 U/L Final    ALT 08/20/2024 22  9 - 46 U/L Final   Office Visit on 07/15/2024   Component Date Value Ref Range Status    Color, POC UA 07/15/2024 Yellow  Yellow, Straw, Colorless Final    Clarity, POC UA 07/15/2024 Clear  Clear Final    Glucose, POC UA 07/15/2024 Negative  Negative Final    Bilirubin, POC UA 07/15/2024 Negative  Negative Final    Ketones, POC UA 07/15/2024 Negative  Negative Final    Spec Grav POC UA 07/15/2024 1.010  1.005 - 1.030 Final    Blood, POC UA 07/15/2024 Trace-intact (A)  Negative Final    pH, POC UA 07/15/2024 6.5  5.0 - 8.0 Final    Protein, POC UA 07/15/2024 Negative  Negative Final    Urobilinogen, POC UA 07/15/2024 0.2  <=1.0 Final    Nitrite, POC UA 07/15/2024 Negative  Negative Final    WBC, POC UA 07/15/2024 Negative  Negative Final   Lab Visit on 07/08/2024   Component Date Value Ref Range Status    PSA Diagnostic 07/08/2024 <0.01  0.00 - 4.00 ng/mL Final       Past Medical History:   Diagnosis Date    Bell's palsy     High cholesterol     Hypertension     Insomnia     Thyroid disease      Past Surgical History:   Procedure Laterality Date    APPENDECTOMY      CHOLECYSTECTOMY      CYSTOSCOPY N/A 2/27/2024    Procedure: CYSTOSCOPY;  Surgeon: Walker Brooks MD;  Location: [facility] OR;  Service: Urology;  Laterality: N/A;    PROSTATE BIOPSY      PROSTATE SURGERY      right knee arthroscopy      ROBOT-ASSISTED LAPAROSCOPIC PROSTATECTOMY N/A 10/16/2019    Procedure: ROBOTIC PROSTATECTOMY;  Surgeon: Walker Brooks MD;  Location: [facility] OR;  Service: Urology;  Laterality: N/A;     Family History   Problem Relation Name Age of Onset    Arthritis Mother         Marital Status:   Alcohol History:  reports current alcohol use.  Tobacco History:  reports that he has never smoked. He has never used smokeless tobacco.  Drug History:  reports no history of drug use.    Review of patient's allergies indicates:   Allergen Reactions    Ancef [cefazolin]  "Anaphylaxis     Anaphylactic shock     Clarithromycin (bulk) Anaphylaxis and Shortness Of Breath     Allergic reaction  Can not take anything that ends in "Mycin"    Augmentin [amoxicillin-pot clavulanate] Rash     Was sent home with two antibiotics and broke out with a rash so not 100% sure if allergic to this or not       Current Outpatient Medications:     ascorbic acid, vitamin C, (VITAMIN C) 100 MG tablet, Take 100 mg by mouth once daily., Disp: , Rfl:     aspirin (ECOTRIN) 81 MG EC tablet, Take 81 mg by mouth once daily., Disp: , Rfl:     benzonatate (TESSALON) 100 MG capsule, Take 1 capsule (100 mg total) by mouth 3 (three) times daily as needed for Cough. (Patient not taking: Reported on 10/22/2024), Disp: 15 capsule, Rfl: 0    buPROPion (WELLBUTRIN XL) 150 MG TB24 tablet, Take 1 tablet (150 mg total) by mouth once daily., Disp: 30 tablet, Rfl: 11    calcium carbonate (OS-BEAU) 600 mg calcium (1,500 mg) Tab, Take 600 mg by mouth once daily., Disp: , Rfl:     carvediloL (COREG) 12.5 MG tablet, Take 12.5 mg by mouth 2 (two) times daily., Disp: , Rfl:     clonazePAM (KLONOPIN) 1 MG tablet, Take 1 tablet (1 mg total) by mouth 2 (two) times daily as needed for Anxiety., Disp: 30 tablet, Rfl: 1    diphenhydrAMINE (BENADRYL) 25 mg capsule, Take 1 capsule (25 mg total) by mouth every 8 (eight) hours. for 5 days, Disp: 15 capsule, Rfl: 0    doxycycline (VIBRA-TABS) 100 MG tablet, Take 1 tablet (100 mg total) by mouth 2 (two) times daily. for 5 days, Disp: 10 tablet, Rfl: 0    EPINEPHrine (EPIPEN) 0.3 mg/0.3 mL AtIn, Inject 0.3 mLs (0.3 mg total) into the muscle once. for 1 dose, Disp: 0.6 mL, Rfl: 11    famotidine (PEPCID) 20 MG tablet, Take 1 tablet (20 mg total) by mouth 2 (two) times daily., Disp: 60 tablet, Rfl: 11    hydrALAZINE (APRESOLINE) 50 MG tablet, Take 100 mg by mouth 2 (two) times daily., Disp: , Rfl:     levothyroxine (SYNTHROID) 200 MCG tablet, Take 1 tablet (200 mcg total) by mouth before breakfast., " "Disp: 30 tablet, Rfl: 2    losartan (COZAAR) 50 MG tablet, Take 50 mg by mouth 2 (two) times a day., Disp: , Rfl:     methylPREDNISolone (MEDROL DOSEPACK) 4 mg tablet, use as directed, Disp: 21 each, Rfl: 0    multivitamin-minerals-lutein Tab, Take 1 tablet by mouth once daily., Disp: , Rfl:     ondansetron (ZOFRAN-ODT) 4 MG TbDL, Take 1 tablet (4 mg total) by mouth every 8 (eight) hours as needed., Disp: 30 tablet, Rfl: 0    predniSONE (DELTASONE) 5 MG tablet, Take 1 tablet (5 mg total) by mouth once daily. Can restart taking after you finish Medrol Dose Pack, Disp: 30 tablet, Rfl: 3    solifenacin (VESICARE) 10 MG tablet, Take 1 tablet (10 mg total) by mouth once daily., Disp: 30 tablet, Rfl: 11    tadalafiL (CIALIS) 20 MG Tab, Take 1 tablet (20 mg total) by mouth once daily. (Patient taking differently: Take 20 mg by mouth daily as needed.), Disp: 10 tablet, Rfl: 11    vitamin D (VITAMIN D3) 1000 units Tab, Take 2,000 Units by mouth once daily., Disp: , Rfl:   Objective:      Vitals:    10/23/24 1005   BP: 116/62   Pulse: 68   SpO2: 98%   Weight: 75.7 kg (166 lb 12.8 oz)   Height: 6' 1" (1.854 m)     Physical Exam    General: No acute distress. Well-developed. Well-nourished.  Eyes: EOMI. Sclerae anicteric.  HENT: Normocephalic. Atraumatic. Nares patent. Moist oral mucosa.  Ears: Bilateral TMs clear. Bilateral EACs clear.  Cardiovascular: Regular rate. Regular rhythm. No murmurs. No rubs. No gallops. Normal S1, S2.  Respiratory: Normal respiratory effort. Clear to auscultation bilaterally. No rales. No rhonchi. No wheezing.  Abdomen: Soft. Non-tender. Non-distended. Normoactive bowel sounds.  Musculoskeletal: No  obvious deformity.  Extremities: No lower extremity edema.  Neurological: Alert & oriented x3. No slurred speech. Normal gait.  Psychiatric: Normal mood. Normal affect. Good insight. Good judgment.  Skin: Warm. Dry. No rash.       Assessment:       Assessment & Plan    - Assessed patient's recent " hospitalization for severe allergic reaction to erythromycin, noting improvement in cough and transition from yellow to clear sputum  - Considered patient's history of multiple severe antibiotic allergies, including reactions to Ancef and clindamycin  - Evaluated potential connection between patient's autoimmune disease, long-term steroid use, and increased susceptibility to infections  - Noted recent chest XR findings of pleural effusion and cardiomegaly, which were not present in previous imaging  - Will order a follow-up chest XR to reassess these findings and monitor for any changes    SEVERE ALLERGIC REACTIONS:  - Educated patient on the severity of their allergic reactions, emphasizing the potential for life-threatening complications such as airway compromise.  - Phoenix to keep EpiPen readily available, but not in hot environments like a truck.  - Phoenix to avoid all medications in the macrolide class (e.g., Z-Alejo, azithromycin) due to severe reaction to erythromycin.  - Discontinued erythromycin due to severe allergic reaction.    AUTOIMMUNE DISEASE:  - Explained the relationship between the patient's autoimmune disease, long-term steroid use, and increased susceptibility to infections.  - Phoenix to inform rheumatologist about recent complications with infections and antibiotic reactions at next appointment.    MEDICATIONS/SUPPLEMENTS:  - Continued doxycycline as previously prescribed.    PLEURAL EFFUSION AND CARDIOMEGALY:  - Chest XR ordered to reassess pleural effusion and cardiomegaly.    ALLERGY REFERRAL:  - Referred to Dr. Rider, an allergist, for comprehensive allergy testing and management of multiple severe antibiotic allergies.  - Phoenix to call Dr. Rider's office to schedule an appointment for allergy evaluation.    FOLLOW UP:  - Contact the office if any new or worsening symptoms occur, particularly related to breathing difficulties or allergic reactions.       Plan:       Pleural  effusion  Comments:  repeat xray  Orders:  -     X-Ray Chest PA And Lateral; Future; Expected date: 10/23/2024    Hospital discharge follow-up  Comments:  discharge summary reviewed    Polymyalgia rheumatica  -     Ambulatory referral/consult to Allergy; Future; Expected date: 10/30/2024    Anaphylactic shock, due to adverse effect of correct medicinal substance properly administered, subsequent encounter  Comments:  epi pen  Orders:  -     Ambulatory referral/consult to Allergy; Future; Expected date: 10/30/2024  -     EPINEPHrine (EPIPEN) 0.3 mg/0.3 mL AtIn; Inject 0.3 mLs (0.3 mg total) into the muscle once. for 1 dose  Dispense: 0.6 mL; Refill: 11    Allergic reaction to drug, initial encounter  Comments:  list updated    Anaphylaxis, subsequent encounter      Follow up in about 4 weeks (around 11/20/2024), or if symptoms worsen or fail to improve, for medication management.    This note was generated with the assistance of ambient listening technology. Verbal consent was obtained by the patient and accompanying visitor(s) for the recording of patient appointment to facilitate this note. I attest to having reviewed and edited the generated note for accuracy, though some syntax or spelling errors may persist. Please contact the author of this note for any clarification.

## 2024-11-08 ENCOUNTER — CLINICAL SUPPORT (OUTPATIENT)
Dept: FAMILY MEDICINE | Facility: CLINIC | Age: 64
End: 2024-11-08
Payer: MEDICARE

## 2024-11-08 DIAGNOSIS — Z23 NEED FOR INFLUENZA VACCINATION: Primary | ICD-10-CM

## 2024-12-11 DIAGNOSIS — F41.9 ANXIETY: ICD-10-CM

## 2024-12-13 RX ORDER — CLONAZEPAM 1 MG/1
1 TABLET ORAL 2 TIMES DAILY PRN
Qty: 30 TABLET | Refills: 1 | Status: SHIPPED | OUTPATIENT
Start: 2024-12-13 | End: 2025-12-13

## 2024-12-17 DIAGNOSIS — E03.9 HYPOTHYROIDISM, UNSPECIFIED TYPE: ICD-10-CM

## 2024-12-18 RX ORDER — LEVOTHYROXINE SODIUM 200 UG/1
200 TABLET ORAL
Qty: 30 TABLET | Refills: 2 | Status: SHIPPED | OUTPATIENT
Start: 2024-12-18 | End: 2025-03-18

## 2024-12-23 ENCOUNTER — TELEPHONE (OUTPATIENT)
Dept: FAMILY MEDICINE | Facility: CLINIC | Age: 64
End: 2024-12-23
Payer: MEDICARE

## 2024-12-23 RX ORDER — DOXYCYCLINE 100 MG/1
100 CAPSULE ORAL 2 TIMES DAILY
Qty: 20 CAPSULE | Refills: 0 | Status: SHIPPED | OUTPATIENT
Start: 2024-12-23

## 2024-12-23 RX ORDER — BENZONATATE 200 MG/1
200 CAPSULE ORAL 3 TIMES DAILY PRN
Qty: 30 CAPSULE | Refills: 0 | Status: SHIPPED | OUTPATIENT
Start: 2024-12-23 | End: 2025-01-02

## 2024-12-23 NOTE — TELEPHONE ENCOUNTER
"Seen on 10/23 -     Spoke with patient, states he has been coughing very badly, spitting up a lot of mucus. Has been coughing "for a while" but yesterday it got much worse. Blowing his nose very frequently, body aches, does not think he has a fever. Started taking coricidin HBP.    Allergic to all -mycins and Augmentin.   "

## 2024-12-23 NOTE — TELEPHONE ENCOUNTER
----- Message from Lorena sent at 12/23/2024  9:12 AM CST -----  Vm- 8:33- pt would like to talk to nurse about a terrible cough   [phone]

## 2024-12-26 ENCOUNTER — TELEPHONE (OUTPATIENT)
Dept: FAMILY MEDICINE | Facility: CLINIC | Age: 64
End: 2024-12-26
Payer: MEDICARE

## 2024-12-26 NOTE — TELEPHONE ENCOUNTER
----- Message from Med Assistant Gordon sent at 12/26/2024  2:12 PM CST -----  Pt is calling stating he is still feeling sick even after taking doxycycline     Pt # [phone]

## 2024-12-27 ENCOUNTER — TELEPHONE (OUTPATIENT)
Dept: FAMILY MEDICINE | Facility: CLINIC | Age: 64
End: 2024-12-27

## 2024-12-27 NOTE — TELEPHONE ENCOUNTER
Spoke with patients wife and explained we do not charge for no shows and apologized for the confusion.

## 2024-12-27 NOTE — TELEPHONE ENCOUNTER
----- Message from Blanche sent at 12/27/2024  8:58 AM CST -----  Wife calling due to patient wanting to cancel for today. Patient wife is under the impression that her insurance will not be charged for this appointment. Wife was informed that she would be charged for this appointment cause the patient should have called yesterday to cancel.  [phone]

## 2025-01-26 RX ORDER — SOLIFENACIN SUCCINATE 10 MG/1
TABLET, FILM COATED ORAL
Qty: 30 TABLET | Refills: 11 | Status: SHIPPED | OUTPATIENT
Start: 2025-01-26

## 2025-01-30 ENCOUNTER — PATIENT MESSAGE (OUTPATIENT)
Dept: FAMILY MEDICINE | Facility: CLINIC | Age: 65
End: 2025-01-30
Payer: MEDICARE

## 2025-02-06 ENCOUNTER — TELEPHONE (OUTPATIENT)
Dept: FAMILY MEDICINE | Facility: CLINIC | Age: 65
End: 2025-02-06
Payer: MEDICARE

## 2025-02-06 DIAGNOSIS — I10 ESSENTIAL HYPERTENSION: Primary | ICD-10-CM

## 2025-02-06 NOTE — TELEPHONE ENCOUNTER
----- Message from Leona sent at 2/6/2025  2:39 PM CST -----  Cherelle called from Dr Duran's office. Cherelle said she needs a referral and his demographics  phone # [phone] fax # [phone] [facility]

## 2025-02-07 ENCOUNTER — PATIENT MESSAGE (OUTPATIENT)
Dept: FAMILY MEDICINE | Facility: CLINIC | Age: 65
End: 2025-02-07
Payer: MEDICARE

## 2025-02-20 ENCOUNTER — TELEPHONE (OUTPATIENT)
Dept: FAMILY MEDICINE | Facility: CLINIC | Age: 65
End: 2025-02-20
Payer: MEDICARE

## 2025-02-20 DIAGNOSIS — Z00.00 ROUTINE GENERAL MEDICAL EXAMINATION AT A HEALTH CARE FACILITY: ICD-10-CM

## 2025-02-20 DIAGNOSIS — Z51.81 ENCOUNTER FOR THERAPEUTIC DRUG MONITORING: ICD-10-CM

## 2025-02-20 DIAGNOSIS — I10 ESSENTIAL HYPERTENSION: Primary | ICD-10-CM

## 2025-02-20 DIAGNOSIS — Z12.5 SCREENING FOR PROSTATE CANCER: ICD-10-CM

## 2025-02-20 DIAGNOSIS — Z79.899 HIGH RISK MEDICATION USE: ICD-10-CM

## 2025-02-20 DIAGNOSIS — E03.9 HYPOTHYROIDISM, UNSPECIFIED TYPE: ICD-10-CM

## 2025-03-17 ENCOUNTER — OFFICE VISIT (OUTPATIENT)
Dept: FAMILY MEDICINE | Facility: CLINIC | Age: 65
End: 2025-03-17
Payer: MEDICARE

## 2025-03-17 ENCOUNTER — RESULTS FOLLOW-UP (OUTPATIENT)
Dept: FAMILY MEDICINE | Facility: CLINIC | Age: 65
End: 2025-03-17

## 2025-03-17 ENCOUNTER — HOSPITAL ENCOUNTER (OUTPATIENT)
Dept: RADIOLOGY | Facility: HOSPITAL | Age: 65
Discharge: HOME | End: 2025-03-17
Attending: NURSE PRACTITIONER
Payer: MEDICARE

## 2025-03-17 VITALS
BODY MASS INDEX: 24.44 KG/M2 | OXYGEN SATURATION: 96 % | HEIGHT: 73 IN | HEART RATE: 67 BPM | WEIGHT: 184.38 LBS | SYSTOLIC BLOOD PRESSURE: 120 MMHG | DIASTOLIC BLOOD PRESSURE: 84 MMHG

## 2025-03-17 DIAGNOSIS — I10 ESSENTIAL HYPERTENSION: ICD-10-CM

## 2025-03-17 DIAGNOSIS — R05.9 COUGH, UNSPECIFIED TYPE: ICD-10-CM

## 2025-03-17 DIAGNOSIS — E55.9 VITAMIN D DEFICIENCY: ICD-10-CM

## 2025-03-17 DIAGNOSIS — R73.02 IMPAIRED GLUCOSE TOLERANCE (ORAL): ICD-10-CM

## 2025-03-17 DIAGNOSIS — E03.9 HYPOTHYROIDISM, UNSPECIFIED TYPE: ICD-10-CM

## 2025-03-17 DIAGNOSIS — K21.9 GASTROESOPHAGEAL REFLUX DISEASE, UNSPECIFIED WHETHER ESOPHAGITIS PRESENT: ICD-10-CM

## 2025-03-17 DIAGNOSIS — D64.9 ANEMIA, UNSPECIFIED TYPE: ICD-10-CM

## 2025-03-17 DIAGNOSIS — M06.4 INFLAMMATORY POLYARTHROPATHY: ICD-10-CM

## 2025-03-17 DIAGNOSIS — R05.9 COUGH, UNSPECIFIED TYPE: Primary | ICD-10-CM

## 2025-03-17 PROBLEM — C61 MALIGNANT NEOPLASM OF PROSTATE: Status: RESOLVED | Noted: 2019-08-28 | Resolved: 2025-03-17

## 2025-03-17 PROBLEM — C61 PROSTATE CANCER: Status: RESOLVED | Noted: 2019-10-16 | Resolved: 2025-03-17

## 2025-03-17 PROCEDURE — 99215 OFFICE O/P EST HI 40 MIN: CPT | Mod: S$GLB,,, | Performed by: NURSE PRACTITIONER

## 2025-03-17 PROCEDURE — 1101F PT FALLS ASSESS-DOCD LE1/YR: CPT | Mod: CPTII,S$GLB,, | Performed by: NURSE PRACTITIONER

## 2025-03-17 PROCEDURE — 1160F RVW MEDS BY RX/DR IN RCRD: CPT | Mod: CPTII,S$GLB,, | Performed by: NURSE PRACTITIONER

## 2025-03-17 PROCEDURE — 3008F BODY MASS INDEX DOCD: CPT | Mod: CPTII,S$GLB,, | Performed by: NURSE PRACTITIONER

## 2025-03-17 PROCEDURE — 71046 X-RAY EXAM CHEST 2 VIEWS: CPT | Mod: 26,,, | Performed by: RADIOLOGY

## 2025-03-17 PROCEDURE — 3288F FALL RISK ASSESSMENT DOCD: CPT | Mod: CPTII,S$GLB,, | Performed by: NURSE PRACTITIONER

## 2025-03-17 PROCEDURE — 1126F AMNT PAIN NOTED NONE PRSNT: CPT | Mod: CPTII,S$GLB,, | Performed by: NURSE PRACTITIONER

## 2025-03-17 PROCEDURE — 3079F DIAST BP 80-89 MM HG: CPT | Mod: CPTII,S$GLB,, | Performed by: NURSE PRACTITIONER

## 2025-03-17 PROCEDURE — 1159F MED LIST DOCD IN RCRD: CPT | Mod: CPTII,S$GLB,, | Performed by: NURSE PRACTITIONER

## 2025-03-17 PROCEDURE — 71046 X-RAY EXAM CHEST 2 VIEWS: CPT | Mod: TC,PO

## 2025-03-17 PROCEDURE — 4010F ACE/ARB THERAPY RXD/TAKEN: CPT | Mod: CPTII,S$GLB,, | Performed by: NURSE PRACTITIONER

## 2025-03-17 PROCEDURE — 3074F SYST BP LT 130 MM HG: CPT | Mod: CPTII,S$GLB,, | Performed by: NURSE PRACTITIONER

## 2025-03-17 RX ORDER — OMEGA-3-ACID ETHYL ESTERS 1 G/1
2 CAPSULE, LIQUID FILLED ORAL 2 TIMES DAILY
COMMUNITY

## 2025-03-17 RX ORDER — PANTOPRAZOLE SODIUM 40 MG/1
40 TABLET, DELAYED RELEASE ORAL DAILY
Qty: 90 TABLET | Refills: 3 | Status: SHIPPED | OUTPATIENT
Start: 2025-03-17 | End: 2026-03-17

## 2025-03-18 ENCOUNTER — TELEPHONE (OUTPATIENT)
Dept: FAMILY MEDICINE | Facility: CLINIC | Age: 65
End: 2025-03-18
Payer: MEDICARE

## 2025-03-18 ENCOUNTER — HOSPITAL ENCOUNTER (OUTPATIENT)
Dept: RADIOLOGY | Facility: HOSPITAL | Age: 65
Discharge: HOME | End: 2025-03-18
Attending: NURSE PRACTITIONER
Payer: MEDICARE

## 2025-03-18 DIAGNOSIS — R07.9 CHEST PAIN, UNSPECIFIED TYPE: Primary | ICD-10-CM

## 2025-03-18 DIAGNOSIS — R79.89 POSITIVE D DIMER: ICD-10-CM

## 2025-03-18 DIAGNOSIS — R07.9 CHEST PAIN, UNSPECIFIED TYPE: ICD-10-CM

## 2025-03-18 LAB
25(OH)D3+25(OH)D2 SERPL-MCNC: 81 NG/ML (ref 30–100)
ALBUMIN SERPL-MCNC: 4.2 G/DL (ref 3.6–5.1)
ALBUMIN/GLOB SERPL: 1.4 (CALC) (ref 1–2.5)
ALP SERPL-CCNC: 82 U/L (ref 35–144)
ALT SERPL-CCNC: 16 U/L (ref 9–46)
AST SERPL-CCNC: 18 U/L (ref 10–35)
BASOPHILS # BLD AUTO: 40 CELLS/UL (ref 0–200)
BASOPHILS NFR BLD AUTO: 0.8 %
BILIRUB SERPL-MCNC: 0.7 MG/DL (ref 0.2–1.2)
BNP SERPL-MCNC: 21 PG/ML
BUN SERPL-MCNC: 28 MG/DL (ref 7–25)
BUN/CREAT SERPL: 27 (CALC) (ref 6–22)
CALCIUM SERPL-MCNC: 9.6 MG/DL (ref 8.6–10.3)
CHLORIDE SERPL-SCNC: 101 MMOL/L (ref 98–110)
CO2 SERPL-SCNC: 31 MMOL/L (ref 20–32)
CREAT SERPL-MCNC: 1.03 MG/DL (ref 0.7–1.35)
D DIMER PPP FEU-MCNC: 2.2 MCG/ML FEU
EGFR: 81 ML/MIN/1.73M2
EOSINOPHIL # BLD AUTO: 50 CELLS/UL (ref 15–500)
EOSINOPHIL NFR BLD AUTO: 1 %
ERYTHROCYTE [DISTWIDTH] IN BLOOD BY AUTOMATED COUNT: 12.8 % (ref 11–15)
GLOBULIN SER CALC-MCNC: 3.1 G/DL (CALC) (ref 1.9–3.7)
GLUCOSE SERPL-MCNC: 92 MG/DL (ref 65–99)
HBA1C MFR BLD: 4.9 % OF TOTAL HGB
HCT VFR BLD AUTO: 41.2 % (ref 38.5–50)
HGB BLD-MCNC: 13.3 G/DL (ref 13.2–17.1)
LYMPHOCYTES # BLD AUTO: 925 CELLS/UL (ref 850–3900)
LYMPHOCYTES NFR BLD AUTO: 18.5 %
MCH RBC QN AUTO: 27.3 PG (ref 27–33)
MCHC RBC AUTO-ENTMCNC: 32.3 G/DL (ref 32–36)
MCV RBC AUTO: 84.4 FL (ref 80–100)
MONOCYTES # BLD AUTO: 280 CELLS/UL (ref 200–950)
MONOCYTES NFR BLD AUTO: 5.6 %
NEUTROPHILS # BLD AUTO: 3705 CELLS/UL (ref 1500–7800)
NEUTROPHILS NFR BLD AUTO: 74.1 %
PLATELET # BLD AUTO: 136 THOUSAND/UL (ref 140–400)
PMV BLD REES-ECKER: 9.7 FL (ref 7.5–12.5)
POTASSIUM SERPL-SCNC: 4 MMOL/L (ref 3.5–5.3)
PROT SERPL-MCNC: 7.3 G/DL (ref 6.1–8.1)
RBC # BLD AUTO: 4.88 MILLION/UL (ref 4.2–5.8)
SODIUM SERPL-SCNC: 140 MMOL/L (ref 135–146)
TSH SERPL-ACNC: 1.51 MIU/L (ref 0.4–4.5)
WBC # BLD AUTO: 5 THOUSAND/UL (ref 3.8–10.8)

## 2025-03-18 PROCEDURE — 71275 CT ANGIOGRAPHY CHEST: CPT | Mod: 26,,, | Performed by: RADIOLOGY

## 2025-03-18 PROCEDURE — 71275 CT ANGIOGRAPHY CHEST: CPT | Mod: TC

## 2025-03-18 PROCEDURE — 25500020 PHARM REV CODE 255: Performed by: NURSE PRACTITIONER

## 2025-03-18 RX ADMIN — IOHEXOL 100 ML: 350 INJECTION, SOLUTION INTRAVENOUS at 04:03

## 2025-03-18 NOTE — PROGRESS NOTES
SUBJECTIVE:    Patient ID: Phoenix Fragoso is a 64 y.o. male.    Chief Complaint: Follow-up (No bottles, has list//Pt is here for a 6 month follow up//KE)      History of Present Illness    CHIEF COMPLAINT:  64 year old presents today for check up    RESPIRATORY SYMPTOMS:  He reports persistent respiratory symptoms since October with white phlegm production and frequent throat clearing associated with postnasal drip. While the cough has improved, it continues to persist. He experiences exercise intolerance, becoming short of breath with activity. He denies smoking history.    FATIGUE:  He experiences fatigue easily requiring rest, often lying down during lunch breaks when working near home to recuperate.    RHEUMATOLOGIC CONDITION:  He reports inability to discontinue prednisone, stating he does not feel well without it. His insurance will not cover newer immunologics     MUSCULOSKELETAL:  He completed three months of rehabilitation for a hand injury. He reports limited finger mobility with difficulty bending one finger due to pulling sensation and decreased hand strength, affecting his ability to  objects from a table.    CARDIOVASCULAR:  He reports occasional chest pain, which he describes as very rare, not associated with any specific activity or time of day.    MEDICATIONS:  He self-treats with OTC Prilosec and Pepcid for GI symptoms. When not taking Prilosec, he takes two Pepcid tablets.      ROS:  General: -fever, -chills, +fatigue, -weight gain, -weight loss  Eyes: -vision changes, -redness, -discharge  ENT: -ear pain, -nasal congestion, -sore throat, +post nasal drip  Cardiovascular: +chest pain, -palpitations, -lower extremity edema  Respiratory: +cough, -shortness of breath, +productive cough, +sputum production, +exertional dyspnea  Gastrointestinal: -abdominal pain, -nausea, -vomiting, -diarrhea, -constipation, -blood in stool, +indigestion  Genitourinary: -dysuria, -hematuria,  -frequency  Musculoskeletal: -joint pain, -muscle pain, +muscle weakness  Skin: -rash, -lesion  Neurological: -headache, -dizziness, -numbness, -tingling  Psychiatric: -anxiety, -depression, -sleep difficulty              Admission on 10/16/2024, Discharged on 10/18/2024   Component Date Value Ref Range Status    WBC 10/16/2024 9.17  3.90 - 12.70 K/uL Final    RBC 10/16/2024 4.79  4.60 - 6.20 M/uL Final    Hemoglobin 10/16/2024 13.7 (L)  14.0 - 18.0 g/dL Final    Hematocrit 10/16/2024 40.3  40.0 - 54.0 % Final    MCV 10/16/2024 84  82 - 98 fL Final    MCH 10/16/2024 28.6  27.0 - 31.0 pg Final    MCHC 10/16/2024 34.0  32.0 - 36.0 g/dL Final    RDW 10/16/2024 13.8  11.5 - 14.5 % Final    Platelets 10/16/2024 231  150 - 450 K/uL Final    MPV 10/16/2024 9.9  9.2 - 12.9 fL Final    Immature Granulocytes 10/16/2024 0.7 (H)  0.0 - 0.5 % Final    Gran # (ANC) 10/16/2024 8.0 (H)  1.8 - 7.7 K/uL Final    Immature Grans (Abs) 10/16/2024 0.06 (H)  0.00 - 0.04 K/uL Final    Lymph # 10/16/2024 0.6 (L)  1.0 - 4.8 K/uL Final    Mono # 10/16/2024 0.5  0.3 - 1.0 K/uL Final    Eos # 10/16/2024 0.0  0.0 - 0.5 K/uL Final    Baso # 10/16/2024 0.02  0.00 - 0.20 K/uL Final    nRBC 10/16/2024 0  0 /100 WBC Final    Gran % 10/16/2024 87.1 (H)  38.0 - 73.0 % Final    Lymph % 10/16/2024 6.3 (L)  18.0 - 48.0 % Final    Mono % 10/16/2024 5.6  4.0 - 15.0 % Final    Eosinophil % 10/16/2024 0.1  0.0 - 8.0 % Final    Basophil % 10/16/2024 0.2  0.0 - 1.9 % Final    Differential Method 10/16/2024 Automated   Final    Sodium 10/16/2024 130 (L)  136 - 145 mmol/L Final    Potassium 10/16/2024 3.5  3.5 - 5.1 mmol/L Final    Chloride 10/16/2024 91 (L)  95 - 110 mmol/L Final    CO2 10/16/2024 28  23 - 29 mmol/L Final    Glucose 10/16/2024 89  70 - 110 mg/dL Final    BUN 10/16/2024 21  8 - 23 mg/dL Final    Creatinine 10/16/2024 1.1  0.5 - 1.4 mg/dL Final    Calcium 10/16/2024 9.6  8.7 - 10.5 mg/dL Final    Total Protein 10/16/2024 7.6  6.0 - 8.4 g/dL Final     Albumin 10/16/2024 3.9  3.5 - 5.2 g/dL Final    Total Bilirubin 10/16/2024 1.5 (H)  0.1 - 1.0 mg/dL Final    Alkaline Phosphatase 10/16/2024 65  55 - 135 U/L Final    AST 10/16/2024 16  10 - 40 U/L Final    ALT 10/16/2024 16  10 - 44 U/L Final    eGFR 10/16/2024 >60.0  >60 mL/min/1.73 m^2 Final    Anion Gap 10/16/2024 11  8 - 16 mmol/L Final    Sodium 10/17/2024 138  136 - 145 mmol/L Final    Potassium 10/17/2024 3.6  3.5 - 5.1 mmol/L Final    Chloride 10/17/2024 103  95 - 110 mmol/L Final    CO2 10/17/2024 29  23 - 29 mmol/L Final    Glucose 10/17/2024 143 (H)  70 - 110 mg/dL Final    BUN 10/17/2024 24 (H)  8 - 23 mg/dL Final    Creatinine 10/17/2024 0.9  0.5 - 1.4 mg/dL Final    Calcium 10/17/2024 8.9  8.7 - 10.5 mg/dL Final    Total Protein 10/17/2024 6.5  6.0 - 8.4 g/dL Final    Albumin 10/17/2024 3.2 (L)  3.5 - 5.2 g/dL Final    Total Bilirubin 10/17/2024 0.5  0.1 - 1.0 mg/dL Final    Alkaline Phosphatase 10/17/2024 50 (L)  55 - 135 U/L Final    AST 10/17/2024 12  10 - 40 U/L Final    ALT 10/17/2024 13  10 - 44 U/L Final    eGFR 10/17/2024 >60.0  >60 mL/min/1.73 m^2 Final    Anion Gap 10/17/2024 6 (L)  8 - 16 mmol/L Final    Magnesium 10/17/2024 2.2  1.6 - 2.6 mg/dL Final    WBC 10/17/2024 6.23  3.90 - 12.70 K/uL Final    RBC 10/17/2024 4.04 (L)  4.60 - 6.20 M/uL Final    Hemoglobin 10/17/2024 11.0 (L)  14.0 - 18.0 g/dL Final    Hematocrit 10/17/2024 34.0 (L)  40.0 - 54.0 % Final    MCV 10/17/2024 84  82 - 98 fL Final    MCH 10/17/2024 27.2  27.0 - 31.0 pg Final    MCHC 10/17/2024 32.4  32.0 - 36.0 g/dL Final    RDW 10/17/2024 13.4  11.5 - 14.5 % Final    Platelets 10/17/2024 195  150 - 450 K/uL Final    MPV 10/17/2024 9.4  9.2 - 12.9 fL Final    Immature Granulocytes 10/17/2024 0.5  0.0 - 0.5 % Final    Gran # (ANC) 10/17/2024 5.5  1.8 - 7.7 K/uL Final    Immature Grans (Abs) 10/17/2024 0.03  0.00 - 0.04 K/uL Final    Lymph # 10/17/2024 0.4 (L)  1.0 - 4.8 K/uL Final    Mono # 10/17/2024 0.3  0.3 - 1.0 K/uL  Final    Eos # 10/17/2024 0.0  0.0 - 0.5 K/uL Final    Baso # 10/17/2024 0.01  0.00 - 0.20 K/uL Final    nRBC 10/17/2024 0  0 /100 WBC Final    Gran % 10/17/2024 87.6 (H)  38.0 - 73.0 % Final    Lymph % 10/17/2024 6.6 (L)  18.0 - 48.0 % Final    Mono % 10/17/2024 5.1  4.0 - 15.0 % Final    Eosinophil % 10/17/2024 0.0  0.0 - 8.0 % Final    Basophil % 10/17/2024 0.2  0.0 - 1.9 % Final    Differential Method 10/17/2024 Automated   Final    Sodium 10/18/2024 142  136 - 145 mmol/L Final    Potassium 10/18/2024 3.8  3.5 - 5.1 mmol/L Final    Chloride 10/18/2024 105  95 - 110 mmol/L Final    CO2 10/18/2024 30 (H)  23 - 29 mmol/L Final    Glucose 10/18/2024 124 (H)  70 - 110 mg/dL Final    BUN 10/18/2024 34 (H)  8 - 23 mg/dL Final    Creatinine 10/18/2024 0.9  0.5 - 1.4 mg/dL Final    Calcium 10/18/2024 8.7  8.7 - 10.5 mg/dL Final    Total Protein 10/18/2024 6.1  6.0 - 8.4 g/dL Final    Albumin 10/18/2024 3.0 (L)  3.5 - 5.2 g/dL Final    Total Bilirubin 10/18/2024 0.4  0.1 - 1.0 mg/dL Final    Alkaline Phosphatase 10/18/2024 48 (L)  55 - 135 U/L Final    AST 10/18/2024 15  10 - 40 U/L Final    ALT 10/18/2024 17  10 - 44 U/L Final    eGFR 10/18/2024 >60.0  >60 mL/min/1.73 m^2 Final    Anion Gap 10/18/2024 7 (L)  8 - 16 mmol/L Final    Magnesium 10/18/2024 2.4  1.6 - 2.6 mg/dL Final    WBC 10/18/2024 10.72  3.90 - 12.70 K/uL Final    RBC 10/18/2024 3.90 (L)  4.60 - 6.20 M/uL Final    Hemoglobin 10/18/2024 11.1 (L)  14.0 - 18.0 g/dL Final    Hematocrit 10/18/2024 33.5 (L)  40.0 - 54.0 % Final    MCV 10/18/2024 86  82 - 98 fL Final    MCH 10/18/2024 28.5  27.0 - 31.0 pg Final    MCHC 10/18/2024 33.1  32.0 - 36.0 g/dL Final    RDW 10/18/2024 13.4  11.5 - 14.5 % Final    Platelets 10/18/2024 251  150 - 450 K/uL Final    MPV 10/18/2024 9.2  9.2 - 12.9 fL Final    Immature Granulocytes 10/18/2024 1.0 (H)  0.0 - 0.5 % Final    Gran # (ANC) 10/18/2024 9.3 (H)  1.8 - 7.7 K/uL Final    Immature Grans (Abs) 10/18/2024 0.11 (H)  0.00 -  0.04 K/uL Final    Lymph # 10/18/2024 0.7 (L)  1.0 - 4.8 K/uL Final    Mono # 10/18/2024 0.6  0.3 - 1.0 K/uL Final    Eos # 10/18/2024 0.0  0.0 - 0.5 K/uL Final    Baso # 10/18/2024 0.02  0.00 - 0.20 K/uL Final    nRBC 10/18/2024 0  0 /100 WBC Final    Gran % 10/18/2024 86.3 (H)  38.0 - 73.0 % Final    Lymph % 10/18/2024 6.8 (L)  18.0 - 48.0 % Final    Mono % 10/18/2024 5.7  4.0 - 15.0 % Final    Eosinophil % 10/18/2024 0.0  0.0 - 8.0 % Final    Basophil % 10/18/2024 0.2  0.0 - 1.9 % Final    Differential Method 10/18/2024 Automated   Final   Office Visit on 10/15/2024   Component Date Value Ref Range Status    POC Molecular Influenza A Ag 10/15/2024 Negative  Negative Final    POC Molecular Influenza B Ag 10/15/2024 Negative  Negative Final     Acceptable 10/15/2024 Yes   Final    POC Rapid COVID 10/15/2024 Negative  Negative Final     Acceptable 10/15/2024 Yes   Final       Past Medical History:   Diagnosis Date    Bell's palsy     High cholesterol     Hypertension     Insomnia     Thyroid disease      Past Surgical History:   Procedure Laterality Date    APPENDECTOMY      CHOLECYSTECTOMY      CYSTOSCOPY N/A 2/27/2024    Procedure: CYSTOSCOPY;  Surgeon: Walker Brooks MD;  Location: [facility] OR;  Service: Urology;  Laterality: N/A;    PROSTATE BIOPSY      PROSTATE SURGERY      right knee arthroscopy      ROBOT-ASSISTED LAPAROSCOPIC PROSTATECTOMY N/A 10/16/2019    Procedure: ROBOTIC PROSTATECTOMY;  Surgeon: Walker Brooks MD;  Location: [facility] OR;  Service: Urology;  Laterality: N/A;     Family History   Problem Relation Name Age of Onset    Arthritis Mother         All of your core healthy metrics are met.      The 10-year CVD risk score (KOLTON'Agostino, et al., 2008) is: 24.5%    Values used to calculate the score:      Age: 64 years      Sex: Male      Diabetic: No      Tobacco smoker: No      Systolic Blood Pressure: 120 mmHg      Is BP treated: Yes      HDL Cholesterol: 31  "mg/dL      Total Cholesterol: 161 mg/dL     Marital Status:   Alcohol History:  reports current alcohol use.  Tobacco History:  reports that he has never smoked. He has never used smokeless tobacco.  Drug History:  reports no history of drug use.    Health Maintenance Topics with due status: Not Due       Topic Last Completion Date    TETANUS VACCINE 11/03/2019    Colorectal Cancer Screening 11/13/2023    Lipid Panel 03/14/2024     Immunization History   Administered Date(s) Administered    COVID-19, MRNA, LN-S, PF (MODERNA FULL 0.5 ML DOSE) 02/24/2021, 03/24/2021    Hepatitis A, Adult 09/12/2005    Influenza (FLUBLOK) - Quadrivalent - Recombinant - PF *Preferred* (egg allergy) 10/24/2019, 10/06/2020, 10/20/2022    Influenza - Quadrivalent - PF *Preferred* (6 months and older) 10/03/2017, 10/30/2023    Influenza - Trivalent - Fluad - Adjuvanted - PF (65 years and older 11/08/2024    Influenza - Trivalent - Flublok - PF (18 years and older) 09/11/2018    Pneumococcal Polysaccharide - 23 Valent 11/18/2014    Tdap 12/05/2016, 11/03/2019       Review of patient's allergies indicates:   Allergen Reactions    Ancef [cefazolin] Anaphylaxis     Anaphylactic shock     Clarithromycin (bulk) Anaphylaxis and Shortness Of Breath     Allergic reaction  Can not take anything that ends in "Mycin"    Augmentin [amoxicillin-pot clavulanate] Rash     Was sent home with two antibiotics and broke out with a rash so not 100% sure if allergic to this or not     Current Medications[1]        Objective:      Vitals:    03/17/25 1332   BP: 120/84   Pulse: 67   SpO2: 96%   Weight: 83.6 kg (184 lb 6.4 oz)   Height: 6' 1" (1.854 m)       Physical Exam  Vitals and nursing note reviewed.   Constitutional:       General: He is not in acute distress.     Appearance: Normal appearance. He is well-developed.   HENT:      Right Ear: External ear normal.      Left Ear: External ear normal.      Nose: Congestion present.      Right Sinus: No " frontal sinus tenderness.      Left Sinus: No frontal sinus tenderness.      Mouth/Throat:      Pharynx: Postnasal drip present.   Eyes:      Pupils: Pupils are equal, round, and reactive to light.   Neck:      Trachea: No tracheal deviation.   Cardiovascular:      Rate and Rhythm: Normal rate and regular rhythm.      Heart sounds: No murmur heard.     No friction rub. No gallop.   Pulmonary:      Breath sounds: Normal breath sounds. No stridor. No wheezing or rales.   Abdominal:      Palpations: Abdomen is soft. There is no mass.      Tenderness: There is no abdominal tenderness.   Musculoskeletal:         General: No tenderness or deformity.      Cervical back: Neck supple.   Lymphadenopathy:      Cervical: No cervical adenopathy.   Skin:     General: Skin is warm and dry.   Neurological:      Mental Status: He is alert and oriented to person, place, and time.      Coordination: Coordination normal.   Psychiatric:         Mood and Affect: Mood is not anxious or depressed.         Thought Content: Thought content normal.            Assessment:       1. Cough, unspecified type    2. Hypothyroidism, unspecified type    3. Essential hypertension    4. Anemia, unspecified type    5. Vitamin D deficiency    6. Impaired glucose tolerance (oral)    7. Inflammatory polyarthropathy           Assessment & Plan    - Ordered XR Chest and specific labs to rule out potential underlying conditions, including tests for blood clots, given persistent cough and phlegm production since October.  - Suspect possible acid reflux contributing to symptoms, given use of prednisone which can exacerbate reflux.  - Evaluated effectiveness of current treatment regimen for rheumatological condition,  - Acknowledged Dr. Mendoza may want to conduct cardiac testing given symptoms.    INFLAMMATORY POLYARTHROPATHY:  - Phoenix unable to quit prednisone and does not feel well without it.  - Current regimen deemed ineffective.  - Continued prednisone 5mg  Leisa  Marylou Garibay instructed to bring blood pressure logs to next appointment.    CHRONIC COUGH:  - Phoenix reports ongoing cough since October with white sputum and frequent throat clearing.  - Physical exam revealed no abnormal lung sounds.  - Ordered chest XR and specific labs to rule out potential underlying conditions.    POSTNASAL DRIP:  - Phoenix reports persistent postnasal drip.  - Some drainage observed during exam.    SHORTNESS OF BREATH:  - Phoenix reports dyspnea during physical activity.  - Ordered chest XR and labs to evaluate shortness of breath.    FATIGUE:  - Phoenix reports fatigue easily and requires rest during work.    HAND STIFFNESS AND WEAKNESS:  - Phoenix reports difficulty with finger flexion and hand weakness.  - Instructed to use hand exercise ball while watching TV and recommended additional hand exercises to improve strength.    CHEST PAIN:  - Phoenix reports occasional chest pain.  - Ordered chest XR and specific labs to rule out potential underlying conditions, including tests for thromboembolism.    GASTROESOPHAGEAL REFLUX DISEASE:  - Assessed that gastroesophageal reflux may be contributing to patient's symptoms, including cough.  - Phoenix reports using OTC Prilosec and Pepcid for GI issues.  - Prescribed Protonix (to be taken daily in the morning) and continued Pepcid (to be taken daily at night) to address potential gastroesophageal reflux.  - Explained the mechanism of action for both medications.  - Discontinued OTC Prilosec.  - Advised the patient to contact the office if symptoms do not improve with new medication regimen.    LONG-TERM STEROID USE:  - Discussed potential correlation between prednisone use and increased gastroesophageal reflux.    FOLLOW-UP:  - Follow up with Dr. Mendoza at scheduled appointment tomorrow.        Plan:       1. Cough, unspecified type  Comments:  xray  Orders:  -     X-Ray Chest PA And Lateral; Future; Expected date: 03/17/2025    2. Hypothyroidism,  unspecified type  Comments:  levothyroxine  Orders:  -     CBC Auto Differential; Future; Expected date: 03/17/2025  -     Comprehensive Metabolic Panel; Future; Expected date: 03/17/2025  -     BNP; Future; Expected date: 03/17/2025  -     D-Dimer, Quantitative; Future; Expected date: 03/17/2025  -     Hemoglobin A1C; Future; Expected date: 03/17/2025  -     TSH w/reflex to FT4; Future; Expected date: 03/17/2025    3. Essential hypertension  Comments:  bp controlled  Orders:  -     CBC Auto Differential; Future; Expected date: 03/17/2025  -     Comprehensive Metabolic Panel; Future; Expected date: 03/17/2025  -     BNP; Future; Expected date: 03/17/2025  -     D-Dimer, Quantitative; Future; Expected date: 03/17/2025  -     Hemoglobin A1C; Future; Expected date: 03/17/2025    4. Anemia, unspecified type  Comments:  labs  Orders:  -     CBC Auto Differential; Future; Expected date: 03/17/2025  -     Comprehensive Metabolic Panel; Future; Expected date: 03/17/2025  -     BNP; Future; Expected date: 03/17/2025  -     D-Dimer, Quantitative; Future; Expected date: 03/17/2025  -     Hemoglobin A1C; Future; Expected date: 03/17/2025    5. Vitamin D deficiency  Comments:  labs  Orders:  -     Vitamin D; Future; Expected date: 03/17/2025    6. Impaired glucose tolerance (oral)  -     Hemoglobin A1C; Future; Expected date: 03/17/2025    7. Inflammatory polyarthropathy  Comments:  followed by rheumagology    8. Gastroesophageal reflux disease, unspecified whether esophagitis present  Comments:  protonix in am. pepcid at night. call if no improvement    Other orders  -     pantoprazole (PROTONIX) 40 MG tablet; Take 1 tablet (40 mg total) by mouth once daily.  Dispense: 90 tablet; Refill: 3      Follow up in about 3 months (around 6/17/2025), or if symptoms worsen or fail to improve, for medication management.        ..Total time spend on encounter: 40-54 minutes. This includes face to face time and non-face to face time  preparing to see the patient (eg, review of tests), obtaining and/or reviewing separately obtained history, documenting clinical information in the electronic or other health record, independently interpreting results and communicating results to the patient/family/caregiver, or care coordinator.      Counseled on age and gender appropriate medical preventative services, including cancer screenings, immunizations, overall nutritional health, need for a consistent exercise regimen and an overall push towards maintaining a vigorous and active lifestyle.      This note was generated with the assistance of ambient listening technology. Verbal consent was obtained by the patient and accompanying visitor(s) for the recording of patient appointment to facilitate this note. I attest to having reviewed and edited the generated note for accuracy, though some syntax or spelling errors may persist. Please contact the author of this note for any clarification.       3/17/2025 Brigette Robledo NP         [1]   Current Outpatient Medications:     ascorbic acid, vitamin C, (VITAMIN C) 100 MG tablet, Take 100 mg by mouth once daily., Disp: , Rfl:     aspirin (ECOTRIN) 81 MG EC tablet, Take 81 mg by mouth once daily., Disp: , Rfl:     buPROPion (WELLBUTRIN XL) 150 MG TB24 tablet, Take 1 tablet (150 mg total) by mouth once daily., Disp: 30 tablet, Rfl: 11    calcium carbonate (OS-BEAU) 600 mg calcium (1,500 mg) Tab, Take 600 mg by mouth once daily., Disp: , Rfl:     carvediloL (COREG) 12.5 MG tablet, Take 12.5 mg by mouth 2 (two) times daily., Disp: , Rfl:     clonazePAM (KLONOPIN) 1 MG tablet, Take 1 tablet (1 mg total) by mouth 2 (two) times daily as needed for Anxiety., Disp: 30 tablet, Rfl: 1    EPINEPHrine (EPIPEN) 0.3 mg/0.3 mL AtIn, Inject 0.3 mLs (0.3 mg total) into the muscle once. for 1 dose, Disp: 0.6 mL, Rfl: 11    famotidine (PEPCID) 20 MG tablet, Take 1 tablet (20 mg total) by mouth 2 (two) times daily., Disp: 60 tablet, Rfl:  11    hydrALAZINE (APRESOLINE) 50 MG tablet, Take 100 mg by mouth 2 (two) times daily., Disp: , Rfl:     levothyroxine (SYNTHROID) 200 MCG tablet, Take 1 tablet (200 mcg total) by mouth before breakfast., Disp: 30 tablet, Rfl: 2    losartan (COZAAR) 50 MG tablet, Take 50 mg by mouth 2 (two) times a day., Disp: , Rfl:     multivitamin-minerals-lutein Tab, Take 1 tablet by mouth once daily., Disp: , Rfl:     omega-3 acid ethyl esters (LOVAZA) 1 gram capsule, Take 2 g by mouth 2 (two) times daily., Disp: , Rfl:     ondansetron (ZOFRAN-ODT) 4 MG TbDL, Take 1 tablet (4 mg total) by mouth every 8 (eight) hours as needed., Disp: 30 tablet, Rfl: 0    predniSONE (DELTASONE) 5 MG tablet, Take 1 tablet (5 mg total) by mouth once daily. Can restart taking after you finish Medrol Dose Pack, Disp: 30 tablet, Rfl: 3    solifenacin (VESICARE) 10 MG tablet, TAKE ONE TABLET (10MG TOTAL) BY MOUTH ONCE DAILY, Disp: 30 tablet, Rfl: 11    tadalafiL (CIALIS) 20 MG Tab, Take 1 tablet (20 mg total) by mouth once daily., Disp: 10 tablet, Rfl: 11    vitamin D (VITAMIN D3) 1000 units Tab, Take 2,000 Units by mouth once daily., Disp: , Rfl:     pantoprazole (PROTONIX) 40 MG tablet, Take 1 tablet (40 mg total) by mouth once daily., Disp: 90 tablet, Rfl: 3

## 2025-03-19 ENCOUNTER — TELEPHONE (OUTPATIENT)
Dept: FAMILY MEDICINE | Facility: CLINIC | Age: 65
End: 2025-03-19
Payer: MEDICARE

## 2025-03-19 DIAGNOSIS — R05.9 COUGH, UNSPECIFIED TYPE: Primary | ICD-10-CM

## 2025-03-19 DIAGNOSIS — R93.89 ABNORMAL CT SCAN, CHEST: ICD-10-CM

## 2025-03-19 NOTE — TELEPHONE ENCOUNTER
----- Message from Lorena sent at 3/19/2025  9:27 AM CDT -----  - 9:16- pt is calling about the ct scan results from his d dimer being high [phone]

## 2025-03-19 NOTE — TELEPHONE ENCOUNTER
Spoke with patient and let him know the below per Brigette. Aware she is going to reach out to the pulmonologist and then call him this afternoon

## 2025-03-19 NOTE — TELEPHONE ENCOUNTER
1. No pulmonary embolism to the segmental level.  2. Right lower lobe pulmonary nodule measuring 9 mm, abutting the pleura, likely an intrapulmonary lymph node.  For a solid nodule >8 mm, Fleischner Society 2017 guidelines recommend considering CT, PET/CT or tissue sampling at 3 months.

## 2025-03-19 NOTE — TELEPHONE ENCOUNTER
Blood clot negative.   I would like him to see alka to further eval the nodule in lung  Will reach out to her shortly.

## 2025-03-20 RX ORDER — TADALAFIL 20 MG/1
20 TABLET ORAL DAILY
Qty: 10 TABLET | Refills: 11 | Status: SHIPPED | OUTPATIENT
Start: 2025-03-20 | End: 2026-03-20

## 2025-03-20 NOTE — TELEPHONE ENCOUNTER
Pt last seen 7/2024   Dr Chen noted     Elected the latter. He has a problem from it would like manage then. Will get KUB with annual PSA and follow-up     Med set up for refill

## 2025-03-20 NOTE — TELEPHONE ENCOUNTER
----- Message from Antoinette sent at 3/20/2025 12:47 PM CDT -----  Type:  Patient Returning CallWho Called:pt Who Left Message for Patient:Antonina the patient know what this is regarding?:Would the patient rather a call back or a response via [place]? Call back Best Call Back Number:[phone] Additional Information:   Please call back to advise. Thank you.

## 2025-03-21 ENCOUNTER — TELEPHONE (OUTPATIENT)
Facility: CLINIC | Age: 65
End: 2025-03-21
Payer: MEDICARE

## 2025-03-21 NOTE — NURSING
Pt returned call. Scheduled a visit in our lung clinic for 3/25 with Dr Lugo.   Date, time, and location of appt has been confirmed.      Oncology Navigation   Intake  Cancer Type: LDCT/Incidental Lung Finding  Type of Referral: Internal  Date of Referral: 03/21/25  Initial Nurse Navigator Contact: 03/21/25  Referral to Initial Contact Timeline (days): 0  First Appointment Available: 03/25/25  Appointment Date: 03/25/25  First Available Date vs. Scheduled Date (days): 0     Treatment  Current Status: Staging work-up             Procedures: CT  CT Schedule Date: 03/18/25                 Acuity      Follow Up  No follow-ups on file.

## 2025-03-24 DIAGNOSIS — Z00.00 ENCOUNTER FOR MEDICARE ANNUAL WELLNESS EXAM: ICD-10-CM

## 2025-03-25 ENCOUNTER — DOCUMENTATION ONLY (OUTPATIENT)
Facility: CLINIC | Age: 65
End: 2025-03-25

## 2025-03-25 ENCOUNTER — OFFICE VISIT (OUTPATIENT)
Facility: CLINIC | Age: 65
End: 2025-03-25
Payer: MEDICARE

## 2025-03-25 VITALS
WEIGHT: 185.88 LBS | TEMPERATURE: 98 F | HEART RATE: 68 BPM | DIASTOLIC BLOOD PRESSURE: 93 MMHG | OXYGEN SATURATION: 98 % | RESPIRATION RATE: 18 BRPM | SYSTOLIC BLOOD PRESSURE: 169 MMHG | BODY MASS INDEX: 24.63 KG/M2 | HEIGHT: 73 IN

## 2025-03-25 DIAGNOSIS — M35.3 PMR (POLYMYALGIA RHEUMATICA): ICD-10-CM

## 2025-03-25 DIAGNOSIS — R91.1 SOLITARY PULMONARY NODULE: ICD-10-CM

## 2025-03-25 DIAGNOSIS — R91.1 SOLITARY PULMONARY NODULE ON LUNG CT: Primary | ICD-10-CM

## 2025-03-25 DIAGNOSIS — R93.89 ABNORMAL CT SCAN, CHEST: ICD-10-CM

## 2025-03-25 DIAGNOSIS — R05.9 COUGH, UNSPECIFIED TYPE: ICD-10-CM

## 2025-03-25 PROCEDURE — 3044F HG A1C LEVEL LT 7.0%: CPT | Mod: CPTII,S$GLB,, | Performed by: INTERNAL MEDICINE

## 2025-03-25 PROCEDURE — 4010F ACE/ARB THERAPY RXD/TAKEN: CPT | Mod: CPTII,S$GLB,, | Performed by: INTERNAL MEDICINE

## 2025-03-25 PROCEDURE — 99999 PR PBB SHADOW E&M-EST. PATIENT-LVL V: CPT | Mod: PBBFAC,,, | Performed by: INTERNAL MEDICINE

## 2025-03-25 PROCEDURE — 99214 OFFICE O/P EST MOD 30 MIN: CPT | Mod: S$GLB,,, | Performed by: INTERNAL MEDICINE

## 2025-03-25 PROCEDURE — 3077F SYST BP >= 140 MM HG: CPT | Mod: CPTII,S$GLB,, | Performed by: INTERNAL MEDICINE

## 2025-03-25 PROCEDURE — 1126F AMNT PAIN NOTED NONE PRSNT: CPT | Mod: CPTII,S$GLB,, | Performed by: INTERNAL MEDICINE

## 2025-03-25 PROCEDURE — 1159F MED LIST DOCD IN RCRD: CPT | Mod: CPTII,S$GLB,, | Performed by: INTERNAL MEDICINE

## 2025-03-25 PROCEDURE — 3008F BODY MASS INDEX DOCD: CPT | Mod: CPTII,S$GLB,, | Performed by: INTERNAL MEDICINE

## 2025-03-25 PROCEDURE — 3080F DIAST BP >= 90 MM HG: CPT | Mod: CPTII,S$GLB,, | Performed by: INTERNAL MEDICINE

## 2025-03-25 PROCEDURE — 1101F PT FALLS ASSESS-DOCD LE1/YR: CPT | Mod: CPTII,S$GLB,, | Performed by: INTERNAL MEDICINE

## 2025-03-25 PROCEDURE — 3288F FALL RISK ASSESSMENT DOCD: CPT | Mod: CPTII,S$GLB,, | Performed by: INTERNAL MEDICINE

## 2025-03-25 NOTE — PROGRESS NOTES
Met with patient and wife in pulmonary clinic today.  Explained my role as navigator.  Reviewed plan of care and answered questions.    Plan is for 6 m CT with f/u at [facility].  My contact information was provided and pt was encouraged to call with any questions or concerns.  Pt and wife verbalized understanding.

## 2025-03-25 NOTE — PROGRESS NOTES
[facility]   Initial Office Visit  Chief Complaint   Patient presents with    new patient      RLL nodule         SUBJECTIVE:     History of Present Illness:  Patient is a 64 y.o. male   History of Present Illness    CHIEF COMPLAINT:  - Patient presents for evaluation of a right lower lobe solitary pulmonary nodule found on a recent CT of the chest.    HPI:  Patient was referred by Angie Robledo NP, for evaluation of a right lower lobe solitary pulmonary nodule discovered on a recent CT chest CT. The CT was ordered due to an elevated D-dimer level, checked the day before a scheduled cardiology appointment. He reports shortness of breath with rare chest pain. He has been coughing and producing significant phlegm for an extended period, including a cough during [place] that improved after New Year's. He denies any history of smoking but mentions growing up around parents who smoked. 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 He reports fatigue, which he attributes to his age (64 years old). He has a family history of heart problems, with both parents having had heart attacks and strokes. His blood pressure remains high while on 3 blood pressure medications twice daily. He has been diagnosed with polymyalgia rheumatica and has been taking prednisone for over 3 years, currently at a low dose of 5 mg. He is followed by a rheumatologist, Dr. Oleary, for this condition. He also mentions expectorating small amounts of very white mucus, possibly related to sinus drip. He denies weight loss and feeling run down.    TEST RESULTS:  - D-dimer: Recently, elevated    IMAGING:  - CT Chest: Recently, revealed a right lower lobe solitary pulmonary nodule measuring 9 mm. The nodule was described as potentially being a lymph node due to its location near a fissure between the right lower lobe and right middle lobe. No other abnormalities were mentioned.  - Chest XRs: Patient reports  "having had several in the past.    SOCIAL HISTORY:  - Alcohol: Denies current use  - Occupation: Contractor          Exposures: 2nd hand smoke growing up. No asbestos     Smoking Hx: never     Past Medical History:   Diagnosis Date    Bell's palsy     High cholesterol     Hypertension     Insomnia     Thyroid disease      Past Surgical History:   Procedure Laterality Date    APPENDECTOMY      CHOLECYSTECTOMY      CYSTOSCOPY N/A 2/27/2024    Procedure: CYSTOSCOPY;  Surgeon: Walker Brooks MD;  Location: [facility] OR;  Service: Urology;  Laterality: N/A;    PROSTATE BIOPSY      PROSTATE SURGERY      right knee arthroscopy      ROBOT-ASSISTED LAPAROSCOPIC PROSTATECTOMY N/A 10/16/2019    Procedure: ROBOTIC PROSTATECTOMY;  Surgeon: Walker Brooks MD;  Location: [facility] OR;  Service: Urology;  Laterality: N/A;     Family History   Problem Relation Name Age of Onset    Arthritis Mother       Social History[1]     Review of patient's allergies indicates:   Allergen Reactions    Ancef [cefazolin] Anaphylaxis     Anaphylactic shock     Clarithromycin (bulk) Anaphylaxis and Shortness Of Breath     Allergic reaction  Can not take anything that ends in "Mycin"    Augmentin [amoxicillin-pot clavulanate] Rash     Was sent home with two antibiotics and broke out with a rash so not 100% sure if allergic to this or not       Medications Ordered Prior to Encounter[2]      Review of Systems     Constitutional: Negative unless otherwise commented above  HENT: Negative unless otherwise commented above  Eyes: Negative unless otherwise commented above  Respiratory: Negative unless otherwise commented above  Cardiovascular: Negative unless otherwise commented above  Musculoskeletal: Negative unless otherwise commented above  Skin: Negative unless otherwise commented above  Neurological: Negative unless otherwise commented above  Hematological: Negative unless otherwise commented above  Endocrine: Negative unless otherwise commented " "above    OBJECTIVE:     Vital Signs (Most Recent)  Visit Vitals  BP (!) 169/93 (BP Location: Left arm, Patient Position: Sitting)   Pulse 68   Temp 97.7 °F (36.5 °C) (Temporal)   Resp 18   Ht 6' 1" (1.854 m)   Wt 84.3 kg (185 lb 13.6 oz)   SpO2 98%   BMI 24.52 kg/m²     6' 1" (1.854 m)  84.3 kg (185 lb 13.6 oz)     SpO2: 98 %      Physical Exam:    General: NAD    Eye: Extraocular movements are intact, Normal conjunctiva.  No scleral icterus  HENT: Normocephalic, Oral mucosa is moist, No pharyngeal erythema, clear oropharynx  Neck: Supple, Non-tender, No jugular venous distention.  Respiratory: clear w/o wheezes, rhonchi   Cardiovascular: RRR   Extremities: no c/c/e   Gastrointestinal: Soft, Non-tender, Non-distended   Lymphatics: No lymphadenopathy neck, supraclavicular.   Integumentary: Warm, Dry.   Neurologic: Alert, Oriented, Normal motor function, No focal defects.         Echocardiogram  No results found for this or any previous visit.    Most Recent Nuclear Stress Test Results  No results found for this or any previous visit.    Most Recent Cardiac PET Stress Test Results  No results found for this or any previous visit.    Most Recent Cardiovascular Angiogram  No results found for this or any previous visit.        SpO2: 98 %      Significant Imaging:  No prior CT for comparison         COMPARISON:  Chest radiograph from 03/17/2025.     FINDINGS:  Pulmonary vasculature: Satisfactory opacification of the pulmonary arterial system with no filling defect to the segmental level.     Aorta: Left-sided aortic arch.  No aneurysm and no significant atherosclerosis.  The thoracic aorta is tortuous.     Base of Neck: No significant abnormality.     Thoracic soft tissues: Normal.     Heart: Normal size. No effusion.     Maria Antonia/Mediastinum: No pathologic luan enlargement.     Airways: The large airways are patent. No foci of endobronchial filling.     Lungs/Pleura: There is a 9 mm right lower lobe pulmonary nodule " abutting the fissure (series 4, image 183).  No pleural effusion or thickening.     Esophagus: There is a small hiatal hernia.  The esophagus is otherwise unremarkable.     Upper Abdomen: There is a simple cyst in the right hepatic lobe.  There is a too small to characterize hypodensity in the anterior left hepatic lobe.  Gallbladder is surgically absent.  There is a partially visualized calcification in the left kidney, may represent a nonobstructing calculus.  Punctate nonobstructing right renal calculus noted     Bones: No acute fracture. No suspicious lytic or sclerotic lesions.     Impression:     1. No pulmonary embolism to the segmental level.  2. Right lower lobe pulmonary nodule measuring 9 mm, abutting the pleura, likely an intrapulmonary lymph node.  For a solid nodule >8 mm, Fleischner Society 2017 guidelines recommend considering CT, PET/CT or tissue sampling at 3 months.        Electronically signed by:Bakari Krueger  Date:                                            03/18/2025  Time:                                           17:41        Exam Ended: 03/18/25 17:02 CDT     Test(s) Reviewed  I have personally reviewed and interpreted the following in detail with patient :  [] Chest Xray Images   [x] CT Images   [] Echocardiogram   [] Labs   [] Other:       Assessment/ Plan:     Solitary pulmonary nodule on lung CT    Cough, unspecified type  -     Ambulatory referral/consult to Pulmonology    Abnormal CT scan, chest  -     Ambulatory referral/consult to Pulmonology    PMR (polymyalgia rheumatica)    Solitary pulmonary nodule  -     CT Chest Without Contrast; Future; Expected date: 09/25/2025         Assessment & Plan    Evaluated right lower lobe solitary pulmonary nodule found on recent CT chest.  Assessed risk factors for lung cancer: non-smoker, no family history, no significant occupational exposures.  Utilized Ilya calculator to estimate cancer risk at 2.6%.  Interpreted nodule as likely benign,  possibly an enlarged lymph node.  Determined active surveillance approach appropriate given low cancer risk and small nodule size (9 mm).  Considered differential diagnoses including lymph node vs. malignancy.    SOLITARY PULMONARY NODULE:  - Explained CT findings, including location and size of nodule.  - Discussed Fleischner Society recommendations for pulmonary nodule follow-up (6 months as pt is low risk) .  - Clarified that nodule is too small to cause symptoms.  - Reassured about low cancer risk based on risk factors and calculator results.  - Explained concept of active surveillance for pulmonary nodules.  - Ordered CT chest in 6 months.    FOLLOW-UP:  - Follow up in 6 months for CT chest review, then in 2 years if nodule remains stable.          Follow up in about 6 months (around 9/25/2025).     See labs and med orders.    Medications have been reviewed and reconciled.      Portions of this note may have been created with voice recognition software.  Grammatical, syntax and spelling errors may be inevitable.      This note was generated with the assistance of ambient listening technology. Verbal consent was obtained by the patient and accompanying visitor(s) for the recording of patient appointment to facilitate this note. I attest to having reviewed and edited the generated note for accuracy, though some syntax or spelling errors may persist. Please contact the author of this note for any clarification.            [1]   Social History  Tobacco Use    Smoking status: Never    Smokeless tobacco: Never   Substance Use Topics    Alcohol use: Yes     Comment: OCCASIONALLY    Drug use: No   [2]   Current Outpatient Medications on File Prior to Visit   Medication Sig Dispense Refill    ascorbic acid, vitamin C, (VITAMIN C) 100 MG tablet Take 100 mg by mouth once daily.      aspirin (ECOTRIN) 81 MG EC tablet Take 81 mg by mouth once daily.      buPROPion (WELLBUTRIN XL) 150 MG TB24 tablet Take 1 tablet (150 mg  total) by mouth once daily. 30 tablet 11    calcium carbonate (OS-BEAU) 600 mg calcium (1,500 mg) Tab Take 600 mg by mouth once daily.      carvediloL (COREG) 12.5 MG tablet Take 12.5 mg by mouth 2 (two) times daily.      clonazePAM (KLONOPIN) 1 MG tablet Take 1 tablet (1 mg total) by mouth 2 (two) times daily as needed for Anxiety. 30 tablet 1    famotidine (PEPCID) 20 MG tablet Take 1 tablet (20 mg total) by mouth 2 (two) times daily. 60 tablet 11    hydrALAZINE (APRESOLINE) 50 MG tablet Take 100 mg by mouth 2 (two) times daily.      losartan (COZAAR) 50 MG tablet Take 50 mg by mouth 2 (two) times a day.      multivitamin-minerals-lutein Tab Take 1 tablet by mouth once daily.      omega-3 acid ethyl esters (LOVAZA) 1 gram capsule Take 2 g by mouth 2 (two) times daily.      pantoprazole (PROTONIX) 40 MG tablet Take 1 tablet (40 mg total) by mouth once daily. 90 tablet 3    predniSONE (DELTASONE) 5 MG tablet Take 1 tablet (5 mg total) by mouth once daily. Can restart taking after you finish Medrol Dose Pack 30 tablet 3    solifenacin (VESICARE) 10 MG tablet TAKE ONE TABLET (10MG TOTAL) BY MOUTH ONCE DAILY 30 tablet 11    tadalafiL (CIALIS) 20 MG Tab Take 1 tablet (20 mg total) by mouth once daily. 10 tablet 11    vitamin D (VITAMIN D3) 1000 units Tab Take 2,000 Units by mouth once daily.      EPINEPHrine (EPIPEN) 0.3 mg/0.3 mL AtIn Inject 0.3 mLs (0.3 mg total) into the muscle once. for 1 dose 0.6 mL 11    levothyroxine (SYNTHROID) 200 MCG tablet Take 1 tablet (200 mcg total) by mouth before breakfast. 30 tablet 2    ondansetron (ZOFRAN-ODT) 4 MG TbDL Take 1 tablet (4 mg total) by mouth every 8 (eight) hours as needed. 30 tablet 0     No current facility-administered medications on file prior to visit.

## 2025-05-28 DIAGNOSIS — E03.9 HYPOTHYROIDISM, UNSPECIFIED TYPE: ICD-10-CM

## 2025-05-28 RX ORDER — LEVOTHYROXINE SODIUM 200 UG/1
200 TABLET ORAL
Qty: 90 TABLET | Refills: 1 | Status: SHIPPED | OUTPATIENT
Start: 2025-05-28 | End: 2025-08-26

## (undated) DEVICE — HEMOSTAT SURGICEL 4X8IN

## (undated) DEVICE — GLOVE SURG ULTRA TOUCH 7

## (undated) DEVICE — SEE MEDLINE ITEM 152651

## (undated) DEVICE — WATER STERILE INJ 500ML BAG

## (undated) DEVICE — SPONGE IV DRAIN 4X4 STERILE

## (undated) DEVICE — SUT STRATAFIX PGAPCL 3 RB-1

## (undated) DEVICE — TUBING PNEUMO

## (undated) DEVICE — IRRIGATOR ENDOSCOPY DISP.

## (undated) DEVICE — NDL PNEUMOPERITONEUM 150MM

## (undated) DEVICE — SUT 2-0 VICRYL / SH (J417)

## (undated) DEVICE — DRESSING TRANS 4X4 3/4

## (undated) DEVICE — SEE MEDLINE ITEM 157117

## (undated) DEVICE — SEE MEDLINE ITEM 157118

## (undated) DEVICE — DRESSING TRANS 4X4 TEGADERM

## (undated) DEVICE — SLEEVE SCD EXPRESS CALF MEDIUM

## (undated) DEVICE — SPONGE GAUZE 16PLY 4X4

## (undated) DEVICE — SEE L#120831

## (undated) DEVICE — DRAIN SIL FLT 7MM FULL PERF ST

## (undated) DEVICE — DRAPE LAVH LAPAROSCOPY W/FLUID

## (undated) DEVICE — SOL IRR NACL .9% 3000ML

## (undated) DEVICE — SOL ELECTROLUBE ANTI-STIC

## (undated) DEVICE — SUT EASE CROSSBOW CLSR SYS

## (undated) DEVICE — SUT 1 36IN PDS II

## (undated) DEVICE — COVER TIP CURVED SCISSORS XI

## (undated) DEVICE — ELECTRODE REM PLYHSV RETURN 9

## (undated) DEVICE — PACK BASIC

## (undated) DEVICE — SEE MEDLINE ITEM 146292

## (undated) DEVICE — SEE MEDLINE ITEM 152622

## (undated) DEVICE — JELLY LUBRICATING STERILE 5 GR

## (undated) DEVICE — UNDERGLOVES BIOGEL PI SZ 7 LF

## (undated) DEVICE — CLIP HEMO-LOK ML

## (undated) DEVICE — Device

## (undated) DEVICE — SUT 2-0 VICRYL / CT-1

## (undated) DEVICE — PACK CUSTOM UNIV BASIN SLI

## (undated) DEVICE — SOL CLEARIFY VISUALIZATION LAP

## (undated) DEVICE — SUT MONOCRYL 4-0 PS-2

## (undated) DEVICE — EVACUATOR WOUND BULB 100CC

## (undated) DEVICE — KIT CATH SURESTEP 350ML URIN18

## (undated) DEVICE — SHEET DRAPE MEDIUM

## (undated) DEVICE — DRAPE MINOR PROCEDURE

## (undated) DEVICE — CLIP HEMO-LOK MLX LARGE LF

## (undated) DEVICE — BLADE SURG CARBON STEEL SZ11

## (undated) DEVICE — LUBRICANT SURGILUBE 2 OZ

## (undated) DEVICE — SUT ABS CLIP LAPRA-TY CTD

## (undated) DEVICE — SCRUB 10% POVIDONE IODINE 4OZ

## (undated) DEVICE — BAG TISS RETRV MONARCH 10MM

## (undated) DEVICE — ADHESIVE DERMABOND ADVANCED

## (undated) DEVICE — TUBE NG W/ANTI-REFLUX FILTER 1

## (undated) DEVICE — CORD BIPOLAR 12 FOOT

## (undated) DEVICE — KIT ROBOTIC 4 ARM DA VINCI SI

## (undated) DEVICE — SET CYSTO IRR DRP CHMBR 84IN

## (undated) DEVICE — TROCAR ENDOPATH XCEL 12MM 10CM

## (undated) DEVICE — GLOVE SENSICARE PI ALOE 7

## (undated) DEVICE — CLIPPER BLADE MOD 4406 (CAREF)

## (undated) DEVICE — SYR 50ML CATH TIP

## (undated) DEVICE — SYR 10CC LUER LOCK

## (undated) DEVICE — SCISSOR 5MMX35CM DIRECT DRIVE

## (undated) DEVICE — SUT 3-0 VICRYL / RB-1

## (undated) DEVICE — CONTAINER SPECIMEN STRL 4OZ

## (undated) DEVICE — SET CYSTO IRRIGATION UNIV SPIK

## (undated) DEVICE — LINER SUCTION 3000CC

## (undated) DEVICE — SUT ETHILON 2-0 BLK PS-2

## (undated) DEVICE — SET TUBE PNEUMOCLEAR SE HI FLO

## (undated) DEVICE — TROCAR ENDOPATH XCEL 5X100MM

## (undated) DEVICE — KIT ENDOKIT EGD/COLON/ERCP

## (undated) DEVICE — DRESSING TRANS 2X2 TEGADERM

## (undated) DEVICE — APPLICATOR CHLORAPREP ORN 26ML

## (undated) DEVICE — TROCAR KII FIOS 12MM X 100MM